# Patient Record
Sex: FEMALE | Race: WHITE | NOT HISPANIC OR LATINO | Employment: UNEMPLOYED | URBAN - METROPOLITAN AREA
[De-identification: names, ages, dates, MRNs, and addresses within clinical notes are randomized per-mention and may not be internally consistent; named-entity substitution may affect disease eponyms.]

---

## 2017-04-04 ENCOUNTER — GENERIC CONVERSION - ENCOUNTER (OUTPATIENT)
Dept: OTHER | Facility: OTHER | Age: 27
End: 2017-04-04

## 2017-04-05 ENCOUNTER — ALLSCRIPTS OFFICE VISIT (OUTPATIENT)
Dept: OTHER | Facility: OTHER | Age: 27
End: 2017-04-05

## 2017-04-05 ENCOUNTER — APPOINTMENT (INPATIENT)
Dept: CT IMAGING | Facility: HOSPITAL | Age: 27
DRG: 280 | End: 2017-04-05
Payer: COMMERCIAL

## 2017-04-05 ENCOUNTER — HOSPITAL ENCOUNTER (INPATIENT)
Facility: HOSPITAL | Age: 27
LOS: 6 days | Discharge: HOME | DRG: 280 | End: 2017-04-11
Attending: EMERGENCY MEDICINE | Admitting: INTERNAL MEDICINE
Payer: COMMERCIAL

## 2017-04-05 ENCOUNTER — APPOINTMENT (EMERGENCY)
Dept: ULTRASOUND IMAGING | Facility: HOSPITAL | Age: 27
DRG: 280 | End: 2017-04-05
Payer: COMMERCIAL

## 2017-04-05 DIAGNOSIS — R17 JAUNDICE: ICD-10-CM

## 2017-04-05 DIAGNOSIS — F41.0 PANIC DISORDER: Chronic | ICD-10-CM

## 2017-04-05 DIAGNOSIS — F10.10 ALCOHOL ABUSE: Primary | ICD-10-CM

## 2017-04-05 DIAGNOSIS — F31.63 BIPOLAR DISORDER, CURR EPISODE MIXED, SEVERE, W/O PSYCHOTIC FEATURES (HCC): ICD-10-CM

## 2017-04-05 DIAGNOSIS — R74.01 TRANSAMINITIS: ICD-10-CM

## 2017-04-05 DIAGNOSIS — E78.5 ELEVATED LIPIDS: ICD-10-CM

## 2017-04-05 DIAGNOSIS — R79.89 LFT ELEVATION: ICD-10-CM

## 2017-04-05 LAB
ALBUMIN SERPL BCP-MCNC: 2.5 G/DL (ref 3.5–5)
ALP SERPL-CCNC: 371 U/L (ref 46–116)
ALT SERPL W P-5'-P-CCNC: 120 U/L (ref 12–78)
AMPHETAMINES SERPL QL SCN: NEGATIVE
ANION GAP SERPL CALCULATED.3IONS-SCNC: 6 MMOL/L (ref 4–13)
APAP SERPL-MCNC: <2 UG/ML (ref 10–30)
APTT PPP: 30 SECONDS (ref 24–36)
AST SERPL W P-5'-P-CCNC: 346 U/L (ref 5–45)
BACTERIA UR QL AUTO: ABNORMAL /HPF
BARBITURATES UR QL: NEGATIVE
BASOPHILS # BLD AUTO: 0.05 THOUSANDS/ΜL (ref 0–0.1)
BASOPHILS NFR BLD AUTO: 1 % (ref 0–1)
BENZODIAZ UR QL: NEGATIVE
BILIRUB DIRECT SERPL-MCNC: 6.71 MG/DL (ref 0–0.2)
BILIRUB SERPL-MCNC: 10.78 MG/DL (ref 0.2–1)
BILIRUB UR QL STRIP: ABNORMAL
BUN SERPL-MCNC: 5 MG/DL (ref 5–25)
CALCIUM SERPL-MCNC: 8 MG/DL (ref 8.3–10.1)
CHLORIDE SERPL-SCNC: 98 MMOL/L (ref 100–108)
CLARITY UR: CLEAR
CO2 SERPL-SCNC: 31 MMOL/L (ref 21–32)
COCAINE UR QL: NEGATIVE
COLOR UR: ABNORMAL
COLOR, POC: NORMAL
CREAT SERPL-MCNC: 0.41 MG/DL (ref 0.6–1.3)
EOSINOPHIL # BLD AUTO: 0.08 THOUSAND/ΜL (ref 0–0.61)
EOSINOPHIL NFR BLD AUTO: 1 % (ref 0–6)
ERYTHROCYTE [DISTWIDTH] IN BLOOD BY AUTOMATED COUNT: 15.2 % (ref 11.6–15.1)
ETHANOL SERPL-MCNC: <3 MG/DL (ref 0–3)
GFR SERPL CREATININE-BSD FRML MDRD: >60 ML/MIN/1.73SQ M
GLUCOSE SERPL-MCNC: 94 MG/DL (ref 65–140)
GLUCOSE UR STRIP-MCNC: NEGATIVE MG/DL
HCG UR QL: NEGATIVE
HCT VFR BLD AUTO: 33.5 % (ref 34.8–46.1)
HGB BLD-MCNC: 10.6 G/DL
HGB BLD-MCNC: 11.2 G/DL (ref 11.5–15.4)
HGB UR QL STRIP.AUTO: ABNORMAL
INR PPP: 1.2 (ref 0.86–1.16)
KETONES UR STRIP-MCNC: ABNORMAL MG/DL
LEUKOCYTE ESTERASE UR QL STRIP: NEGATIVE
LIPASE SERPL-CCNC: 313 U/L (ref 73–393)
LYMPHOCYTES # BLD AUTO: 0.88 THOUSANDS/ΜL (ref 0.6–4.47)
LYMPHOCYTES NFR BLD AUTO: 8 % (ref 14–44)
MCH RBC QN AUTO: 33.4 PG (ref 26.8–34.3)
MCHC RBC AUTO-ENTMCNC: 33.4 G/DL (ref 31.4–37.4)
MCV RBC AUTO: 100 FL (ref 82–98)
METHADONE UR QL: NEGATIVE
MONOCYTES # BLD AUTO: 0.69 THOUSAND/ΜL (ref 0.17–1.22)
MONOCYTES NFR BLD AUTO: 7 % (ref 4–12)
NEUTROPHILS # BLD AUTO: 8.97 THOUSANDS/ΜL (ref 1.85–7.62)
NEUTS SEG NFR BLD AUTO: 83 % (ref 43–75)
NITRITE UR QL STRIP: NEGATIVE
NON-SQ EPI CELLS URNS QL MICRO: ABNORMAL /HPF
NRBC BLD AUTO-RTO: 0 /100 WBCS
OPIATES UR QL SCN: NEGATIVE
PCP UR QL: NEGATIVE
PH UR STRIP.AUTO: 7 [PH] (ref 4.5–8)
PLATELET # BLD AUTO: 220 THOUSANDS/UL (ref 149–390)
PMV BLD AUTO: 11.4 FL (ref 8.9–12.7)
POTASSIUM SERPL-SCNC: 4.9 MMOL/L (ref 3.5–5.3)
PROT SERPL-MCNC: 6.9 G/DL (ref 6.4–8.2)
PROT UR STRIP-MCNC: NEGATIVE MG/DL
PROTHROMBIN TIME: 15.2 SECONDS (ref 12–14.3)
RBC # BLD AUTO: 3.35 MILLION/UL (ref 3.81–5.12)
RBC #/AREA URNS AUTO: ABNORMAL /HPF
SALICYLATES SERPL-MCNC: <3 MG/DL (ref 3–20)
SODIUM SERPL-SCNC: 135 MMOL/L (ref 136–145)
SP GR UR STRIP.AUTO: 1.01 (ref 1–1.03)
THC UR QL: NEGATIVE
TSH SERPL DL<=0.05 MIU/L-ACNC: 4.68 UIU/ML (ref 0.36–3.74)
UROBILINOGEN UR QL STRIP.AUTO: 1 E.U./DL
WBC # BLD AUTO: 10.67 THOUSAND/UL (ref 4.31–10.16)
WBC #/AREA URNS AUTO: ABNORMAL /HPF

## 2017-04-05 PROCEDURE — 81002 URINALYSIS NONAUTO W/O SCOPE: CPT

## 2017-04-05 PROCEDURE — 81001 URINALYSIS AUTO W/SCOPE: CPT

## 2017-04-05 PROCEDURE — 80307 DRUG TEST PRSMV CHEM ANLYZR: CPT | Performed by: EMERGENCY MEDICINE

## 2017-04-05 PROCEDURE — 80048 BASIC METABOLIC PNL TOTAL CA: CPT | Performed by: EMERGENCY MEDICINE

## 2017-04-05 PROCEDURE — 76705 ECHO EXAM OF ABDOMEN: CPT

## 2017-04-05 PROCEDURE — 85610 PROTHROMBIN TIME: CPT | Performed by: EMERGENCY MEDICINE

## 2017-04-05 PROCEDURE — 99285 EMERGENCY DEPT VISIT HI MDM: CPT

## 2017-04-05 PROCEDURE — 81025 URINE PREGNANCY TEST: CPT

## 2017-04-05 PROCEDURE — 83690 ASSAY OF LIPASE: CPT | Performed by: EMERGENCY MEDICINE

## 2017-04-05 PROCEDURE — 80076 HEPATIC FUNCTION PANEL: CPT | Performed by: EMERGENCY MEDICINE

## 2017-04-05 PROCEDURE — 84443 ASSAY THYROID STIM HORMONE: CPT | Performed by: EMERGENCY MEDICINE

## 2017-04-05 PROCEDURE — 85025 COMPLETE CBC W/AUTO DIFF WBC: CPT | Performed by: EMERGENCY MEDICINE

## 2017-04-05 PROCEDURE — 87086 URINE CULTURE/COLONY COUNT: CPT

## 2017-04-05 PROCEDURE — 85730 THROMBOPLASTIN TIME PARTIAL: CPT | Performed by: EMERGENCY MEDICINE

## 2017-04-05 PROCEDURE — 36415 COLL VENOUS BLD VENIPUNCTURE: CPT | Performed by: EMERGENCY MEDICINE

## 2017-04-05 PROCEDURE — 74177 CT ABD & PELVIS W/CONTRAST: CPT

## 2017-04-05 PROCEDURE — 80320 DRUG SCREEN QUANTALCOHOLS: CPT | Performed by: EMERGENCY MEDICINE

## 2017-04-05 PROCEDURE — 80329 ANALGESICS NON-OPIOID 1 OR 2: CPT | Performed by: EMERGENCY MEDICINE

## 2017-04-05 RX ORDER — IBUPROFEN 400 MG/1
400 TABLET ORAL ONCE
Status: COMPLETED | OUTPATIENT
Start: 2017-04-05 | End: 2017-04-05

## 2017-04-05 RX ORDER — MAGNESIUM HYDROXIDE/ALUMINUM HYDROXICE/SIMETHICONE 120; 1200; 1200 MG/30ML; MG/30ML; MG/30ML
30 SUSPENSION ORAL EVERY 6 HOURS PRN
Status: DISCONTINUED | OUTPATIENT
Start: 2017-04-05 | End: 2017-04-11 | Stop reason: HOSPADM

## 2017-04-05 RX ORDER — ALBUMIN (HUMAN) 12.5 G/50ML
25 SOLUTION INTRAVENOUS ONCE
Status: COMPLETED | OUTPATIENT
Start: 2017-04-05 | End: 2017-04-07

## 2017-04-05 RX ORDER — ONDANSETRON 2 MG/ML
4 INJECTION INTRAMUSCULAR; INTRAVENOUS EVERY 6 HOURS PRN
Status: DISCONTINUED | OUTPATIENT
Start: 2017-04-05 | End: 2017-04-07

## 2017-04-05 RX ORDER — ACETAMINOPHEN 325 MG/1
650 TABLET ORAL EVERY 6 HOURS PRN
Status: DISCONTINUED | OUTPATIENT
Start: 2017-04-05 | End: 2017-04-07

## 2017-04-05 RX ORDER — SODIUM CHLORIDE 9 MG/ML
75 INJECTION, SOLUTION INTRAVENOUS CONTINUOUS
Status: DISCONTINUED | OUTPATIENT
Start: 2017-04-05 | End: 2017-04-07

## 2017-04-05 RX ORDER — LORAZEPAM 1 MG/1
0.5 TABLET ORAL ONCE
Status: COMPLETED | OUTPATIENT
Start: 2017-04-05 | End: 2017-04-05

## 2017-04-05 RX ORDER — QUETIAPINE FUMARATE 200 MG/1
400 TABLET, FILM COATED ORAL
Status: DISCONTINUED | OUTPATIENT
Start: 2017-04-05 | End: 2017-04-11 | Stop reason: HOSPADM

## 2017-04-05 RX ORDER — AZELASTINE 1 MG/ML
1 SPRAY, METERED NASAL 2 TIMES DAILY
COMMUNITY
End: 2017-05-18

## 2017-04-05 RX ORDER — FOLIC ACID 1 MG/1
1 TABLET ORAL DAILY
COMMUNITY

## 2017-04-05 RX ORDER — QUETIAPINE FUMARATE 25 MG/1
100 TABLET, FILM COATED ORAL 2 TIMES DAILY
Status: DISCONTINUED | OUTPATIENT
Start: 2017-04-05 | End: 2017-04-11 | Stop reason: HOSPADM

## 2017-04-05 RX ORDER — LEVETIRACETAM 500 MG/1
500 TABLET ORAL 2 TIMES DAILY
COMMUNITY
End: 2018-06-21

## 2017-04-05 RX ORDER — IBUPROFEN 400 MG/1
400 TABLET ORAL ONCE
Status: DISCONTINUED | OUTPATIENT
Start: 2017-04-06 | End: 2017-04-08

## 2017-04-05 RX ORDER — LORAZEPAM 0.5 MG/1
0.5 TABLET ORAL EVERY 6 HOURS PRN
Status: DISCONTINUED | OUTPATIENT
Start: 2017-04-05 | End: 2017-04-11 | Stop reason: HOSPADM

## 2017-04-05 RX ORDER — FOLIC ACID 1 MG/1
1 TABLET ORAL DAILY
Status: DISCONTINUED | OUTPATIENT
Start: 2017-04-05 | End: 2017-04-08

## 2017-04-05 RX ORDER — CHLORDIAZEPOXIDE HYDROCHLORIDE 10 MG/1
10 CAPSULE, GELATIN COATED ORAL EVERY 8 HOURS
Status: DISCONTINUED | OUTPATIENT
Start: 2017-04-05 | End: 2017-04-07

## 2017-04-05 RX ORDER — FOLIC ACID 1 MG/1
1 TABLET ORAL DAILY
Status: DISCONTINUED | OUTPATIENT
Start: 2017-04-06 | End: 2017-04-05

## 2017-04-05 RX ORDER — THIAMINE MONONITRATE (VIT B1) 100 MG
100 TABLET ORAL DAILY
Status: DISCONTINUED | OUTPATIENT
Start: 2017-04-05 | End: 2017-04-07

## 2017-04-05 RX ORDER — LEVETIRACETAM 500 MG/1
500 TABLET ORAL 2 TIMES DAILY
Status: DISCONTINUED | OUTPATIENT
Start: 2017-04-05 | End: 2017-04-11 | Stop reason: HOSPADM

## 2017-04-05 RX ADMIN — SODIUM CHLORIDE 75 ML/HR: 0.9 INJECTION, SOLUTION INTRAVENOUS at 23:32

## 2017-04-05 RX ADMIN — IOHEXOL 100 ML: 350 INJECTION, SOLUTION INTRAVENOUS at 23:02

## 2017-04-05 RX ADMIN — ALBUMIN HUMAN 25 G: 0.25 SOLUTION INTRAVENOUS at 22:00

## 2017-04-05 RX ADMIN — LEVETIRACETAM 500 MG: 500 TABLET, FILM COATED ORAL at 21:22

## 2017-04-05 RX ADMIN — IBUPROFEN 400 MG: 400 TABLET ORAL at 19:18

## 2017-04-05 RX ADMIN — FOLIC ACID 1 MG: 1 TABLET ORAL at 22:07

## 2017-04-05 RX ADMIN — ACETAMINOPHEN 650 MG: 325 TABLET, FILM COATED ORAL at 21:22

## 2017-04-05 RX ADMIN — LORAZEPAM 0.5 MG: 0.5 TABLET ORAL at 21:22

## 2017-04-05 RX ADMIN — LORAZEPAM 0.5 MG: 1 TABLET ORAL at 18:43

## 2017-04-05 RX ADMIN — Medication 100 MG: at 22:07

## 2017-04-05 RX ADMIN — CHLORDIAZEPOXIDE HYDROCHLORIDE 10 MG: 10 CAPSULE ORAL at 21:23

## 2017-04-05 RX ADMIN — QUETIAPINE FUMARATE 100 MG: 25 TABLET, FILM COATED ORAL at 21:22

## 2017-04-06 ENCOUNTER — APPOINTMENT (INPATIENT)
Dept: NUCLEAR MEDICINE | Facility: HOSPITAL | Age: 27
DRG: 280 | End: 2017-04-06
Payer: COMMERCIAL

## 2017-04-06 LAB
ALBUMIN SERPL BCP-MCNC: 2.5 G/DL (ref 3.5–5)
ALP SERPL-CCNC: 286 U/L (ref 46–116)
ALT SERPL W P-5'-P-CCNC: 83 U/L (ref 12–78)
ANION GAP SERPL CALCULATED.3IONS-SCNC: 7 MMOL/L (ref 4–13)
AST SERPL W P-5'-P-CCNC: 204 U/L (ref 5–45)
BACTERIA UR CULT: NORMAL
BILIRUB DIRECT SERPL-MCNC: 6.59 MG/DL (ref 0–0.2)
BILIRUB SERPL-MCNC: 8.28 MG/DL (ref 0.2–1)
BUN SERPL-MCNC: 6 MG/DL (ref 5–25)
CALCIUM SERPL-MCNC: 7.7 MG/DL (ref 8.3–10.1)
CHLORIDE SERPL-SCNC: 103 MMOL/L (ref 100–108)
CO2 SERPL-SCNC: 26 MMOL/L (ref 21–32)
CREAT SERPL-MCNC: 0.4 MG/DL (ref 0.6–1.3)
ERYTHROCYTE [DISTWIDTH] IN BLOOD BY AUTOMATED COUNT: 15.3 % (ref 11.6–15.1)
FOLATE SERPL-MCNC: 17.7 NG/ML (ref 3.1–17.5)
GFR SERPL CREATININE-BSD FRML MDRD: >60 ML/MIN/1.73SQ M
GLUCOSE SERPL-MCNC: 92 MG/DL (ref 65–140)
HCT VFR BLD AUTO: 30.5 % (ref 34.8–46.1)
HGB BLD-MCNC: 9.8 G/DL (ref 11.5–15.4)
INR PPP: 1.25 (ref 0.86–1.16)
MCH RBC QN AUTO: 32 PG (ref 26.8–34.3)
MCHC RBC AUTO-ENTMCNC: 32.1 G/DL (ref 31.4–37.4)
MCV RBC AUTO: 100 FL (ref 82–98)
PLATELET # BLD AUTO: 180 THOUSANDS/UL (ref 149–390)
PMV BLD AUTO: 11.1 FL (ref 8.9–12.7)
POTASSIUM SERPL-SCNC: 3 MMOL/L (ref 3.5–5.3)
PROT SERPL-MCNC: 6.1 G/DL (ref 6.4–8.2)
PROTHROMBIN TIME: 15.6 SECONDS (ref 12–14.3)
RBC # BLD AUTO: 3.06 MILLION/UL (ref 3.81–5.12)
SODIUM SERPL-SCNC: 136 MMOL/L (ref 136–145)
VIT B12 SERPL-MCNC: 1350 PG/ML (ref 100–900)
WBC # BLD AUTO: 8.24 THOUSAND/UL (ref 4.31–10.16)

## 2017-04-06 PROCEDURE — 80048 BASIC METABOLIC PNL TOTAL CA: CPT | Performed by: PHYSICIAN ASSISTANT

## 2017-04-06 PROCEDURE — 85027 COMPLETE CBC AUTOMATED: CPT | Performed by: PHYSICIAN ASSISTANT

## 2017-04-06 PROCEDURE — A9537 TC99M MEBROFENIN: HCPCS

## 2017-04-06 PROCEDURE — 80076 HEPATIC FUNCTION PANEL: CPT | Performed by: PHYSICIAN ASSISTANT

## 2017-04-06 PROCEDURE — 82746 ASSAY OF FOLIC ACID SERUM: CPT | Performed by: PHYSICIAN ASSISTANT

## 2017-04-06 PROCEDURE — 83625 ASSAY OF LDH ENZYMES: CPT | Performed by: PHYSICIAN ASSISTANT

## 2017-04-06 PROCEDURE — 82607 VITAMIN B-12: CPT | Performed by: PHYSICIAN ASSISTANT

## 2017-04-06 PROCEDURE — 85610 PROTHROMBIN TIME: CPT | Performed by: PHYSICIAN ASSISTANT

## 2017-04-06 PROCEDURE — 83615 LACTATE (LD) (LDH) ENZYME: CPT | Performed by: PHYSICIAN ASSISTANT

## 2017-04-06 PROCEDURE — 78227 HEPATOBIL SYST IMAGE W/DRUG: CPT

## 2017-04-06 RX ORDER — MORPHINE SULFATE 4 MG/ML
3 INJECTION, SOLUTION INTRAMUSCULAR; INTRAVENOUS
Status: COMPLETED | OUTPATIENT
Start: 2017-04-06 | End: 2017-04-06

## 2017-04-06 RX ORDER — LORAZEPAM 2 MG/ML
0.5 INJECTION INTRAMUSCULAR ONCE
Status: COMPLETED | OUTPATIENT
Start: 2017-04-06 | End: 2017-04-06

## 2017-04-06 RX ORDER — POTASSIUM CHLORIDE 20 MEQ/1
40 TABLET, EXTENDED RELEASE ORAL 2 TIMES DAILY
Status: DISPENSED | OUTPATIENT
Start: 2017-04-06 | End: 2017-04-07

## 2017-04-06 RX ORDER — PANTOPRAZOLE SODIUM 40 MG/1
40 TABLET, DELAYED RELEASE ORAL
Status: DISCONTINUED | OUTPATIENT
Start: 2017-04-06 | End: 2017-04-11 | Stop reason: HOSPADM

## 2017-04-06 RX ADMIN — ACETAMINOPHEN 650 MG: 325 TABLET, FILM COATED ORAL at 19:40

## 2017-04-06 RX ADMIN — LORAZEPAM 0.5 MG: 0.5 TABLET ORAL at 16:31

## 2017-04-06 RX ADMIN — LEVETIRACETAM 500 MG: 500 TABLET, FILM COATED ORAL at 10:53

## 2017-04-06 RX ADMIN — Medication 100 MG: at 10:53

## 2017-04-06 RX ADMIN — LORAZEPAM 0.5 MG: 0.5 TABLET ORAL at 22:32

## 2017-04-06 RX ADMIN — SODIUM CHLORIDE 75 ML/HR: 0.9 INJECTION, SOLUTION INTRAVENOUS at 15:32

## 2017-04-06 RX ADMIN — MORPHINE SULFATE 3 MG: 4 INJECTION, SOLUTION INTRAMUSCULAR; INTRAVENOUS at 15:15

## 2017-04-06 RX ADMIN — LORAZEPAM 0.5 MG: 2 INJECTION, SOLUTION INTRAMUSCULAR; INTRAVENOUS at 11:59

## 2017-04-06 RX ADMIN — PANTOPRAZOLE SODIUM 40 MG: 40 TABLET, DELAYED RELEASE ORAL at 16:29

## 2017-04-06 RX ADMIN — FOLIC ACID 1 MG: 1 TABLET ORAL at 10:53

## 2017-04-06 RX ADMIN — LEVETIRACETAM 500 MG: 500 TABLET, FILM COATED ORAL at 19:24

## 2017-04-06 RX ADMIN — ENOXAPARIN SODIUM 40 MG: 40 INJECTION SUBCUTANEOUS at 10:52

## 2017-04-06 RX ADMIN — POTASSIUM CHLORIDE 40 MEQ: 1500 TABLET, EXTENDED RELEASE ORAL at 19:24

## 2017-04-07 LAB
ALBUMIN SERPL BCP-MCNC: 2.4 G/DL (ref 3.5–5)
ALP SERPL-CCNC: 267 U/L (ref 46–116)
ALT SERPL W P-5'-P-CCNC: 77 U/L (ref 12–78)
ANION GAP SERPL CALCULATED.3IONS-SCNC: 7 MMOL/L (ref 4–13)
AST SERPL W P-5'-P-CCNC: 178 U/L (ref 5–45)
BILIRUB DIRECT SERPL-MCNC: 6.07 MG/DL (ref 0–0.2)
BILIRUB SERPL-MCNC: 7.23 MG/DL (ref 0.2–1)
BUN SERPL-MCNC: 6 MG/DL (ref 5–25)
CALCIUM SERPL-MCNC: 7.7 MG/DL (ref 8.3–10.1)
CHLORIDE SERPL-SCNC: 104 MMOL/L (ref 100–108)
CO2 SERPL-SCNC: 24 MMOL/L (ref 21–32)
CREAT SERPL-MCNC: 0.44 MG/DL (ref 0.6–1.3)
ERYTHROCYTE [DISTWIDTH] IN BLOOD BY AUTOMATED COUNT: 15.2 % (ref 11.6–15.1)
GFR SERPL CREATININE-BSD FRML MDRD: >60 ML/MIN/1.73SQ M
GLUCOSE SERPL-MCNC: 94 MG/DL (ref 65–140)
HCT VFR BLD AUTO: 31.6 % (ref 34.8–46.1)
HGB BLD-MCNC: 10.3 G/DL (ref 11.5–15.4)
INR PPP: 1.17 (ref 0.86–1.16)
MCH RBC QN AUTO: 33.2 PG (ref 26.8–34.3)
MCHC RBC AUTO-ENTMCNC: 32.6 G/DL (ref 31.4–37.4)
MCV RBC AUTO: 102 FL (ref 82–98)
PLATELET # BLD AUTO: 193 THOUSANDS/UL (ref 149–390)
PMV BLD AUTO: 10.8 FL (ref 8.9–12.7)
POTASSIUM SERPL-SCNC: 3.9 MMOL/L (ref 3.5–5.3)
PROT SERPL-MCNC: 6.1 G/DL (ref 6.4–8.2)
PROTHROMBIN TIME: 14.9 SECONDS (ref 12–14.3)
RBC # BLD AUTO: 3.1 MILLION/UL (ref 3.81–5.12)
SODIUM SERPL-SCNC: 135 MMOL/L (ref 136–145)
WBC # BLD AUTO: 10.9 THOUSAND/UL (ref 4.31–10.16)

## 2017-04-07 PROCEDURE — 85610 PROTHROMBIN TIME: CPT | Performed by: INTERNAL MEDICINE

## 2017-04-07 PROCEDURE — 85027 COMPLETE CBC AUTOMATED: CPT | Performed by: INTERNAL MEDICINE

## 2017-04-07 PROCEDURE — 80076 HEPATIC FUNCTION PANEL: CPT | Performed by: INTERNAL MEDICINE

## 2017-04-07 PROCEDURE — 80048 BASIC METABOLIC PNL TOTAL CA: CPT | Performed by: INTERNAL MEDICINE

## 2017-04-07 RX ORDER — DEXTROSE AND SODIUM CHLORIDE 5; .9 G/100ML; G/100ML
100 INJECTION, SOLUTION INTRAVENOUS CONTINUOUS
Status: DISCONTINUED | OUTPATIENT
Start: 2017-04-07 | End: 2017-04-10

## 2017-04-07 RX ADMIN — CHLORDIAZEPOXIDE HYDROCHLORIDE 10 MG: 10 CAPSULE ORAL at 15:10

## 2017-04-07 RX ADMIN — LEVETIRACETAM 500 MG: 500 TABLET, FILM COATED ORAL at 20:46

## 2017-04-07 RX ADMIN — LORAZEPAM 0.5 MG: 0.5 TABLET ORAL at 09:18

## 2017-04-07 RX ADMIN — ENOXAPARIN SODIUM 40 MG: 40 INJECTION SUBCUTANEOUS at 09:17

## 2017-04-07 RX ADMIN — SODIUM CHLORIDE 75 ML/HR: 0.9 INJECTION, SOLUTION INTRAVENOUS at 04:39

## 2017-04-07 RX ADMIN — DEXTROSE AND SODIUM CHLORIDE 100 ML/HR: 5; .9 INJECTION, SOLUTION INTRAVENOUS at 15:20

## 2017-04-07 RX ADMIN — LORAZEPAM 0.5 MG: 0.5 TABLET ORAL at 21:36

## 2017-04-07 RX ADMIN — POTASSIUM CHLORIDE 40 MEQ: 1500 TABLET, EXTENDED RELEASE ORAL at 09:17

## 2017-04-07 RX ADMIN — THIAMINE HYDROCHLORIDE 100 MG: 100 INJECTION, SOLUTION INTRAMUSCULAR; INTRAVENOUS at 15:54

## 2017-04-07 RX ADMIN — PANTOPRAZOLE SODIUM 40 MG: 40 TABLET, DELAYED RELEASE ORAL at 05:47

## 2017-04-07 RX ADMIN — Medication 100 MG: at 09:17

## 2017-04-07 RX ADMIN — LEVETIRACETAM 500 MG: 500 TABLET, FILM COATED ORAL at 09:30

## 2017-04-07 RX ADMIN — FOLIC ACID 1 MG: 1 TABLET ORAL at 09:17

## 2017-04-07 RX ADMIN — CHLORDIAZEPOXIDE HYDROCHLORIDE 15 MG: 10 CAPSULE ORAL at 20:46

## 2017-04-07 RX ADMIN — LORAZEPAM 0.5 MG: 0.5 TABLET ORAL at 15:21

## 2017-04-08 LAB
ALBUMIN SERPL BCP-MCNC: 2.3 G/DL (ref 3.5–5)
ALP SERPL-CCNC: 265 U/L (ref 46–116)
ALT SERPL W P-5'-P-CCNC: 70 U/L (ref 12–78)
ANION GAP SERPL CALCULATED.3IONS-SCNC: 8 MMOL/L (ref 4–13)
AST SERPL W P-5'-P-CCNC: 173 U/L (ref 5–45)
BILIRUB SERPL-MCNC: 5.7 MG/DL (ref 0.2–1)
BUN SERPL-MCNC: 4 MG/DL (ref 5–25)
CALCIUM SERPL-MCNC: 7.7 MG/DL (ref 8.3–10.1)
CHLORIDE SERPL-SCNC: 104 MMOL/L (ref 100–108)
CO2 SERPL-SCNC: 22 MMOL/L (ref 21–32)
CREAT SERPL-MCNC: 0.49 MG/DL (ref 0.6–1.3)
ERYTHROCYTE [DISTWIDTH] IN BLOOD BY AUTOMATED COUNT: 15.6 % (ref 11.6–15.1)
GFR SERPL CREATININE-BSD FRML MDRD: >60 ML/MIN/1.73SQ M
GLUCOSE SERPL-MCNC: 122 MG/DL (ref 65–140)
HCT VFR BLD AUTO: 30.6 % (ref 34.8–46.1)
HGB BLD-MCNC: 9.9 G/DL (ref 11.5–15.4)
INR PPP: 1.14 (ref 0.86–1.16)
LDH SERPL-CCNC: 202 IU/L (ref 119–226)
LDH1 CFR SERPL ELPH: 16 % (ref 17–32)
LDH2 CFR SERPL ELPH: 27 % (ref 25–40)
LDH3 CFR SERPL ELPH: 21 % (ref 17–27)
LDH4 CFR SERPL ELPH: 9 % (ref 5–13)
LDH5 CFR SERPL ELPH: 27 % (ref 4–20)
MCH RBC QN AUTO: 33.3 PG (ref 26.8–34.3)
MCHC RBC AUTO-ENTMCNC: 32.4 G/DL (ref 31.4–37.4)
MCV RBC AUTO: 103 FL (ref 82–98)
PLATELET # BLD AUTO: 188 THOUSANDS/UL (ref 149–390)
PMV BLD AUTO: 11.1 FL (ref 8.9–12.7)
POTASSIUM SERPL-SCNC: 3.4 MMOL/L (ref 3.5–5.3)
PROT SERPL-MCNC: 6.1 G/DL (ref 6.4–8.2)
PROTHROMBIN TIME: 14.6 SECONDS (ref 12–14.3)
RBC # BLD AUTO: 2.97 MILLION/UL (ref 3.81–5.12)
SODIUM SERPL-SCNC: 134 MMOL/L (ref 136–145)
WBC # BLD AUTO: 10.32 THOUSAND/UL (ref 4.31–10.16)

## 2017-04-08 PROCEDURE — 85027 COMPLETE CBC AUTOMATED: CPT | Performed by: INTERNAL MEDICINE

## 2017-04-08 PROCEDURE — 80053 COMPREHEN METABOLIC PANEL: CPT | Performed by: INTERNAL MEDICINE

## 2017-04-08 PROCEDURE — 85610 PROTHROMBIN TIME: CPT | Performed by: INTERNAL MEDICINE

## 2017-04-08 RX ORDER — FERROUS SULFATE 325(65) MG
325 TABLET ORAL
Status: DISCONTINUED | OUTPATIENT
Start: 2017-04-09 | End: 2017-04-11 | Stop reason: HOSPADM

## 2017-04-08 RX ORDER — CALCIUM CARBONATE 500(1250)
1 TABLET ORAL 2 TIMES DAILY WITH MEALS
Status: DISCONTINUED | OUTPATIENT
Start: 2017-04-08 | End: 2017-04-11 | Stop reason: HOSPADM

## 2017-04-08 RX ORDER — POTASSIUM CHLORIDE 20 MEQ/1
40 TABLET, EXTENDED RELEASE ORAL ONCE
Status: COMPLETED | OUTPATIENT
Start: 2017-04-08 | End: 2017-04-08

## 2017-04-08 RX ORDER — LACTULOSE 20 G/30ML
10 SOLUTION ORAL 2 TIMES DAILY
Status: DISCONTINUED | OUTPATIENT
Start: 2017-04-08 | End: 2017-04-11 | Stop reason: HOSPADM

## 2017-04-08 RX ADMIN — LORAZEPAM 0.5 MG: 0.5 TABLET ORAL at 17:45

## 2017-04-08 RX ADMIN — THIAMINE HYDROCHLORIDE 100 MG: 100 INJECTION, SOLUTION INTRAMUSCULAR; INTRAVENOUS at 17:38

## 2017-04-08 RX ADMIN — PANTOPRAZOLE SODIUM 40 MG: 40 TABLET, DELAYED RELEASE ORAL at 06:08

## 2017-04-08 RX ADMIN — LEVETIRACETAM 500 MG: 500 TABLET, FILM COATED ORAL at 09:50

## 2017-04-08 RX ADMIN — CHLORDIAZEPOXIDE HYDROCHLORIDE 15 MG: 10 CAPSULE ORAL at 20:15

## 2017-04-08 RX ADMIN — FOLIC ACID 1 MG: 1 TABLET ORAL at 09:48

## 2017-04-08 RX ADMIN — LEVETIRACETAM 500 MG: 500 TABLET, FILM COATED ORAL at 20:08

## 2017-04-08 RX ADMIN — POTASSIUM CHLORIDE 40 MEQ: 1500 TABLET, EXTENDED RELEASE ORAL at 11:36

## 2017-04-08 RX ADMIN — ENOXAPARIN SODIUM 40 MG: 40 INJECTION SUBCUTANEOUS at 09:48

## 2017-04-08 RX ADMIN — LORAZEPAM 0.5 MG: 0.5 TABLET ORAL at 23:45

## 2017-04-08 RX ADMIN — DEXTROSE AND SODIUM CHLORIDE 100 ML/HR: 5; .9 INJECTION, SOLUTION INTRAVENOUS at 11:39

## 2017-04-08 RX ADMIN — Medication 1 TABLET: at 17:32

## 2017-04-08 RX ADMIN — CHLORDIAZEPOXIDE HYDROCHLORIDE 15 MG: 10 CAPSULE ORAL at 13:08

## 2017-04-08 RX ADMIN — CHLORDIAZEPOXIDE HYDROCHLORIDE 15 MG: 10 CAPSULE ORAL at 04:34

## 2017-04-08 RX ADMIN — LORAZEPAM 0.5 MG: 0.5 TABLET ORAL at 11:36

## 2017-04-08 RX ADMIN — LORAZEPAM 0.5 MG: 0.5 TABLET ORAL at 04:34

## 2017-04-08 RX ADMIN — DEXTROSE AND SODIUM CHLORIDE 100 ML/HR: 5; .9 INJECTION, SOLUTION INTRAVENOUS at 19:39

## 2017-04-09 ENCOUNTER — ANESTHESIA EVENT (INPATIENT)
Dept: GASTROENTEROLOGY | Facility: HOSPITAL | Age: 27
DRG: 280 | End: 2017-04-09
Payer: COMMERCIAL

## 2017-04-09 LAB
ALBUMIN SERPL BCP-MCNC: 2.4 G/DL (ref 3.5–5)
ALP SERPL-CCNC: 262 U/L (ref 46–116)
ALT SERPL W P-5'-P-CCNC: 73 U/L (ref 12–78)
AMMONIA PLAS-SCNC: <10 UMOL/L (ref 11–35)
ANION GAP SERPL CALCULATED.3IONS-SCNC: 7 MMOL/L (ref 4–13)
AST SERPL W P-5'-P-CCNC: 172 U/L (ref 5–45)
BASOPHILS # BLD AUTO: 0.03 THOUSANDS/ΜL (ref 0–0.1)
BASOPHILS NFR BLD AUTO: 0 % (ref 0–1)
BILIRUB SERPL-MCNC: 5.15 MG/DL (ref 0.2–1)
BUN SERPL-MCNC: 4 MG/DL (ref 5–25)
CALCIUM SERPL-MCNC: 8.4 MG/DL (ref 8.3–10.1)
CHLORIDE SERPL-SCNC: 104 MMOL/L (ref 100–108)
CO2 SERPL-SCNC: 26 MMOL/L (ref 21–32)
CREAT SERPL-MCNC: 0.47 MG/DL (ref 0.6–1.3)
EOSINOPHIL # BLD AUTO: 0.07 THOUSAND/ΜL (ref 0–0.61)
EOSINOPHIL NFR BLD AUTO: 1 % (ref 0–6)
ERYTHROCYTE [DISTWIDTH] IN BLOOD BY AUTOMATED COUNT: 15.8 % (ref 11.6–15.1)
GFR SERPL CREATININE-BSD FRML MDRD: >60 ML/MIN/1.73SQ M
GLUCOSE SERPL-MCNC: 102 MG/DL (ref 65–140)
HCT VFR BLD AUTO: 33.2 % (ref 34.8–46.1)
HGB BLD-MCNC: 10.8 G/DL (ref 11.5–15.4)
LYMPHOCYTES # BLD AUTO: 1.13 THOUSANDS/ΜL (ref 0.6–4.47)
LYMPHOCYTES NFR BLD AUTO: 13 % (ref 14–44)
MCH RBC QN AUTO: 33.9 PG (ref 26.8–34.3)
MCHC RBC AUTO-ENTMCNC: 32.5 G/DL (ref 31.4–37.4)
MCV RBC AUTO: 104 FL (ref 82–98)
MONOCYTES # BLD AUTO: 0.92 THOUSAND/ΜL (ref 0.17–1.22)
MONOCYTES NFR BLD AUTO: 10 % (ref 4–12)
NEUTROPHILS # BLD AUTO: 6.85 THOUSANDS/ΜL (ref 1.85–7.62)
NEUTS SEG NFR BLD AUTO: 76 % (ref 43–75)
NRBC BLD AUTO-RTO: 0 /100 WBCS
PLATELET # BLD AUTO: 174 THOUSANDS/UL (ref 149–390)
PMV BLD AUTO: 11.2 FL (ref 8.9–12.7)
POTASSIUM SERPL-SCNC: 3.6 MMOL/L (ref 3.5–5.3)
PROT SERPL-MCNC: 6.6 G/DL (ref 6.4–8.2)
RBC # BLD AUTO: 3.19 MILLION/UL (ref 3.81–5.12)
SODIUM SERPL-SCNC: 137 MMOL/L (ref 136–145)
WBC # BLD AUTO: 9 THOUSAND/UL (ref 4.31–10.16)

## 2017-04-09 PROCEDURE — 85025 COMPLETE CBC W/AUTO DIFF WBC: CPT | Performed by: INTERNAL MEDICINE

## 2017-04-09 PROCEDURE — 80053 COMPREHEN METABOLIC PANEL: CPT | Performed by: INTERNAL MEDICINE

## 2017-04-09 PROCEDURE — 82140 ASSAY OF AMMONIA: CPT | Performed by: INTERNAL MEDICINE

## 2017-04-09 RX ADMIN — ENOXAPARIN SODIUM 40 MG: 40 INJECTION SUBCUTANEOUS at 08:18

## 2017-04-09 RX ADMIN — FERROUS SULFATE TAB 325 MG (65 MG ELEMENTAL FE) 325 MG: 325 (65 FE) TAB at 08:18

## 2017-04-09 RX ADMIN — LORAZEPAM 0.5 MG: 0.5 TABLET ORAL at 06:27

## 2017-04-09 RX ADMIN — LORAZEPAM 0.5 MG: 0.5 TABLET ORAL at 18:53

## 2017-04-09 RX ADMIN — CHLORDIAZEPOXIDE HYDROCHLORIDE 15 MG: 10 CAPSULE ORAL at 21:29

## 2017-04-09 RX ADMIN — Medication 1 TABLET: at 16:20

## 2017-04-09 RX ADMIN — DEXTROSE AND SODIUM CHLORIDE 100 ML/HR: 5; .9 INJECTION, SOLUTION INTRAVENOUS at 16:47

## 2017-04-09 RX ADMIN — PANTOPRAZOLE SODIUM 40 MG: 40 TABLET, DELAYED RELEASE ORAL at 05:02

## 2017-04-09 RX ADMIN — CHLORDIAZEPOXIDE HYDROCHLORIDE 15 MG: 10 CAPSULE ORAL at 12:52

## 2017-04-09 RX ADMIN — DEXTROSE AND SODIUM CHLORIDE 100 ML/HR: 5; .9 INJECTION, SOLUTION INTRAVENOUS at 06:24

## 2017-04-09 RX ADMIN — Medication 1 TABLET: at 08:18

## 2017-04-09 RX ADMIN — LEVETIRACETAM 500 MG: 500 TABLET, FILM COATED ORAL at 11:03

## 2017-04-09 RX ADMIN — LORAZEPAM 0.5 MG: 0.5 TABLET ORAL at 12:52

## 2017-04-09 RX ADMIN — CHLORDIAZEPOXIDE HYDROCHLORIDE 15 MG: 10 CAPSULE ORAL at 04:15

## 2017-04-09 RX ADMIN — LEVETIRACETAM 500 MG: 500 TABLET, FILM COATED ORAL at 21:29

## 2017-04-10 ENCOUNTER — ANESTHESIA (INPATIENT)
Dept: GASTROENTEROLOGY | Facility: HOSPITAL | Age: 27
DRG: 280 | End: 2017-04-10
Payer: COMMERCIAL

## 2017-04-10 PROCEDURE — 0DJ08ZZ INSPECTION OF UPPER INTESTINAL TRACT, VIA NATURAL OR ARTIFICIAL OPENING ENDOSCOPIC: ICD-10-PCS | Performed by: INTERNAL MEDICINE

## 2017-04-10 RX ORDER — CHLORDIAZEPOXIDE HYDROCHLORIDE 10 MG/1
10 CAPSULE, GELATIN COATED ORAL EVERY 8 HOURS
Status: DISCONTINUED | OUTPATIENT
Start: 2017-04-10 | End: 2017-04-11 | Stop reason: HOSPADM

## 2017-04-10 RX ORDER — SODIUM CHLORIDE 9 MG/ML
INJECTION, SOLUTION INTRAVENOUS CONTINUOUS PRN
Status: DISCONTINUED | OUTPATIENT
Start: 2017-04-10 | End: 2017-04-10 | Stop reason: SURG

## 2017-04-10 RX ORDER — PROPOFOL 10 MG/ML
INJECTION, EMULSION INTRAVENOUS AS NEEDED
Status: DISCONTINUED | OUTPATIENT
Start: 2017-04-10 | End: 2017-04-10 | Stop reason: SURG

## 2017-04-10 RX ORDER — DEXTROSE AND SODIUM CHLORIDE 5; .9 G/100ML; G/100ML
75 INJECTION, SOLUTION INTRAVENOUS CONTINUOUS
Status: DISCONTINUED | OUTPATIENT
Start: 2017-04-10 | End: 2017-04-11

## 2017-04-10 RX ADMIN — FERROUS SULFATE TAB 325 MG (65 MG ELEMENTAL FE) 325 MG: 325 (65 FE) TAB at 09:25

## 2017-04-10 RX ADMIN — CHLORDIAZEPOXIDE HYDROCHLORIDE 15 MG: 10 CAPSULE ORAL at 05:05

## 2017-04-10 RX ADMIN — LORAZEPAM 0.5 MG: 0.5 TABLET ORAL at 11:51

## 2017-04-10 RX ADMIN — Medication 1 TABLET: at 09:25

## 2017-04-10 RX ADMIN — PROPOFOL 30 MG: 10 INJECTION, EMULSION INTRAVENOUS at 13:04

## 2017-04-10 RX ADMIN — PROPOFOL 170 MG: 10 INJECTION, EMULSION INTRAVENOUS at 13:00

## 2017-04-10 RX ADMIN — LORAZEPAM 0.5 MG: 0.5 TABLET ORAL at 18:32

## 2017-04-10 RX ADMIN — SODIUM CHLORIDE: 0.9 INJECTION, SOLUTION INTRAVENOUS at 12:56

## 2017-04-10 RX ADMIN — LORAZEPAM 0.5 MG: 0.5 TABLET ORAL at 00:56

## 2017-04-10 RX ADMIN — PANTOPRAZOLE SODIUM 40 MG: 40 TABLET, DELAYED RELEASE ORAL at 05:05

## 2017-04-10 RX ADMIN — LEVETIRACETAM 500 MG: 500 TABLET, FILM COATED ORAL at 09:25

## 2017-04-10 RX ADMIN — Medication 1 TABLET: at 16:40

## 2017-04-10 RX ADMIN — DEXTROSE AND SODIUM CHLORIDE 100 ML/HR: 5; .9 INJECTION, SOLUTION INTRAVENOUS at 03:09

## 2017-04-10 RX ADMIN — DEXTROSE AND SODIUM CHLORIDE 100 ML/HR: 5; .9 INJECTION, SOLUTION INTRAVENOUS at 12:17

## 2017-04-10 RX ADMIN — CHLORDIAZEPOXIDE HYDROCHLORIDE 10 MG: 10 CAPSULE ORAL at 21:54

## 2017-04-10 RX ADMIN — LEVETIRACETAM 500 MG: 500 TABLET, FILM COATED ORAL at 21:54

## 2017-04-10 RX ADMIN — THIAMINE HYDROCHLORIDE 100 MG: 100 INJECTION, SOLUTION INTRAMUSCULAR; INTRAVENOUS at 16:40

## 2017-04-11 VITALS
OXYGEN SATURATION: 97 % | HEART RATE: 88 BPM | BODY MASS INDEX: 24.55 KG/M2 | RESPIRATION RATE: 18 BRPM | DIASTOLIC BLOOD PRESSURE: 73 MMHG | HEIGHT: 68 IN | WEIGHT: 162 LBS | SYSTOLIC BLOOD PRESSURE: 127 MMHG | TEMPERATURE: 98.9 F

## 2017-04-11 RX ORDER — PANTOPRAZOLE SODIUM 40 MG/1
40 TABLET, DELAYED RELEASE ORAL
Qty: 30 TABLET | Refills: 0 | Status: SHIPPED | OUTPATIENT
Start: 2017-04-11 | End: 2018-06-21

## 2017-04-11 RX ORDER — CHLORDIAZEPOXIDE HYDROCHLORIDE 5 MG/1
5 CAPSULE, GELATIN COATED ORAL 3 TIMES DAILY PRN
Qty: 15 CAPSULE | Refills: 0 | Status: SHIPPED | OUTPATIENT
Start: 2017-04-11 | End: 2017-05-03 | Stop reason: HOSPADM

## 2017-04-11 RX ORDER — LACTULOSE 20 G/30ML
10 SOLUTION ORAL 2 TIMES DAILY
Qty: 900 ML | Refills: 0 | Status: SHIPPED | OUTPATIENT
Start: 2017-04-11 | End: 2017-06-07 | Stop reason: HOSPADM

## 2017-04-11 RX ORDER — CALCIUM CARBONATE 500(1250)
1 TABLET ORAL 2 TIMES DAILY WITH MEALS
Qty: 60 TABLET | Refills: 0 | Status: SHIPPED | OUTPATIENT
Start: 2017-04-11 | End: 2017-05-03 | Stop reason: HOSPADM

## 2017-04-11 RX ORDER — LORAZEPAM 1 MG/1
0.5 TABLET ORAL EVERY 8 HOURS PRN
Start: 2017-04-11

## 2017-04-11 RX ORDER — FERROUS SULFATE 325(65) MG
325 TABLET ORAL
Qty: 30 TABLET | Refills: 0 | Status: SHIPPED | OUTPATIENT
Start: 2017-04-11 | End: 2017-05-03 | Stop reason: HOSPADM

## 2017-04-11 RX ADMIN — DEXTROSE AND SODIUM CHLORIDE 75 ML/HR: 5; .9 INJECTION, SOLUTION INTRAVENOUS at 00:33

## 2017-04-11 RX ADMIN — FERROUS SULFATE TAB 325 MG (65 MG ELEMENTAL FE) 325 MG: 325 (65 FE) TAB at 08:44

## 2017-04-11 RX ADMIN — Medication 1 TABLET: at 08:44

## 2017-04-11 RX ADMIN — PANTOPRAZOLE SODIUM 40 MG: 40 TABLET, DELAYED RELEASE ORAL at 06:00

## 2017-04-11 RX ADMIN — LORAZEPAM 0.5 MG: 0.5 TABLET ORAL at 08:44

## 2017-04-11 RX ADMIN — LORAZEPAM 0.5 MG: 0.5 TABLET ORAL at 00:32

## 2017-04-11 RX ADMIN — CHLORDIAZEPOXIDE HYDROCHLORIDE 10 MG: 10 CAPSULE ORAL at 06:00

## 2017-04-11 RX ADMIN — LEVETIRACETAM 500 MG: 500 TABLET, FILM COATED ORAL at 08:44

## 2017-04-18 ENCOUNTER — ALLSCRIPTS OFFICE VISIT (OUTPATIENT)
Dept: OTHER | Facility: OTHER | Age: 27
End: 2017-04-18

## 2017-04-18 DIAGNOSIS — D64.9 ANEMIA: ICD-10-CM

## 2017-04-18 DIAGNOSIS — F10.10 UNCOMPLICATED ALCOHOL ABUSE: ICD-10-CM

## 2017-04-18 DIAGNOSIS — K76.6 PORTAL HYPERTENSION (HCC): ICD-10-CM

## 2017-04-18 DIAGNOSIS — K70.10 ALCOHOLIC HEPATITIS WITHOUT ASCITES: ICD-10-CM

## 2017-04-18 DIAGNOSIS — Z87.898 PERSONAL HISTORY OF OTHER SPECIFIED CONDITIONS: ICD-10-CM

## 2017-04-18 DIAGNOSIS — K85.90 ACUTE PANCREATITIS WITHOUT INFECTION OR NECROSIS: ICD-10-CM

## 2017-04-24 ENCOUNTER — ALLSCRIPTS OFFICE VISIT (OUTPATIENT)
Dept: OTHER | Facility: OTHER | Age: 27
End: 2017-04-24

## 2017-04-24 ENCOUNTER — GENERIC CONVERSION - ENCOUNTER (OUTPATIENT)
Dept: OTHER | Facility: OTHER | Age: 27
End: 2017-04-24

## 2017-04-27 ENCOUNTER — APPOINTMENT (EMERGENCY)
Dept: CT IMAGING | Facility: HOSPITAL | Age: 27
DRG: 282 | End: 2017-04-27
Payer: COMMERCIAL

## 2017-04-27 ENCOUNTER — HOSPITAL ENCOUNTER (INPATIENT)
Facility: HOSPITAL | Age: 27
LOS: 5 days | Discharge: HOME | DRG: 282 | End: 2017-05-03
Attending: EMERGENCY MEDICINE | Admitting: INTERNAL MEDICINE
Payer: COMMERCIAL

## 2017-04-27 DIAGNOSIS — K85.90 PANCREATITIS: Primary | ICD-10-CM

## 2017-04-27 DIAGNOSIS — F10.10 ALCOHOL ABUSE: ICD-10-CM

## 2017-04-27 DIAGNOSIS — K85.20 ALCOHOL-INDUCED ACUTE PANCREATITIS: ICD-10-CM

## 2017-04-27 DIAGNOSIS — R74.01 TRANSAMINITIS: ICD-10-CM

## 2017-04-27 LAB
ALBUMIN SERPL BCP-MCNC: 2.8 G/DL (ref 3.5–5)
ALP SERPL-CCNC: 165 U/L (ref 46–116)
ALT SERPL W P-5'-P-CCNC: 93 U/L (ref 12–78)
ANION GAP SERPL CALCULATED.3IONS-SCNC: 8 MMOL/L (ref 4–13)
AST SERPL W P-5'-P-CCNC: 230 U/L (ref 5–45)
BACTERIA UR QL AUTO: ABNORMAL /HPF
BASOPHILS # BLD AUTO: 0.03 THOUSANDS/ΜL (ref 0–0.1)
BASOPHILS NFR BLD AUTO: 0 % (ref 0–1)
BILIRUB SERPL-MCNC: 1.78 MG/DL (ref 0.2–1)
BILIRUB UR QL STRIP: ABNORMAL
BUN SERPL-MCNC: 4 MG/DL (ref 5–25)
CALCIUM SERPL-MCNC: 7.9 MG/DL (ref 8.3–10.1)
CHLORIDE SERPL-SCNC: 103 MMOL/L (ref 100–108)
CLARITY UR: CLEAR
CO2 SERPL-SCNC: 24 MMOL/L (ref 21–32)
COLOR UR: YELLOW
CREAT SERPL-MCNC: 0.47 MG/DL (ref 0.6–1.3)
EOSINOPHIL # BLD AUTO: 0.1 THOUSAND/ΜL (ref 0–0.61)
EOSINOPHIL NFR BLD AUTO: 1 % (ref 0–6)
ERYTHROCYTE [DISTWIDTH] IN BLOOD BY AUTOMATED COUNT: 13.9 % (ref 11.6–15.1)
GFR SERPL CREATININE-BSD FRML MDRD: >60 ML/MIN/1.73SQ M
GLUCOSE SERPL-MCNC: 101 MG/DL (ref 65–140)
GLUCOSE UR STRIP-MCNC: NEGATIVE MG/DL
HCG UR QL: NEGATIVE
HCT VFR BLD AUTO: 33.6 % (ref 34.8–46.1)
HGB BLD-MCNC: 11.3 G/DL (ref 11.5–15.4)
HGB UR QL STRIP.AUTO: ABNORMAL
KETONES UR STRIP-MCNC: NEGATIVE MG/DL
LEUKOCYTE ESTERASE UR QL STRIP: NEGATIVE
LIPASE SERPL-CCNC: 465 U/L (ref 73–393)
LYMPHOCYTES # BLD AUTO: 1.28 THOUSANDS/ΜL (ref 0.6–4.47)
LYMPHOCYTES NFR BLD AUTO: 8 % (ref 14–44)
MCH RBC QN AUTO: 33.2 PG (ref 26.8–34.3)
MCHC RBC AUTO-ENTMCNC: 33.6 G/DL (ref 31.4–37.4)
MCV RBC AUTO: 99 FL (ref 82–98)
MONOCYTES # BLD AUTO: 1.11 THOUSAND/ΜL (ref 0.17–1.22)
MONOCYTES NFR BLD AUTO: 7 % (ref 4–12)
NEUTROPHILS # BLD AUTO: 13.01 THOUSANDS/ΜL (ref 1.85–7.62)
NEUTS SEG NFR BLD AUTO: 84 % (ref 43–75)
NITRITE UR QL STRIP: NEGATIVE
NON-SQ EPI CELLS URNS QL MICRO: ABNORMAL /HPF
NRBC BLD AUTO-RTO: 0 /100 WBCS
PH UR STRIP.AUTO: 6.5 [PH] (ref 4.5–8)
PLATELET # BLD AUTO: 140 THOUSANDS/UL (ref 149–390)
PMV BLD AUTO: 10.4 FL (ref 8.9–12.7)
POTASSIUM SERPL-SCNC: 4.3 MMOL/L (ref 3.5–5.3)
PROT SERPL-MCNC: 7.7 G/DL (ref 6.4–8.2)
PROT UR STRIP-MCNC: ABNORMAL MG/DL
RBC # BLD AUTO: 3.4 MILLION/UL (ref 3.81–5.12)
RBC #/AREA URNS AUTO: ABNORMAL /HPF
SODIUM SERPL-SCNC: 135 MMOL/L (ref 136–145)
SP GR UR STRIP.AUTO: 1.02 (ref 1–1.03)
UROBILINOGEN UR QL STRIP.AUTO: 0.2 E.U./DL
WBC # BLD AUTO: 15.53 THOUSAND/UL (ref 4.31–10.16)
WBC #/AREA URNS AUTO: ABNORMAL /HPF

## 2017-04-27 PROCEDURE — 85025 COMPLETE CBC W/AUTO DIFF WBC: CPT | Performed by: EMERGENCY MEDICINE

## 2017-04-27 PROCEDURE — 96374 THER/PROPH/DIAG INJ IV PUSH: CPT

## 2017-04-27 PROCEDURE — 81001 URINALYSIS AUTO W/SCOPE: CPT

## 2017-04-27 PROCEDURE — 36415 COLL VENOUS BLD VENIPUNCTURE: CPT

## 2017-04-27 PROCEDURE — 80053 COMPREHEN METABOLIC PANEL: CPT

## 2017-04-27 PROCEDURE — 81002 URINALYSIS NONAUTO W/O SCOPE: CPT

## 2017-04-27 PROCEDURE — 83690 ASSAY OF LIPASE: CPT | Performed by: EMERGENCY MEDICINE

## 2017-04-27 PROCEDURE — 96375 TX/PRO/DX INJ NEW DRUG ADDON: CPT

## 2017-04-27 PROCEDURE — 87086 URINE CULTURE/COLONY COUNT: CPT

## 2017-04-27 PROCEDURE — 81025 URINE PREGNANCY TEST: CPT

## 2017-04-27 PROCEDURE — 96361 HYDRATE IV INFUSION ADD-ON: CPT

## 2017-04-27 PROCEDURE — 74177 CT ABD & PELVIS W/CONTRAST: CPT

## 2017-04-27 RX ORDER — ONDANSETRON 2 MG/ML
4 INJECTION INTRAMUSCULAR; INTRAVENOUS ONCE
Status: COMPLETED | OUTPATIENT
Start: 2017-04-27 | End: 2017-04-27

## 2017-04-27 RX ORDER — LORAZEPAM 2 MG/ML
0.5 INJECTION INTRAMUSCULAR ONCE
Status: COMPLETED | OUTPATIENT
Start: 2017-04-27 | End: 2017-04-27

## 2017-04-27 RX ORDER — MORPHINE SULFATE 4 MG/ML
4 INJECTION, SOLUTION INTRAMUSCULAR; INTRAVENOUS ONCE
Status: COMPLETED | OUTPATIENT
Start: 2017-04-27 | End: 2017-04-27

## 2017-04-27 RX ADMIN — FAMOTIDINE 20 MG: 10 INJECTION, SOLUTION INTRAVENOUS at 21:51

## 2017-04-27 RX ADMIN — ONDANSETRON 4 MG: 2 INJECTION INTRAMUSCULAR; INTRAVENOUS at 21:50

## 2017-04-27 RX ADMIN — IOHEXOL 100 ML: 350 INJECTION, SOLUTION INTRAVENOUS at 22:26

## 2017-04-27 RX ADMIN — SODIUM CHLORIDE 1000 ML: 0.9 INJECTION, SOLUTION INTRAVENOUS at 21:48

## 2017-04-27 RX ADMIN — MORPHINE SULFATE 4 MG: 4 INJECTION, SOLUTION INTRAMUSCULAR; INTRAVENOUS at 22:08

## 2017-04-27 RX ADMIN — LORAZEPAM 0.5 MG: 2 INJECTION, SOLUTION INTRAMUSCULAR; INTRAVENOUS at 22:05

## 2017-04-27 RX ADMIN — SODIUM CHLORIDE 1000 ML: 0.9 INJECTION, SOLUTION INTRAVENOUS at 23:16

## 2017-04-28 PROBLEM — K86.3 PSEUDOCYST OF PANCREAS: Status: ACTIVE | Noted: 2017-04-28

## 2017-04-28 PROBLEM — K85.20 ALCOHOL-INDUCED ACUTE PANCREATITIS: Status: ACTIVE | Noted: 2017-04-28

## 2017-04-28 LAB
ALBUMIN SERPL BCP-MCNC: 2.3 G/DL (ref 3.5–5)
ALP SERPL-CCNC: 138 U/L (ref 46–116)
ALT SERPL W P-5'-P-CCNC: 72 U/L (ref 12–78)
ANION GAP SERPL CALCULATED.3IONS-SCNC: 10 MMOL/L (ref 4–13)
AST SERPL W P-5'-P-CCNC: 161 U/L (ref 5–45)
BILIRUB SERPL-MCNC: 1.66 MG/DL (ref 0.2–1)
BUN SERPL-MCNC: 3 MG/DL (ref 5–25)
CALCIUM SERPL-MCNC: 7.9 MG/DL (ref 8.3–10.1)
CHLORIDE SERPL-SCNC: 107 MMOL/L (ref 100–108)
CO2 SERPL-SCNC: 24 MMOL/L (ref 21–32)
CREAT SERPL-MCNC: 0.48 MG/DL (ref 0.6–1.3)
GFR SERPL CREATININE-BSD FRML MDRD: >60 ML/MIN/1.73SQ M
GLUCOSE SERPL-MCNC: 92 MG/DL (ref 65–140)
POTASSIUM SERPL-SCNC: 3.4 MMOL/L (ref 3.5–5.3)
PROT SERPL-MCNC: 6.6 G/DL (ref 6.4–8.2)
SODIUM SERPL-SCNC: 141 MMOL/L (ref 136–145)

## 2017-04-28 PROCEDURE — 87040 BLOOD CULTURE FOR BACTERIA: CPT | Performed by: INTERNAL MEDICINE

## 2017-04-28 PROCEDURE — 99285 EMERGENCY DEPT VISIT HI MDM: CPT

## 2017-04-28 PROCEDURE — 80053 COMPREHEN METABOLIC PANEL: CPT | Performed by: NURSE PRACTITIONER

## 2017-04-28 RX ORDER — SODIUM CHLORIDE 9 MG/ML
75 INJECTION, SOLUTION INTRAVENOUS CONTINUOUS
Status: DISCONTINUED | OUTPATIENT
Start: 2017-04-28 | End: 2017-05-03 | Stop reason: HOSPADM

## 2017-04-28 RX ORDER — LEVETIRACETAM 500 MG/1
500 TABLET ORAL 2 TIMES DAILY
Status: DISCONTINUED | OUTPATIENT
Start: 2017-04-28 | End: 2017-05-03 | Stop reason: HOSPADM

## 2017-04-28 RX ORDER — MORPHINE SULFATE 2 MG/ML
1 INJECTION, SOLUTION INTRAMUSCULAR; INTRAVENOUS EVERY 4 HOURS PRN
Status: DISCONTINUED | OUTPATIENT
Start: 2017-04-28 | End: 2017-04-28

## 2017-04-28 RX ORDER — QUETIAPINE FUMARATE 25 MG/1
100 TABLET, FILM COATED ORAL 2 TIMES DAILY
Status: DISCONTINUED | OUTPATIENT
Start: 2017-04-28 | End: 2017-05-03 | Stop reason: HOSPADM

## 2017-04-28 RX ORDER — THIAMINE MONONITRATE (VIT B1) 100 MG
100 TABLET ORAL DAILY
Status: DISCONTINUED | OUTPATIENT
Start: 2017-04-28 | End: 2017-05-03 | Stop reason: HOSPADM

## 2017-04-28 RX ORDER — AZELASTINE 1 MG/ML
1 SPRAY, METERED NASAL 2 TIMES DAILY
Status: DISCONTINUED | OUTPATIENT
Start: 2017-04-28 | End: 2017-05-03 | Stop reason: HOSPADM

## 2017-04-28 RX ORDER — FOLIC ACID 1 MG/1
1 TABLET ORAL DAILY
Status: DISCONTINUED | OUTPATIENT
Start: 2017-04-28 | End: 2017-05-03 | Stop reason: HOSPADM

## 2017-04-28 RX ORDER — LACTULOSE 20 G/30ML
10 SOLUTION ORAL 2 TIMES DAILY
Status: DISCONTINUED | OUTPATIENT
Start: 2017-04-28 | End: 2017-05-03 | Stop reason: HOSPADM

## 2017-04-28 RX ORDER — LORAZEPAM 0.5 MG/1
0.5 TABLET ORAL EVERY 8 HOURS PRN
Status: DISCONTINUED | OUTPATIENT
Start: 2017-04-28 | End: 2017-05-03 | Stop reason: HOSPADM

## 2017-04-28 RX ORDER — QUETIAPINE FUMARATE 200 MG/1
400 TABLET, FILM COATED ORAL
Status: DISCONTINUED | OUTPATIENT
Start: 2017-04-28 | End: 2017-05-03 | Stop reason: HOSPADM

## 2017-04-28 RX ORDER — MORPHINE SULFATE 2 MG/ML
2 INJECTION, SOLUTION INTRAMUSCULAR; INTRAVENOUS
Status: DISCONTINUED | OUTPATIENT
Start: 2017-04-28 | End: 2017-05-03 | Stop reason: HOSPADM

## 2017-04-28 RX ORDER — CHLORDIAZEPOXIDE HYDROCHLORIDE 5 MG/1
5 CAPSULE, GELATIN COATED ORAL 3 TIMES DAILY PRN
Status: CANCELLED | OUTPATIENT
Start: 2017-04-28

## 2017-04-28 RX ORDER — FERROUS SULFATE 325(65) MG
325 TABLET ORAL
Status: DISCONTINUED | OUTPATIENT
Start: 2017-04-28 | End: 2017-05-02

## 2017-04-28 RX ORDER — HEPARIN SODIUM 5000 [USP'U]/ML
5000 INJECTION, SOLUTION INTRAVENOUS; SUBCUTANEOUS EVERY 8 HOURS SCHEDULED
Status: DISCONTINUED | OUTPATIENT
Start: 2017-04-28 | End: 2017-05-03 | Stop reason: HOSPADM

## 2017-04-28 RX ORDER — MORPHINE SULFATE 2 MG/ML
1 INJECTION, SOLUTION INTRAMUSCULAR; INTRAVENOUS EVERY 6 HOURS PRN
Status: DISCONTINUED | OUTPATIENT
Start: 2017-04-28 | End: 2017-04-28

## 2017-04-28 RX ORDER — ONDANSETRON 2 MG/ML
4 INJECTION INTRAMUSCULAR; INTRAVENOUS EVERY 6 HOURS PRN
Status: DISCONTINUED | OUTPATIENT
Start: 2017-04-28 | End: 2017-05-03 | Stop reason: HOSPADM

## 2017-04-28 RX ORDER — CALCIUM CARBONATE 500(1250)
1 TABLET ORAL 2 TIMES DAILY WITH MEALS
Status: DISCONTINUED | OUTPATIENT
Start: 2017-04-28 | End: 2017-05-02

## 2017-04-28 RX ORDER — PANTOPRAZOLE SODIUM 40 MG/1
40 TABLET, DELAYED RELEASE ORAL
Status: DISCONTINUED | OUTPATIENT
Start: 2017-04-28 | End: 2017-05-03 | Stop reason: HOSPADM

## 2017-04-28 RX ADMIN — HEPARIN SODIUM 5000 UNITS: 5000 INJECTION, SOLUTION INTRAVENOUS; SUBCUTANEOUS at 01:52

## 2017-04-28 RX ADMIN — MORPHINE SULFATE 2 MG: 2 INJECTION, SOLUTION INTRAMUSCULAR; INTRAVENOUS at 23:03

## 2017-04-28 RX ADMIN — LORAZEPAM 0.5 MG: 0.5 TABLET ORAL at 09:04

## 2017-04-28 RX ADMIN — HEPARIN SODIUM 5000 UNITS: 5000 INJECTION, SOLUTION INTRAVENOUS; SUBCUTANEOUS at 16:52

## 2017-04-28 RX ADMIN — MORPHINE SULFATE 1 MG: 2 INJECTION, SOLUTION INTRAMUSCULAR; INTRAVENOUS at 05:56

## 2017-04-28 RX ADMIN — MORPHINE SULFATE 2 MG: 2 INJECTION, SOLUTION INTRAMUSCULAR; INTRAVENOUS at 13:47

## 2017-04-28 RX ADMIN — FERROUS SULFATE TAB 325 MG (65 MG ELEMENTAL FE) 325 MG: 325 (65 FE) TAB at 09:04

## 2017-04-28 RX ADMIN — FOLIC ACID 1 MG: 1 TABLET ORAL at 09:04

## 2017-04-28 RX ADMIN — Medication 1 TABLET: at 16:52

## 2017-04-28 RX ADMIN — Medication 1 TABLET: at 09:04

## 2017-04-28 RX ADMIN — MORPHINE SULFATE 1 MG: 2 INJECTION, SOLUTION INTRAMUSCULAR; INTRAVENOUS at 10:02

## 2017-04-28 RX ADMIN — SODIUM CHLORIDE 2000 ML: 0.9 INJECTION, SOLUTION INTRAVENOUS at 01:10

## 2017-04-28 RX ADMIN — SODIUM CHLORIDE 150 ML/HR: 0.9 INJECTION, SOLUTION INTRAVENOUS at 10:06

## 2017-04-28 RX ADMIN — SODIUM CHLORIDE 150 ML/HR: 0.9 INJECTION, SOLUTION INTRAVENOUS at 03:32

## 2017-04-28 RX ADMIN — HEPARIN SODIUM 5000 UNITS: 5000 INJECTION, SOLUTION INTRAVENOUS; SUBCUTANEOUS at 09:04

## 2017-04-28 RX ADMIN — SODIUM CHLORIDE 150 ML/HR: 0.9 INJECTION, SOLUTION INTRAVENOUS at 16:53

## 2017-04-28 RX ADMIN — LORAZEPAM 0.5 MG: 0.5 TABLET ORAL at 23:03

## 2017-04-28 RX ADMIN — LEVETIRACETAM 500 MG: 500 TABLET ORAL at 16:51

## 2017-04-28 RX ADMIN — MORPHINE SULFATE 1 MG: 2 INJECTION, SOLUTION INTRAMUSCULAR; INTRAVENOUS at 01:53

## 2017-04-28 RX ADMIN — MORPHINE SULFATE 2 MG: 2 INJECTION, SOLUTION INTRAMUSCULAR; INTRAVENOUS at 20:02

## 2017-04-28 RX ADMIN — LEVETIRACETAM 500 MG: 500 TABLET ORAL at 09:05

## 2017-04-28 RX ADMIN — Medication 100 MG: at 09:04

## 2017-04-28 RX ADMIN — MORPHINE SULFATE 2 MG: 2 INJECTION, SOLUTION INTRAMUSCULAR; INTRAVENOUS at 16:52

## 2017-04-29 LAB
ALBUMIN SERPL BCP-MCNC: 2.4 G/DL (ref 3.5–5)
ALP SERPL-CCNC: 139 U/L (ref 46–116)
ALT SERPL W P-5'-P-CCNC: 71 U/L (ref 12–78)
ANION GAP SERPL CALCULATED.3IONS-SCNC: 8 MMOL/L (ref 4–13)
AST SERPL W P-5'-P-CCNC: 162 U/L (ref 5–45)
BACTERIA UR CULT: NORMAL
BASOPHILS # BLD AUTO: 0.02 THOUSANDS/ΜL (ref 0–0.1)
BASOPHILS NFR BLD AUTO: 0 % (ref 0–1)
BILIRUB SERPL-MCNC: 1.66 MG/DL (ref 0.2–1)
BUN SERPL-MCNC: 3 MG/DL (ref 5–25)
CALCIUM SERPL-MCNC: 8.6 MG/DL (ref 8.3–10.1)
CHLORIDE SERPL-SCNC: 105 MMOL/L (ref 100–108)
CO2 SERPL-SCNC: 26 MMOL/L (ref 21–32)
CREAT SERPL-MCNC: 0.49 MG/DL (ref 0.6–1.3)
EOSINOPHIL # BLD AUTO: 0.13 THOUSAND/ΜL (ref 0–0.61)
EOSINOPHIL NFR BLD AUTO: 2 % (ref 0–6)
ERYTHROCYTE [DISTWIDTH] IN BLOOD BY AUTOMATED COUNT: 13.4 % (ref 11.6–15.1)
GFR SERPL CREATININE-BSD FRML MDRD: >60 ML/MIN/1.73SQ M
GLUCOSE SERPL-MCNC: 84 MG/DL (ref 65–140)
HCT VFR BLD AUTO: 33.6 % (ref 34.8–46.1)
HGB BLD-MCNC: 11.3 G/DL (ref 11.5–15.4)
LIPASE SERPL-CCNC: 95 U/L (ref 73–393)
LYMPHOCYTES # BLD AUTO: 1.24 THOUSANDS/ΜL (ref 0.6–4.47)
LYMPHOCYTES NFR BLD AUTO: 14 % (ref 14–44)
MCH RBC QN AUTO: 32.9 PG (ref 26.8–34.3)
MCHC RBC AUTO-ENTMCNC: 33.6 G/DL (ref 31.4–37.4)
MCV RBC AUTO: 98 FL (ref 82–98)
MONOCYTES # BLD AUTO: 0.72 THOUSAND/ΜL (ref 0.17–1.22)
MONOCYTES NFR BLD AUTO: 8 % (ref 4–12)
NEUTROPHILS # BLD AUTO: 6.68 THOUSANDS/ΜL (ref 1.85–7.62)
NEUTS SEG NFR BLD AUTO: 76 % (ref 43–75)
NRBC BLD AUTO-RTO: 0 /100 WBCS
PLATELET # BLD AUTO: 153 THOUSANDS/UL (ref 149–390)
PMV BLD AUTO: 10.1 FL (ref 8.9–12.7)
POTASSIUM SERPL-SCNC: 3.5 MMOL/L (ref 3.5–5.3)
PROT SERPL-MCNC: 6.9 G/DL (ref 6.4–8.2)
RBC # BLD AUTO: 3.43 MILLION/UL (ref 3.81–5.12)
SODIUM SERPL-SCNC: 139 MMOL/L (ref 136–145)
WBC # BLD AUTO: 8.79 THOUSAND/UL (ref 4.31–10.16)

## 2017-04-29 PROCEDURE — 80053 COMPREHEN METABOLIC PANEL: CPT | Performed by: INTERNAL MEDICINE

## 2017-04-29 PROCEDURE — 83690 ASSAY OF LIPASE: CPT | Performed by: INTERNAL MEDICINE

## 2017-04-29 PROCEDURE — 85025 COMPLETE CBC W/AUTO DIFF WBC: CPT | Performed by: INTERNAL MEDICINE

## 2017-04-29 RX ADMIN — HEPARIN SODIUM 5000 UNITS: 5000 INJECTION, SOLUTION INTRAVENOUS; SUBCUTANEOUS at 22:30

## 2017-04-29 RX ADMIN — Medication 100 MG: at 08:08

## 2017-04-29 RX ADMIN — QUETIAPINE FUMARATE 100 MG: 25 TABLET, FILM COATED ORAL at 08:08

## 2017-04-29 RX ADMIN — HEPARIN SODIUM 5000 UNITS: 5000 INJECTION, SOLUTION INTRAVENOUS; SUBCUTANEOUS at 13:34

## 2017-04-29 RX ADMIN — MORPHINE SULFATE 2 MG: 2 INJECTION, SOLUTION INTRAMUSCULAR; INTRAVENOUS at 10:36

## 2017-04-29 RX ADMIN — PANTOPRAZOLE SODIUM 40 MG: 40 TABLET, DELAYED RELEASE ORAL at 06:04

## 2017-04-29 RX ADMIN — MORPHINE SULFATE 2 MG: 2 INJECTION, SOLUTION INTRAMUSCULAR; INTRAVENOUS at 03:46

## 2017-04-29 RX ADMIN — MORPHINE SULFATE 2 MG: 2 INJECTION, SOLUTION INTRAMUSCULAR; INTRAVENOUS at 17:27

## 2017-04-29 RX ADMIN — SODIUM CHLORIDE 150 ML/HR: 0.9 INJECTION, SOLUTION INTRAVENOUS at 05:46

## 2017-04-29 RX ADMIN — Medication 1 TABLET: at 21:05

## 2017-04-29 RX ADMIN — MORPHINE SULFATE 2 MG: 2 INJECTION, SOLUTION INTRAMUSCULAR; INTRAVENOUS at 07:18

## 2017-04-29 RX ADMIN — Medication 1 TABLET: at 08:07

## 2017-04-29 RX ADMIN — FERROUS SULFATE TAB 325 MG (65 MG ELEMENTAL FE) 325 MG: 325 (65 FE) TAB at 08:08

## 2017-04-29 RX ADMIN — LEVETIRACETAM 500 MG: 500 TABLET ORAL at 21:05

## 2017-04-29 RX ADMIN — LEVETIRACETAM 500 MG: 500 TABLET ORAL at 08:08

## 2017-04-29 RX ADMIN — LORAZEPAM 0.5 MG: 0.5 TABLET ORAL at 08:09

## 2017-04-29 RX ADMIN — LORAZEPAM 0.5 MG: 0.5 TABLET ORAL at 17:46

## 2017-04-29 RX ADMIN — MORPHINE SULFATE 2 MG: 2 INJECTION, SOLUTION INTRAMUSCULAR; INTRAVENOUS at 21:05

## 2017-04-29 RX ADMIN — MORPHINE SULFATE 2 MG: 2 INJECTION, SOLUTION INTRAMUSCULAR; INTRAVENOUS at 13:34

## 2017-04-29 RX ADMIN — FOLIC ACID 1 MG: 1 TABLET ORAL at 08:08

## 2017-04-30 LAB
ANION GAP SERPL CALCULATED.3IONS-SCNC: 8 MMOL/L (ref 4–13)
BUN SERPL-MCNC: 2 MG/DL (ref 5–25)
CALCIUM SERPL-MCNC: 8.3 MG/DL (ref 8.3–10.1)
CHLORIDE SERPL-SCNC: 106 MMOL/L (ref 100–108)
CO2 SERPL-SCNC: 26 MMOL/L (ref 21–32)
CREAT SERPL-MCNC: 0.49 MG/DL (ref 0.6–1.3)
GFR SERPL CREATININE-BSD FRML MDRD: >60 ML/MIN/1.73SQ M
GLUCOSE SERPL-MCNC: 90 MG/DL (ref 65–140)
POTASSIUM SERPL-SCNC: 3.3 MMOL/L (ref 3.5–5.3)
SODIUM SERPL-SCNC: 140 MMOL/L (ref 136–145)

## 2017-04-30 PROCEDURE — 80048 BASIC METABOLIC PNL TOTAL CA: CPT | Performed by: INTERNAL MEDICINE

## 2017-04-30 RX ORDER — GABAPENTIN 100 MG/1
100 CAPSULE ORAL 3 TIMES DAILY
Status: DISCONTINUED | OUTPATIENT
Start: 2017-04-30 | End: 2017-05-03 | Stop reason: HOSPADM

## 2017-04-30 RX ORDER — IBUPROFEN 200 MG
200 TABLET ORAL EVERY 6 HOURS PRN
Qty: 30 TABLET | Refills: 0 | Status: SHIPPED | OUTPATIENT
Start: 2017-04-30 | End: 2017-05-03

## 2017-04-30 RX ADMIN — LORAZEPAM 0.5 MG: 0.5 TABLET ORAL at 23:06

## 2017-04-30 RX ADMIN — Medication 1 TABLET: at 17:32

## 2017-04-30 RX ADMIN — PANTOPRAZOLE SODIUM 40 MG: 40 TABLET, DELAYED RELEASE ORAL at 06:30

## 2017-04-30 RX ADMIN — SODIUM CHLORIDE 75 ML/HR: 0.9 INJECTION, SOLUTION INTRAVENOUS at 02:08

## 2017-04-30 RX ADMIN — FOLIC ACID 1 MG: 1 TABLET ORAL at 08:19

## 2017-04-30 RX ADMIN — LEVETIRACETAM 500 MG: 500 TABLET ORAL at 08:19

## 2017-04-30 RX ADMIN — LEVETIRACETAM 500 MG: 500 TABLET ORAL at 17:33

## 2017-04-30 RX ADMIN — FERROUS SULFATE TAB 325 MG (65 MG ELEMENTAL FE) 325 MG: 325 (65 FE) TAB at 08:19

## 2017-04-30 RX ADMIN — MORPHINE SULFATE 2 MG: 2 INJECTION, SOLUTION INTRAMUSCULAR; INTRAVENOUS at 15:27

## 2017-04-30 RX ADMIN — GABAPENTIN 100 MG: 100 CAPSULE ORAL at 21:50

## 2017-04-30 RX ADMIN — SODIUM CHLORIDE 75 ML/HR: 0.9 INJECTION, SOLUTION INTRAVENOUS at 15:28

## 2017-04-30 RX ADMIN — ONDANSETRON 4 MG: 2 INJECTION INTRAMUSCULAR; INTRAVENOUS at 20:41

## 2017-04-30 RX ADMIN — SODIUM CHLORIDE 75 ML/HR: 0.9 INJECTION, SOLUTION INTRAVENOUS at 15:29

## 2017-04-30 RX ADMIN — MORPHINE SULFATE 2 MG: 2 INJECTION, SOLUTION INTRAMUSCULAR; INTRAVENOUS at 08:19

## 2017-04-30 RX ADMIN — MORPHINE SULFATE 2 MG: 2 INJECTION, SOLUTION INTRAMUSCULAR; INTRAVENOUS at 12:23

## 2017-04-30 RX ADMIN — Medication 100 MG: at 08:19

## 2017-04-30 RX ADMIN — MORPHINE SULFATE 2 MG: 2 INJECTION, SOLUTION INTRAMUSCULAR; INTRAVENOUS at 04:53

## 2017-04-30 RX ADMIN — Medication 1 TABLET: at 08:19

## 2017-04-30 RX ADMIN — MORPHINE SULFATE 2 MG: 2 INJECTION, SOLUTION INTRAMUSCULAR; INTRAVENOUS at 00:06

## 2017-04-30 RX ADMIN — LORAZEPAM 0.5 MG: 0.5 TABLET ORAL at 02:08

## 2017-04-30 RX ADMIN — MORPHINE SULFATE 2 MG: 2 INJECTION, SOLUTION INTRAMUSCULAR; INTRAVENOUS at 19:25

## 2017-04-30 RX ADMIN — MORPHINE SULFATE 2 MG: 2 INJECTION, SOLUTION INTRAMUSCULAR; INTRAVENOUS at 22:32

## 2017-05-01 LAB
ALBUMIN SERPL BCP-MCNC: 2.7 G/DL (ref 3.5–5)
ALP SERPL-CCNC: 168 U/L (ref 46–116)
ALT SERPL W P-5'-P-CCNC: 72 U/L (ref 12–78)
ANION GAP SERPL CALCULATED.3IONS-SCNC: 7 MMOL/L (ref 4–13)
AST SERPL W P-5'-P-CCNC: 145 U/L (ref 5–45)
BILIRUB SERPL-MCNC: 1.58 MG/DL (ref 0.2–1)
BUN SERPL-MCNC: 2 MG/DL (ref 5–25)
CALCIUM SERPL-MCNC: 9.1 MG/DL (ref 8.3–10.1)
CHLORIDE SERPL-SCNC: 104 MMOL/L (ref 100–108)
CO2 SERPL-SCNC: 28 MMOL/L (ref 21–32)
CREAT SERPL-MCNC: 0.48 MG/DL (ref 0.6–1.3)
GFR SERPL CREATININE-BSD FRML MDRD: >60 ML/MIN/1.73SQ M
GLUCOSE SERPL-MCNC: 79 MG/DL (ref 65–140)
LIPASE SERPL-CCNC: 58 U/L (ref 73–393)
POTASSIUM SERPL-SCNC: 3.5 MMOL/L (ref 3.5–5.3)
PROT SERPL-MCNC: 7.5 G/DL (ref 6.4–8.2)
SODIUM SERPL-SCNC: 139 MMOL/L (ref 136–145)

## 2017-05-01 PROCEDURE — 80053 COMPREHEN METABOLIC PANEL: CPT | Performed by: INTERNAL MEDICINE

## 2017-05-01 PROCEDURE — 83690 ASSAY OF LIPASE: CPT | Performed by: INTERNAL MEDICINE

## 2017-05-01 RX ORDER — MORPHINE SULFATE 2 MG/ML
2 INJECTION, SOLUTION INTRAMUSCULAR; INTRAVENOUS ONCE
Status: COMPLETED | OUTPATIENT
Start: 2017-05-01 | End: 2017-05-01

## 2017-05-01 RX ADMIN — MORPHINE SULFATE 2 MG: 2 INJECTION, SOLUTION INTRAMUSCULAR; INTRAVENOUS at 02:11

## 2017-05-01 RX ADMIN — SODIUM CHLORIDE 75 ML/HR: 0.9 INJECTION, SOLUTION INTRAVENOUS at 17:38

## 2017-05-01 RX ADMIN — LEVETIRACETAM 500 MG: 500 TABLET ORAL at 08:23

## 2017-05-01 RX ADMIN — LORAZEPAM 0.5 MG: 0.5 TABLET ORAL at 08:24

## 2017-05-01 RX ADMIN — GABAPENTIN 100 MG: 100 CAPSULE ORAL at 08:23

## 2017-05-01 RX ADMIN — MORPHINE SULFATE 2 MG: 2 INJECTION, SOLUTION INTRAMUSCULAR; INTRAVENOUS at 21:13

## 2017-05-01 RX ADMIN — MORPHINE SULFATE 2 MG: 2 INJECTION, SOLUTION INTRAMUSCULAR; INTRAVENOUS at 01:06

## 2017-05-01 RX ADMIN — LEVETIRACETAM 500 MG: 500 TABLET ORAL at 21:12

## 2017-05-01 RX ADMIN — Medication 1 TABLET: at 08:23

## 2017-05-01 RX ADMIN — MORPHINE SULFATE 2 MG: 2 INJECTION, SOLUTION INTRAMUSCULAR; INTRAVENOUS at 15:01

## 2017-05-01 RX ADMIN — MORPHINE SULFATE 2 MG: 2 INJECTION, SOLUTION INTRAMUSCULAR; INTRAVENOUS at 12:19

## 2017-05-01 RX ADMIN — Medication 100 MG: at 08:24

## 2017-05-01 RX ADMIN — MORPHINE SULFATE 2 MG: 2 INJECTION, SOLUTION INTRAMUSCULAR; INTRAVENOUS at 18:06

## 2017-05-01 RX ADMIN — FOLIC ACID 1 MG: 1 TABLET ORAL at 08:23

## 2017-05-01 RX ADMIN — PANTOPRAZOLE SODIUM 40 MG: 40 TABLET, DELAYED RELEASE ORAL at 05:17

## 2017-05-01 RX ADMIN — MORPHINE SULFATE 2 MG: 2 INJECTION, SOLUTION INTRAMUSCULAR; INTRAVENOUS at 05:16

## 2017-05-01 RX ADMIN — FERROUS SULFATE TAB 325 MG (65 MG ELEMENTAL FE) 325 MG: 325 (65 FE) TAB at 08:23

## 2017-05-01 RX ADMIN — HEPARIN SODIUM 5000 UNITS: 5000 INJECTION, SOLUTION INTRAVENOUS; SUBCUTANEOUS at 21:12

## 2017-05-01 RX ADMIN — MORPHINE SULFATE 2 MG: 2 INJECTION, SOLUTION INTRAMUSCULAR; INTRAVENOUS at 08:24

## 2017-05-01 RX ADMIN — SODIUM CHLORIDE 75 ML/HR: 0.9 INJECTION, SOLUTION INTRAVENOUS at 04:28

## 2017-05-01 RX ADMIN — MORPHINE SULFATE 2 MG: 2 INJECTION, SOLUTION INTRAMUSCULAR; INTRAVENOUS at 22:10

## 2017-05-02 RX ADMIN — GABAPENTIN 100 MG: 100 CAPSULE ORAL at 22:03

## 2017-05-02 RX ADMIN — LEVETIRACETAM 500 MG: 500 TABLET ORAL at 08:59

## 2017-05-02 RX ADMIN — PANTOPRAZOLE SODIUM 40 MG: 40 TABLET, DELAYED RELEASE ORAL at 06:20

## 2017-05-02 RX ADMIN — LORAZEPAM 0.5 MG: 0.5 TABLET ORAL at 20:36

## 2017-05-02 RX ADMIN — FOLIC ACID 1 MG: 1 TABLET ORAL at 08:58

## 2017-05-02 RX ADMIN — Medication 100 MG: at 08:59

## 2017-05-02 RX ADMIN — MORPHINE SULFATE 2 MG: 2 INJECTION, SOLUTION INTRAMUSCULAR; INTRAVENOUS at 22:03

## 2017-05-02 RX ADMIN — MORPHINE SULFATE 2 MG: 2 INJECTION, SOLUTION INTRAMUSCULAR; INTRAVENOUS at 12:23

## 2017-05-02 RX ADMIN — SODIUM CHLORIDE 75 ML/HR: 0.9 INJECTION, SOLUTION INTRAVENOUS at 20:11

## 2017-05-02 RX ADMIN — GABAPENTIN 100 MG: 100 CAPSULE ORAL at 08:59

## 2017-05-02 RX ADMIN — MORPHINE SULFATE 2 MG: 2 INJECTION, SOLUTION INTRAMUSCULAR; INTRAVENOUS at 09:36

## 2017-05-02 RX ADMIN — GABAPENTIN 100 MG: 100 CAPSULE ORAL at 18:40

## 2017-05-02 RX ADMIN — FERROUS SULFATE TAB 325 MG (65 MG ELEMENTAL FE) 325 MG: 325 (65 FE) TAB at 08:58

## 2017-05-02 RX ADMIN — LEVETIRACETAM 500 MG: 500 TABLET ORAL at 18:40

## 2017-05-02 RX ADMIN — MORPHINE SULFATE 2 MG: 2 INJECTION, SOLUTION INTRAMUSCULAR; INTRAVENOUS at 00:19

## 2017-05-02 RX ADMIN — MORPHINE SULFATE 2 MG: 2 INJECTION, SOLUTION INTRAMUSCULAR; INTRAVENOUS at 06:20

## 2017-05-02 RX ADMIN — MORPHINE SULFATE 2 MG: 2 INJECTION, SOLUTION INTRAMUSCULAR; INTRAVENOUS at 15:47

## 2017-05-02 RX ADMIN — LORAZEPAM 0.5 MG: 0.5 TABLET ORAL at 00:19

## 2017-05-02 RX ADMIN — LACTULOSE 10 G: 20 SOLUTION ORAL at 20:33

## 2017-05-02 RX ADMIN — MORPHINE SULFATE 2 MG: 2 INJECTION, SOLUTION INTRAMUSCULAR; INTRAVENOUS at 18:55

## 2017-05-02 RX ADMIN — Medication 1 TABLET: at 08:58

## 2017-05-02 RX ADMIN — MORPHINE SULFATE 2 MG: 2 INJECTION, SOLUTION INTRAMUSCULAR; INTRAVENOUS at 03:05

## 2017-05-02 RX ADMIN — LORAZEPAM 0.5 MG: 0.5 TABLET ORAL at 10:38

## 2017-05-03 VITALS
RESPIRATION RATE: 18 BRPM | HEIGHT: 67 IN | BODY MASS INDEX: 24.33 KG/M2 | DIASTOLIC BLOOD PRESSURE: 68 MMHG | TEMPERATURE: 98.2 F | WEIGHT: 154.98 LBS | OXYGEN SATURATION: 97 % | HEART RATE: 68 BPM | SYSTOLIC BLOOD PRESSURE: 126 MMHG

## 2017-05-03 LAB
BACTERIA BLD CULT: NORMAL
BACTERIA BLD CULT: NORMAL

## 2017-05-03 RX ORDER — OXYCODONE HYDROCHLORIDE 5 MG/1
5 TABLET ORAL EVERY 4 HOURS PRN
Qty: 30 TABLET | Refills: 0 | Status: SHIPPED | OUTPATIENT
Start: 2017-05-03 | End: 2017-05-13

## 2017-05-03 RX ORDER — IBUPROFEN 600 MG/1
600 TABLET ORAL EVERY 6 HOURS PRN
Qty: 50 TABLET | Refills: 0 | Status: SHIPPED | OUTPATIENT
Start: 2017-05-03 | End: 2017-06-07 | Stop reason: HOSPADM

## 2017-05-03 RX ORDER — IBUPROFEN 200 MG
200 TABLET ORAL EVERY 6 HOURS PRN
Qty: 30 TABLET | Refills: 0 | Status: SHIPPED | OUTPATIENT
Start: 2017-05-03 | End: 2017-05-03 | Stop reason: HOSPADM

## 2017-05-03 RX ADMIN — Medication 100 MG: at 08:24

## 2017-05-03 RX ADMIN — MORPHINE SULFATE 2 MG: 2 INJECTION, SOLUTION INTRAMUSCULAR; INTRAVENOUS at 16:35

## 2017-05-03 RX ADMIN — LACTULOSE 10 G: 20 SOLUTION ORAL at 08:23

## 2017-05-03 RX ADMIN — FOLIC ACID 1 MG: 1 TABLET ORAL at 08:24

## 2017-05-03 RX ADMIN — PANTOPRAZOLE SODIUM 40 MG: 40 TABLET, DELAYED RELEASE ORAL at 06:20

## 2017-05-03 RX ADMIN — LEVETIRACETAM 500 MG: 500 TABLET ORAL at 17:32

## 2017-05-03 RX ADMIN — MORPHINE SULFATE 2 MG: 2 INJECTION, SOLUTION INTRAMUSCULAR; INTRAVENOUS at 04:44

## 2017-05-03 RX ADMIN — LORAZEPAM 0.5 MG: 0.5 TABLET ORAL at 17:31

## 2017-05-03 RX ADMIN — MORPHINE SULFATE 2 MG: 2 INJECTION, SOLUTION INTRAMUSCULAR; INTRAVENOUS at 08:27

## 2017-05-03 RX ADMIN — MORPHINE SULFATE 2 MG: 2 INJECTION, SOLUTION INTRAMUSCULAR; INTRAVENOUS at 13:35

## 2017-05-03 RX ADMIN — LACTULOSE 10 G: 20 SOLUTION ORAL at 17:36

## 2017-05-03 RX ADMIN — LORAZEPAM 0.5 MG: 0.5 TABLET ORAL at 08:24

## 2017-05-03 RX ADMIN — MORPHINE SULFATE 2 MG: 2 INJECTION, SOLUTION INTRAMUSCULAR; INTRAVENOUS at 01:31

## 2017-05-03 RX ADMIN — LEVETIRACETAM 500 MG: 500 TABLET ORAL at 08:24

## 2017-05-03 RX ADMIN — SODIUM CHLORIDE 75 ML/HR: 0.9 INJECTION, SOLUTION INTRAVENOUS at 08:21

## 2017-05-08 ENCOUNTER — ALLSCRIPTS OFFICE VISIT (OUTPATIENT)
Dept: OTHER | Facility: OTHER | Age: 27
End: 2017-05-08

## 2017-05-15 ENCOUNTER — GENERIC CONVERSION - ENCOUNTER (OUTPATIENT)
Dept: OTHER | Facility: OTHER | Age: 27
End: 2017-05-15

## 2017-05-18 ENCOUNTER — GENERIC CONVERSION - ENCOUNTER (OUTPATIENT)
Dept: OTHER | Facility: OTHER | Age: 27
End: 2017-05-18

## 2017-05-18 ENCOUNTER — HOSPITAL ENCOUNTER (EMERGENCY)
Facility: HOSPITAL | Age: 27
Discharge: HOME | End: 2017-05-18
Attending: EMERGENCY MEDICINE | Admitting: EMERGENCY MEDICINE
Payer: COMMERCIAL

## 2017-05-18 ENCOUNTER — ALLSCRIPTS OFFICE VISIT (OUTPATIENT)
Dept: OTHER | Facility: OTHER | Age: 27
End: 2017-05-18

## 2017-05-18 VITALS
DIASTOLIC BLOOD PRESSURE: 81 MMHG | TEMPERATURE: 98.3 F | OXYGEN SATURATION: 99 % | BODY MASS INDEX: 23.49 KG/M2 | HEART RATE: 92 BPM | RESPIRATION RATE: 14 BRPM | SYSTOLIC BLOOD PRESSURE: 126 MMHG | WEIGHT: 150 LBS

## 2017-05-18 DIAGNOSIS — K29.20 ALCOHOLIC GASTRITIS: ICD-10-CM

## 2017-05-18 DIAGNOSIS — R10.13 ABDOMINAL PAIN, ACUTE, EPIGASTRIC: Primary | ICD-10-CM

## 2017-05-18 LAB
ALBUMIN SERPL BCP-MCNC: 3.4 G/DL (ref 3.5–5)
ALP SERPL-CCNC: 125 U/L (ref 46–116)
ALT SERPL W P-5'-P-CCNC: 94 U/L (ref 12–78)
ANION GAP SERPL CALCULATED.3IONS-SCNC: 13 MMOL/L (ref 4–13)
AST SERPL W P-5'-P-CCNC: 197 U/L (ref 5–45)
BASOPHILS # BLD AUTO: 0.07 THOUSANDS/ΜL (ref 0–0.1)
BASOPHILS NFR BLD AUTO: 1 % (ref 0–1)
BILIRUB SERPL-MCNC: 0.76 MG/DL (ref 0.2–1)
BILIRUB UR QL STRIP: NEGATIVE
BUN SERPL-MCNC: 3 MG/DL (ref 5–25)
CALCIUM SERPL-MCNC: 8.8 MG/DL (ref 8.3–10.1)
CHLORIDE SERPL-SCNC: 104 MMOL/L (ref 100–108)
CLARITY UR: CLEAR
CO2 SERPL-SCNC: 22 MMOL/L (ref 21–32)
COLOR UR: YELLOW
COLOR, POC: YELLOW
CREAT SERPL-MCNC: 0.44 MG/DL (ref 0.6–1.3)
EOSINOPHIL # BLD AUTO: 0.19 THOUSAND/ΜL (ref 0–0.61)
EOSINOPHIL NFR BLD AUTO: 2 % (ref 0–6)
ERYTHROCYTE [DISTWIDTH] IN BLOOD BY AUTOMATED COUNT: 13.4 % (ref 11.6–15.1)
GFR SERPL CREATININE-BSD FRML MDRD: >60 ML/MIN/1.73SQ M
GLUCOSE SERPL-MCNC: 110 MG/DL (ref 65–140)
GLUCOSE UR STRIP-MCNC: NEGATIVE MG/DL
HCG UR QL: NEGATIVE
HCT VFR BLD AUTO: 41.4 % (ref 34.8–46.1)
HGB BLD-MCNC: 14.3 G/DL (ref 11.5–15.4)
HGB UR QL STRIP.AUTO: NEGATIVE
KETONES UR STRIP-MCNC: NEGATIVE MG/DL
LEUKOCYTE ESTERASE UR QL STRIP: NEGATIVE
LIPASE SERPL-CCNC: 122 U/L (ref 73–393)
LYMPHOCYTES # BLD AUTO: 3.5 THOUSANDS/ΜL (ref 0.6–4.47)
LYMPHOCYTES NFR BLD AUTO: 29 % (ref 14–44)
MCH RBC QN AUTO: 32.1 PG (ref 26.8–34.3)
MCHC RBC AUTO-ENTMCNC: 34.5 G/DL (ref 31.4–37.4)
MCV RBC AUTO: 93 FL (ref 82–98)
MONOCYTES # BLD AUTO: 0.66 THOUSAND/ΜL (ref 0.17–1.22)
MONOCYTES NFR BLD AUTO: 6 % (ref 4–12)
NEUTROPHILS # BLD AUTO: 7.56 THOUSANDS/ΜL (ref 1.85–7.62)
NEUTS SEG NFR BLD AUTO: 62 % (ref 43–75)
NITRITE UR QL STRIP: NEGATIVE
NRBC BLD AUTO-RTO: 0 /100 WBCS
PH UR STRIP.AUTO: 7 [PH] (ref 4.5–8)
PLATELET # BLD AUTO: 294 THOUSANDS/UL (ref 149–390)
PMV BLD AUTO: 9.6 FL (ref 8.9–12.7)
POTASSIUM SERPL-SCNC: 4.8 MMOL/L (ref 3.5–5.3)
PROT SERPL-MCNC: 8.9 G/DL (ref 6.4–8.2)
PROT UR STRIP-MCNC: NEGATIVE MG/DL
RBC # BLD AUTO: 4.46 MILLION/UL (ref 3.81–5.12)
SODIUM SERPL-SCNC: 139 MMOL/L (ref 136–145)
SP GR UR STRIP.AUTO: 1.01 (ref 1–1.03)
UROBILINOGEN UR QL STRIP.AUTO: 0.2 E.U./DL
WBC # BLD AUTO: 11.98 THOUSAND/UL (ref 4.31–10.16)

## 2017-05-18 PROCEDURE — 36415 COLL VENOUS BLD VENIPUNCTURE: CPT

## 2017-05-18 PROCEDURE — 96376 TX/PRO/DX INJ SAME DRUG ADON: CPT

## 2017-05-18 PROCEDURE — 81003 URINALYSIS AUTO W/O SCOPE: CPT

## 2017-05-18 PROCEDURE — 96374 THER/PROPH/DIAG INJ IV PUSH: CPT

## 2017-05-18 PROCEDURE — 85025 COMPLETE CBC W/AUTO DIFF WBC: CPT

## 2017-05-18 PROCEDURE — 80053 COMPREHEN METABOLIC PANEL: CPT

## 2017-05-18 PROCEDURE — 99284 EMERGENCY DEPT VISIT MOD MDM: CPT

## 2017-05-18 PROCEDURE — 96375 TX/PRO/DX INJ NEW DRUG ADDON: CPT

## 2017-05-18 PROCEDURE — 81025 URINE PREGNANCY TEST: CPT | Performed by: EMERGENCY MEDICINE

## 2017-05-18 PROCEDURE — 81002 URINALYSIS NONAUTO W/O SCOPE: CPT | Performed by: EMERGENCY MEDICINE

## 2017-05-18 PROCEDURE — 83690 ASSAY OF LIPASE: CPT | Performed by: EMERGENCY MEDICINE

## 2017-05-18 PROCEDURE — C9113 INJ PANTOPRAZOLE SODIUM, VIA: HCPCS

## 2017-05-18 PROCEDURE — 96361 HYDRATE IV INFUSION ADD-ON: CPT

## 2017-05-18 RX ORDER — PANTOPRAZOLE SODIUM 40 MG/1
INJECTION, POWDER, FOR SOLUTION INTRAVENOUS
Status: COMPLETED
Start: 2017-05-18 | End: 2017-05-18

## 2017-05-18 RX ORDER — LORAZEPAM 2 MG/ML
1 INJECTION INTRAMUSCULAR ONCE
Status: COMPLETED | OUTPATIENT
Start: 2017-05-18 | End: 2017-05-18

## 2017-05-18 RX ORDER — ONDANSETRON 2 MG/ML
INJECTION INTRAMUSCULAR; INTRAVENOUS
Status: COMPLETED
Start: 2017-05-18 | End: 2017-05-18

## 2017-05-18 RX ORDER — ONDANSETRON 4 MG/1
4 TABLET, FILM COATED ORAL EVERY 6 HOURS
Qty: 12 TABLET | Refills: 0 | Status: ON HOLD | OUTPATIENT
Start: 2017-05-18 | End: 2017-06-07

## 2017-05-18 RX ORDER — MORPHINE SULFATE 4 MG/ML
4 INJECTION, SOLUTION INTRAMUSCULAR; INTRAVENOUS ONCE
Status: COMPLETED | OUTPATIENT
Start: 2017-05-18 | End: 2017-05-18

## 2017-05-18 RX ORDER — PANTOPRAZOLE SODIUM 40 MG/1
40 INJECTION, POWDER, FOR SOLUTION INTRAVENOUS ONCE
Status: COMPLETED | OUTPATIENT
Start: 2017-05-18 | End: 2017-05-18

## 2017-05-18 RX ORDER — ONDANSETRON 2 MG/ML
4 INJECTION INTRAMUSCULAR; INTRAVENOUS ONCE
Status: COMPLETED | OUTPATIENT
Start: 2017-05-18 | End: 2017-05-18

## 2017-05-18 RX ORDER — ONDANSETRON 2 MG/ML
4 INJECTION INTRAMUSCULAR; INTRAVENOUS ONCE AS NEEDED
Status: COMPLETED | OUTPATIENT
Start: 2017-05-18 | End: 2017-05-18

## 2017-05-18 RX ORDER — SUCRALFATE 1 G/1
1 TABLET ORAL
Qty: 56 TABLET | Refills: 0 | Status: SHIPPED | OUTPATIENT
Start: 2017-05-18 | End: 2017-06-04

## 2017-05-18 RX ORDER — OMEPRAZOLE 20 MG/1
20 CAPSULE, DELAYED RELEASE ORAL DAILY
Qty: 30 CAPSULE | Refills: 0 | Status: SHIPPED | OUTPATIENT
Start: 2017-05-18 | End: 2017-06-04

## 2017-05-18 RX ADMIN — PANTOPRAZOLE SODIUM 40 MG: 40 INJECTION, POWDER, FOR SOLUTION INTRAVENOUS at 07:46

## 2017-05-18 RX ADMIN — ONDANSETRON 4 MG: 2 INJECTION INTRAMUSCULAR; INTRAVENOUS at 07:44

## 2017-05-18 RX ADMIN — ONDANSETRON 4 MG: 2 INJECTION INTRAMUSCULAR; INTRAVENOUS at 06:45

## 2017-05-18 RX ADMIN — SODIUM CHLORIDE 1000 ML: 0.9 INJECTION, SOLUTION INTRAVENOUS at 06:47

## 2017-05-18 RX ADMIN — MORPHINE SULFATE 4 MG: 4 INJECTION, SOLUTION INTRAMUSCULAR; INTRAVENOUS at 08:45

## 2017-05-18 RX ADMIN — LORAZEPAM 1 MG: 2 INJECTION INTRAMUSCULAR; INTRAVENOUS at 08:40

## 2017-06-02 ENCOUNTER — ALLSCRIPTS OFFICE VISIT (OUTPATIENT)
Dept: OTHER | Facility: OTHER | Age: 27
End: 2017-06-02

## 2017-06-04 ENCOUNTER — HOSPITAL ENCOUNTER (INPATIENT)
Facility: HOSPITAL | Age: 27
LOS: 3 days | Discharge: HOME | DRG: 282 | End: 2017-06-07
Attending: EMERGENCY MEDICINE | Admitting: INTERNAL MEDICINE
Payer: COMMERCIAL

## 2017-06-04 ENCOUNTER — APPOINTMENT (EMERGENCY)
Dept: CT IMAGING | Facility: HOSPITAL | Age: 27
DRG: 282 | End: 2017-06-04
Payer: COMMERCIAL

## 2017-06-04 DIAGNOSIS — R11.10 VOMITING: ICD-10-CM

## 2017-06-04 DIAGNOSIS — R10.9 ABDOMINAL PAIN: ICD-10-CM

## 2017-06-04 DIAGNOSIS — K85.90 ACUTE PANCREATITIS: Primary | ICD-10-CM

## 2017-06-04 PROBLEM — IMO0002 RECURRENT PANCREATITIS: Status: ACTIVE | Noted: 2017-06-04

## 2017-06-04 LAB
ALBUMIN SERPL BCP-MCNC: 3.9 G/DL (ref 3.5–5)
ALP SERPL-CCNC: 120 U/L (ref 46–116)
ALT SERPL W P-5'-P-CCNC: 70 U/L (ref 12–78)
ANION GAP SERPL CALCULATED.3IONS-SCNC: 10 MMOL/L (ref 4–13)
AST SERPL W P-5'-P-CCNC: 73 U/L (ref 5–45)
BACTERIA UR QL AUTO: ABNORMAL /HPF
BASOPHILS # BLD AUTO: 0.02 THOUSANDS/ΜL (ref 0–0.1)
BASOPHILS NFR BLD AUTO: 0 % (ref 0–1)
BILIRUB SERPL-MCNC: 0.75 MG/DL (ref 0.2–1)
BILIRUB UR QL STRIP: ABNORMAL
BUN SERPL-MCNC: 8 MG/DL (ref 5–25)
CALCIUM SERPL-MCNC: 9.5 MG/DL (ref 8.3–10.1)
CHLORIDE SERPL-SCNC: 101 MMOL/L (ref 100–108)
CLARITY UR: ABNORMAL
CO2 SERPL-SCNC: 27 MMOL/L (ref 21–32)
COLOR UR: YELLOW
CREAT SERPL-MCNC: 0.61 MG/DL (ref 0.6–1.3)
EOSINOPHIL # BLD AUTO: 0.1 THOUSAND/ΜL (ref 0–0.61)
EOSINOPHIL NFR BLD AUTO: 1 % (ref 0–6)
ERYTHROCYTE [DISTWIDTH] IN BLOOD BY AUTOMATED COUNT: 13.2 % (ref 11.6–15.1)
GFR SERPL CREATININE-BSD FRML MDRD: >60 ML/MIN/1.73SQ M
GLUCOSE SERPL-MCNC: 115 MG/DL (ref 65–140)
GLUCOSE UR STRIP-MCNC: NEGATIVE MG/DL
HCG UR QL: NEGATIVE
HCT VFR BLD AUTO: 39.3 % (ref 34.8–46.1)
HGB BLD-MCNC: 13.8 G/DL (ref 11.5–15.4)
HGB UR QL STRIP.AUTO: ABNORMAL
KETONES UR STRIP-MCNC: NEGATIVE MG/DL
LEUKOCYTE ESTERASE UR QL STRIP: NEGATIVE
LIPASE SERPL-CCNC: 171 U/L (ref 73–393)
LYMPHOCYTES # BLD AUTO: 1.14 THOUSANDS/ΜL (ref 0.6–4.47)
LYMPHOCYTES NFR BLD AUTO: 13 % (ref 14–44)
MCH RBC QN AUTO: 31.4 PG (ref 26.8–34.3)
MCHC RBC AUTO-ENTMCNC: 35.1 G/DL (ref 31.4–37.4)
MCV RBC AUTO: 89 FL (ref 82–98)
MONOCYTES # BLD AUTO: 0.62 THOUSAND/ΜL (ref 0.17–1.22)
MONOCYTES NFR BLD AUTO: 7 % (ref 4–12)
NEUTROPHILS # BLD AUTO: 6.66 THOUSANDS/ΜL (ref 1.85–7.62)
NEUTS SEG NFR BLD AUTO: 79 % (ref 43–75)
NITRITE UR QL STRIP: NEGATIVE
NON-SQ EPI CELLS URNS QL MICRO: ABNORMAL /HPF
NRBC BLD AUTO-RTO: 0 /100 WBCS
PH UR STRIP.AUTO: 5.5 [PH] (ref 4.5–8)
PLATELET # BLD AUTO: 135 THOUSANDS/UL (ref 149–390)
PMV BLD AUTO: 10.7 FL (ref 8.9–12.7)
POTASSIUM SERPL-SCNC: 4 MMOL/L (ref 3.5–5.3)
PROT SERPL-MCNC: 8.6 G/DL (ref 6.4–8.2)
PROT UR STRIP-MCNC: ABNORMAL MG/DL
RBC # BLD AUTO: 4.4 MILLION/UL (ref 3.81–5.12)
RBC #/AREA URNS AUTO: ABNORMAL /HPF
SODIUM SERPL-SCNC: 138 MMOL/L (ref 136–145)
SP GR UR STRIP.AUTO: >=1.03 (ref 1–1.03)
UROBILINOGEN UR QL STRIP.AUTO: 0.2 E.U./DL
WBC # BLD AUTO: 8.54 THOUSAND/UL (ref 4.31–10.16)
WBC #/AREA URNS AUTO: ABNORMAL /HPF

## 2017-06-04 PROCEDURE — 96374 THER/PROPH/DIAG INJ IV PUSH: CPT

## 2017-06-04 PROCEDURE — 36415 COLL VENOUS BLD VENIPUNCTURE: CPT

## 2017-06-04 PROCEDURE — C9113 INJ PANTOPRAZOLE SODIUM, VIA: HCPCS | Performed by: EMERGENCY MEDICINE

## 2017-06-04 PROCEDURE — 85025 COMPLETE CBC W/AUTO DIFF WBC: CPT | Performed by: EMERGENCY MEDICINE

## 2017-06-04 PROCEDURE — 81001 URINALYSIS AUTO W/SCOPE: CPT

## 2017-06-04 PROCEDURE — 80053 COMPREHEN METABOLIC PANEL: CPT

## 2017-06-04 PROCEDURE — 96375 TX/PRO/DX INJ NEW DRUG ADDON: CPT

## 2017-06-04 PROCEDURE — 96376 TX/PRO/DX INJ SAME DRUG ADON: CPT

## 2017-06-04 PROCEDURE — 96361 HYDRATE IV INFUSION ADD-ON: CPT

## 2017-06-04 PROCEDURE — 99285 EMERGENCY DEPT VISIT HI MDM: CPT

## 2017-06-04 PROCEDURE — 83690 ASSAY OF LIPASE: CPT | Performed by: EMERGENCY MEDICINE

## 2017-06-04 PROCEDURE — 74177 CT ABD & PELVIS W/CONTRAST: CPT

## 2017-06-04 PROCEDURE — 81025 URINE PREGNANCY TEST: CPT | Performed by: EMERGENCY MEDICINE

## 2017-06-04 PROCEDURE — 81002 URINALYSIS NONAUTO W/O SCOPE: CPT | Performed by: EMERGENCY MEDICINE

## 2017-06-04 RX ORDER — MIRTAZAPINE 15 MG/1
15 TABLET, FILM COATED ORAL
Status: DISCONTINUED | OUTPATIENT
Start: 2017-06-04 | End: 2017-06-07 | Stop reason: HOSPADM

## 2017-06-04 RX ORDER — ONDANSETRON 2 MG/ML
4 INJECTION INTRAMUSCULAR; INTRAVENOUS ONCE
Status: COMPLETED | OUTPATIENT
Start: 2017-06-04 | End: 2017-06-04

## 2017-06-04 RX ORDER — MAGNESIUM HYDROXIDE/ALUMINUM HYDROXICE/SIMETHICONE 120; 1200; 1200 MG/30ML; MG/30ML; MG/30ML
30 SUSPENSION ORAL EVERY 4 HOURS PRN
Status: DISCONTINUED | OUTPATIENT
Start: 2017-06-04 | End: 2017-06-07 | Stop reason: HOSPADM

## 2017-06-04 RX ORDER — FOLIC ACID 1 MG/1
1 TABLET ORAL DAILY
Status: DISCONTINUED | OUTPATIENT
Start: 2017-06-05 | End: 2017-06-07 | Stop reason: HOSPADM

## 2017-06-04 RX ORDER — MORPHINE SULFATE 4 MG/ML
4 INJECTION, SOLUTION INTRAMUSCULAR; INTRAVENOUS ONCE
Status: COMPLETED | OUTPATIENT
Start: 2017-06-04 | End: 2017-06-04

## 2017-06-04 RX ORDER — NICOTINE 21 MG/24HR
1 PATCH, TRANSDERMAL 24 HOURS TRANSDERMAL DAILY
Status: DISCONTINUED | OUTPATIENT
Start: 2017-06-05 | End: 2017-06-07 | Stop reason: HOSPADM

## 2017-06-04 RX ORDER — LACTULOSE 20 G/30ML
10 SOLUTION ORAL 2 TIMES DAILY
Status: DISCONTINUED | OUTPATIENT
Start: 2017-06-04 | End: 2017-06-07 | Stop reason: HOSPADM

## 2017-06-04 RX ORDER — IBUPROFEN 600 MG/1
600 TABLET ORAL EVERY 6 HOURS PRN
Status: DISCONTINUED | OUTPATIENT
Start: 2017-06-04 | End: 2017-06-07 | Stop reason: HOSPADM

## 2017-06-04 RX ORDER — LEVETIRACETAM 500 MG/1
500 TABLET ORAL 2 TIMES DAILY
Status: DISCONTINUED | OUTPATIENT
Start: 2017-06-04 | End: 2017-06-07 | Stop reason: HOSPADM

## 2017-06-04 RX ORDER — LORAZEPAM 0.5 MG/1
0.5 TABLET ORAL EVERY 8 HOURS PRN
Status: DISCONTINUED | OUTPATIENT
Start: 2017-06-04 | End: 2017-06-07 | Stop reason: HOSPADM

## 2017-06-04 RX ORDER — SODIUM CHLORIDE 9 MG/ML
125 INJECTION, SOLUTION INTRAVENOUS CONTINUOUS
Status: DISCONTINUED | OUTPATIENT
Start: 2017-06-04 | End: 2017-06-07 | Stop reason: HOSPADM

## 2017-06-04 RX ORDER — OXYCODONE HYDROCHLORIDE 5 MG/1
5 TABLET ORAL EVERY 4 HOURS PRN
Status: DISCONTINUED | OUTPATIENT
Start: 2017-06-04 | End: 2017-06-07

## 2017-06-04 RX ORDER — PANTOPRAZOLE SODIUM 40 MG/1
40 INJECTION, POWDER, FOR SOLUTION INTRAVENOUS ONCE
Status: COMPLETED | OUTPATIENT
Start: 2017-06-04 | End: 2017-06-04

## 2017-06-04 RX ORDER — KETOROLAC TROMETHAMINE 30 MG/ML
15 INJECTION, SOLUTION INTRAMUSCULAR; INTRAVENOUS EVERY 6 HOURS PRN
Status: DISCONTINUED | OUTPATIENT
Start: 2017-06-04 | End: 2017-06-07 | Stop reason: HOSPADM

## 2017-06-04 RX ORDER — PANTOPRAZOLE SODIUM 40 MG/1
40 TABLET, DELAYED RELEASE ORAL
Status: DISCONTINUED | OUTPATIENT
Start: 2017-06-05 | End: 2017-06-07 | Stop reason: HOSPADM

## 2017-06-04 RX ORDER — THIAMINE MONONITRATE (VIT B1) 100 MG
100 TABLET ORAL DAILY
Status: DISCONTINUED | OUTPATIENT
Start: 2017-06-05 | End: 2017-06-07 | Stop reason: HOSPADM

## 2017-06-04 RX ORDER — ONDANSETRON 2 MG/ML
4 INJECTION INTRAMUSCULAR; INTRAVENOUS EVERY 6 HOURS PRN
Status: DISCONTINUED | OUTPATIENT
Start: 2017-06-04 | End: 2017-06-07 | Stop reason: HOSPADM

## 2017-06-04 RX ADMIN — ONDANSETRON 4 MG: 2 INJECTION INTRAMUSCULAR; INTRAVENOUS at 22:40

## 2017-06-04 RX ADMIN — ONDANSETRON 4 MG: 2 INJECTION INTRAMUSCULAR; INTRAVENOUS at 14:22

## 2017-06-04 RX ADMIN — MORPHINE SULFATE 4 MG: 4 INJECTION, SOLUTION INTRAMUSCULAR; INTRAVENOUS at 15:42

## 2017-06-04 RX ADMIN — ONDANSETRON 4 MG: 2 INJECTION INTRAMUSCULAR; INTRAVENOUS at 15:40

## 2017-06-04 RX ADMIN — PANTOPRAZOLE SODIUM 40 MG: 40 INJECTION, POWDER, FOR SOLUTION INTRAVENOUS at 14:25

## 2017-06-04 RX ADMIN — SODIUM CHLORIDE 125 ML/HR: 0.9 INJECTION, SOLUTION INTRAVENOUS at 17:10

## 2017-06-04 RX ADMIN — MIRTAZAPINE 15 MG: 15 TABLET, FILM COATED ORAL at 22:39

## 2017-06-04 RX ADMIN — KETOROLAC TROMETHAMINE 15 MG: 30 INJECTION, SOLUTION INTRAMUSCULAR at 18:38

## 2017-06-04 RX ADMIN — MORPHINE SULFATE 4 MG: 4 INJECTION, SOLUTION INTRAMUSCULAR; INTRAVENOUS at 14:17

## 2017-06-04 RX ADMIN — SODIUM CHLORIDE 1000 ML: 0.9 INJECTION, SOLUTION INTRAVENOUS at 14:34

## 2017-06-04 RX ADMIN — HYDROMORPHONE HYDROCHLORIDE 1 MG: 1 INJECTION, SOLUTION INTRAMUSCULAR; INTRAVENOUS; SUBCUTANEOUS at 17:09

## 2017-06-04 RX ADMIN — ALUMINUM HYDROXIDE, MAGNESIUM HYDROXIDE, AND SIMETHICONE 30 ML: 200; 200; 20 SUSPENSION ORAL at 14:28

## 2017-06-04 RX ADMIN — IBUPROFEN 600 MG: 600 TABLET, FILM COATED ORAL at 22:39

## 2017-06-04 RX ADMIN — OXYCODONE HYDROCHLORIDE 5 MG: 5 TABLET ORAL at 20:51

## 2017-06-04 RX ADMIN — LIDOCAINE HYDROCHLORIDE 15 ML: 20 SOLUTION ORAL; TOPICAL at 14:29

## 2017-06-04 RX ADMIN — LORAZEPAM 0.5 MG: 0.5 TABLET ORAL at 20:07

## 2017-06-04 RX ADMIN — IOHEXOL 100 ML: 350 INJECTION, SOLUTION INTRAVENOUS at 16:10

## 2017-06-04 RX ADMIN — LEVETIRACETAM 500 MG: 500 TABLET ORAL at 18:38

## 2017-06-04 RX ADMIN — SODIUM CHLORIDE 1000 ML: 0.9 INJECTION, SOLUTION INTRAVENOUS at 16:15

## 2017-06-04 RX ADMIN — HYDROMORPHONE HYDROCHLORIDE 0.5 MG: 1 INJECTION, SOLUTION INTRAMUSCULAR; INTRAVENOUS; SUBCUTANEOUS at 22:40

## 2017-06-05 LAB
ALBUMIN SERPL BCP-MCNC: 3.1 G/DL (ref 3.5–5)
ALP SERPL-CCNC: 91 U/L (ref 46–116)
ALT SERPL W P-5'-P-CCNC: 49 U/L (ref 12–78)
ANION GAP SERPL CALCULATED.3IONS-SCNC: 10 MMOL/L (ref 4–13)
AST SERPL W P-5'-P-CCNC: 43 U/L (ref 5–45)
BILIRUB SERPL-MCNC: 1.23 MG/DL (ref 0.2–1)
BUN SERPL-MCNC: 5 MG/DL (ref 5–25)
CALCIUM SERPL-MCNC: 8.7 MG/DL (ref 8.3–10.1)
CHLORIDE SERPL-SCNC: 103 MMOL/L (ref 100–108)
CO2 SERPL-SCNC: 23 MMOL/L (ref 21–32)
CREAT SERPL-MCNC: 0.51 MG/DL (ref 0.6–1.3)
GFR SERPL CREATININE-BSD FRML MDRD: >60 ML/MIN/1.73SQ M
GLUCOSE SERPL-MCNC: 96 MG/DL (ref 65–140)
POTASSIUM SERPL-SCNC: 3.6 MMOL/L (ref 3.5–5.3)
PROT SERPL-MCNC: 6.9 G/DL (ref 6.4–8.2)
SODIUM SERPL-SCNC: 136 MMOL/L (ref 136–145)

## 2017-06-05 PROCEDURE — 80053 COMPREHEN METABOLIC PANEL: CPT | Performed by: HOSPITALIST

## 2017-06-05 RX ADMIN — FOLIC ACID 1 MG: 1 TABLET ORAL at 08:39

## 2017-06-05 RX ADMIN — ONDANSETRON 4 MG: 2 INJECTION INTRAMUSCULAR; INTRAVENOUS at 12:23

## 2017-06-05 RX ADMIN — SODIUM CHLORIDE 125 ML/HR: 0.9 INJECTION, SOLUTION INTRAVENOUS at 09:26

## 2017-06-05 RX ADMIN — ENOXAPARIN SODIUM 40 MG: 40 INJECTION SUBCUTANEOUS at 08:39

## 2017-06-05 RX ADMIN — Medication 100 MG: at 08:39

## 2017-06-05 RX ADMIN — SODIUM CHLORIDE 125 ML/HR: 0.9 INJECTION, SOLUTION INTRAVENOUS at 17:20

## 2017-06-05 RX ADMIN — LACTULOSE 10 G: 20 SOLUTION ORAL at 17:20

## 2017-06-05 RX ADMIN — MIRTAZAPINE 15 MG: 15 TABLET, FILM COATED ORAL at 21:56

## 2017-06-05 RX ADMIN — LACTULOSE 10 G: 20 SOLUTION ORAL at 08:38

## 2017-06-05 RX ADMIN — OXYCODONE HYDROCHLORIDE 5 MG: 5 TABLET ORAL at 02:35

## 2017-06-05 RX ADMIN — LEVETIRACETAM 500 MG: 500 TABLET ORAL at 08:39

## 2017-06-05 RX ADMIN — KETOROLAC TROMETHAMINE 15 MG: 30 INJECTION, SOLUTION INTRAMUSCULAR at 01:36

## 2017-06-05 RX ADMIN — HYDROMORPHONE HYDROCHLORIDE 0.5 MG: 1 INJECTION, SOLUTION INTRAMUSCULAR; INTRAVENOUS; SUBCUTANEOUS at 06:07

## 2017-06-05 RX ADMIN — ONDANSETRON 4 MG: 2 INJECTION INTRAMUSCULAR; INTRAVENOUS at 06:07

## 2017-06-05 RX ADMIN — KETOROLAC TROMETHAMINE 15 MG: 30 INJECTION, SOLUTION INTRAMUSCULAR at 21:56

## 2017-06-05 RX ADMIN — KETOROLAC TROMETHAMINE 15 MG: 30 INJECTION, SOLUTION INTRAMUSCULAR at 15:28

## 2017-06-05 RX ADMIN — KETOROLAC TROMETHAMINE 15 MG: 30 INJECTION, SOLUTION INTRAMUSCULAR at 09:27

## 2017-06-05 RX ADMIN — HYDROMORPHONE HYDROCHLORIDE 0.5 MG: 1 INJECTION, SOLUTION INTRAMUSCULAR; INTRAVENOUS; SUBCUTANEOUS at 18:20

## 2017-06-05 RX ADMIN — OXYCODONE HYDROCHLORIDE 5 MG: 5 TABLET ORAL at 19:30

## 2017-06-05 RX ADMIN — HYDROMORPHONE HYDROCHLORIDE 0.5 MG: 1 INJECTION, SOLUTION INTRAMUSCULAR; INTRAVENOUS; SUBCUTANEOUS at 12:19

## 2017-06-05 RX ADMIN — LEVETIRACETAM 500 MG: 500 TABLET ORAL at 17:20

## 2017-06-05 RX ADMIN — SODIUM CHLORIDE 125 ML/HR: 0.9 INJECTION, SOLUTION INTRAVENOUS at 01:23

## 2017-06-05 RX ADMIN — LORAZEPAM 0.5 MG: 0.5 TABLET ORAL at 06:07

## 2017-06-06 LAB
BASOPHILS # BLD AUTO: 0.01 THOUSANDS/ΜL (ref 0–0.1)
BASOPHILS NFR BLD AUTO: 0 % (ref 0–1)
EOSINOPHIL # BLD AUTO: 0.11 THOUSAND/ΜL (ref 0–0.61)
EOSINOPHIL NFR BLD AUTO: 2 % (ref 0–6)
ERYTHROCYTE [DISTWIDTH] IN BLOOD BY AUTOMATED COUNT: 13.1 % (ref 11.6–15.1)
HCT VFR BLD AUTO: 30.5 % (ref 34.8–46.1)
HGB BLD-MCNC: 10.4 G/DL (ref 11.5–15.4)
LIPASE SERPL-CCNC: 80 U/L (ref 73–393)
LYMPHOCYTES # BLD AUTO: 1.34 THOUSANDS/ΜL (ref 0.6–4.47)
LYMPHOCYTES NFR BLD AUTO: 27 % (ref 14–44)
MCH RBC QN AUTO: 30.4 PG (ref 26.8–34.3)
MCHC RBC AUTO-ENTMCNC: 34.1 G/DL (ref 31.4–37.4)
MCV RBC AUTO: 89 FL (ref 82–98)
MONOCYTES # BLD AUTO: 0.69 THOUSAND/ΜL (ref 0.17–1.22)
MONOCYTES NFR BLD AUTO: 14 % (ref 4–12)
NEUTROPHILS # BLD AUTO: 2.87 THOUSANDS/ΜL (ref 1.85–7.62)
NEUTS SEG NFR BLD AUTO: 57 % (ref 43–75)
NRBC BLD AUTO-RTO: 0 /100 WBCS
PLATELET # BLD AUTO: 108 THOUSANDS/UL (ref 149–390)
PMV BLD AUTO: 10.7 FL (ref 8.9–12.7)
RBC # BLD AUTO: 3.42 MILLION/UL (ref 3.81–5.12)
WBC # BLD AUTO: 5.02 THOUSAND/UL (ref 4.31–10.16)

## 2017-06-06 PROCEDURE — 83690 ASSAY OF LIPASE: CPT | Performed by: INTERNAL MEDICINE

## 2017-06-06 PROCEDURE — 85025 COMPLETE CBC W/AUTO DIFF WBC: CPT | Performed by: INTERNAL MEDICINE

## 2017-06-06 RX ADMIN — LACTULOSE 10 G: 20 SOLUTION ORAL at 17:07

## 2017-06-06 RX ADMIN — OXYCODONE HYDROCHLORIDE 5 MG: 5 TABLET ORAL at 09:14

## 2017-06-06 RX ADMIN — KETOROLAC TROMETHAMINE 15 MG: 30 INJECTION, SOLUTION INTRAMUSCULAR at 16:36

## 2017-06-06 RX ADMIN — ONDANSETRON 4 MG: 2 INJECTION INTRAMUSCULAR; INTRAVENOUS at 21:33

## 2017-06-06 RX ADMIN — KETOROLAC TROMETHAMINE 15 MG: 30 INJECTION, SOLUTION INTRAMUSCULAR at 10:31

## 2017-06-06 RX ADMIN — LEVETIRACETAM 500 MG: 500 TABLET ORAL at 09:14

## 2017-06-06 RX ADMIN — LEVETIRACETAM 500 MG: 500 TABLET ORAL at 17:07

## 2017-06-06 RX ADMIN — KETOROLAC TROMETHAMINE 15 MG: 30 INJECTION, SOLUTION INTRAMUSCULAR at 23:03

## 2017-06-06 RX ADMIN — HYDROMORPHONE HYDROCHLORIDE 0.5 MG: 1 INJECTION, SOLUTION INTRAMUSCULAR; INTRAVENOUS; SUBCUTANEOUS at 06:40

## 2017-06-06 RX ADMIN — ONDANSETRON 4 MG: 2 INJECTION INTRAMUSCULAR; INTRAVENOUS at 00:44

## 2017-06-06 RX ADMIN — ONDANSETRON 4 MG: 2 INJECTION INTRAMUSCULAR; INTRAVENOUS at 09:14

## 2017-06-06 RX ADMIN — KETOROLAC TROMETHAMINE 15 MG: 30 INJECTION, SOLUTION INTRAMUSCULAR at 04:14

## 2017-06-06 RX ADMIN — Medication 100 MG: at 09:14

## 2017-06-06 RX ADMIN — SODIUM CHLORIDE 125 ML/HR: 0.9 INJECTION, SOLUTION INTRAVENOUS at 09:19

## 2017-06-06 RX ADMIN — OXYCODONE HYDROCHLORIDE 5 MG: 5 TABLET ORAL at 20:26

## 2017-06-06 RX ADMIN — SODIUM CHLORIDE 125 ML/HR: 0.9 INJECTION, SOLUTION INTRAVENOUS at 01:12

## 2017-06-06 RX ADMIN — LACTULOSE 10 G: 20 SOLUTION ORAL at 09:14

## 2017-06-06 RX ADMIN — PANTOPRAZOLE SODIUM 40 MG: 40 TABLET, DELAYED RELEASE ORAL at 05:32

## 2017-06-06 RX ADMIN — LORAZEPAM 0.5 MG: 0.5 TABLET ORAL at 09:14

## 2017-06-06 RX ADMIN — FOLIC ACID 1 MG: 1 TABLET ORAL at 09:14

## 2017-06-06 RX ADMIN — OXYCODONE HYDROCHLORIDE 5 MG: 5 TABLET ORAL at 02:25

## 2017-06-06 RX ADMIN — HYDROMORPHONE HYDROCHLORIDE 0.5 MG: 1 INJECTION, SOLUTION INTRAMUSCULAR; INTRAVENOUS; SUBCUTANEOUS at 18:50

## 2017-06-06 RX ADMIN — HYDROMORPHONE HYDROCHLORIDE 0.5 MG: 1 INJECTION, SOLUTION INTRAMUSCULAR; INTRAVENOUS; SUBCUTANEOUS at 00:40

## 2017-06-06 RX ADMIN — HYDROMORPHONE HYDROCHLORIDE 0.5 MG: 1 INJECTION, SOLUTION INTRAMUSCULAR; INTRAVENOUS; SUBCUTANEOUS at 12:47

## 2017-06-07 VITALS
BODY MASS INDEX: 23.96 KG/M2 | HEART RATE: 70 BPM | OXYGEN SATURATION: 97 % | WEIGHT: 153 LBS | DIASTOLIC BLOOD PRESSURE: 82 MMHG | TEMPERATURE: 97.4 F | SYSTOLIC BLOOD PRESSURE: 126 MMHG | RESPIRATION RATE: 16 BRPM

## 2017-06-07 PROBLEM — K85.20 ALCOHOL-INDUCED ACUTE PANCREATITIS: Status: RESOLVED | Noted: 2017-04-28 | Resolved: 2017-06-07

## 2017-06-07 RX ORDER — LANOLIN ALCOHOL/MO/W.PET/CERES
100 CREAM (GRAM) TOPICAL DAILY
Qty: 30 TABLET | Refills: 0 | Status: SHIPPED | OUTPATIENT
Start: 2017-06-07

## 2017-06-07 RX ORDER — OXYCODONE HYDROCHLORIDE 5 MG/1
5 TABLET ORAL EVERY 4 HOURS PRN
Status: DISCONTINUED | OUTPATIENT
Start: 2017-06-07 | End: 2017-06-07 | Stop reason: HOSPADM

## 2017-06-07 RX ORDER — OXYCODONE HYDROCHLORIDE 5 MG/1
5 TABLET ORAL EVERY 6 HOURS PRN
Status: DISCONTINUED | OUTPATIENT
Start: 2017-06-07 | End: 2017-06-07 | Stop reason: HOSPADM

## 2017-06-07 RX ORDER — OXYCODONE HYDROCHLORIDE 5 MG/1
5 TABLET ORAL EVERY 6 HOURS PRN
Qty: 20 TABLET | Refills: 0 | Status: SHIPPED | OUTPATIENT
Start: 2017-06-07 | End: 2018-06-21

## 2017-06-07 RX ORDER — LIDOCAINE 50 MG/G
1 PATCH TOPICAL EVERY 24 HOURS
Qty: 30 PATCH | Refills: 0 | Status: SHIPPED | OUTPATIENT
Start: 2017-06-07 | End: 2017-06-26 | Stop reason: ALTCHOICE

## 2017-06-07 RX ORDER — ONDANSETRON 4 MG/1
4 TABLET, FILM COATED ORAL EVERY 6 HOURS
Qty: 28 TABLET | Refills: 0 | Status: SHIPPED | OUTPATIENT
Start: 2017-06-07 | End: 2018-06-21

## 2017-06-07 RX ORDER — ONDANSETRON 4 MG/1
4 TABLET, FILM COATED ORAL EVERY 6 HOURS
Qty: 12 TABLET | Refills: 0 | Status: SHIPPED | OUTPATIENT
Start: 2017-06-07 | End: 2017-06-07

## 2017-06-07 RX ADMIN — HYDROMORPHONE HYDROCHLORIDE 0.5 MG: 1 INJECTION, SOLUTION INTRAMUSCULAR; INTRAVENOUS; SUBCUTANEOUS at 07:53

## 2017-06-07 RX ADMIN — KETOROLAC TROMETHAMINE 15 MG: 30 INJECTION, SOLUTION INTRAMUSCULAR at 05:48

## 2017-06-07 RX ADMIN — OXYCODONE HYDROCHLORIDE 5 MG: 5 TABLET ORAL at 02:20

## 2017-06-07 RX ADMIN — LORAZEPAM 0.5 MG: 0.5 TABLET ORAL at 14:39

## 2017-06-07 RX ADMIN — ONDANSETRON 4 MG: 2 INJECTION INTRAMUSCULAR; INTRAVENOUS at 12:47

## 2017-06-07 RX ADMIN — LACTULOSE 10 G: 20 SOLUTION ORAL at 08:55

## 2017-06-07 RX ADMIN — LEVETIRACETAM 500 MG: 500 TABLET ORAL at 08:54

## 2017-06-07 RX ADMIN — HYDROMORPHONE HYDROCHLORIDE 0.5 MG: 1 INJECTION, SOLUTION INTRAMUSCULAR; INTRAVENOUS; SUBCUTANEOUS at 00:51

## 2017-06-07 RX ADMIN — SODIUM CHLORIDE 125 ML/HR: 0.9 INJECTION, SOLUTION INTRAVENOUS at 08:58

## 2017-06-07 RX ADMIN — FOLIC ACID 1 MG: 1 TABLET ORAL at 08:55

## 2017-06-07 RX ADMIN — PANTOPRAZOLE SODIUM 40 MG: 40 TABLET, DELAYED RELEASE ORAL at 05:48

## 2017-06-07 RX ADMIN — OXYCODONE HYDROCHLORIDE 5 MG: 5 TABLET ORAL at 12:47

## 2017-06-07 RX ADMIN — LORAZEPAM 0.5 MG: 0.5 TABLET ORAL at 02:20

## 2017-06-07 RX ADMIN — ENOXAPARIN SODIUM 40 MG: 40 INJECTION SUBCUTANEOUS at 08:54

## 2017-06-07 RX ADMIN — Medication 100 MG: at 08:55

## 2017-06-07 RX ADMIN — MIRTAZAPINE 15 MG: 15 TABLET, FILM COATED ORAL at 00:43

## 2017-06-07 RX ADMIN — SODIUM CHLORIDE 125 ML/HR: 0.9 INJECTION, SOLUTION INTRAVENOUS at 00:51

## 2017-06-12 ENCOUNTER — ALLSCRIPTS OFFICE VISIT (OUTPATIENT)
Dept: OTHER | Facility: OTHER | Age: 27
End: 2017-06-12

## 2017-06-14 ENCOUNTER — ALLSCRIPTS OFFICE VISIT (OUTPATIENT)
Dept: OTHER | Facility: OTHER | Age: 27
End: 2017-06-14

## 2017-06-16 ENCOUNTER — GENERIC CONVERSION - ENCOUNTER (OUTPATIENT)
Dept: OTHER | Facility: OTHER | Age: 27
End: 2017-06-16

## 2017-06-19 ENCOUNTER — ALLSCRIPTS OFFICE VISIT (OUTPATIENT)
Dept: OTHER | Facility: OTHER | Age: 27
End: 2017-06-19

## 2017-06-19 DIAGNOSIS — F31.63 BIPOLAR DISORDER, CURRENT EPISODE MIXED, SEVERE, WITHOUT PSYCHOTIC FEATURES (HCC): ICD-10-CM

## 2017-06-19 DIAGNOSIS — F10.10 UNCOMPLICATED ALCOHOL ABUSE: ICD-10-CM

## 2017-06-19 DIAGNOSIS — K86.3 PSEUDOCYST OF PANCREAS: ICD-10-CM

## 2017-06-19 DIAGNOSIS — K21.9 GASTRO-ESOPHAGEAL REFLUX DISEASE WITHOUT ESOPHAGITIS: ICD-10-CM

## 2017-06-19 DIAGNOSIS — M54.50 LOW BACK PAIN: ICD-10-CM

## 2017-06-19 DIAGNOSIS — K85.20 ALCOHOL-INDUCED ACUTE PANCREATITIS WITHOUT INFECTION OR NECROSIS: ICD-10-CM

## 2017-06-19 DIAGNOSIS — K70.10 ALCOHOLIC HEPATITIS WITHOUT ASCITES: ICD-10-CM

## 2017-06-19 DIAGNOSIS — R94.5 ABNORMAL RESULTS OF LIVER FUNCTION STUDIES: ICD-10-CM

## 2017-06-26 ENCOUNTER — HOSPITAL ENCOUNTER (EMERGENCY)
Facility: HOSPITAL | Age: 27
Discharge: HOME | End: 2017-06-26
Attending: EMERGENCY MEDICINE | Admitting: EMERGENCY MEDICINE
Payer: COMMERCIAL

## 2017-06-26 VITALS
DIASTOLIC BLOOD PRESSURE: 79 MMHG | RESPIRATION RATE: 18 BRPM | TEMPERATURE: 98.4 F | SYSTOLIC BLOOD PRESSURE: 128 MMHG | OXYGEN SATURATION: 99 % | HEART RATE: 94 BPM

## 2017-06-26 DIAGNOSIS — R11.10 VOMITING: ICD-10-CM

## 2017-06-26 DIAGNOSIS — R10.9 ACUTE ABDOMINAL PAIN: Primary | ICD-10-CM

## 2017-06-26 DIAGNOSIS — K29.20 ALCOHOLIC GASTRITIS: ICD-10-CM

## 2017-06-26 DIAGNOSIS — F10.10 ALCOHOL ABUSE: ICD-10-CM

## 2017-06-26 LAB
ALBUMIN SERPL BCP-MCNC: 3.9 G/DL (ref 3.5–5)
ALP SERPL-CCNC: 115 U/L (ref 46–116)
ALT SERPL W P-5'-P-CCNC: 68 U/L (ref 12–78)
ANION GAP SERPL CALCULATED.3IONS-SCNC: 15 MMOL/L (ref 4–13)
AST SERPL W P-5'-P-CCNC: 68 U/L (ref 5–45)
BACTERIA UR QL AUTO: ABNORMAL /HPF
BASOPHILS # BLD AUTO: 0.03 THOUSANDS/ΜL (ref 0–0.1)
BASOPHILS NFR BLD AUTO: 0 % (ref 0–1)
BILIRUB SERPL-MCNC: 0.65 MG/DL (ref 0.2–1)
BILIRUB UR QL STRIP: NEGATIVE
BUN SERPL-MCNC: 6 MG/DL (ref 5–25)
CALCIUM SERPL-MCNC: 9.1 MG/DL (ref 8.3–10.1)
CHLORIDE SERPL-SCNC: 98 MMOL/L (ref 100–108)
CLARITY UR: CLEAR
CO2 SERPL-SCNC: 24 MMOL/L (ref 21–32)
COLOR UR: YELLOW
CREAT SERPL-MCNC: 0.61 MG/DL (ref 0.6–1.3)
EOSINOPHIL # BLD AUTO: 0.15 THOUSAND/ΜL (ref 0–0.61)
EOSINOPHIL NFR BLD AUTO: 2 % (ref 0–6)
ERYTHROCYTE [DISTWIDTH] IN BLOOD BY AUTOMATED COUNT: 13.5 % (ref 11.6–15.1)
ETHANOL SERPL-MCNC: 147 MG/DL (ref 0–3)
GFR SERPL CREATININE-BSD FRML MDRD: >60 ML/MIN/1.73SQ M
GLUCOSE SERPL-MCNC: 85 MG/DL (ref 65–140)
GLUCOSE UR STRIP-MCNC: NEGATIVE MG/DL
HCT VFR BLD AUTO: 36.8 % (ref 34.8–46.1)
HGB BLD-MCNC: 13.2 G/DL (ref 11.5–15.4)
HGB UR QL STRIP.AUTO: ABNORMAL
KETONES UR STRIP-MCNC: NEGATIVE MG/DL
LEUKOCYTE ESTERASE UR QL STRIP: NEGATIVE
LIPASE SERPL-CCNC: 76 U/L (ref 73–393)
LYMPHOCYTES # BLD AUTO: 3.4 THOUSANDS/ΜL (ref 0.6–4.47)
LYMPHOCYTES NFR BLD AUTO: 34 % (ref 14–44)
MCH RBC QN AUTO: 30.1 PG (ref 26.8–34.3)
MCHC RBC AUTO-ENTMCNC: 35.9 G/DL (ref 31.4–37.4)
MCV RBC AUTO: 84 FL (ref 82–98)
MONOCYTES # BLD AUTO: 0.84 THOUSAND/ΜL (ref 0.17–1.22)
MONOCYTES NFR BLD AUTO: 8 % (ref 4–12)
NEUTROPHILS # BLD AUTO: 5.7 THOUSANDS/ΜL (ref 1.85–7.62)
NEUTS SEG NFR BLD AUTO: 56 % (ref 43–75)
NITRITE UR QL STRIP: NEGATIVE
NON-SQ EPI CELLS URNS QL MICRO: ABNORMAL /HPF
NRBC BLD AUTO-RTO: 0 /100 WBCS
PH UR STRIP.AUTO: 5 [PH] (ref 4.5–8)
PLATELET # BLD AUTO: 223 THOUSANDS/UL (ref 149–390)
PMV BLD AUTO: 9.5 FL (ref 8.9–12.7)
POTASSIUM SERPL-SCNC: 3.1 MMOL/L (ref 3.5–5.3)
PROT SERPL-MCNC: 8.3 G/DL (ref 6.4–8.2)
PROT UR STRIP-MCNC: NEGATIVE MG/DL
RBC # BLD AUTO: 4.38 MILLION/UL (ref 3.81–5.12)
RBC #/AREA URNS AUTO: ABNORMAL /HPF
SODIUM SERPL-SCNC: 137 MMOL/L (ref 136–145)
SP GR UR STRIP.AUTO: <=1.005 (ref 1–1.03)
UROBILINOGEN UR QL STRIP.AUTO: 0.2 E.U./DL
WBC # BLD AUTO: 10.12 THOUSAND/UL (ref 4.31–10.16)
WBC #/AREA URNS AUTO: ABNORMAL /HPF

## 2017-06-26 PROCEDURE — C9113 INJ PANTOPRAZOLE SODIUM, VIA: HCPCS | Performed by: EMERGENCY MEDICINE

## 2017-06-26 PROCEDURE — 85025 COMPLETE CBC W/AUTO DIFF WBC: CPT | Performed by: EMERGENCY MEDICINE

## 2017-06-26 PROCEDURE — 96375 TX/PRO/DX INJ NEW DRUG ADDON: CPT

## 2017-06-26 PROCEDURE — 80320 DRUG SCREEN QUANTALCOHOLS: CPT | Performed by: EMERGENCY MEDICINE

## 2017-06-26 PROCEDURE — 81001 URINALYSIS AUTO W/SCOPE: CPT

## 2017-06-26 PROCEDURE — 96361 HYDRATE IV INFUSION ADD-ON: CPT

## 2017-06-26 PROCEDURE — 36415 COLL VENOUS BLD VENIPUNCTURE: CPT | Performed by: EMERGENCY MEDICINE

## 2017-06-26 PROCEDURE — 80053 COMPREHEN METABOLIC PANEL: CPT | Performed by: EMERGENCY MEDICINE

## 2017-06-26 PROCEDURE — 99284 EMERGENCY DEPT VISIT MOD MDM: CPT

## 2017-06-26 PROCEDURE — 96365 THER/PROPH/DIAG IV INF INIT: CPT

## 2017-06-26 PROCEDURE — 81002 URINALYSIS NONAUTO W/O SCOPE: CPT | Performed by: EMERGENCY MEDICINE

## 2017-06-26 PROCEDURE — 83690 ASSAY OF LIPASE: CPT | Performed by: EMERGENCY MEDICINE

## 2017-06-26 RX ORDER — SUCRALFATE 1 G/1
1 TABLET ORAL
Status: DISCONTINUED | OUTPATIENT
Start: 2017-06-26 | End: 2017-06-26 | Stop reason: HOSPADM

## 2017-06-26 RX ORDER — ONDANSETRON 4 MG/1
4 TABLET, ORALLY DISINTEGRATING ORAL ONCE
Status: COMPLETED | OUTPATIENT
Start: 2017-06-26 | End: 2017-06-26

## 2017-06-26 RX ORDER — PANTOPRAZOLE SODIUM 40 MG/1
40 INJECTION, POWDER, FOR SOLUTION INTRAVENOUS ONCE
Status: COMPLETED | OUTPATIENT
Start: 2017-06-26 | End: 2017-06-26

## 2017-06-26 RX ORDER — MAGNESIUM HYDROXIDE/ALUMINUM HYDROXICE/SIMETHICONE 120; 1200; 1200 MG/30ML; MG/30ML; MG/30ML
30 SUSPENSION ORAL ONCE
Status: COMPLETED | OUTPATIENT
Start: 2017-06-26 | End: 2017-06-26

## 2017-06-26 RX ORDER — ONDANSETRON 2 MG/ML
4 INJECTION INTRAMUSCULAR; INTRAVENOUS ONCE
Status: COMPLETED | OUTPATIENT
Start: 2017-06-26 | End: 2017-06-26

## 2017-06-26 RX ORDER — SUCRALFATE 1 G/1
1 TABLET ORAL
Qty: 56 TABLET | Refills: 0 | Status: SHIPPED | OUTPATIENT
Start: 2017-06-26 | End: 2018-06-21

## 2017-06-26 RX ORDER — LORAZEPAM 2 MG/ML
1 INJECTION INTRAMUSCULAR ONCE
Status: COMPLETED | OUTPATIENT
Start: 2017-06-26 | End: 2017-06-26

## 2017-06-26 RX ORDER — MORPHINE SULFATE 4 MG/ML
4 INJECTION, SOLUTION INTRAMUSCULAR; INTRAVENOUS ONCE
Status: COMPLETED | OUTPATIENT
Start: 2017-06-26 | End: 2017-06-26

## 2017-06-26 RX ORDER — ONDANSETRON 4 MG/1
4 TABLET, FILM COATED ORAL EVERY 6 HOURS
Qty: 12 TABLET | Refills: 0 | Status: SHIPPED | OUTPATIENT
Start: 2017-06-26 | End: 2018-06-21

## 2017-06-26 RX ORDER — OXYCODONE HYDROCHLORIDE AND ACETAMINOPHEN 5; 325 MG/1; MG/1
1 TABLET ORAL ONCE
Status: COMPLETED | OUTPATIENT
Start: 2017-06-26 | End: 2017-06-26

## 2017-06-26 RX ADMIN — PANTOPRAZOLE SODIUM 40 MG: 40 INJECTION, POWDER, FOR SOLUTION INTRAVENOUS at 07:21

## 2017-06-26 RX ADMIN — LIDOCAINE HYDROCHLORIDE 10 ML: 20 SOLUTION ORAL; TOPICAL at 11:10

## 2017-06-26 RX ADMIN — ONDANSETRON 4 MG: 2 INJECTION INTRAMUSCULAR; INTRAVENOUS at 07:21

## 2017-06-26 RX ADMIN — MORPHINE SULFATE 4 MG: 4 INJECTION, SOLUTION INTRAMUSCULAR; INTRAVENOUS at 08:44

## 2017-06-26 RX ADMIN — ONDANSETRON 4 MG: 4 TABLET, ORALLY DISINTEGRATING ORAL at 09:53

## 2017-06-26 RX ADMIN — OXYCODONE HYDROCHLORIDE AND ACETAMINOPHEN 1 TABLET: 5; 325 TABLET ORAL at 09:53

## 2017-06-26 RX ADMIN — SODIUM CHLORIDE 1000 ML: 0.9 INJECTION, SOLUTION INTRAVENOUS at 09:54

## 2017-06-26 RX ADMIN — LORAZEPAM 1 MG: 2 INJECTION INTRAMUSCULAR; INTRAVENOUS at 07:21

## 2017-06-26 RX ADMIN — SUCRALFATE 1 G: 1 TABLET ORAL at 11:10

## 2017-06-26 RX ADMIN — ALUMINUM HYDROXIDE, MAGNESIUM HYDROXIDE, AND SIMETHICONE 30 ML: 200; 200; 20 SUSPENSION ORAL at 11:10

## 2017-06-26 RX ADMIN — DEXTROSE, SODIUM CHLORIDE, SODIUM LACTATE, POTASSIUM CHLORIDE, AND CALCIUM CHLORIDE 1000 ML: 5; .6; .31; .03; .02 INJECTION, SOLUTION INTRAVENOUS at 07:22

## 2017-06-28 ENCOUNTER — ALLSCRIPTS OFFICE VISIT (OUTPATIENT)
Dept: OTHER | Facility: OTHER | Age: 27
End: 2017-06-28

## 2017-07-01 ENCOUNTER — GENERIC CONVERSION - ENCOUNTER (OUTPATIENT)
Dept: OTHER | Facility: OTHER | Age: 27
End: 2017-07-01

## 2017-07-03 ENCOUNTER — GENERIC CONVERSION - ENCOUNTER (OUTPATIENT)
Dept: OTHER | Facility: OTHER | Age: 27
End: 2017-07-03

## 2017-07-03 ENCOUNTER — ALLSCRIPTS OFFICE VISIT (OUTPATIENT)
Dept: OTHER | Facility: OTHER | Age: 27
End: 2017-07-03

## 2017-07-06 ENCOUNTER — HOSPITAL ENCOUNTER (EMERGENCY)
Facility: HOSPITAL | Age: 27
Discharge: HOME | End: 2017-07-06
Attending: EMERGENCY MEDICINE
Payer: COMMERCIAL

## 2017-07-06 VITALS
RESPIRATION RATE: 16 BRPM | HEART RATE: 82 BPM | TEMPERATURE: 98.6 F | OXYGEN SATURATION: 96 % | WEIGHT: 136.91 LBS | SYSTOLIC BLOOD PRESSURE: 142 MMHG | BODY MASS INDEX: 21.44 KG/M2 | DIASTOLIC BLOOD PRESSURE: 82 MMHG

## 2017-07-06 DIAGNOSIS — Z76.5 DRUG-SEEKING BEHAVIOR: ICD-10-CM

## 2017-07-06 DIAGNOSIS — K29.20 ALCOHOLIC GASTRITIS: ICD-10-CM

## 2017-07-06 DIAGNOSIS — E86.0 DEHYDRATION: ICD-10-CM

## 2017-07-06 DIAGNOSIS — F10.20 ALCOHOLISM (HCC): ICD-10-CM

## 2017-07-06 DIAGNOSIS — F10.929 ALCOHOL INTOXICATION (HCC): Primary | ICD-10-CM

## 2017-07-06 LAB
ALBUMIN SERPL BCP-MCNC: 4.1 G/DL (ref 3.5–5)
ALP SERPL-CCNC: 124 U/L (ref 46–116)
ALT SERPL W P-5'-P-CCNC: 68 U/L (ref 12–78)
AMPHETAMINES SERPL QL SCN: NEGATIVE
ANION GAP SERPL CALCULATED.3IONS-SCNC: 15 MMOL/L (ref 4–13)
AST SERPL W P-5'-P-CCNC: 85 U/L (ref 5–45)
BACTERIA UR QL AUTO: ABNORMAL /HPF
BARBITURATES UR QL: NEGATIVE
BASOPHILS # BLD AUTO: 0.03 THOUSANDS/ΜL (ref 0–0.1)
BASOPHILS NFR BLD AUTO: 0 % (ref 0–1)
BENZODIAZ UR QL: NEGATIVE
BILIRUB SERPL-MCNC: 0.39 MG/DL (ref 0.2–1)
BILIRUB UR QL STRIP: NEGATIVE
BUN SERPL-MCNC: 5 MG/DL (ref 5–25)
CALCIUM SERPL-MCNC: 8.2 MG/DL (ref 8.3–10.1)
CHLORIDE SERPL-SCNC: 104 MMOL/L (ref 100–108)
CLARITY UR: CLEAR
CLARITY, POC: CLEAR
CO2 SERPL-SCNC: 25 MMOL/L (ref 21–32)
COCAINE UR QL: NEGATIVE
COLOR UR: YELLOW
COLOR, POC: YELLOW
CREAT SERPL-MCNC: 0.66 MG/DL (ref 0.6–1.3)
EOSINOPHIL # BLD AUTO: 0.08 THOUSAND/ΜL (ref 0–0.61)
EOSINOPHIL NFR BLD AUTO: 1 % (ref 0–6)
ERYTHROCYTE [DISTWIDTH] IN BLOOD BY AUTOMATED COUNT: 14.4 % (ref 11.6–15.1)
ETHANOL SERPL-MCNC: 376 MG/DL (ref 0–3)
GFR SERPL CREATININE-BSD FRML MDRD: >60 ML/MIN/1.73SQ M
GLUCOSE SERPL-MCNC: 97 MG/DL (ref 65–140)
GLUCOSE UR STRIP-MCNC: NEGATIVE MG/DL
HCG UR QL: NEGATIVE
HCT VFR BLD AUTO: 37.8 % (ref 34.8–46.1)
HGB BLD-MCNC: 13.5 G/DL (ref 11.5–15.4)
HGB UR QL STRIP.AUTO: NEGATIVE
KETONES UR STRIP-MCNC: NEGATIVE MG/DL
LACTATE SERPL-SCNC: 3.2 MMOL/L (ref 0.5–2)
LACTATE SERPL-SCNC: 3.3 MMOL/L (ref 0.5–2)
LEUKOCYTE ESTERASE UR QL STRIP: NEGATIVE
LIPASE SERPL-CCNC: 142 U/L (ref 73–393)
LYMPHOCYTES # BLD AUTO: 5.25 THOUSANDS/ΜL (ref 0.6–4.47)
LYMPHOCYTES NFR BLD AUTO: 51 % (ref 14–44)
MCH RBC QN AUTO: 29.9 PG (ref 26.8–34.3)
MCHC RBC AUTO-ENTMCNC: 35.7 G/DL (ref 31.4–37.4)
MCV RBC AUTO: 84 FL (ref 82–98)
METHADONE UR QL: NEGATIVE
MONOCYTES # BLD AUTO: 0.65 THOUSAND/ΜL (ref 0.17–1.22)
MONOCYTES NFR BLD AUTO: 6 % (ref 4–12)
MUCOUS THREADS UR QL AUTO: ABNORMAL
NEUTROPHILS # BLD AUTO: 4.27 THOUSANDS/ΜL (ref 1.85–7.62)
NEUTS SEG NFR BLD AUTO: 42 % (ref 43–75)
NITRITE UR QL STRIP: NEGATIVE
NON-SQ EPI CELLS URNS QL MICRO: ABNORMAL /HPF
NRBC BLD AUTO-RTO: 0 /100 WBCS
OPIATES UR QL SCN: NEGATIVE
PCP UR QL: NEGATIVE
PH UR STRIP.AUTO: 5.5 [PH] (ref 4.5–8)
PLATELET # BLD AUTO: 262 THOUSANDS/UL (ref 149–390)
PMV BLD AUTO: 9 FL (ref 8.9–12.7)
POTASSIUM SERPL-SCNC: 3.5 MMOL/L (ref 3.5–5.3)
PROT SERPL-MCNC: 8.4 G/DL (ref 6.4–8.2)
PROT UR STRIP-MCNC: ABNORMAL MG/DL
RBC # BLD AUTO: 4.51 MILLION/UL (ref 3.81–5.12)
RBC #/AREA URNS AUTO: ABNORMAL /HPF
SODIUM SERPL-SCNC: 144 MMOL/L (ref 136–145)
SP GR UR STRIP.AUTO: 1.02 (ref 1–1.03)
THC UR QL: NEGATIVE
UROBILINOGEN UR QL STRIP.AUTO: 0.2 E.U./DL
WBC # BLD AUTO: 10.28 THOUSAND/UL (ref 4.31–10.16)
WBC #/AREA URNS AUTO: ABNORMAL /HPF

## 2017-07-06 PROCEDURE — 36415 COLL VENOUS BLD VENIPUNCTURE: CPT | Performed by: EMERGENCY MEDICINE

## 2017-07-06 PROCEDURE — 81025 URINE PREGNANCY TEST: CPT | Performed by: EMERGENCY MEDICINE

## 2017-07-06 PROCEDURE — 80307 DRUG TEST PRSMV CHEM ANLYZR: CPT | Performed by: EMERGENCY MEDICINE

## 2017-07-06 PROCEDURE — 80053 COMPREHEN METABOLIC PANEL: CPT | Performed by: EMERGENCY MEDICINE

## 2017-07-06 PROCEDURE — 85025 COMPLETE CBC W/AUTO DIFF WBC: CPT | Performed by: EMERGENCY MEDICINE

## 2017-07-06 PROCEDURE — 96361 HYDRATE IV INFUSION ADD-ON: CPT

## 2017-07-06 PROCEDURE — 83690 ASSAY OF LIPASE: CPT | Performed by: EMERGENCY MEDICINE

## 2017-07-06 PROCEDURE — 80320 DRUG SCREEN QUANTALCOHOLS: CPT | Performed by: EMERGENCY MEDICINE

## 2017-07-06 PROCEDURE — 99284 EMERGENCY DEPT VISIT MOD MDM: CPT

## 2017-07-06 PROCEDURE — 96374 THER/PROPH/DIAG INJ IV PUSH: CPT

## 2017-07-06 PROCEDURE — 81002 URINALYSIS NONAUTO W/O SCOPE: CPT | Performed by: EMERGENCY MEDICINE

## 2017-07-06 PROCEDURE — 83605 ASSAY OF LACTIC ACID: CPT | Performed by: EMERGENCY MEDICINE

## 2017-07-06 PROCEDURE — 81001 URINALYSIS AUTO W/SCOPE: CPT

## 2017-07-06 RX ORDER — DICYCLOMINE HYDROCHLORIDE 10 MG/5ML
20 SOLUTION ORAL ONCE
Status: COMPLETED | OUTPATIENT
Start: 2017-07-06 | End: 2017-07-06

## 2017-07-06 RX ORDER — MAGNESIUM HYDROXIDE/ALUMINUM HYDROXICE/SIMETHICONE 120; 1200; 1200 MG/30ML; MG/30ML; MG/30ML
30 SUSPENSION ORAL ONCE
Status: COMPLETED | OUTPATIENT
Start: 2017-07-06 | End: 2017-07-06

## 2017-07-06 RX ORDER — KETOROLAC TROMETHAMINE 30 MG/ML
30 INJECTION, SOLUTION INTRAMUSCULAR; INTRAVENOUS ONCE
Status: COMPLETED | OUTPATIENT
Start: 2017-07-06 | End: 2017-07-06

## 2017-07-06 RX ORDER — SUCRALFATE ORAL 1 G/10ML
1000 SUSPENSION ORAL ONCE
Status: COMPLETED | OUTPATIENT
Start: 2017-07-06 | End: 2017-07-06

## 2017-07-06 RX ADMIN — SUCRALFATE 1000 MG: 1 SUSPENSION ORAL at 04:57

## 2017-07-06 RX ADMIN — ALUMINUM HYDROXIDE, MAGNESIUM HYDROXIDE, AND SIMETHICONE 30 ML: 200; 200; 20 SUSPENSION ORAL at 05:29

## 2017-07-06 RX ADMIN — SODIUM CHLORIDE 1000 ML: 0.9 INJECTION, SOLUTION INTRAVENOUS at 04:01

## 2017-07-06 RX ADMIN — SODIUM CHLORIDE 1000 ML: 0.9 INJECTION, SOLUTION INTRAVENOUS at 04:10

## 2017-07-06 RX ADMIN — LIDOCAINE HYDROCHLORIDE 15 ML: 20 SOLUTION ORAL; TOPICAL at 05:29

## 2017-07-06 RX ADMIN — DICYCLOMINE HYDROCHLORIDE 20 MG: 10 SOLUTION ORAL at 05:38

## 2017-07-06 RX ADMIN — KETOROLAC TROMETHAMINE 30 MG: 30 INJECTION, SOLUTION INTRAMUSCULAR at 05:25

## 2018-01-10 NOTE — PROCEDURES
Procedures by Bria Boswell MD at 2016   4:32 PM      Author:  Bria Boswell MD Service:  Neurology Author Type:  Physician     Filed:  2016  4:35 PM Date of Service:  2016  4:32 PM Status:  Signed     :  Bria Boswell MD (Physician)            ELECTROENCEPHALOGRAM (EEG)      Patient Name:  Hedy Arenas  MRN: [id number]   :  1990 File #: [facility]    Age: 26 y o  Encounter #: [id number]   Date performed: 2016            Report date: 2016          Study type: Routine EEG    ICD 10 diagnosis: Other Epilepsy G40 909    Start time: 09:30 End time: 10:13     -------------------------------------------------------------------------------------------------------------------   Patient History: This recording was observed in a 26 y o  female with prior seizures to better  characterize epilepsy  Medications include: clonazepam    -------------------------------------------------------------------------------------------------------------------   Description of Procedure:  ·  32 channel digital recording with electrodes placed according to the International 10-20 system with additional  T1/T2 electrodes, EOG, EKG, and simultaneous  video  The recording was technically satisfactory  -------------------------------------------------------------------------------------------------------------------   EEG Description:    The recording was performed with the patient awake, drowsy, and asleep  She was fully oriented  During wakefulness, there were long runs of well regulated, low amplitude, posteriorly  dominant, symmetric 9-10 cps alpha rhythm that attenuated with eye opening  There were symmetric low amplitude,  diffuse enhanced beta activities  With drowsiness, alpha activity attenuated and was replaced by diffusely distributed beta and theta activities  With sleep, symmetric vertex sharp waves  and sleep spindles were present  -------------------------------------------------------------------------------------------------------------------   Activation Procedures:  Hyperventilation was performed for 3-4  minutes and did not produce any changes  Stepped photic stimulation between 1-20 cps was performed and induced symmetric  photic driving  Other findings: The single lead ECG demonstrated a regular rhythm     -------------------------------------------------------------------------------------------------------------------   EEG Interpretation: This Routine EEG recorded during wakefulness, drowsiness, and sleep is essentially normal   Enhanced beta activities are commonly seen due to medication effect of benzodiazepines and/or barbiturates  Maritza Moreno MD   [facility]               Received for:Robb LEVIN    Jun 16 2016  4:34PM [facility] Standard Time
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
## 2018-01-10 NOTE — MISCELLANEOUS
Provider Comments  Provider Comments:   First no show appointment 8/15/16  Adriane JEWELL tried to contact patient to [facility]  Sent first missed appointment letter        Signatures   Electronically signed by : DANTE Pearce ; Aug 15 2016  4:12PM EST                       (Author)
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
## 2018-01-11 NOTE — PSYCH
History of Present Illness  Psychotherapy Provided  [facility]: Individual Psychotherapy 25 minutes minutes provided today  Goals addressed in session:   PATIENT SEEN VIA EVISIT  Patient seen by PCP today  Continues to struggle with physical health/pain issues and has not secured behavioral health provider for her mood and alcohol issues  Unfortunately, options are somewhat limited by her insurance and her alcohol and pain med use  Discussed need for rehab but she declines this level of care  Denies drinking daily and claims to have drank three times since her last discharge from hospital        HPI - Psych: Patient's biggest complaints revolve around her pancreatitis and subsequent pain issues  States she picked up drinking for pain relief  Have met with her before but she remains somewhat ambivalent about need for alcohol/drug treatment despite her pancreatitis  Until this issue is evaluated and in treatment no outpatient psych office will accept her  Denies any acute mood issues but has been anxious and overwhelmed with her struggles and pain issues   Note   Note:   Spoke with our psych group and she would need to be evaluated for alcohol treatment before they would consider accepting her even for counseling  Forwarded the contact information for [facility]  Still believe she would benefit from dual program  The programs listed may help with this  Will assist if needed in attempting to secure gastroenterologist       Assessment    1   Alcohol abuse (305 00) (F10 10)    Signatures   Electronically signed by : Candance Simper, LCSW; May 18 2017 12:11PM EST                       (Author)

## 2018-01-12 VITALS
HEART RATE: 84 BPM | RESPIRATION RATE: 16 BRPM | BODY MASS INDEX: 24.25 KG/M2 | HEIGHT: 67 IN | SYSTOLIC BLOOD PRESSURE: 118 MMHG | DIASTOLIC BLOOD PRESSURE: 82 MMHG | WEIGHT: 154.5 LBS

## 2018-01-13 VITALS
SYSTOLIC BLOOD PRESSURE: 100 MMHG | WEIGHT: 155.25 LBS | HEIGHT: 67 IN | HEART RATE: 104 BPM | BODY MASS INDEX: 24.37 KG/M2 | DIASTOLIC BLOOD PRESSURE: 80 MMHG

## 2018-01-13 VITALS
WEIGHT: 156 LBS | HEIGHT: 67 IN | SYSTOLIC BLOOD PRESSURE: 118 MMHG | DIASTOLIC BLOOD PRESSURE: 70 MMHG | HEART RATE: 80 BPM | BODY MASS INDEX: 24.48 KG/M2

## 2018-01-13 VITALS
WEIGHT: 156 LBS | DIASTOLIC BLOOD PRESSURE: 66 MMHG | BODY MASS INDEX: 24.43 KG/M2 | SYSTOLIC BLOOD PRESSURE: 118 MMHG | HEART RATE: 80 BPM

## 2018-01-14 VITALS
HEIGHT: 67 IN | BODY MASS INDEX: 24.56 KG/M2 | HEART RATE: 84 BPM | SYSTOLIC BLOOD PRESSURE: 128 MMHG | DIASTOLIC BLOOD PRESSURE: 70 MMHG | WEIGHT: 156.5 LBS

## 2018-01-14 VITALS
BODY MASS INDEX: 25.45 KG/M2 | HEIGHT: 67 IN | SYSTOLIC BLOOD PRESSURE: 116 MMHG | RESPIRATION RATE: 16 BRPM | WEIGHT: 162.13 LBS | DIASTOLIC BLOOD PRESSURE: 72 MMHG | HEART RATE: 72 BPM
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
## 2018-01-14 NOTE — PSYCH
Provider Comments  Provider Comments:   Patient was a no-show for psychiatric evaluation        Signatures   Electronically signed by : Ivy Ferguson MD; May 11 2016  9:59AM EST                       (Author)
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
## 2018-01-15 VITALS
BODY MASS INDEX: 24.01 KG/M2 | HEART RATE: 64 BPM | WEIGHT: 153 LBS | SYSTOLIC BLOOD PRESSURE: 128 MMHG | HEIGHT: 67 IN | DIASTOLIC BLOOD PRESSURE: 70 MMHG
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
## 2018-01-15 NOTE — PSYCH
Behavioral Health Outpatient Intake    Referred By: Sharyn Hall  Intake Questions: there are no developmental disabilities  the patient does not have a hearing impairment  the patient does not have an ICM or CTT  patient is not taking injectable psychiatric medications  Employment: The patient is not employed  at [employer]  Emergency Contact Information:   Emergency Contact: JAYNA QUEZADA   Relationship to Patient: FATHER   Phone Number: [phone]   Previous Psychiatric Treatment: She has previously been seen by a psychiatrist  Zackery Arreola, 5 YRS AGO  She has previously been seen by a therapist  Mariah Larios   History: no  service  She has not had combat service  She was not activated into federal active duty as a member of the [employer] or [employer]  Insurance Subscriber: Sudhir Peterson   : [birth year]   Employer: [employer]   Primary Insurance: [id number]   Phone number: [phone]   ID number: [id number]         Presenting Problem (in patient's words): PTSD, BIPOLAR, ANXIETY, MED MANAGEMENT  Substance Abuse: NONE  Previous Treatment: The patient has not been seen here in the past      Accepted as Patient    [facility] 16 @ 9:00     Primary Care Physician:       Signatures   Electronically signed by : Don Caban, ; Feb 15 2016 10:50AM EST                       (Author)
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
## 2018-01-15 NOTE — PSYCH
History of Present Illness  Psychotherapy Provided [facility]: Individual Psychotherapy 25 minutes minutes provided today  Goals addressed in session:   Patient not engaged in current [facility] treatment  Complaining of sleep issues and reduction in Benzodiazepine  EtOH issues more problematic than previously known  Would benefit from dual diagnosis treatment  Insurance may be barrier for same  Patient verbal and cooperative  HPI - Psych: Patient complaining of sleep and anxiety issues  Claims to have been having panic attacks  Needs to engage in outpatient behavioral health treatment  Options limited with insurance  Declined PHP referral  Denies any SI  Interestingly, requesting prior Benzodiazepine dose as well as something for restless leg syndrome  Reinforced need for dual diagnosis treatment   Note   Note:   Provided supportive therapy  Agreed to investigate dual treatment options for her  Will do so and notify off options and will also inform he PCP  Assessment    1   Anxiety disorder (300 00) (F41 9)    Signatures   Electronically signed by : Chelsea Zuniga LCSW; Apr 24 2017 12:05PM EST                       (Author)
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
## 2018-01-17 NOTE — MISCELLANEOUS
Provider Comments  Provider Comments:   pt called at 11:30 am, regarding her 11:00 am appointment that she no showed for   she was requesting another appt for today because she stated she overslept because she had to take her father to the er last night   she was told that she could not get another appt for today  Gio Dietz an appt was made for monday        Signatures   Electronically signed by : Aleida Samuels, ; Jun 16 2017 12:02PM EST                       (Author)
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
## 2018-01-17 NOTE — MISCELLANEOUS
Message  Message Free Text Note Form: The patient called this morning requesting pain medication because she is in an extreme amount of pain  She was advised by the nurse to go to the emergency room because she will not get any pain medication dispensed from this office without Dr Anuj Martínez approving it  She would not take no for an answer and called me back and I told her basically the same thing  She was slurring her words but denied she was drinking and was told again to go to the emergency room and have them evaluate her for her pain  She is not to call the rest of the weekend an answering service will be told that they should not protocol through because she has instructions and she hasn't been following them        Signatures   Electronically signed by : Shanita Gomez, [facility]; Jul 1 2017  9:39AM EST                       (Author)
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
## 2018-01-18 NOTE — PSYCH
Provider Comments  Provider Comments:   No show for follow-up  Will send letter to reach out to patient and encourage follow-up in accordance with clinic policy        Signatures   Electronically signed by : Yaneli Park MD; Dec  6 2016  3:28PM EST                       (Author)
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
## 2018-01-22 VITALS
DIASTOLIC BLOOD PRESSURE: 68 MMHG | SYSTOLIC BLOOD PRESSURE: 124 MMHG | WEIGHT: 163 LBS | HEART RATE: 68 BPM | HEIGHT: 67 IN | BODY MASS INDEX: 25.58 KG/M2

## 2018-03-07 NOTE — PSYCH
Message  Patient No Show Letter - Behavioral Health:     Date: 12/07/2016     Dear [place],     We missed seeing you for a scheduled appointment at [facility] on 12-6-16 at 11:20am with Dr Alyson Colin  Our goal is to offer the best possible care to our patients, so we are concerned when you are unable to keep a scheduled appointment  Please call us at [phone] so that we can reschedule the appointment for a date and time that will work for you  We understand that circumstances may arise which make it impossible for you to keep a scheduled appointment  Should this happen in the future, please call us as soon as you know the appointment will be missed  The earlier you let us know, the more likely we can offer your scheduled appointment time to another patient  We hope to hear from you soon       Sincerely,   Dr Guillen Credit   Electronically signed by : Karen Barron, ; Dec  7 2016  7:10AM EST                       (Author)

## 2018-06-21 ENCOUNTER — HOSPITAL ENCOUNTER (INPATIENT)
Facility: HOSPITAL | Age: 28
LOS: 1 days | Discharge: HOME | DRG: 053 | End: 2018-06-23
Attending: EMERGENCY MEDICINE | Admitting: INTERNAL MEDICINE
Payer: COMMERCIAL

## 2018-06-21 ENCOUNTER — APPOINTMENT (EMERGENCY)
Dept: RADIOLOGY | Facility: HOSPITAL | Age: 28
DRG: 053 | End: 2018-06-21
Payer: COMMERCIAL

## 2018-06-21 DIAGNOSIS — R56.9 SEIZURE (HCC): Primary | ICD-10-CM

## 2018-06-21 DIAGNOSIS — F10.929 ALCOHOL INTOXICATION (HCC): ICD-10-CM

## 2018-06-21 LAB
ALBUMIN SERPL BCP-MCNC: 3.8 G/DL (ref 3.5–5)
ALP SERPL-CCNC: 97 U/L (ref 46–116)
ALT SERPL W P-5'-P-CCNC: 25 U/L (ref 12–78)
AMPHETAMINES SERPL QL SCN: NEGATIVE
ANION GAP BLD CALC-SCNC: 21 MMOL/L (ref 4–13)
ANION GAP SERPL CALCULATED.3IONS-SCNC: 9 MMOL/L (ref 4–13)
APAP SERPL-MCNC: <2 UG/ML (ref 10–30)
AST SERPL W P-5'-P-CCNC: 26 U/L (ref 5–45)
ATRIAL RATE: 83 BPM
ATRIAL RATE: 89 BPM
BARBITURATES UR QL: NEGATIVE
BASE EX.OXY STD BLDV CALC-SCNC: 64.2 % (ref 60–80)
BASE EXCESS BLDV CALC-SCNC: -5 MMOL/L
BASOPHILS # BLD AUTO: 0.05 THOUSANDS/ΜL (ref 0–0.1)
BASOPHILS NFR BLD AUTO: 0 % (ref 0–1)
BENZODIAZ UR QL: NEGATIVE
BILIRUB SERPL-MCNC: 0.19 MG/DL (ref 0.2–1)
BILIRUB UR QL STRIP: NEGATIVE
BUN BLD-MCNC: 10 MG/DL (ref 5–25)
BUN SERPL-MCNC: 11 MG/DL (ref 5–25)
CA-I BLD-SCNC: 1.13 MMOL/L (ref 1.12–1.32)
CALCIUM SERPL-MCNC: 8.4 MG/DL (ref 8.3–10.1)
CHLORIDE BLD-SCNC: 108 MMOL/L (ref 100–108)
CHLORIDE SERPL-SCNC: 110 MMOL/L (ref 100–108)
CLARITY UR: CLEAR
CO2 SERPL-SCNC: 22 MMOL/L (ref 21–32)
COCAINE UR QL: NEGATIVE
COLOR UR: YELLOW
CREAT BLD-MCNC: 0.8 MG/DL (ref 0.6–1.3)
CREAT SERPL-MCNC: 0.52 MG/DL (ref 0.6–1.3)
EOSINOPHIL # BLD AUTO: 0.31 THOUSAND/ΜL (ref 0–0.61)
EOSINOPHIL NFR BLD AUTO: 2 % (ref 0–6)
ERYTHROCYTE [DISTWIDTH] IN BLOOD BY AUTOMATED COUNT: 15.4 % (ref 11.6–15.1)
ETHANOL SERPL-MCNC: 297 MG/DL (ref 0–3)
EXT PREG TEST URINE: NORMAL
GFR SERPL CREATININE-BSD FRML MDRD: 102 ML/MIN/1.73SQ M
GFR SERPL CREATININE-BSD FRML MDRD: 132 ML/MIN/1.73SQ M
GLUCOSE SERPL-MCNC: 101 MG/DL (ref 65–140)
GLUCOSE SERPL-MCNC: 94 MG/DL (ref 65–140)
GLUCOSE SERPL-MCNC: 95 MG/DL (ref 65–140)
GLUCOSE UR STRIP-MCNC: NEGATIVE MG/DL
HCO3 BLDV-SCNC: 21.3 MMOL/L (ref 24–30)
HCT VFR BLD AUTO: 37.6 % (ref 34.8–46.1)
HCT VFR BLD CALC: 45 % (ref 34.8–46.1)
HGB BLD-MCNC: 12.3 G/DL (ref 11.5–15.4)
HGB BLDA-MCNC: 15.3 G/DL (ref 11.5–15.4)
HGB UR QL STRIP.AUTO: NEGATIVE
IMM GRANULOCYTES # BLD AUTO: 0.05 THOUSAND/UL (ref 0–0.2)
IMM GRANULOCYTES NFR BLD AUTO: 0 % (ref 0–2)
KETONES UR STRIP-MCNC: NEGATIVE MG/DL
LEUKOCYTE ESTERASE UR QL STRIP: NEGATIVE
LIPASE SERPL-CCNC: 47 U/L (ref 73–393)
LYMPHOCYTES # BLD AUTO: 3.91 THOUSANDS/ΜL (ref 0.6–4.47)
LYMPHOCYTES NFR BLD AUTO: 27 % (ref 14–44)
MAGNESIUM SERPL-MCNC: 2 MG/DL (ref 1.6–2.6)
MCH RBC QN AUTO: 27.3 PG (ref 26.8–34.3)
MCHC RBC AUTO-ENTMCNC: 32.7 G/DL (ref 31.4–37.4)
MCV RBC AUTO: 83 FL (ref 82–98)
METHADONE UR QL: NEGATIVE
MONOCYTES # BLD AUTO: 0.76 THOUSAND/ΜL (ref 0.17–1.22)
MONOCYTES NFR BLD AUTO: 5 % (ref 4–12)
NEUTROPHILS # BLD AUTO: 9.18 THOUSANDS/ΜL (ref 1.85–7.62)
NEUTS SEG NFR BLD AUTO: 66 % (ref 43–75)
NITRITE UR QL STRIP: NEGATIVE
NRBC BLD AUTO-RTO: 0 /100 WBCS
NT-PROBNP SERPL-MCNC: 43 PG/ML
O2 CT BLDV-SCNC: 10.8 ML/DL
OPIATES UR QL SCN: NEGATIVE
P AXIS: 54 DEGREES
P AXIS: 57 DEGREES
PCO2 BLD: 20 MMOL/L (ref 21–32)
PCO2 BLDV: 44.2 MM HG (ref 42–50)
PCP UR QL: NEGATIVE
PH BLDV: 7.3 [PH] (ref 7.3–7.4)
PH UR STRIP.AUTO: 6.5 [PH] (ref 4.5–8)
PLATELET # BLD AUTO: 235 THOUSANDS/UL (ref 149–390)
PMV BLD AUTO: 10.6 FL (ref 8.9–12.7)
PO2 BLDV: 39.8 MM HG (ref 35–45)
POTASSIUM BLD-SCNC: 4 MMOL/L (ref 3.5–5.3)
POTASSIUM SERPL-SCNC: 4 MMOL/L (ref 3.5–5.3)
PR INTERVAL: 178 MS
PR INTERVAL: 192 MS
PROT SERPL-MCNC: 7.2 G/DL (ref 6.4–8.2)
PROT UR STRIP-MCNC: NEGATIVE MG/DL
QRS AXIS: 74 DEGREES
QRS AXIS: 75 DEGREES
QRSD INTERVAL: 80 MS
QRSD INTERVAL: 96 MS
QT INTERVAL: 346 MS
QT INTERVAL: 358 MS
QTC INTERVAL: 406 MS
QTC INTERVAL: 435 MS
RBC # BLD AUTO: 4.51 MILLION/UL (ref 3.81–5.12)
SALICYLATES SERPL-MCNC: 4 MG/DL (ref 3–20)
SODIUM BLD-SCNC: 144 MMOL/L (ref 136–145)
SODIUM SERPL-SCNC: 141 MMOL/L (ref 136–145)
SP GR UR STRIP.AUTO: 1.01 (ref 1–1.03)
SPECIMEN SOURCE: ABNORMAL
T WAVE AXIS: 62 DEGREES
T WAVE AXIS: 67 DEGREES
THC UR QL: NEGATIVE
TROPONIN I SERPL-MCNC: <0.02 NG/ML
TSH SERPL DL<=0.05 MIU/L-ACNC: 1.98 UIU/ML (ref 0.36–3.74)
UROBILINOGEN UR QL STRIP.AUTO: 0.2 E.U./DL
VENTRICULAR RATE: 83 BPM
VENTRICULAR RATE: 89 BPM
WBC # BLD AUTO: 14.26 THOUSAND/UL (ref 4.31–10.16)

## 2018-06-21 PROCEDURE — 71045 X-RAY EXAM CHEST 1 VIEW: CPT

## 2018-06-21 PROCEDURE — 94640 AIRWAY INHALATION TREATMENT: CPT

## 2018-06-21 PROCEDURE — 83735 ASSAY OF MAGNESIUM: CPT | Performed by: EMERGENCY MEDICINE

## 2018-06-21 PROCEDURE — 70450 CT HEAD/BRAIN W/O DYE: CPT

## 2018-06-21 PROCEDURE — 36415 COLL VENOUS BLD VENIPUNCTURE: CPT | Performed by: EMERGENCY MEDICINE

## 2018-06-21 PROCEDURE — 96361 HYDRATE IV INFUSION ADD-ON: CPT

## 2018-06-21 PROCEDURE — 96375 TX/PRO/DX INJ NEW DRUG ADDON: CPT

## 2018-06-21 PROCEDURE — 84484 ASSAY OF TROPONIN QUANT: CPT | Performed by: EMERGENCY MEDICINE

## 2018-06-21 PROCEDURE — 83880 ASSAY OF NATRIURETIC PEPTIDE: CPT | Performed by: EMERGENCY MEDICINE

## 2018-06-21 PROCEDURE — 99285 EMERGENCY DEPT VISIT HI MDM: CPT

## 2018-06-21 PROCEDURE — 85014 HEMATOCRIT: CPT

## 2018-06-21 PROCEDURE — 93010 ELECTROCARDIOGRAM REPORT: CPT | Performed by: INTERNAL MEDICINE

## 2018-06-21 PROCEDURE — 81003 URINALYSIS AUTO W/O SCOPE: CPT

## 2018-06-21 PROCEDURE — 93005 ELECTROCARDIOGRAM TRACING: CPT

## 2018-06-21 PROCEDURE — 80329 ANALGESICS NON-OPIOID 1 OR 2: CPT | Performed by: EMERGENCY MEDICINE

## 2018-06-21 PROCEDURE — 80047 BASIC METABLC PNL IONIZED CA: CPT

## 2018-06-21 PROCEDURE — 84443 ASSAY THYROID STIM HORMONE: CPT | Performed by: EMERGENCY MEDICINE

## 2018-06-21 PROCEDURE — 85025 COMPLETE CBC W/AUTO DIFF WBC: CPT | Performed by: EMERGENCY MEDICINE

## 2018-06-21 PROCEDURE — 80053 COMPREHEN METABOLIC PANEL: CPT | Performed by: EMERGENCY MEDICINE

## 2018-06-21 PROCEDURE — 96374 THER/PROPH/DIAG INJ IV PUSH: CPT

## 2018-06-21 PROCEDURE — 80320 DRUG SCREEN QUANTALCOHOLS: CPT | Performed by: EMERGENCY MEDICINE

## 2018-06-21 PROCEDURE — 80201 ASSAY OF TOPIRAMATE: CPT | Performed by: EMERGENCY MEDICINE

## 2018-06-21 PROCEDURE — 82805 BLOOD GASES W/O2 SATURATION: CPT | Performed by: EMERGENCY MEDICINE

## 2018-06-21 PROCEDURE — 83690 ASSAY OF LIPASE: CPT | Performed by: EMERGENCY MEDICINE

## 2018-06-21 PROCEDURE — 81025 URINE PREGNANCY TEST: CPT | Performed by: EMERGENCY MEDICINE

## 2018-06-21 PROCEDURE — 80307 DRUG TEST PRSMV CHEM ANLYZR: CPT | Performed by: EMERGENCY MEDICINE

## 2018-06-21 PROCEDURE — 82948 REAGENT STRIP/BLOOD GLUCOSE: CPT

## 2018-06-21 RX ORDER — ALBUTEROL SULFATE 2.5 MG/3ML
2.5 SOLUTION RESPIRATORY (INHALATION) EVERY 4 HOURS PRN
Status: DISCONTINUED | OUTPATIENT
Start: 2018-06-21 | End: 2018-06-23 | Stop reason: HOSPADM

## 2018-06-21 RX ORDER — CITALOPRAM 40 MG/1
40 TABLET ORAL DAILY
COMMUNITY

## 2018-06-21 RX ORDER — MIRTAZAPINE 15 MG/1
15 TABLET, FILM COATED ORAL
Status: ON HOLD | COMMUNITY
End: 2018-06-21 | Stop reason: ALTCHOICE

## 2018-06-21 RX ORDER — CITALOPRAM 20 MG/1
20 TABLET ORAL DAILY
Status: CANCELLED | OUTPATIENT
Start: 2018-06-21

## 2018-06-21 RX ORDER — PRAZOSIN HYDROCHLORIDE 1 MG/1
1 CAPSULE ORAL
COMMUNITY
End: 2019-02-27 | Stop reason: HOSPADM

## 2018-06-21 RX ORDER — ALBUTEROL SULFATE 2.5 MG/3ML
5 SOLUTION RESPIRATORY (INHALATION) ONCE
Status: DISCONTINUED | OUTPATIENT
Start: 2018-06-21 | End: 2018-06-21

## 2018-06-21 RX ORDER — SODIUM CHLORIDE, SODIUM GLUCONATE, SODIUM ACETATE, POTASSIUM CHLORIDE, MAGNESIUM CHLORIDE, SODIUM PHOSPHATE, DIBASIC, AND POTASSIUM PHOSPHATE .53; .5; .37; .037; .03; .012; .00082 G/100ML; G/100ML; G/100ML; G/100ML; G/100ML; G/100ML; G/100ML
125 INJECTION, SOLUTION INTRAVENOUS CONTINUOUS
Status: DISCONTINUED | OUTPATIENT
Start: 2018-06-21 | End: 2018-06-23

## 2018-06-21 RX ORDER — PRAZOSIN HYDROCHLORIDE 1 MG/1
1 CAPSULE ORAL
Status: DISCONTINUED | OUTPATIENT
Start: 2018-06-21 | End: 2018-06-21

## 2018-06-21 RX ORDER — IBUPROFEN 400 MG/1
400 TABLET ORAL EVERY 6 HOURS PRN
Status: DISCONTINUED | OUTPATIENT
Start: 2018-06-21 | End: 2018-06-22

## 2018-06-21 RX ORDER — SODIUM CHLORIDE 9 MG/ML
125 INJECTION, SOLUTION INTRAVENOUS CONTINUOUS
Status: DISCONTINUED | OUTPATIENT
Start: 2018-06-21 | End: 2018-06-21

## 2018-06-21 RX ORDER — GABAPENTIN 300 MG/1
600 CAPSULE ORAL 3 TIMES DAILY
Status: DISCONTINUED | OUTPATIENT
Start: 2018-06-22 | End: 2018-06-23 | Stop reason: HOSPADM

## 2018-06-21 RX ORDER — LORAZEPAM 2 MG/ML
1 INJECTION INTRAMUSCULAR ONCE
Status: COMPLETED | OUTPATIENT
Start: 2018-06-21 | End: 2018-06-21

## 2018-06-21 RX ORDER — THIAMINE MONONITRATE (VIT B1) 100 MG
100 TABLET ORAL DAILY
Status: DISCONTINUED | OUTPATIENT
Start: 2018-06-22 | End: 2018-06-23 | Stop reason: HOSPADM

## 2018-06-21 RX ORDER — ALBUTEROL SULFATE 2.5 MG/3ML
5 SOLUTION RESPIRATORY (INHALATION) ONCE
Status: COMPLETED | OUTPATIENT
Start: 2018-06-21 | End: 2018-06-21

## 2018-06-21 RX ORDER — MAGNESIUM HYDROXIDE/ALUMINUM HYDROXICE/SIMETHICONE 120; 1200; 1200 MG/30ML; MG/30ML; MG/30ML
15 SUSPENSION ORAL EVERY 4 HOURS PRN
Status: DISCONTINUED | OUTPATIENT
Start: 2018-06-21 | End: 2018-06-22

## 2018-06-21 RX ORDER — FOLIC ACID 1 MG/1
1 TABLET ORAL DAILY
Status: DISCONTINUED | OUTPATIENT
Start: 2018-06-22 | End: 2018-06-23 | Stop reason: HOSPADM

## 2018-06-21 RX ORDER — GABAPENTIN 600 MG/1
600 TABLET ORAL 3 TIMES DAILY
Status: DISCONTINUED | OUTPATIENT
Start: 2018-06-21 | End: 2018-06-21

## 2018-06-21 RX ORDER — BUPRENORPHINE AND NALOXONE 8; 2 MG/1; MG/1
1 FILM, SOLUBLE BUCCAL; SUBLINGUAL DAILY
COMMUNITY
End: 2019-02-27 | Stop reason: HOSPADM

## 2018-06-21 RX ORDER — GABAPENTIN 600 MG/1
600 TABLET ORAL 3 TIMES DAILY
COMMUNITY
End: 2019-02-27 | Stop reason: HOSPADM

## 2018-06-21 RX ORDER — MIRTAZAPINE 15 MG/1
15 TABLET, FILM COATED ORAL
Status: DISCONTINUED | OUTPATIENT
Start: 2018-06-21 | End: 2018-06-23 | Stop reason: HOSPADM

## 2018-06-21 RX ORDER — TOPIRAMATE 50 MG/1
50 TABLET, FILM COATED ORAL EVERY 12 HOURS SCHEDULED
Status: ON HOLD | COMMUNITY
End: 2018-06-23

## 2018-06-21 RX ORDER — ONDANSETRON 2 MG/ML
4 INJECTION INTRAMUSCULAR; INTRAVENOUS ONCE
Status: COMPLETED | OUTPATIENT
Start: 2018-06-21 | End: 2018-06-21

## 2018-06-21 RX ORDER — ONDANSETRON 2 MG/ML
4 INJECTION INTRAMUSCULAR; INTRAVENOUS EVERY 6 HOURS PRN
Status: DISCONTINUED | OUTPATIENT
Start: 2018-06-21 | End: 2018-06-23 | Stop reason: HOSPADM

## 2018-06-21 RX ORDER — METHOCARBAMOL 500 MG/1
500 TABLET, FILM COATED ORAL 3 TIMES DAILY
Status: ON HOLD | COMMUNITY
End: 2019-02-27 | Stop reason: SDUPTHER

## 2018-06-21 RX ORDER — ONDANSETRON 2 MG/ML
4 INJECTION INTRAMUSCULAR; INTRAVENOUS ONCE
Status: DISCONTINUED | OUTPATIENT
Start: 2018-06-21 | End: 2018-06-21

## 2018-06-21 RX ORDER — LANOLIN ALCOHOL/MO/W.PET/CERES
1 CREAM (GRAM) TOPICAL
COMMUNITY

## 2018-06-21 RX ORDER — MIRTAZAPINE 15 MG/1
15 TABLET, FILM COATED ORAL
Status: DISCONTINUED | OUTPATIENT
Start: 2018-06-21 | End: 2018-06-21

## 2018-06-21 RX ORDER — NAPROXEN 500 MG/1
500 TABLET ORAL 2 TIMES DAILY WITH MEALS
COMMUNITY
End: 2019-02-27 | Stop reason: HOSPADM

## 2018-06-21 RX ORDER — LORAZEPAM 2 MG/ML
1 INJECTION INTRAMUSCULAR EVERY 4 HOURS PRN
Status: DISCONTINUED | OUTPATIENT
Start: 2018-06-21 | End: 2018-06-22

## 2018-06-21 RX ORDER — PRAZOSIN HYDROCHLORIDE 1 MG/1
1 CAPSULE ORAL
Status: DISCONTINUED | OUTPATIENT
Start: 2018-06-21 | End: 2018-06-23 | Stop reason: HOSPADM

## 2018-06-21 RX ORDER — CITALOPRAM 20 MG/1
20 TABLET ORAL DAILY
Status: DISCONTINUED | OUTPATIENT
Start: 2018-06-22 | End: 2018-06-23 | Stop reason: HOSPADM

## 2018-06-21 RX ORDER — LORAZEPAM 2 MG/ML
INJECTION INTRAMUSCULAR
Status: COMPLETED
Start: 2018-06-21 | End: 2018-06-21

## 2018-06-21 RX ORDER — LORAZEPAM 2 MG/ML
1 INJECTION INTRAMUSCULAR ONCE
Status: DISCONTINUED | OUTPATIENT
Start: 2018-06-21 | End: 2018-06-21

## 2018-06-21 RX ADMIN — IPRATROPIUM BROMIDE 0.5 MG: 0.5 SOLUTION RESPIRATORY (INHALATION) at 12:40

## 2018-06-21 RX ADMIN — TOPIRAMATE 150 MG: 100 TABLET, FILM COATED ORAL at 17:55

## 2018-06-21 RX ADMIN — SODIUM CHLORIDE 1000 ML: 0.9 INJECTION, SOLUTION INTRAVENOUS at 12:24

## 2018-06-21 RX ADMIN — LORAZEPAM 1 MG: 2 INJECTION INTRAMUSCULAR; INTRAVENOUS at 22:09

## 2018-06-21 RX ADMIN — LORAZEPAM 1 MG: 2 INJECTION INTRAMUSCULAR; INTRAVENOUS at 12:50

## 2018-06-21 RX ADMIN — ALBUTEROL SULFATE 5 MG: 2.5 SOLUTION RESPIRATORY (INHALATION) at 12:40

## 2018-06-21 RX ADMIN — PANCRELIPASE 36000 UNITS: 24000; 76000; 120000 CAPSULE, DELAYED RELEASE PELLETS ORAL at 18:31

## 2018-06-21 RX ADMIN — FOLIC ACID: 5 INJECTION, SOLUTION INTRAMUSCULAR; INTRAVENOUS; SUBCUTANEOUS at 17:41

## 2018-06-21 RX ADMIN — LORAZEPAM 1 MG: 2 INJECTION INTRAMUSCULAR at 12:50

## 2018-06-21 RX ADMIN — ONDANSETRON 4 MG: 2 INJECTION, SOLUTION INTRAMUSCULAR; INTRAVENOUS at 12:40

## 2018-06-21 RX ADMIN — SODIUM CHLORIDE 125 ML/HR: 0.9 INJECTION, SOLUTION INTRAVENOUS at 16:49

## 2018-06-21 RX ADMIN — IBUPROFEN 400 MG: 400 TABLET, FILM COATED ORAL at 18:32

## 2018-06-21 RX ADMIN — ONDANSETRON 4 MG: 2 INJECTION, SOLUTION INTRAMUSCULAR; INTRAVENOUS at 18:32

## 2018-06-21 RX ADMIN — ALUMINUM HYDROXIDE, MAGNESIUM HYDROXIDE, AND SIMETHICONE 15 ML: 200; 200; 20 SUSPENSION ORAL at 22:09

## 2018-06-21 RX ADMIN — SODIUM CHLORIDE, SODIUM GLUCONATE, SODIUM ACETATE, POTASSIUM CHLORIDE AND MAGNESIUM CHLORIDE 125 ML/HR: 526; 502; 368; 37; 30 INJECTION, SOLUTION INTRAVENOUS at 22:17

## 2018-06-21 RX ADMIN — LORAZEPAM 1 MG: 2 INJECTION INTRAMUSCULAR; INTRAVENOUS at 17:54

## 2018-06-21 RX ADMIN — MIRTAZAPINE 15 MG: 15 TABLET, FILM COATED ORAL at 22:17

## 2018-06-21 NOTE — ED NOTES
Pt medications being walked down to pharmacy by primary RN at this time        Belia Hobson RN  06/21/18 4915

## 2018-06-21 NOTE — ED NOTES
Called [facility] for a tele-box number at this time  States will be calling back in a few minutes        Bennett Xiao RN  06/21/18 5738

## 2018-06-21 NOTE — ED NOTES
Dr Hercules asked to not send pt up at this time  Requests that SOD see pt in the ED prior        Forrest Boone RN  06/21/18 6321

## 2018-06-21 NOTE — H&P
INTERNAL MEDICINE HISTORY AND PHYSICAL  [facility] 714-01 SOD Team B     NAME: Marilia Arriaga  AGE: 33 y o  SEX: female  : 1990   MRN: [id number]  ENCOUNTER: [id number]    DATE: 2018  TIME: 5:14 PM    Primary Care Physician: Luis Santos DO  Admitting Provider: Larae Boas, MD    Chief complaint:  Seizure, placement into drug and alcohol rehab    History of Present Illness     Melanie Tafoya is a 33 y o  female with past medical history of seizure disorder, chronic pancreatitis, extensive psychiatric history, alcohol dependence, asthma, nicotine dependence  Per chart review, patient arrived at [facility] Emergency Department with her sponsor after having a seizure this afternoon  Patient arrived postictal with her sponsor for placement into drug and alcohol rehab  Per sponsor, patient was in route to HOST program when she became quiet and went into a tonic-clonic seizure that lasted for less than 1 min  According to patient, last seizure was yesterday  Patient states that she is compliant with her medications including Topamax  On arrival she also complained of epigastric and left upper quadrant discomfort similar to her previous episodes of pancreatitis with mild nausea  The she denied vomiting, chest pain, shortness of breath, fevers, chills about dyspnea on exertion  Patient would not comply with a few full history and physical     In the emergency department, patient arrived tachycardic with a heart rate between , otherwise hemodynamically stable  Labs were significant for ethanol level 297, troponin negative x1, lipase 47, CMP within normal limits with exception of CO2 22, WBC 14 26 with absolute neutrophilia, rapid drug screen negative, VBG with pH 7 3, pCO2 44, H CO3 21 3  CT of the head without contrast showed no acute intracranial abnormality  Chest x-ray showed no active pulmonary disease which is somewhat limited secondary to low lung volumes    Patient received IV Ativan x2, Zofran and 1 L bolus normal saline  Review of Systems   Review of Systems   Unable to perform ROS: Acuity of condition (Patient refused review of systems, intoxicated, possibly postictal)       Past Medical History     Past Medical History:   Diagnosis Date    Alcohol abuse     Anxiety     Asthma     Bipolar disorder ([facility] )     Depression     Panic disorder 10/11/2016    Psychiatric disorder     PTSD (post-traumatic stress disorder)     Seizures ([facility] )     Self-injurious behavior        Past Surgical History     Past Surgical History:   Procedure Laterality Date    ESOPHAGOGASTRODUODENOSCOPY N/A 4/10/2017    Procedure: ESOPHAGOGASTRODUODENOSCOPY (EGD); Surgeon: Jody Santana MD;  Location: [facility]; Service:     INDUCED          Social History     History   Alcohol Use    1 2 oz/week    2 Cans of beer per week     Comment: history of alcohol abuse, last drank Friday     History   Drug Use No     Comment: used about 2 weeks ago     History   Smoking Status    Current Some Day Smoker    Packs/day: 0 50    Years: 5 00   Smokeless Tobacco    Never Used     Comment: socially       Family History     Family History   Problem Relation Age of Onset    Lupus Father     Coronary artery disease Father     Stroke Father        Medications Prior to Admission     Prior to Admission medications    Medication Sig Start Date End Date Taking?  Authorizing Provider   buprenorphine-naloxone (SUBOXONE) 8-2 mg Place 1 Film under the tongue daily   Yes Historical Provider, MD   citalopram (CeleXA) 20 mg tablet Take 20 mg by mouth daily   Yes Historical Provider, MD   folic acid (FOLVITE) 1 mg tablet Take 1 mg by mouth daily   Yes Historical Provider, MD   gabapentin (NEURONTIN) 600 MG tablet Take 600 mg by mouth 3 (three) times a day   Yes Historical Provider, MD   Melatonin 5 MG TABS Take 1 tablet by mouth daily at bedtime   Yes Historical Provider, MD   methocarbamol (ROBAXIN) 500 mg tablet Take 500 mg by mouth 3 (three) times a day   Yes Historical Provider, MD   mirtazapine (REMERON) 15 mg tablet Take 15 mg by mouth daily at bedtime   Yes Historical Provider, MD   naproxen (NAPROSYN) 500 mg tablet Take 500 mg by mouth 2 (two) times a day with meals   Yes Historical Provider, MD   nicotine polacrilex (NICORETTE) 2 mg gum Chew 1 each as needed for smoking cessation (1 piece of gum max every 1 to 2 hours) Chew and then park for approximately 30 minutes PRN 6/7/17  Yes Akosua Chapman MD   Pancrelipase, Lip-Prot-Amyl, (CREON) 60353 units CPEP Take 1 capsule by mouth 3 (three) times a day   Yes Historical Provider, MD   prazosin (MINIPRESS) 1 mg capsule Take 1 mg by mouth daily at bedtime   Yes Historical Provider, MD   thiamine 100 MG tablet Take 1 tablet by mouth daily 6/7/17  Yes Akosua Chapman MD   topiramate (TOPAMAX) 50 MG tablet Take 50 mg by mouth every 12 (twelve) hours   Yes Historical Provider, MD   levETIRAcetam (KEPPRA) 500 mg tablet Take 500 mg by mouth 2 (two) times a day  6/21/18 Yes Historical Provider, MD   LORazepam (ATIVAN) 1 mg tablet Take 0 5 tablets by mouth every 8 (eight) hours as needed for anxiety (moderate anxiety) for up to 10 doses 4/11/17   Sanjana Montelongo MD   ondansetron [facility]) 4 mg tablet Take 1 tablet by mouth every 6 (six) hours for 7 days 6/7/17 6/21/18  Akosua Chapman MD   ondansetron [facility]) 4 mg tablet Take 1 tablet by mouth every 6 (six) hours for 7 days 6/26/17 6/21/18  Barbara Zayas MD   oxyCODONE (ROXICODONE) 5 mg immediate release tablet Take 1 tablet by mouth every 6 (six) hours as needed for moderate pain or severe pain for up to 20 doses Max Daily Amount: 20 mg 6/7/17 6/21/18  Akosua Chapman MD   pantoprazole (PROTONIX) 40 mg tablet Take 1 tablet by mouth daily in the early morning for 30 days 4/11/17 6/21/18  Sanjana Montelongo MD   sucralfate (CARAFATE) 1 g tablet Take 1 tablet by mouth 4 (four) times a day (before meals and at bedtime) for 14 days 6/26/17 6/21/18  Zachery Munoz MD       Allergies     Allergies   Allergen Reactions    Amoxicillin     Other      Unknown pain med "I needed and epi pen and to get pumped full of steroids"     Penicillin G     Penicillins Hives     Hives      Percocet [Oxycodone-Acetaminophen] GI Intolerance    Sulfa Antibiotics     Sulfur     Erythromycin Rash and Other (See Comments)       Objective     Vitals:    06/21/18 1428 06/21/18 1515 06/21/18 1545 06/21/18 1711   BP: 121/54 112/53 106/59 (!) 106/40   BP Location: Right arm   Left arm   Pulse: 84 100 104 98   Resp: 18 20 18 14   Temp:    98 1 °F (36 7 °C)   TempSrc:    Oral   SpO2: 92% 95% 99% 91%   Weight:       Height:         Body mass index is 21 77 kg/m²  Intake/Output Summary (Last 24 hours) at 06/21/18 1714  Last data filed at 06/21/18 1511   Gross per 24 hour   Intake             1000 ml   Output                0 ml   Net             1000 ml     Invasive Devices     Peripheral Intravenous Line            Peripheral IV 06/21/18 Left Antecubital less than 1 day    Peripheral IV 06/21/18 Right Hand less than 1 day                Physical Exam   Constitutional: She appears well-developed and well-nourished  31-year-old female with BMI of 22, encephalopathic possibly secondary to intoxication versus postictal state, refused physical exam, agitated   Neurological:   Lethargic   Skin: She is not diaphoretic  Nursing note and vitals reviewed  Lab Results: I have personally reviewed pertinent reports      CBC:   Results from last 7 days  Lab Units 06/21/18  1246 06/21/18  1240   WBC Thousand/uL  --  14 26*   RBC Million/uL  --  4 51   HEMOGLOBIN g/dL  --  12 3   I STAT HEMOGLOBIN g/dl 15 3  --    HEMATOCRIT %  --  37 6   MCV fL  --  83   MCH pg  --  27 3   MCHC g/dL  --  32 7   RDW %  --  15 4*   MPV fL  --  10 6   PLATELETS Thousands/uL  --  235   NRBC AUTO /100 WBCs  --  0   NEUTROS PCT %  --  66   LYMPHS PCT %  --  27   MONOS PCT %  --  5 EOS PCT %  --  2   BASOS PCT %  --  0   NEUTROS ABS Thousands/µL  --  9 18*   LYMPHS ABS Thousands/µL  --  3 91   MONOS ABS Thousand/µL  --  0 76   EOS ABS Thousand/µL  --  0 31   , Chemistry Profile:   Results from last 7 days  Lab Units 06/21/18  1246 06/21/18  1240   SODIUM mmol/L  --  141   POTASSIUM mmol/L  --  4 0   CHLORIDE mmol/L  --  110*   CO2 mmol/L  --  22   ANION GAP mmol/L  --  9   BUN mg/dL  --  11   CREATININE mg/dL  --  0 52*   GLUCOSE RANDOM mg/dL  --  94   GLUCOSE, ISTAT mg/dl 101  --    CALCIUM mg/dL  --  8 4   AST U/L  --  26   ALT U/L  --  25   ALK PHOS U/L  --  97   TOTAL PROTEIN g/dL  --  7 2   BILIRUBIN TOTAL mg/dL  --  0 19*   EGFR ml/min/1 73sq m 102 132   , Coagulation Studies:   , Cardiac Studies:   Results from last 7 days  Lab Units 06/21/18  1240   TROPONIN I ng/mL <0 02       Imaging: I have personally reviewed pertinent films in PACS  Xr Chest 1 View Portable    Result Date: 6/21/2018  Narrative: CHEST INDICATION:   seizure/asthma  COMPARISON:  Chest radiographs January 13, 2015 EXAM PERFORMED/VIEWS:  XR CHEST PORTABLE FINDINGS: Cardiomediastinal silhouette appears unremarkable  Lung markings are crowded secondary to low lung volumes  Within limitations of this examination there is no focal airspace opacity to suggest pneumonia  No pneumothorax or pleural effusion  Osseous structures appear within normal limits for patient age  Impression: No active pulmonary disease on examination which is somewhat limited secondary to low lung volumes  Workstation performed: [id number]     Ct Head Without Contrast    Result Date: 6/21/2018  Narrative: CT BRAIN - WITHOUT CONTRAST INDICATION:   Seizure  COMPARISON:  6/7/2016  TECHNIQUE:  CT examination of the brain was performed  In addition to axial images, coronal 2D reformatted images were created and submitted for interpretation  Radiation dose length product (DLP) for this visit:  1104 85 mGy-cm     This examination, like all CT scans performed in the [facility], was performed utilizing techniques to minimize radiation dose exposure, including the use of iterative reconstruction and automated exposure control  IMAGE QUALITY:  Diagnostic  FINDINGS: PARENCHYMA:  No intracranial mass, mass effect or midline shift  No CT signs of acute infarction  No acute parenchymal hemorrhage  VENTRICLES AND EXTRA-AXIAL SPACES:  Normal for the patient's age  VISUALIZED ORBITS AND PARANASAL SINUSES:  Unremarkable  CALVARIUM AND EXTRACRANIAL SOFT TISSUES:  Normal      Impression: No acute intracranial abnormality  Workstation performed: [id number]       Urinalysis:   Results from last 7 days  Lab Units 06/21/18  1324   COLOR UA  Yellow   CLARITY UA  Clear   SPEC GRAV UA  1 010   PH UA  6 5   LEUKOCYTES UA  Negative   NITRITE UA  Negative   PROTEIN UA mg/dl Negative   GLUCOSE UA mg/dl Negative   KETONES UA mg/dl Negative   BILIRUBIN UA  Negative   BLOOD UA  Negative        Urine Micro:        EKG, Pathology, and Other Studies: I have personally reviewed pertinent reports        Medications given in Emergency Department     Medication Administration - last 24 hours from 06/20/2018 1714 to 06/21/2018 1714       Date/Time Order Dose Route Action Action by     06/21/2018 1511 sodium chloride 0 9 % bolus 1,000 mL 0 mL Intravenous Stopped Mercedes Pelayo RN     06/21/2018 1224 sodium chloride 0 9 % bolus 1,000 mL 1,000 mL Intravenous New Bag Mercedes Pelayo RN     06/21/2018 1240 ondansetron (ZOFRAN) injection 4 mg 4 mg Intravenous Given Mercedes Pelayo RN     06/21/2018 1240 albuterol inhalation solution 5 mg 5 mg Nebulization Given Mercedes Pelayo RN     06/21/2018 1240 ipratropium (ATROVENT) 0 02 % inhalation solution 0 5 mg 0 5 mg Nebulization Given Mercedes Pelayo RN     06/21/2018 1250 LORazepam (ATIVAN) 2 mg/mL injection 1 mg 1 mg Intravenous Given Jacqueline Piper RN     06/21/2018 1649 sodium chloride 0 9 % infusion 125 mL/hr Intravenous New Bag Estela Rios RN          Assessment and Plan     Problem List     * (Principal)Seizures [facility])    Migraine    Alcohol abuse    Panic disorder (Chronic)    Transaminitis    Pseudocyst of pancreas    Recurrent pancreatitis ([facility] [address] )    Acute alcohol intoxication ([facility] [address] )        1  Seizure  · Patient with past medical history of epilepsy, chronically on Topamax which patient states she is taking as prescribed though she states that she had a seizure prior to admission as well as yesterday  · Patient arrived with a blood alcohol level of about 300, stating that she drinks 6 beers per day  · Will order neuro checks q 4  · Will order lorazepam IV for seizures  · Will order seizure precautions  · Will monitor clinically and hemodynamically  · Will order telemetry  · Will order IV fluids with normal saline at a rate of 125 cc/hour  · Will continue home Topamax    2  Alcohol dependence with acute alcohol intoxication  · Patient states that she drinks 6 beers per day  · Sponsor was at bedside - states patient is here for HOST placement into the drug and alcohol rehab  · Will consult case management for HOST referral  · Blood alcohol level of about 300 on admission  · Will continue seizure precautions, IV Ativan for seizures  · Will order thiamine and folate  · Will repeat BMP, CBC, ethanol level in the a m  · Will order telemetry    3  Nausea  · Will order IV Zofran p r n  · Does not appear to be acute exacerbation of chronic pancreatitis at this time      Code Status: Level 1 - Full Code  VTE Pharmacologic Prophylaxis: Sequential compression device (Venodyne)    VTE Mechanical Prophylaxis: sequential compression device  Admission Status: OBSERVATION    Admission Time  I spent 1 hour admitting the patient  This involved direct patient contact where I performed a full history and physical, reviewing previous records, and reviewing laboratory and other diagnostic studies      Madalyn Pina MD  Internal Medicine  PGY-1

## 2018-06-21 NOTE — ED PROVIDER NOTES
History  Chief Complaint   Patient presents with    Seizure - Prior Hx Of     Pt reports going through a detox program and her last drink was two hours ago  Pt arrives to ED room post-ictal and began to respond to questions appropriately at this time  Pt also reports having a seizure disorder  Emergency Department Note- Mamta Greene 33 y o  female MRN: [id number]    Unit/Bed#: ED 28 Encounter: [id number]        History of Present Illness  HPI:  Mamta Greene is a 33 y o  female who presents with her sponsor in route to the [facility] program for alcohol rehab due to recently started drinking alcohol again, when she had a seizure  Patient does have a history of epilepsy as well as recurrent pancreatitis, asthma as well as migraine  Patient states that her last full drink was approximately 2 hr ago and then had few small sips before she got in the car  She is currently awake alert and oriented and answering questions but tearful  Per her sponsor who is at bedside, they were in route to the hose program when she suddenly became very quiet and then had a generalized tonic-clonic seizure lasting less than a minute  She was initially postictal but awoke on arrival to her room  Last seizure was yesterday unclear when her most recent seizure before that was  She denies any current drug use  She notes compliance with medication as well as her Topamax which she took this morning  Of note records show she had a recent increase/refill in her dosage  Patient also complains of cough that is nonproductive  No fevers or chills  No chest pain  No shortness of breath or dyspnea on exertion  No headache/dizziness  No focal numbness/weakness/tingling  Denies trauma  No fevers/recent illnesses  She also has epigastric and left upper quadrant discomfort similar to her previous episodes of pancreatitis and she does have a history of chronic pancreatitis  Mild nausea  No vomiting  Mildly decreased p o  intake    While in the room she is tearful but awake alert oriented answering questions appropriately  Vital signs are currently stable  She is placed on the monitor as well as started on IV fluid hydration antiemetic and a breathing treatment  Will monitor very closely  Will give Ativan as needed for seizure/agitation  Rin Krishna REVIEW OF SYSTEMS  Constitutional:  Denies fever or chills   Eyes:  Denies change in visual acuity   HENT:  Denies nasal congestion or sore throat   Respiratory:  Positive cough, denies shortness of breath   Cardiovascular:  Denies chest pain, + LE edema at ankles that has been unchanged x weeks per sponser   GI:  Positive abdominal pain, nausea, No vomiting, bloody stools or diarrhea   :  Denies dysuria   Musculoskeletal:  Denies back pain or joint pain   Integument:  Denies rash   Neurologic:  Denies headache, focal weakness or sensory changes   Endocrine:  Denies polyuria or polydipsia   Lymphatic:  Denies swollen glands   Psychiatric:  Positive anxiety, Positive EtOH abuse     Historical Information  Past Medical History:  No date: Alcohol abuse  No date: Anxiety  No date: Asthma  No date: Bipolar disorder ([facility] )  No date: Depression  10/11/2016: Panic disorder  No date: Psychiatric disorder  No date: PTSD (post-traumatic stress disorder)  No date: Seizures ([facility] )  No date: Self-injurious behavior  Past Surgical History:  4/10/2017: ESOPHAGOGASTRODUODENOSCOPY; N/A      Comment:  Procedure: ESOPHAGOGASTRODUODENOSCOPY (EGD); Surgeon:                Lowell Morris MD;  Location: [facility]; Service:   No date: INDUCED   Social History  Alcohol use: Yes           1 2 oz/week     Cans of beer: 2 per week     Comment: history of alcohol abuse, last drank Friday    Drug use:  No               Comment: used about 2 weeks ago    Smoking status: Current Some Day Smoker                                                    Packs/day: 0 50      Years: 5 00      Smokeless tobacco: Never Used                      Comment: socially    Family History: Review of patient's family history indicates:  Problem: Lupus      Relation: Father       Age of Onset: (Not Specified)   Problem: Coronary artery disease      Relation: Father       Age of Onset: (Not Specified)   Problem: Stroke      Relation: Father       Age of Onset: (Not Specified)       Meds/Allergies  (Not in a hospital admission)     -- Amoxicillin    -- Other     --  Unknown pain med "I needed and epi pen and to get             pumped full of steroids"   -- Penicillin G    -- Penicillins -- Hives    --  Hives   -- Percocet (Oxycodone-Acetaminophen) -- GI Intolerance   -- Sulfa Antibiotics    -- Sulfur    -- Erythromycin -- Rash and Other (See Comments)    Objective  Vitals: Blood pressure 134/66, pulse 85, resp  rate 20, height 5' 6 5" (1 689 m), weight 62 1 kg (136 lb 14 5 oz), SpO2 94 %, not currently breastfeeding  PHYSICAL EXAM  Constitutional:  Well developed, well nourished, no acute distress, non-toxic appearance, tearful  Eyes:  PERRL, conjunctiva mildly injected bilaterally, PERRL, nondilated fundoscopic exam grossly WNL  HENT:  Atraumatic, external ears normal, nose normal, oropharynx moist, no pharyngeal exudates  Neck- normal range of motion, no tenderness, supple   Respiratory:  No respiratory distress, + slight expiratory wheeze bilaterally, no rales, no rhonchi   Cardiovascular:  Normal rate, normal rhythm, no murmurs, no gallops, no rubs   GI:  Soft, nondistended, normal bowel sounds, no organomegaly, no mass, + ttp over epigastric region and LUQ, + guarding, no rebound  :  No costovertebral angle tenderness   Musculoskeletal:  Positive edema to bilateral ankles, no tenderness, no deformities   Back- no tenderness  Integument:  Well hydrated, no rash   Lymphatic:  No lymphadenopathy noted   Neurologic:  Alert & oriented x 3, CN 2-12 normal, normal motor function with 5/5 strength, normal sensory function, no focal deficits noted, no nystagmus, GARCIA, clear speech  Psychiatric:  Tearful, depressed affect, + EtOH     Lab Results: Lab Results: I have personally reviewed pertinent lab results  Imaging: I have personally reviewed pertinent reports  EKG, Pathology, and Other Studies: I have personally reviewed pertinent films in PACS      Assessment/Plan    ED Medical Decision Making:  Seizure  Likely related to patient's underlying epilepsy  Increasing frequency of seizures per patient as well as her sponsor at bedside  Patient also has history of alcohol abuse with chronic pancreatitis  Previously sober, started drinking recently again  She does appear intoxicated at this time but is steve to answer questions, is oriented and responds appropriately, does admit to drinking  Will need to monitor for EtOH withdrawal symptoms and seizure  Will also check labs for acute pancreatitis  Will give IV fluid hydration and antiemetic  Will hold off on narcotic pain control at this time  Will give benzo as needed  Will require continuous monitoring  Will discuss with Neurology if repeat seizures  Will require admission for further treatment and evaluation  Portions of the record may have been created with voice recognition software  Occasional wrong word or "sound-a-like" substitutions may have occurred due to the inherent limitations of voice recognition software  Review chart carefully and recognize, using context, where substitutions have occurred  Prior to Admission Medications   Prescriptions Last Dose Informant Patient Reported? Taking?    LORazepam (ATIVAN) 1 mg tablet More than a month at Unknown time  No No   Sig: Take 0 5 tablets by mouth every 8 (eight) hours as needed for anxiety (moderate anxiety) for up to 10 doses   Melatonin 5 MG TABS 6/20/2018 at Unknown time  Yes Yes   Sig: Take 1 tablet by mouth daily at bedtime   Pancrelipase, Lip-Prot-Amyl, (CREON) 89084 units CPEP 6/20/2018 at Unknown time  Yes Yes   Sig: Take 1 capsule by mouth 3 (three) times a day   buprenorphine-naloxone (SUBOXONE) 8-2 mg 2018 at 0400  Yes Yes   Sig: Place 1 Film under the tongue daily   citalopram (CeleXA) 20 mg tablet 2018 at Unknown time  Yes Yes   Sig: Take 20 mg by mouth daily   folic acid (FOLVITE) 1 mg tablet Past Week at Unknown time  Yes Yes   Sig: Take 1 mg by mouth daily   gabapentin (NEURONTIN) 600 MG tablet 2018 at Unknown time  Yes Yes   Sig: Take 600 mg by mouth 3 (three) times a day   methocarbamol (ROBAXIN) 500 mg tablet Past Week at Unknown time  Yes Yes   Sig: Take 500 mg by mouth 3 (three) times a day   naproxen (NAPROSYN) 500 mg tablet Past Week at Unknown time  Yes Yes   Sig: Take 500 mg by mouth 2 (two) times a day with meals   nicotine polacrilex (NICORETTE) 2 mg gum Past Week at Unknown time  No Yes   Sig: Chew 1 each as needed for smoking cessation (1 piece of gum max every 1 to 2 hours) Chew and then park for approximately 30 minutes PRN   prazosin (MINIPRESS) 1 mg capsule 2018 at Unknown time  Yes Yes   Sig: Take 1 mg by mouth daily at bedtime   thiamine 100 MG tablet Past Week at Unknown time  No Yes   Sig: Take 1 tablet by mouth daily   topiramate (TOPAMAX) 50 MG tablet 2018 at evening  Yes Yes   Sig: Take 50 mg by mouth every 12 (twelve) hours      Facility-Administered Medications: None       Past Medical History:   Diagnosis Date    Alcohol abuse     Anxiety     Asthma     [facility]     Depression     Panic disorder 10/11/2016    Psychiatric disorder     PTSD (post-traumatic stress disorder)     Seizures (HCC)     Self-injurious behavior        Past Surgical History:   Procedure Laterality Date    ESOPHAGOGASTRODUODENOSCOPY N/A 4/10/2017    Procedure: ESOPHAGOGASTRODUODENOSCOPY (EGD); Surgeon: Ganesh Kee MD;  Location: [facility];   Service:     INDUCED          Family History   Problem Relation Age of Onset    Lupus Father     Coronary artery disease Father     Stroke Father I have reviewed and agree with the history as documented      Social History   Substance Use Topics    Smoking status: Current Some Day Smoker     Packs/day: 1 00     Years: 10 00    Smokeless tobacco: Never Used    Alcohol use 14 4 oz/week     24 Cans of beer per week      Comment: last drink today        Review of Systems    Physical Exam  Physical Exam    Vital Signs  ED Triage Vitals   Temperature Pulse Respirations Blood Pressure SpO2   06/21/18 1338 06/21/18 1211 06/21/18 1211 06/21/18 1211 06/21/18 1211   98 7 °F (37 1 °C) 85 20 134/66 94 %      Temp Source Heart Rate Source Patient Position - Orthostatic VS BP Location FiO2 (%)   06/21/18 1338 06/21/18 1327 06/21/18 1327 06/21/18 1327 --   Oral Monitor Lying Right arm       Pain Score       06/21/18 1211       8           Vitals:    06/22/18 2308 06/23/18 0426 06/23/18 0745 06/23/18 1526   BP: 130/72 114/57 119/75 130/74   Pulse: 66 59 68 60   Patient Position - Orthostatic VS: Lying Lying Lying Lying       Visual Acuity  Visual Acuity      Most Recent Value   L Pupil Size (mm)  3   R Pupil Size (mm)  3   L Pupil Shape  Round   R Pupil Shape  Round          ED Medications  Medications   sodium chloride 0 9 % bolus 1,000 mL (0 mL Intravenous Stopped 6/21/18 1511)   ondansetron (ZOFRAN) injection 4 mg (4 mg Intravenous Given 6/21/18 1240)   albuterol inhalation solution 5 mg (5 mg Nebulization Given 6/21/18 1240)   LORazepam (ATIVAN) 2 mg/mL injection 1 mg (1 mg Intravenous Given 6/21/18 1250)   thiamine (VITAMIN B1) 576 mg, folic acid 1 mg, magnesium sulfate 2 g in sodium chloride 0 9 % 1,000 mL infusion ( Intravenous Stopped 6/22/18 0200)   iohexol (OMNIPAQUE) 350 MG/ML injection (MULTI-DOSE) 100 mL (100 mL Intravenous Given 6/22/18 0843)   cloNIDine (CATAPRES) tablet 0 1 mg (0 1 mg Oral Given 6/22/18 2122)   cloNIDine (CATAPRES) tablet 0 1 mg (0 1 mg Oral Given 6/23/18 0941)       Diagnostic Studies  Results Reviewed     Procedure Component Value Units Date/Time    Ethanol [[id number]]  (Normal) Collected:  06/22/18 0547    Lab Status:  Final result Specimen:  Blood from Hand, Left Updated:  06/22/18 0702     Ethanol Lvl <3 mg/dL     Rapid drug screen, urine [[id number]]  (Normal) Collected:  06/21/18 1318    Lab Status:  Final result Specimen:  Urine from Urine, Clean Catch Updated:  06/21/18 1423     Amph/Meth UR Negative     Barbiturate Ur Negative     Benzodiazepine Urine Negative     Cocaine Urine Negative     Methadone Urine Negative     Opiate Urine Negative     PCP Ur Negative     THC Urine Negative    Narrative:         FOR MEDICAL PURPOSES ONLY  IF CONFIRMATION NEEDED PLEASE CONTACT THE LAB WITHIN 5 DAYS  Drug Screen Cutoff Levels:  AMPHETAMINE/METHAMPHETAMINES  1000 ng/mL  BARBITURATES     200 ng/mL  BENZODIAZEPINES     200 ng/mL  COCAINE      300 ng/mL  METHADONE      300 ng/mL  OPIATES      300 ng/mL  PHENCYCLIDINE     25 ng/mL  THC       50 ng/mL    Blood gas, venous [[id number]]  (Abnormal) Collected:  06/21/18 1339    Lab Status:  Final result Specimen:  Blood from Arm, Left Updated:  06/21/18 1345     pH, Kam 7 301     pCO2, Kam 44 2 mm Hg      pO2, Kam 39 8 mm Hg      HCO3, Kam 21 3 (L) mmol/L      Base Excess, Kam -5 0 mmol/L      O2 Content, Kam 10 8 ml/dL      O2 HGB, VENOUS 64 2 %     Topiramate level [[id number]] Collected:  06/21/18 1240    Lab Status: In process Specimen:  Blood from Arm, Left Updated:  06/21/18 1339    TSH [[id number]]  (Normal) Collected:  06/21/18 1240    Lab Status:  Final result Specimen:  Blood from Arm, Left Updated:  06/21/18 1325     TSH 3RD GENERATON 1 980 uIU/mL     Narrative:         Patients undergoing fluorescein dye angiography may retain small amounts of fluorescein in the body for 48-72 hours post procedure  Samples containing fluorescein can produce falsely depressed TSH values  If the patient had this procedure,a specimen should be resubmitted post fluorescein clearance            The recommended reference ranges for TSH during pregnancy are as follows:  First trimester 0 1 to 2 5 uIU/mL  Second trimester  0 2 to 3 0 uIU/mL  Third trimester 0 3 to 3 0 uIU/m      Lipase [[id number]]  (Abnormal) Collected:  06/21/18 1240    Lab Status:  Final result Specimen:  Blood from Arm, Left Updated:  06/21/18 1325     Lipase 47 (L) u/L     B-type natriuretic peptide [[id number]]  (Normal) Collected:  06/21/18 1240    Lab Status:  Final result Specimen:  Blood from Arm, Left Updated:  06/21/18 1325     NT-proBNP 43 pg/mL     Salicylate level [[id number]]  (Normal) Collected:  06/21/18 1240    Lab Status:  Final result Specimen:  Blood from Arm, Left Updated:  80/86/36 0818     Salicylate Lvl 4 mg/dL     Acetaminophen level [[id number]]  (Abnormal) Collected:  06/21/18 1240    Lab Status:  Final result Specimen:  Blood from Arm, Left Updated:  06/21/18 1325     Acetaminophen Level <2 (L) ug/mL     Comprehensive metabolic panel [[id number]]  (Abnormal) Collected:  06/21/18 1240    Lab Status:  Final result Specimen:  Blood from Arm, Left Updated:  06/21/18 1325     Sodium 141 mmol/L      Potassium 4 0 mmol/L      Chloride 110 (H) mmol/L      CO2 22 mmol/L      Anion Gap 9 mmol/L      BUN 11 mg/dL      Creatinine 0 52 (L) mg/dL      Glucose 94 mg/dL      Calcium 8 4 mg/dL      AST 26 U/L      ALT 25 U/L      Alkaline Phosphatase 97 U/L      Total Protein 7 2 g/dL      Albumin 3 8 g/dL      Total Bilirubin 0 19 (L) mg/dL      eGFR 132 ml/min/1 73sq m     Narrative:         National Kidney Disease Education Program recommendations are as follows:  GFR calculation is accurate only with a steady state creatinine  Chronic Kidney disease less than 60 ml/min/1 73 sq  meters  Kidney failure less than 15 ml/min/1 73 sq  meters      Magnesium [[id number]]  (Normal) Collected:  06/21/18 1240    Lab Status:  Final result Specimen:  Blood from Arm, Left Updated:  06/21/18 1325     Magnesium 2 0 mg/dL     Troponin I [[id number]]  (Normal) Collected: 06/21/18 1240    Lab Status:  Final result Specimen:  Blood from Arm, Left Updated:  06/21/18 1317     Troponin I <0 02 ng/mL     POCT pregnancy, urine [[id number]]  (Normal) Resulted:  06/21/18 1317    Lab Status:  Final result Updated:  06/21/18 1317     EXT PREG TEST UR (Ref: Negative) NEGATIVE (-)    ED Urine Macroscopic [[id number]] Collected:  06/21/18 1324    Lab Status:  Final result Specimen:  Urine Updated:  06/21/18 1316     Color, UA Yellow     Clarity, UA Clear     pH, UA 6 5     Leukocytes, UA Negative     Nitrite, UA Negative     Protein, UA Negative mg/dl      Glucose, UA Negative mg/dl      Ketones, UA Negative mg/dl      Urobilinogen, UA 0 2 E U /dl      Bilirubin, UA Negative     Blood, UA Negative     Specific Gravity, UA 1 010    Narrative:       CLINITEK RESULT    Ethanol [[id number]]  (Abnormal) Collected:  06/21/18 1240    Lab Status:  Final result Specimen:  Blood from Arm, Left Updated:  06/21/18 1311     Ethanol Lvl 297 (H) mg/dL     CBC and differential [[id number]]  (Abnormal) Collected:  06/21/18 1240    Lab Status:  Final result Specimen:  Blood from Arm, Left Updated:  06/21/18 1256     WBC 14 26 (H) Thousand/uL      RBC 4 51 Million/uL      Hemoglobin 12 3 g/dL      Hematocrit 37 6 %      MCV 83 fL      MCH 27 3 pg      MCHC 32 7 g/dL      RDW 15 4 (H) %      MPV 10 6 fL      Platelets 567 Thousands/uL      nRBC 0 /100 WBCs      Neutrophils Relative 66 %      Immat GRANS % 0 %      Lymphocytes Relative 27 %      Monocytes Relative 5 %      Eosinophils Relative 2 %      Basophils Relative 0 %      Neutrophils Absolute 9 18 (H) Thousands/µL      Immature Grans Absolute 0 05 Thousand/uL      Lymphocytes Absolute 3 91 Thousands/µL      Monocytes Absolute 0 76 Thousand/µL      Eosinophils Absolute 0 31 Thousand/µL      Basophils Absolute 0 05 Thousands/µL     POCT Chem 8+ [[id number]]  (Abnormal) Collected:  06/21/18 1246    Lab Status:  Final result Updated:  06/21/18 1251     SODIUM, I-STAT 144 mmol/l      Potassium, i-STAT 4 0 mmol/L      Chloride, istat 108 mmol/L      CO2, i-STAT 20 (L) mmol/L      Anion Gap, Istat 21 (H) mmol/L      Calcium, Ionized i-STAT 1 13 mmol/L      BUN, I-STAT 10 mg/dl      Creatinine, i-STAT 0 8 mg/dl      eGFR 102 ml/min/1 73sq m      Glucose, i-STAT 101 mg/dl      Hct, i-STAT 45 %      Hgb, i-STAT 15 3 g/dl      Specimen Type VENOUS    Fingerstick Glucose (POCT) [[id number]]  (Normal) Collected:  06/21/18 1241    Lab Status:  Final result Updated:  06/21/18 1242     POC Glucose 95 mg/dl                  CT abdomen pelvis with contrast   Final Result by Marco Swartz MD (06/22 1843)      1  Mild nonspecific fat stranding around the head of the pancreas could suggest pancreatitis  Pseudocyst seen on prior exam in 2017 has resolved  No lingering peripancreatic fluid collections or findings of pancreatic necrosis  2   Engorgement of the main portal and splenic veins as well as recannulization of the umbilical vein suggesting of portal venous hypertension  Congestive splenomegaly is also present  Findings are chronic  No portal venous thrombosis or gross liver    or morphologic abnormalities  Workstation performed: [id number]         CT head without contrast   Final Result by Earl Webb MD (06/21 1423)      No acute intracranial abnormality  Workstation performed: [id number]         XR chest 1 view portable   ED Interpretation by Faviola Titus MD (06/21 1411)   No acute disease      Final Result by Alcira Vazquez DO (06/21 1421)   No active pulmonary disease on examination which is somewhat limited secondary to low lung volumes  Workstation performed: [id number]                    Procedures  Procedures       Phone Contacts  ED Phone Contact    ED Course  ED Course as of Jun 25 1030   Thu Jun 21, 2018   1249 Pt feeling anxious  Normally takes ativan prn throughout the day   Given concern for pending EtOH w/d and anxiety disorder, will ativan and continue to monitor      1517 Pt sleeping, awakens to verbal      1518 HR 90s, pulse ox 96% RA    1602 Pt wakes up easily, talking to medical students  [facility]  CritCare Time    Disposition  Final diagnoses:   Seizure ([facility] )   Alcohol intoxication ([facility] )     Time reflects when diagnosis was documented in both MDM as applicable and the Disposition within this note     Time User Action Codes Description Comment    6/21/2018  2:45 PM Deleonguerrero, Beryle Caras Add [R56 9] Seizure ([facility] )     6/21/2018  2:45 PM Deleonguerrero, Beryle Caras Add [F10 929] Alcohol intoxication [facility])       ED Disposition     ED Disposition Condition Comment    Admit  Case was discussed with SOD and the patient's admission status was agreed to be Admission Status: observation status to the service of Dr Gustabo Villegas          Follow-up Information     Follow up With Specialties Details Why [address] [place] [address],  Internal Medicine Schedule an appointment as soon as possible for a visit  [address] [place] [id number] [phone]            Discharge Medication List as of 6/23/2018  6:50 PM      CONTINUE these medications which have CHANGED    Details   topiramate (TOPAMAX) 50 MG tablet Take 3 tablets (150 mg total) by mouth every 12 (twelve) hours, Starting Sat 6/23/2018, Print         CONTINUE these medications which have NOT CHANGED    Details   buprenorphine-naloxone (SUBOXONE) 8-2 mg Place 1 Film under the tongue daily, Historical Med      citalopram (CeleXA) 20 mg tablet Take 20 mg by mouth daily, Historical Med      folic acid (FOLVITE) 1 mg tablet Take 1 mg by mouth daily, Until Discontinued, Historical Med      gabapentin (NEURONTIN) 600 MG tablet Take 600 mg by mouth 3 (three) times a day, Historical Med      Melatonin 5 MG TABS Take 1 tablet by mouth daily at bedtime, Historical Med      methocarbamol (ROBAXIN) 500 mg tablet Take 500 mg by mouth 3 (three) times a day, Historical Med      naproxen (NAPROSYN) 500 mg tablet Take 500 mg by mouth 2 (two) times a day with meals, Historical Med      nicotine polacrilex (NICORETTE) 2 mg gum Chew 1 each as needed for smoking cessation (1 piece of gum max every 1 to 2 hours) Chew and then park for approximately 30 minutes PRN, Starting 6/7/2017, Until Discontinued, Print      Pancrelipase, Lip-Prot-Amyl, (CREON) 34648 units CPEP Take 1 capsule by mouth 3 (three) times a day, Historical Med      prazosin (MINIPRESS) 1 mg capsule Take 1 mg by mouth daily at bedtime, Historical Med      thiamine 100 MG tablet Take 1 tablet by mouth daily, Starting 6/7/2017, Until Discontinued, Print      LORazepam (ATIVAN) 1 mg tablet Take 0 5 tablets by mouth every 8 (eight) hours as needed for anxiety (moderate anxiety) for up to 10 doses, Starting 4/11/2017, Until Discontinued, No Print         STOP taking these medications       mirtazapine (REMERON) 15 mg tablet Comments:   Reason for Stopping:               Outpatient Discharge Orders  Discharge Diet     Activity as tolerated         ED Provider  Electronically Signed by           Michelle Lozada MD  06/25/18 5592

## 2018-06-21 NOTE — ED PROCEDURE NOTE
PROCEDURE  ECG 12 Lead Documentation  Date/Time: 6/21/2018 1:27 PM  Performed by: Susannah Rob  Authorized by: Susannah Rob     Patient location:  ED  Previous ECG:     Previous ECG:  Unavailable  Rate:     ECG rate assessment: normal    Rhythm:     Rhythm: sinus rhythm    Ectopy:     Ectopy: none    QRS:     QRS axis:  Normal  Conduction:     Conduction: normal    ST segments:     ST segments:  Normal         Rosina Martin MD  06/21/18 5715

## 2018-06-21 NOTE — ED NOTES
Verbal order from Dr Leonardo Nieto to hang a second L of NS at this time       Marcin Mendez, ABIOLA  06/21/18 4322

## 2018-06-21 NOTE — PROGRESS NOTES
[facility] Senior Admission Note   Unit/Bed # @DBLINK (TIM,59167)@ Encounter: [id number]  SOD Team B           [place] Megha 33 y o  female [id number]       Patient seen and examined  Reviewed H&P per Dr Francisco Reynolds    Agree with the assessment and plan     Assessment/Plan: Principal Problem:    Seizures ([facility] )  Active Problems:    Migraine    Alcohol abuse    Recurrent pancreatitis (HCC)    Acute alcohol intoxication ([facility] )       Disposition:  OBSERVATION    Expected LOS: <2 9 Amy Bell DO

## 2018-06-21 NOTE — RESPIRATORY THERAPY NOTE
RT Protocol Note  Shawanda Blue 33 y o  female MRN: [id number]  Unit/Bed#: ED 28 Encounter: [id number]    Assessment    Active Problems:    * No active hospital problems  *      Home Pulmonary Medications:  albuterol       Past Medical History:   Diagnosis Date    Alcohol abuse     Anxiety     Asthma     Bipolar disorder ([facility] )     Depression     Panic disorder 10/11/2016    Psychiatric disorder     PTSD (post-traumatic stress disorder)     Seizures ([place] [address] )     Self-injurious behavior      Social History     Social History    Marital status: Single     Spouse name: N/A    Number of children: N/A    Years of education: N/A     Social History Main Topics    Smoking status: Current Some Day Smoker     Packs/day: 0 50     Years: 5 00    Smokeless tobacco: Never Used      Comment: socially    Alcohol use 1 2 oz/week     2 Cans of beer per week      Comment: history of alcohol abuse, last drank Friday    Drug use: No      Comment: used about 2 weeks ago    Sexual activity: Yes     Partners: Male     Other Topics Concern    None     Social History Narrative    None       Subjective         Objective    Physical Exam:   Assessment Type: (P) Assess only  General Appearance: (P) Sleeping  Respiratory Pattern: (P) Normal  Chest Assessment: (P) Chest expansion symmetrical  Bilateral Breath Sounds: (P) Clear    Vitals:  Blood pressure 112/53, pulse 100, temperature 98 7 °F (37 1 °C), temperature source Oral, resp  rate 20, height 5' 6 5" (1 689 m), weight 62 1 kg (136 lb 14 5 oz), SpO2 95 %, not currently breastfeeding  Imaging and other studies: I have personally reviewed pertinent reports              Plan    Respiratory Plan: (P) Mild Distress pathway        Resp Comments: (P) pt has hx of asthma, family is not positive what she should be taking at home, pt is sleeping and will not respond  bs clear, pt is not admittted for respiratory issues  will d/c atrovent and change to albuterol udn q4 prn , once pt is able to use mdi change to mdi per protocol

## 2018-06-22 ENCOUNTER — APPOINTMENT (OUTPATIENT)
Dept: RADIOLOGY | Facility: HOSPITAL | Age: 28
DRG: 053 | End: 2018-06-22
Payer: COMMERCIAL

## 2018-06-22 PROBLEM — F10.929 ACUTE ALCOHOL INTOXICATION (HCC): Status: RESOLVED | Noted: 2018-06-21 | Resolved: 2018-06-22

## 2018-06-22 LAB
ANION GAP SERPL CALCULATED.3IONS-SCNC: 10 MMOL/L (ref 4–13)
BUN SERPL-MCNC: 12 MG/DL (ref 5–25)
CALCIUM SERPL-MCNC: 8.2 MG/DL (ref 8.3–10.1)
CHLORIDE SERPL-SCNC: 109 MMOL/L (ref 100–108)
CO2 SERPL-SCNC: 22 MMOL/L (ref 21–32)
CREAT SERPL-MCNC: 0.39 MG/DL (ref 0.6–1.3)
ERYTHROCYTE [DISTWIDTH] IN BLOOD BY AUTOMATED COUNT: 15.3 % (ref 11.6–15.1)
ETHANOL SERPL-MCNC: <3 MG/DL (ref 0–3)
GFR SERPL CREATININE-BSD FRML MDRD: 145 ML/MIN/1.73SQ M
GLUCOSE SERPL-MCNC: 89 MG/DL (ref 65–140)
HCT VFR BLD AUTO: 33.9 % (ref 34.8–46.1)
HGB BLD-MCNC: 10.7 G/DL (ref 11.5–15.4)
MCH RBC QN AUTO: 26.7 PG (ref 26.8–34.3)
MCHC RBC AUTO-ENTMCNC: 31.6 G/DL (ref 31.4–37.4)
MCV RBC AUTO: 85 FL (ref 82–98)
PLATELET # BLD AUTO: 137 THOUSANDS/UL (ref 149–390)
PMV BLD AUTO: 10.5 FL (ref 8.9–12.7)
POTASSIUM SERPL-SCNC: 3.7 MMOL/L (ref 3.5–5.3)
RBC # BLD AUTO: 4.01 MILLION/UL (ref 3.81–5.12)
SODIUM SERPL-SCNC: 141 MMOL/L (ref 136–145)
WBC # BLD AUTO: 6.4 THOUSAND/UL (ref 4.31–10.16)

## 2018-06-22 PROCEDURE — 80320 DRUG SCREEN QUANTALCOHOLS: CPT | Performed by: INTERNAL MEDICINE

## 2018-06-22 PROCEDURE — 74177 CT ABD & PELVIS W/CONTRAST: CPT

## 2018-06-22 PROCEDURE — 80048 BASIC METABOLIC PNL TOTAL CA: CPT | Performed by: INTERNAL MEDICINE

## 2018-06-22 PROCEDURE — 85027 COMPLETE CBC AUTOMATED: CPT | Performed by: INTERNAL MEDICINE

## 2018-06-22 RX ORDER — CLONIDINE HYDROCHLORIDE 0.1 MG/1
0.1 TABLET ORAL ONCE
Status: COMPLETED | OUTPATIENT
Start: 2018-06-22 | End: 2018-06-22

## 2018-06-22 RX ORDER — HYDROXYZINE HYDROCHLORIDE 25 MG/1
25 TABLET, FILM COATED ORAL EVERY 6 HOURS PRN
Status: DISCONTINUED | OUTPATIENT
Start: 2018-06-22 | End: 2018-06-22

## 2018-06-22 RX ORDER — BUPRENORPHINE AND NALOXONE 8; 2 MG/1; MG/1
1 FILM, SOLUBLE BUCCAL; SUBLINGUAL DAILY
Status: DISCONTINUED | OUTPATIENT
Start: 2018-06-22 | End: 2018-06-23 | Stop reason: HOSPADM

## 2018-06-22 RX ORDER — NICOTINE 21 MG/24HR
21 PATCH, TRANSDERMAL 24 HOURS TRANSDERMAL DAILY
Status: DISCONTINUED | OUTPATIENT
Start: 2018-06-22 | End: 2018-06-23 | Stop reason: HOSPADM

## 2018-06-22 RX ORDER — CLONIDINE HYDROCHLORIDE 0.1 MG/1
0.1 TABLET ORAL EVERY 12 HOURS PRN
Status: DISCONTINUED | OUTPATIENT
Start: 2018-06-22 | End: 2018-06-23 | Stop reason: HOSPADM

## 2018-06-22 RX ADMIN — PANCRELIPASE 36000 UNITS: 24000; 76000; 120000 CAPSULE, DELAYED RELEASE PELLETS ORAL at 15:46

## 2018-06-22 RX ADMIN — FOLIC ACID 1 MG: 1 TABLET ORAL at 10:22

## 2018-06-22 RX ADMIN — NICOTINE 21 MG: 21 PATCH, EXTENDED RELEASE TRANSDERMAL at 12:43

## 2018-06-22 RX ADMIN — CITALOPRAM HYDROBROMIDE 20 MG: 20 TABLET ORAL at 10:22

## 2018-06-22 RX ADMIN — PANCRELIPASE 36000 UNITS: 24000; 76000; 120000 CAPSULE, DELAYED RELEASE PELLETS ORAL at 12:42

## 2018-06-22 RX ADMIN — CLONIDINE HYDROCHLORIDE 0.1 MG: 0.1 TABLET ORAL at 15:46

## 2018-06-22 RX ADMIN — GABAPENTIN 600 MG: 300 CAPSULE ORAL at 21:40

## 2018-06-22 RX ADMIN — IOHEXOL 100 ML: 350 INJECTION, SOLUTION INTRAVENOUS at 08:43

## 2018-06-22 RX ADMIN — SODIUM CHLORIDE, SODIUM GLUCONATE, SODIUM ACETATE, POTASSIUM CHLORIDE AND MAGNESIUM CHLORIDE 125 ML/HR: 526; 502; 368; 37; 30 INJECTION, SOLUTION INTRAVENOUS at 05:52

## 2018-06-22 RX ADMIN — MIRTAZAPINE 15 MG: 15 TABLET, FILM COATED ORAL at 21:40

## 2018-06-22 RX ADMIN — GABAPENTIN 600 MG: 300 CAPSULE ORAL at 15:46

## 2018-06-22 RX ADMIN — BUPRENORPHINE HYDROCHLORIDE, NALOXONE HYDROCHLORIDE 1 FILM: 8; 2 FILM, SOLUBLE BUCCAL; SUBLINGUAL at 17:29

## 2018-06-22 RX ADMIN — GABAPENTIN 600 MG: 300 CAPSULE ORAL at 10:22

## 2018-06-22 RX ADMIN — TOPIRAMATE 150 MG: 100 TABLET, FILM COATED ORAL at 21:40

## 2018-06-22 RX ADMIN — Medication 100 MG: at 10:21

## 2018-06-22 RX ADMIN — PRAZOSIN HYDROCHLORIDE 1 MG: 1 CAPSULE ORAL at 21:41

## 2018-06-22 RX ADMIN — PANCRELIPASE 36000 UNITS: 24000; 76000; 120000 CAPSULE, DELAYED RELEASE PELLETS ORAL at 10:22

## 2018-06-22 RX ADMIN — CLONIDINE HYDROCHLORIDE 0.1 MG: 0.1 TABLET ORAL at 21:22

## 2018-06-22 RX ADMIN — TOPIRAMATE 150 MG: 100 TABLET, FILM COATED ORAL at 10:22

## 2018-06-22 RX ADMIN — IBUPROFEN 400 MG: 400 TABLET, FILM COATED ORAL at 04:55

## 2018-06-22 RX ADMIN — LORAZEPAM 1 MG: 2 INJECTION INTRAMUSCULAR; INTRAVENOUS at 04:55

## 2018-06-22 NOTE — SOCIAL WORK
CM met with patient at bedside to explain CM role and discuss d/c plan  Pt resides with boyfriend Arlene Lawrence in a 3rd floor apartment with 12+ DANIELLA  Patient is independent with ADLs  No DME reported  No history of HHC or STR reported  Preferred Rx:  [facility] [address]  Patient reports history of [address] treatment unsure of last inpatient admission  Patient reports history of D/A treatment  Most recent inpatient at [facility] discharged 12/5/17  Pt's primary contact is father Karri Malachi [phone]  Pt is agreeable to referral to HOST  [facility] [place] program ([phone]) to evaluate patient  CM will follow  CM reviewed d/c planning process including the following: identifying help at home, patient preference for d/c planning needs, Discharge Lounge, [facility] Med[facility] to [facility] program, availability of treatment team to discuss questions or concerns patient and/or family may have regarding understanding medications and recognizing signs and symptoms once discharged  CM also encouraged patient to follow up with all recommended appointments after discharge  Patient advised of importance for patient and family to participate in managing patients medical well being

## 2018-06-22 NOTE — PROGRESS NOTES
Called [facility] to verify patient dose of suboxone 8-2 under the tongue daily prescribed by Dr Arely Chapman

## 2018-06-22 NOTE — SOCIAL WORK
MCG Guide Used for Initial Round: Seizure RRG  Optimal GLOS: 1  Hospital Day: 0 days  DC Readiness:   Discharge Readiness  Return to top of Seizure RRG - ISC  · Discharge readiness is indicated by patient meeting Recovery Milestones, including ALL of the following:  ? Mental status at baseline  ? No evidence of CNS bleeding or infection  ? No new neurologic deficits  ? Seizures absent or managed  ? Hemodynamic stability  ? No evidence of seizure etiology requiring inpatient care  ? Electrolytes normal or appropriate for next level of care  ? Ambulatory  ? Oral hydration, medications, and diet  ? Discharge plans and education understood    Identified Barriers: CT abd pending    Discussion Date (Time): 06/22/18 with Dr Emma Shah

## 2018-06-22 NOTE — PLAN OF CARE
DISCHARGE PLANNING     Discharge to home or other facility with appropriate resources Progressing        INFECTION - ADULT     Absence or prevention of progression during hospitalization Progressing        Knowledge Deficit     Patient/family/caregiver demonstrates understanding of disease process, treatment plan, medications, and discharge instructions Progressing        NEUROSENSORY - ADULT     Achieves stable or improved neurological status Progressing     Absence of seizures Progressing     Remains free of injury related to seizures activity Progressing     Achieves maximal functionality and self care Progressing        PAIN - ADULT     Verbalizes/displays adequate comfort level or baseline comfort level Progressing        Potential for Falls     Patient will remain free of falls Progressing        SAFETY ADULT     Maintain or return to baseline ADL function Progressing     Maintain or return mobility status to optimal level Progressing     Patient will remain free of falls Progressing

## 2018-06-22 NOTE — PROGRESS NOTES
IM Residency Progress Note   Unit/Bed#: [facility] 714-01 Encounter: [id number]  SOD Team B       Kamlesh De La Torre 33 y o  female [id number]    Hospital Stay Days: 0      Assessment/Plan:    Principal Problem:    Seizures ([email or web address] )  Active Problems:    Migraine    Alcohol abuse    Recurrent pancreatitis ([email or web address] )    Acute alcohol intoxication ([place] [address] )    1  Seizure  · Patient with past medical history of epilepsy, chronically on Topamax which patient states she is taking as prescribed though she states that she had a seizure prior to admission as day before admission  · Patient arrived with a blood alcohol level of about 300, stating that she drinks 6 beers per day  · Will order neuro checks q 4  · Will continue seizure precautions  · Will monitor clinically and hemodynamically  · Will discontinue telemetry  · Will continue IV fluids with isolyte at a rate of 125 cc/hour  · Will continue home Topamax     2  Alcohol dependence with acute alcohol intoxication  · Patient states that she drinks 6 beers per day  · Sponsor was at bedside - states patient is here for HOST placement into the drug and alcohol rehab  · Will consult case management for HOST referral  · Blood alcohol level of about 300 on admission, decreased to less than 3 on 2018  · Will continue seizure precautions  · Will continue thiamine and folate  · Will continue telemetry     3  Nausea/abdominal pain  · Will continue IV Zofran p r n  for nausea, ibuprofen 400 mg p o  p r n  for pain  · Patient with nausea, epigastric pain and retching on 2018  · Pending CT abdomen with contrast    Disposition:  Pending CT abdomen  Pending HOST referral for placement  Subjective:   Patient examined at bedside  RN states no acute events overnight  Patient states she still has epigastric pain and nausea  Patient denies fever, retching, vomiting  Dysuria, constipation  Patient refused to complete full review of systems         Vitals: Temp (24hrs), Av 4 °F (36 9 °C), Min:98 °F (36 7 °C), Max:98 7 °F (37 1 °C)  Current: Temperature: 98 5 °F (36 9 °C)  Vitals:    06/21/18 2339 06/22/18 0328 06/22/18 0713 06/22/18 1126   BP: 119/57 138/73 127/64 138/77   BP Location: Right arm Left arm Left arm Left arm   Pulse: 67 72 78 68   Resp: 18 18 18 18   Temp: 98 1 °F (36 7 °C) 98 5 °F (36 9 °C) 98 7 °F (37 1 °C) 98 5 °F (36 9 °C)   TempSrc: Oral Oral Oral Oral   SpO2: 99% 96% 98% 100%   Weight:       Height:        Body mass index is 21 77 kg/m²  I/O last 24 hours: In: 5867 9 [P O :1680; I V :2187 9; IV Piggyback:2000]  Out: -       Physical Exam   Constitutional: She appears well-developed and well-nourished  No distress  60-year-old female BMI of 22 resting in bed, lethargic, CIWA-Ar 0   HENT:   Head: Normocephalic and atraumatic  Mouth/Throat: No oropharyngeal exudate  Eyes: Conjunctivae and EOM are normal  Pupils are equal, round, and reactive to light  No scleral icterus  Neck: Normal range of motion  Neck supple  No JVD present  No thyromegaly present  Cardiovascular: Normal rate, regular rhythm, normal heart sounds and intact distal pulses  Exam reveals no gallop and no friction rub  No murmur heard  Pulmonary/Chest: Effort normal and breath sounds normal  No respiratory distress  She has no wheezes  She has no rales  She exhibits no tenderness  Abdominal:   Patient refused abdominal exam   Musculoskeletal: Normal range of motion  She exhibits no edema  Lymphadenopathy:     She has no cervical adenopathy  Neurological:   Lethargic, unable to assess orientation as patient refused to answer questions  Skin: She is not diaphoretic  Nursing note and vitals reviewed          Invasive Devices     Peripheral Intravenous Line            Peripheral IV 06/21/18 Left Antecubital less than 1 day    Peripheral IV 06/21/18 Right Hand less than 1 day                          Labs:   Recent Results (from the past 24 hour(s))   ECG 12 lead    Collection Time: 06/21/18 12:07 PM   Result Value Ref Range    Ventricular Rate 83 BPM    Atrial Rate 83 BPM    CO Interval 192 ms    QRSD Interval 80 ms    QT Interval 346 ms    QTC Interval 406 ms    P Axis 54 degrees    QRS Axis 75 degrees    T Wave Axis 67 degrees   ECG 12 lead    Collection Time: 06/21/18 12:09 PM   Result Value Ref Range    Ventricular Rate 89 BPM    Atrial Rate 89 BPM    CO Interval 178 ms    QRSD Interval 96 ms    QT Interval 358 ms    QTC Interval 435 ms    P [place] 57 degrees    QRS Axis 74 degrees    T Wave Axis 62 degrees   CBC and differential    Collection Time: 06/21/18 12:40 PM   Result Value Ref Range    WBC 14 26 (H) 4 31 - 10 16 Thousand/uL    RBC 4 51 3 81 - 5 12 Million/uL    Hemoglobin 12 3 11 5 - 15 4 g/dL    Hematocrit 37 6 34 8 - 46 1 %    MCV 83 82 - 98 fL    MCH 27 3 26 8 - 34 3 pg    MCHC 32 7 31 4 - 37 4 g/dL    RDW 15 4 (H) 11 6 - 15 1 %    MPV 10 6 8 9 - 12 7 fL    Platelets 823 890 - 100 Thousands/uL    nRBC 0 /100 WBCs    Neutrophils Relative 66 43 - 75 %    Immat GRANS % 0 0 - 2 %    Lymphocytes Relative 27 14 - 44 %    Monocytes Relative 5 4 - 12 %    Eosinophils Relative 2 0 - 6 %    Basophils Relative 0 0 - 1 %    Neutrophils Absolute 9 18 (H) 1 85 - 7 62 Thousands/µL    Immature Grans Absolute 0 05 0 00 - 0 20 Thousand/uL    Lymphocytes Absolute 3 91 0 60 - 4 47 Thousands/µL    Monocytes Absolute 0 76 0 17 - 1 22 Thousand/µL    Eosinophils Absolute 0 31 0 00 - 0 61 Thousand/µL    Basophils Absolute 0 05 0 00 - 0 10 Thousands/µL   Comprehensive metabolic panel    Collection Time: 06/21/18 12:40 PM   Result Value Ref Range    Sodium 141 136 - 145 mmol/L    Potassium 4 0 3 5 - 5 3 mmol/L    Chloride 110 (H) 100 - 108 mmol/L    CO2 22 21 - 32 mmol/L    Anion Gap 9 4 - 13 mmol/L    BUN 11 5 - 25 mg/dL    Creatinine 0 52 (L) 0 60 - 1 30 mg/dL    Glucose 94 65 - 140 mg/dL    Calcium 8 4 8 3 - 10 1 mg/dL    AST 26 5 - 45 U/L    ALT 25 12 - 78 U/L    Alkaline Phosphatase 97 46 - 116 U/L Total Protein 7 2 6 4 - 8 2 g/dL    Albumin 3 8 3 5 - 5 0 g/dL    Total Bilirubin 0 19 (L) 0 20 - 1 00 mg/dL    eGFR 132 ml/min/1 73sq m   TSH    Collection Time: 06/21/18 12:40 PM   Result Value Ref Range    TSH 3RD GENERATON 1 980 0 358 - 3 740 uIU/mL   Magnesium    Collection Time: 06/21/18 12:40 PM   Result Value Ref Range    Magnesium 2 0 1 6 - 2 6 mg/dL   Lipase    Collection Time: 06/21/18 12:40 PM   Result Value Ref Range    Lipase 47 (L) 73 - 393 u/L   Troponin I    Collection Time: 06/21/18 12:40 PM   Result Value Ref Range    Troponin I <0 02 <=0 04 ng/mL   B-type natriuretic peptide    Collection Time: 06/21/18 12:40 PM   Result Value Ref Range    NT-proBNP 43 <125 pg/mL   Ethanol    Collection Time: 06/21/18 12:40 PM   Result Value Ref Range    Ethanol Lvl 297 (H) 0 - 3 mg/dL   Salicylate level    Collection Time: 06/21/18 12:40 PM   Result Value Ref Range    Salicylate Lvl 4 3 - 20 mg/dL   Acetaminophen level    Collection Time: 06/21/18 12:40 PM   Result Value Ref Range    Acetaminophen Level <2 (L) 10 - 30 ug/mL   Fingerstick Glucose (POCT)    Collection Time: 06/21/18 12:41 PM   Result Value Ref Range    POC Glucose 95 65 - 140 mg/dl   POCT Chem 8+    Collection Time: 06/21/18 12:46 PM   Result Value Ref Range    SODIUM, I-STAT 144 136 - 145 mmol/l    Potassium, i-STAT 4 0 3 5 - 5 3 mmol/L    Chloride, istat 108 100 - 108 mmol/L    CO2, i-STAT 20 (L) 21 - 32 mmol/L    Anion Gap, Istat 21 (H) 4 - 13 mmol/L    Calcium, Ionized i-STAT 1 13 1 12 - 1 32 mmol/L    BUN, I-STAT 10 5 - 25 mg/dl    Creatinine, i-STAT 0 8 0 6 - 1 3 mg/dl    eGFR 102 ml/min/1 73sq m    Glucose, i-STAT 101 65 - 140 mg/dl    Hct, i-STAT 45 34 8 - 46 1 %    Hgb, i-STAT 15 3 11 5 - 15 4 g/dl    Specimen Type VENOUS    POCT pregnancy, urine    Collection Time: 06/21/18  1:17 PM   Result Value Ref Range    EXT PREG TEST UR (Ref: Negative) NEGATIVE (-)    Rapid drug screen, urine    Collection Time: 06/21/18  1:18 PM   Result Value Ref Range    Amph/Meth UR Negative Negative    Barbiturate Ur Negative Negative    Benzodiazepine Urine Negative Negative    Cocaine Urine Negative Negative    Methadone Urine Negative Negative    Opiate Urine Negative Negative    PCP Ur Negative Negative    THC Urine Negative Negative   ED Urine Macroscopic    Collection Time: 06/21/18  1:24 PM   Result Value Ref Range    Color, UA Yellow     Clarity, UA Clear     pH, UA 6 5 4 5 - 8 0    Leukocytes, UA Negative Negative    Nitrite, UA Negative Negative    Protein, UA Negative Negative mg/dl    Glucose, UA Negative Negative mg/dl    Ketones, UA Negative Negative mg/dl    Urobilinogen, UA 0 2 0 2, 1 0 E U /dl E U /dl    Bilirubin, UA Negative Negative    Blood, UA Negative Negative    Specific Gravity, UA 1 010 1 003 - 1 030   Blood gas, venous    Collection Time: 06/21/18  1:39 PM   Result Value Ref Range    pH, Kam 7 301 7 300 - 7 400    pCO2, Kam 44 2 42 0 - 50 0 mm Hg    pO2, Kam 39 8 35 0 - 45 0 mm Hg    HCO3, Kam 21 3 (L) 24 - 30 mmol/L    Base Excess, Kam -5 0 mmol/L    O2 Content, Kam 10 8 ml/dL    O2 HGB, VENOUS 64 2 60 0 - 80 0 %   Ethanol    Collection Time: 06/22/18  5:47 AM   Result Value Ref Range    Ethanol Lvl <3 0 - 3 mg/dL   CBC    Collection Time: 06/22/18  5:47 AM   Result Value Ref Range    WBC 6 40 4 31 - 10 16 Thousand/uL    RBC 4 01 3 81 - 5 12 Million/uL    Hemoglobin 10 7 (L) 11 5 - 15 4 g/dL    Hematocrit 33 9 (L) 34 8 - 46 1 %    MCV 85 82 - 98 fL    MCH 26 7 (L) 26 8 - 34 3 pg    MCHC 31 6 31 4 - 37 4 g/dL    RDW 15 3 (H) 11 6 - 15 1 %    Platelets 014 (L) 567 - 390 Thousands/uL    MPV 10 5 8 9 - 12 7 fL   Basic metabolic panel    Collection Time: 06/22/18  5:47 AM   Result Value Ref Range    Sodium 141 136 - 145 mmol/L    Potassium 3 7 3 5 - 5 3 mmol/L    Chloride 109 (H) 100 - 108 mmol/L    CO2 22 21 - 32 mmol/L    Anion Gap 10 4 - 13 mmol/L    BUN 12 5 - 25 mg/dL    Creatinine 0 39 (L) 0 60 - 1 30 mg/dL    Glucose 89 65 - 140 mg/dL Calcium 8 2 (L) 8 3 - 10 1 mg/dL    eGFR 145 ml/min/1 73sq m       Radiology Results: I have personally reviewed pertinent reports  Other Diagnostic Testing:   I have personally reviewed pertinent reports          Active Meds:   Current Facility-Administered Medications   Medication Dose Route Frequency    albuterol inhalation solution 2 5 mg  2 5 mg Nebulization Q4H PRN    citalopram (CeleXA) tablet 20 mg  20 mg Oral Daily    folic acid (FOLVITE) tablet 1 mg  1 mg Oral Daily    gabapentin (NEURONTIN) capsule 600 mg  600 mg Oral TID    mirtazapine (REMERON) tablet 15 mg  15 mg Oral HS    multi-electrolyte (ISOLYTE-S PH 7 4 equivalent) IV solution  125 mL/hr Intravenous Continuous    ondansetron (ZOFRAN) injection 4 mg  4 mg Intravenous Q6H PRN    pancrelipase (Lip-Prot-Amyl) (CREON) delayed release capsule 36,000 Units  36,000 Units Oral TID With Meals    prazosin (MINIPRESS) capsule 1 mg  1 mg Oral HS    thiamine (VITAMIN B1) tablet 100 mg  100 mg Oral Daily    topiramate (TOPAMAX) tablet 150 mg  150 mg Oral Q12H [facility]         VTE Pharmacologic Prophylaxis: Sequential compression device (Venodyne)   VTE Mechanical Prophylaxis: sequential compression device    Becky Tsang MD

## 2018-06-22 NOTE — SOCIAL WORK
CM received call from Luana Eldridge at HOST  Per Luana Eldridge someone will not be able to meet with patient until this evening or possibly tomorrow

## 2018-06-23 VITALS
SYSTOLIC BLOOD PRESSURE: 130 MMHG | DIASTOLIC BLOOD PRESSURE: 74 MMHG | HEART RATE: 60 BPM | TEMPERATURE: 98.8 F | OXYGEN SATURATION: 100 % | HEIGHT: 67 IN | WEIGHT: 136.91 LBS | BODY MASS INDEX: 21.49 KG/M2 | RESPIRATION RATE: 18 BRPM

## 2018-06-23 RX ORDER — IBUPROFEN 400 MG/1
400 TABLET ORAL EVERY 6 HOURS PRN
Status: DISCONTINUED | OUTPATIENT
Start: 2018-06-23 | End: 2018-06-23 | Stop reason: HOSPADM

## 2018-06-23 RX ORDER — TOPIRAMATE 50 MG/1
150 TABLET, FILM COATED ORAL EVERY 12 HOURS SCHEDULED
Qty: 180 TABLET | Refills: 0 | Status: SHIPPED | OUTPATIENT
Start: 2018-06-23 | End: 2019-02-27 | Stop reason: HOSPADM

## 2018-06-23 RX ORDER — CLONIDINE HYDROCHLORIDE 0.1 MG/1
0.1 TABLET ORAL ONCE
Status: COMPLETED | OUTPATIENT
Start: 2018-06-23 | End: 2018-06-23

## 2018-06-23 RX ADMIN — CLONIDINE HYDROCHLORIDE 0.1 MG: 0.1 TABLET ORAL at 04:27

## 2018-06-23 RX ADMIN — CLONIDINE HYDROCHLORIDE 0.1 MG: 0.1 TABLET ORAL at 09:41

## 2018-06-23 RX ADMIN — GABAPENTIN 600 MG: 300 CAPSULE ORAL at 17:40

## 2018-06-23 RX ADMIN — CITALOPRAM HYDROBROMIDE 20 MG: 20 TABLET ORAL at 09:41

## 2018-06-23 RX ADMIN — GABAPENTIN 600 MG: 300 CAPSULE ORAL at 09:40

## 2018-06-23 RX ADMIN — PANCRELIPASE 36000 UNITS: 24000; 76000; 120000 CAPSULE, DELAYED RELEASE PELLETS ORAL at 17:36

## 2018-06-23 RX ADMIN — SODIUM CHLORIDE, SODIUM GLUCONATE, SODIUM ACETATE, POTASSIUM CHLORIDE AND MAGNESIUM CHLORIDE 125 ML/HR: 526; 502; 368; 37; 30 INJECTION, SOLUTION INTRAVENOUS at 02:08

## 2018-06-23 RX ADMIN — TOPIRAMATE 150 MG: 100 TABLET, FILM COATED ORAL at 09:41

## 2018-06-23 RX ADMIN — Medication 100 MG: at 09:41

## 2018-06-23 RX ADMIN — FOLIC ACID 1 MG: 1 TABLET ORAL at 09:41

## 2018-06-23 RX ADMIN — NICOTINE 21 MG: 21 PATCH, EXTENDED RELEASE TRANSDERMAL at 09:40

## 2018-06-23 RX ADMIN — IBUPROFEN 400 MG: 400 TABLET ORAL at 00:53

## 2018-06-23 RX ADMIN — PANCRELIPASE 36000 UNITS: 24000; 76000; 120000 CAPSULE, DELAYED RELEASE PELLETS ORAL at 12:45

## 2018-06-23 RX ADMIN — PANCRELIPASE 36000 UNITS: 24000; 76000; 120000 CAPSULE, DELAYED RELEASE PELLETS ORAL at 08:30

## 2018-06-23 RX ADMIN — BUPRENORPHINE HYDROCHLORIDE, NALOXONE HYDROCHLORIDE 1 FILM: 8; 2 FILM, SOLUBLE BUCCAL; SUBLINGUAL at 09:41

## 2018-06-23 NOTE — PROGRESS NOTES
Called the host sponsor multiple times for bed availability  Was told by  and RN that no bed is available  Per CM HOST will reach out to the patient and place them in the community in in patient rehab vs  Intensive out patient rehab  Patients family will be here to pick her up

## 2018-06-23 NOTE — SOCIAL WORK
CM rec'd call from Bianca Alejandre (Host) stating [facility] just informed her, they cannot take the pt due to her "psychiatric issues"  Bianca Alejandre stated she will follow up with them and give us a call back  Per Bianca Alejandre, there may be other facilities which can accept the patient  Bianca Alejandre will check into them and let us know  CM contacted SOD-B and notified of situation  Per SOD-B pt is medically cleared and will be d/c today to home  CM notified Host of d/c and to follow up with patient via her primary phone [phone]

## 2018-06-23 NOTE — PROGRESS NOTES
IM Residency Progress Note   Unit/Bed#: [facility] 714-01 Encounter: [id number]  SOD Team B       Kathy Cea 33 y o  female [id number]    Hospital Stay Days: 1      Assessment/Plan:    Principal Problem:    Seizures ([facility] )  Active Problems:    Migraine    Alcohol abuse    [facility] )    Seizure  -history of epilepsy on outpatient Topamax which patient was not compliant with  Patient additionally was intoxicated on admission with alcohol level of 300  Plan:  -continue neuro checks q 4 hours  -continue seizure precautions  -continue Topamax    Alcohol dependence with acute alcohol intoxication  Patient drinks 6 beers per day the  Plan:  -follow-up HOST referral  -continue seizure precautions  -continue clonidine p r n  withdrawal symptoms  -continue thiamine and folate    Nausea/abdominal pain  Likely secondary to acute intoxication in setting of chronic pancreatitis/pseudocyst  CT abdomen shows mild nonspecific fat stranding around head of pancreas which could suggest pancreatitis, no signs of pancreatic necrosis as well as congestive splenomegaly  Lipase is normal, no abdominal pain, tolerating diet  Plan:  -continue oral hydration  -continue monitor clinically    Opioid dependence  Patient takes home Suboxone dose confirmed with pharmacy  Plan:  -continue Suboxone 8-2 mg daily    Nicotine dependence  Plan:  -continue nicotine patch while in hospital    Chronic pancreatitis  With history of pseudocyst which has since resolved  CT showed abdomen shows mild nonspecific fat stranding around head of pancreas which could suggest pancreatitis  Lipase is normal, no abdominal pain tolerating diet  Plan:  -continue Creon    Insomnia/PTSD  Plan:  -continue Remeron  -continue prazosin     Anemia  Likely iron-deficiency anemia versus myelosuppression from alcohol use  Plan:  -obtain outpatient iron studies and follow up        Disposition:  Pending host referral on placement, patient has transport today      Subjective:   Patient seen and examined at bedside  Patient required 1 time dose clonidine overnight  Patient has transport today to inpatient facility  Vitals: Temp (24hrs), Av 7 °F (37 1 °C), Min:98 4 °F (36 9 °C), Max:99 °F (37 2 °C)  Current: Temperature: 99 °F (37 2 °C)  Vitals:    18 2121 18 2308 18 0426 18 0745   BP: 138/78 130/72 114/57 119/75   BP Location: Left arm Left arm Left arm Left arm   Pulse: 74 66 59 68   Resp:  18  18   Temp:  98 7 °F (37 1 °C)  99 °F (37 2 °C)   TempSrc:  Oral  Oral   SpO2:  96%  98%   Weight:       Height:        Body mass index is 21 77 kg/m²  I/O last 24 hours: In: 1000 [I V :1000]  Out: -       Physical Exam:   Physical Exam   Constitutional: She is oriented to person, place, and time  She appears well-developed and well-nourished  No distress  HENT:   Head: Normocephalic and atraumatic  Eyes: Conjunctivae and EOM are normal  Pupils are equal, round, and reactive to light  Cardiovascular: Normal rate, regular rhythm and normal heart sounds  No murmur heard  Pulmonary/Chest: Effort normal and breath sounds normal  No respiratory distress  She has no rales  Abdominal: Soft  Bowel sounds are normal  She exhibits no distension  There is no tenderness  There is no guarding  Musculoskeletal: She exhibits no edema, tenderness or deformity  Neurological: She is alert and oriented to person, place, and time  Skin: Skin is warm and dry  She is not diaphoretic     Psychiatric:   Depressed affect       Invasive Devices     Peripheral Intravenous Line            Peripheral IV 18 Left Antecubital 1 day    Peripheral IV 18 Right Hand 1 day                          Labs: Labs personally reviewed    Radiology Results: Imaging personally reviewed    Active Meds:   Current Facility-Administered Medications   Medication Dose Route Frequency    albuterol inhalation solution 2 5 mg  2 5 mg Nebulization Q4H PRN    buprenorphine-naloxone (SUBOXONE) 8-2 mg per SL film 1 Film  1 Film Sublingual Daily    citalopram (CeleXA) tablet 20 mg  20 mg Oral Daily    cloNIDine (CATAPRES) tablet 0 1 mg  0 1 mg Oral Q12H PRN    cloNIDine (CATAPRES) tablet 0 1 mg  0 1 mg Oral Once    folic acid (FOLVITE) tablet 1 mg  1 mg Oral Daily    gabapentin (NEURONTIN) capsule 600 mg  600 mg Oral TID    ibuprofen (MOTRIN) tablet 400 mg  400 mg Oral Q6H PRN    mirtazapine (REMERON) tablet 15 mg  15 mg Oral HS    nicotine (NICODERM CQ) 21 mg/24 hr TD 24 hr patch 21 mg  21 mg Transdermal Daily    ondansetron (ZOFRAN) injection 4 mg  4 mg Intravenous Q6H PRN    pancrelipase (Lip-Prot-Amyl) (CREON) delayed release capsule 36,000 Units  36,000 Units Oral TID With Meals    prazosin (MINIPRESS) capsule 1 mg  1 mg Oral HS    thiamine (VITAMIN B1) tablet 100 mg  100 mg Oral Daily    topiramate (TOPAMAX) tablet 150 mg  150 mg Oral Q12H [address]         VTE Pharmacologic Prophylaxis: Reason for no pharmacologic prophylaxis low risk  VTE Mechanical Prophylaxis: Bilateral SCDs    Radha Gist, DO

## 2018-06-23 NOTE — SOCIAL WORK
Care coordination with SOD-B, pt medically cleared to go to [facility] today  CM called Host and left two messages for a callback regarding d/c to [facility]

## 2018-06-23 NOTE — PROGRESS NOTES
Shelly Joseph from the Host Program called last night to inform us that she found a bed for patient in a treatment facility  However, this morning, a call was made by the nurse to that facility and spoke with Tara Zimmerman, who told the nurse that, after reviewing the case, the facility decided not to accept the patient due to some psych issues  Shelly Joseph was contacted at the number she provided but there was no answer  A message was left asking for an update concerning any plan for an alternate facility  Patient will be discharge today to home if not arrangements are done with facilities  Patient stated that if she is sent home, she will not be willing to check in to  any rehab program later on  Pt was upset and crying to find out that this facility did not accepted her

## 2018-06-23 NOTE — DISCHARGE INSTRUCTIONS
Abuse of Alcohol   WHAT YOU SHOULD KNOW:   Alcohol abuse is when you drink large amounts of alcohol often to change your mood or behavior  CARE AGREEMENT:   You have the right to help plan your care  Learn about your health condition and how it may be treated  Discuss treatment options with your caregivers to decide what care you want to receive  You always have the right to refuse treatment  RISKS:   Medicines to treat alcohol abuse may cause vomiting, stress, anxiety, headaches, or dizziness  Alcohol abuse puts you at risk for disease and injury  Alcohol can damage your brain, heart, kidneys, lungs, and liver  The risk of stroke is greater if you have 5 or more drinks each day  You may act out violently when you abuse alcohol  You may harm yourself and others  Risky sexual behavior could lead to sexually transmitted infections  If you are pregnant and drink alcohol, you and your baby are at risk for serious health problems  Alcohol abuse may put you in a coma and may be life-threatening  WHILE YOU ARE HERE:   Informed consent  is a legal document that explains the tests, treatments, or procedures that you may need  Informed consent means you understand what will be done and can make decisions about what you want  You give your permission when you sign the consent form  You can have someone sign this form for you if you are not able to sign it  You have the right to understand your medical care in words you know  Before you sign the consent form, understand the risks and benefits of what will be done  Make sure all your questions are answered  Psychiatric assessment:  Caregivers will ask if you have a history of psychological trauma, such as physical, sexual, or mental abuse  They will ask if you were given the care that you needed  Caregivers will ask you if you have been a victim of a crime or natural disaster, or if you have a serious injury or disease   They will ask you if you have seen other people being harmed, such as in combat  You will be asked if you drink alcohol or use drugs at present or in the past  Caregivers will ask you if you want to hurt or kill yourself or others  How you answer these questions can help caregivers decide on treatment  To help during treatment, caregivers will ask you about such things as how you feel about it and your hobbies and goals  Caregivers will also ask you about the people in your life who support you  Pulse oximeter: A pulse oximeter is a device that measures the amount of oxygen in your blood  A cord with a clip or sticky strip is placed on your finger, ear, or toe  The other end of the cord is hooked to a machine  Never turn the pulse oximeter or alarm off  An alarm will sound if your oxygen level is low or cannot be read  Vital signs:  Caregivers will check your blood pressure, heart rate, breathing rate, and temperature  They will also ask about your pain  These vital signs give caregivers information about your current health  Intake and Output:  Healthcare providers will keep track of the amount of liquid you are getting  They may also need to know how much you are urinating  Ask your healthcare provider how much liquid you should have each day  You may need to increase or decrease the amount of liquid you have each day  Ask healthcare providers if they need to measure or collect your urine before you dispose of it  An IV  is a small tube placed in your vein that is used to give you medicine or liquids  Medicines:   · Sedative: This medicine is given to help you stay calm and relaxed  · Anticonvulsant medicine: This medicine is given to control seizures  Take this medicine exactly as directed  · Antinausea medicine: This medicine may be given to calm your stomach and prevent vomiting  · Glucose: This medicine may be given to increase the amount of sugar in your blood       · Vitamin supplement:  Alcohol can make it hard for your body to absorb enough vitamin B1  You may be given vitamin B1 if you have low levels  It is also given to prevent alcohol related brain damage  You may also need other vitamin supplements  Tests:   · Blood and urine tests:  Samples of your blood or urine are tested for alcohol  Tests can also show signs of liver, kidney, or heart damage caused by alcohol  You may need to have these tests more than once  · Neurologic exam:  This is also called neuro signs, neuro checks, or neuro status  A neurologic exam can show caregivers how well your brain works after an injury or illness  Caregivers will check how your pupils (black dots in the center of each eye) react to light  They may check your memory and how easily you wake up  Your hand grasp and balance may also be tested  · EKG: This test records the electrical activity of your heart  It will be used to check for damage or problems caused by alcohol  · Chest x-ray: This is a picture of your lungs and heart  Healthcare providers may use the x-ray to look for heart damage, injuries, or signs of infection, such as pneumonia  · CT scan: This test is also called a CAT scan  An x-ray machine uses a computer to take pictures of your brain  The pictures may show damage caused by alcohol abuse  You may be given a dye before the pictures are taken to help healthcare providers see the pictures better  Tell the healthcare provider if you have ever had an allergic reaction to contrast dye  Treatment:   · Brief intervention therapy:  A healthcare provider meets with you to discuss ways to control your risky behaviors, such as drinking and driving  This therapy also helps you set goals to decrease the amount of alcohol you drink  · Breathing support:      ¨ You may need extra oxygen  if your blood oxygen level is lower than it should be  You may get oxygen through a mask placed over your nose and mouth or through small tubes placed in your nostrils   Ask your healthcare provider before you take off the mask or oxygen tubing  ¨ A ventilator  is a machine that gives you oxygen and breathes for you when you cannot breathe well on your own  An endotracheal (ET) tube is put into your mouth or nose and attached to the ventilator  You may need a trach if an ET tube cannot be placed  A trach is a tube put through an incision and into your windpipe  © 2014 [address] [employer] [employer] is for End User's use only and may not be sold, redistributed or otherwise used for commercial purposes  All illustrations and images included in CareNotes® are the copyrighted property of [employer]  or [employer]  The above information is an  only  It is not intended as medical advice for individual conditions or treatments  Talk to your doctor, nurse or pharmacist before following any medical regimen to see if it is safe and effective for you

## 2018-06-25 LAB — TOPIRAMATE SERPL-MCNC: 3 UG/ML (ref 2–25)

## 2018-06-25 NOTE — DISCHARGE SUMMARY
[facility] Discharge Summary - [place] 33 y o  female MRN: [id number]    [address] [facility]  Room / Bed: [facility] 714/[facility] 792-93 Encounter: [id number]    BRIEF OVERVIEW    Admitting Provider: Tucker Bae MD  Discharge Provider: No att  providers found  Primary Care Physician at Discharge: Betina Austin DO    Discharge To:      Admission Date: 6/21/2018     Discharge Date: 6/23/2018  7:12 PM    Primary Discharge Diagnosis  Principal Problem:    Seizures ([facility] [address] )  Active Problems:    Migraine    Alcohol abuse    Recurrent pancreatitis ([facility] [address] )  Resolved Problems:    Acute alcohol intoxication ([facility] )      Other Problems Addressed: none    Consulting Providers        none      Therapeutic Operative Procedures Performed  none    Diagnostic Procedures Performed  Xr Chest 1 View Portable    Result Date: 6/21/2018  Impression: No active pulmonary disease on examination which is somewhat limited secondary to low lung volumes  Workstation performed: [id number]     Ct Head Without Contrast    Result Date: 6/21/2018  Impression: No acute intracranial abnormality  Workstation performed: [id number]     Ct Abdomen Pelvis With Contrast    Result Date: 6/22/2018  Impression: 1  Mild nonspecific fat stranding around the head of the pancreas could suggest pancreatitis  Pseudocyst seen on prior exam in 2017 has resolved  No lingering peripancreatic fluid collections or findings of pancreatic necrosis  2   Engorgement of the main portal and splenic veins as well as recannulization of the umbilical vein suggesting of portal venous hypertension  Congestive splenomegaly is also present  Findings are chronic  No portal venous thrombosis or gross liver or morphologic abnormalities   Workstation performed: [id number]         Discharge Disposition: Home/Self Care  Discharged With Lines: no  Test Results Pending at Discharge: none    Outpatient Follow-Up  Betina Austin, [facility] PCP - General Internal Medicine [address] [id number]     Next Steps: Schedule an appointment as soon as possible for a visit                      Code Status: Prior  Advance Directive and Living Will: <no information>  Power of :    POLST:      Medications     Discharge Medication List as of 6/23/2018  6:50 PM      STOP taking these medications       mirtazapine (REMERON) 15 mg tablet Comments:   Reason for Stopping:               Discharge Medication List as of 6/23/2018  6:50 PM      CONTINUE these medications which have NOT CHANGED    Details   buprenorphine-naloxone (SUBOXONE) 8-2 mg Place 1 Film under the tongue daily, Historical Med      citalopram (CeleXA) 20 mg tablet Take 20 mg by mouth daily, Historical Med      folic acid (FOLVITE) 1 mg tablet Take 1 mg by mouth daily, Until Discontinued, Historical Med      gabapentin (NEURONTIN) 600 MG tablet Take 600 mg by mouth 3 (three) times a day, Historical Med      Melatonin 5 MG TABS Take 1 tablet by mouth daily at bedtime, Historical Med      methocarbamol (ROBAXIN) 500 mg tablet Take 500 mg by mouth 3 (three) times a day, Historical Med      naproxen (NAPROSYN) 500 mg tablet Take 500 mg by mouth 2 (two) times a day with meals, Historical Med      nicotine polacrilex (NICORETTE) 2 mg gum Chew 1 each as needed for smoking cessation (1 piece of gum max every 1 to 2 hours) Chew and then park for approximately 30 minutes PRN, Starting 6/7/2017, Until Discontinued, Print      Pancrelipase, Lip-Prot-Amyl, (CREON) 80637 units CPEP Take 1 capsule by mouth 3 (three) times a day, Historical Med      prazosin (MINIPRESS) 1 mg capsule Take 1 mg by mouth daily at bedtime, Historical Med      thiamine 100 MG tablet Take 1 tablet by mouth daily, Starting 6/7/2017, Until Discontinued, Print      LORazepam (ATIVAN) 1 mg tablet Take 0 5 tablets by mouth every 8 (eight) hours as needed for anxiety (moderate anxiety) for up to 10 doses, Starting 4/11/2017, Until Discontinued, No Print             Discharge Medication List as of 6/23/2018  6:50 PM            Allergies  Allergies   Allergen Reactions    Amoxicillin     Other      Unknown pain med "I needed and epi pen and to get pumped full of steroids"     Penicillin G     Penicillins Hives     Hives      Percocet [Oxycodone-Acetaminophen] GI Intolerance    Sulfa Antibiotics     Sulfur     Erythromycin Rash and Other (See Comments)     Discharge Diet: regular diet  Activity restrictions: none    [address] [facility] [address]  Patient is a 36-year-old female with past medical history seizure disorder, chronic pancreatitis, extensive psychiatric history, alcohol dependence, asthma, nicotine dependence who presented to emergency department following witnessed tonic-clonic seizure  Patient was with sponsor on her way to placement due to drug and alcohol rehab for recent relapse of alcohol use  Sponsor reports that patient was quiet and had tonic-clonic seizure that lasted less than 1 min  Patient reported recent alcohol abuse as well as noncompliance with outpatient Topamax  On presentation to ED, place shunt complained of epigastric and left upper quadrant pain similar to her previous episodes of pancreatitis  In ED, patient was tachycardic otherwise hemodynamically stable, ethanol level was 297, troponin negative, lipase 47, white blood cell count 14  CT head no acute intracranial abnormality, his chest x-ray showed no active pulmonary disease  Patient was admitted and monitored on telemetry with q 4 hours neuro checks as well as seizure precautions  Host referral was consulted for evaluation  CT abdomen performed which showed mild nonspecific fat stranding around head of pancreas which could suggest pancreatitis, no signs of pancreatic necrosis    At that time, Patient had already received significant IV fluids, abdominal pain resolved, lipase was normal, patient was tolerated diet  Patient was not able to obtain inpatient bed due to availability  Patient was discharged home and case management/host will recheck the patient in the community discuss rehab versus intensive outpatient therapy  Patient is to resume home meds as prescribed prior to hospitalization  Presenting Problem/History of Present Illness  Principal Problem:    Seizures ([facility] [address] )  Active Problems:    Migraine    Alcohol abuse    Recurrent pancreatitis ([facility] [address] )  Resolved Problems:    Acute alcohol intoxication ([facility] )        Other Pertinent Test Results  none    Discharge Condition: stable    Discharge  Statement   I spent 30 minutes discharging the patient  This time was spent on the day of discharge  I had direct contact with the patient on the day of discharge  Additional documentation is required if more than 30 minutes were spent on discharge

## 2018-10-04 ENCOUNTER — APPOINTMENT (EMERGENCY)
Dept: CT IMAGING | Facility: HOSPITAL | Age: 28
End: 2018-10-04
Payer: COMMERCIAL

## 2018-10-04 ENCOUNTER — HOSPITAL ENCOUNTER (EMERGENCY)
Facility: HOSPITAL | Age: 28
Discharge: HOME | End: 2018-10-04
Attending: EMERGENCY MEDICINE | Admitting: EMERGENCY MEDICINE
Payer: COMMERCIAL

## 2018-10-04 VITALS
SYSTOLIC BLOOD PRESSURE: 120 MMHG | RESPIRATION RATE: 18 BRPM | WEIGHT: 147 LBS | DIASTOLIC BLOOD PRESSURE: 61 MMHG | BODY MASS INDEX: 23.37 KG/M2 | HEART RATE: 81 BPM | TEMPERATURE: 98.8 F | OXYGEN SATURATION: 98 %
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
## 2018-10-04 DIAGNOSIS — S09.93XA FACIAL INJURY, INITIAL ENCOUNTER: ICD-10-CM

## 2018-10-04 DIAGNOSIS — F10.929 ALCOHOL INTOXICATION (HCC): ICD-10-CM

## 2018-10-04 LAB
ANION GAP BLD CALC-SCNC: 18 MMOL/L (ref 4–13)
BUN BLD-MCNC: 7 MG/DL (ref 5–25)
CA-I BLD-SCNC: 1.03 MMOL/L (ref 1.12–1.32)
CHLORIDE BLD-SCNC: 106 MMOL/L (ref 100–108)
CREAT BLD-MCNC: 0.7 MG/DL (ref 0.6–1.3)
GFR SERPL CREATININE-BSD FRML MDRD: 119 ML/MIN/1.73SQ M
GLUCOSE SERPL-MCNC: 94 MG/DL (ref 65–140)
HCT VFR BLD CALC: 39 % (ref 34.8–46.1)
HGB BLDA-MCNC: 13.3 G/DL (ref 11.5–15.4)
PCO2 BLD: 25 MMOL/L (ref 21–32)
POTASSIUM BLD-SCNC: 3.6 MMOL/L (ref 3.5–5.3)
SODIUM BLD-SCNC: 143 MMOL/L (ref 136–145)
SPECIMEN SOURCE: ABNORMAL

## 2018-10-04 PROCEDURE — 94640 AIRWAY INHALATION TREATMENT: CPT

## 2018-10-04 PROCEDURE — 96360 HYDRATION IV INFUSION INIT: CPT

## 2018-10-04 PROCEDURE — 85014 HEMATOCRIT: CPT

## 2018-10-04 PROCEDURE — 74177 CT ABD & PELVIS W/CONTRAST: CPT

## 2018-10-04 PROCEDURE — 99284 EMERGENCY DEPT VISIT MOD MDM: CPT

## 2018-10-04 PROCEDURE — 70486 CT MAXILLOFACIAL W/O DYE: CPT

## 2018-10-04 PROCEDURE — 80047 BASIC METABLC PNL IONIZED CA: CPT

## 2018-10-04 PROCEDURE — 96361 HYDRATE IV INFUSION ADD-ON: CPT

## 2018-10-04 PROCEDURE — 71260 CT THORAX DX C+: CPT

## 2018-10-04 PROCEDURE — 70450 CT HEAD/BRAIN W/O DYE: CPT

## 2018-10-04 PROCEDURE — 72125 CT NECK SPINE W/O DYE: CPT

## 2018-10-04 RX ORDER — IPRATROPIUM BROMIDE AND ALBUTEROL SULFATE 2.5; .5 MG/3ML; MG/3ML
3 SOLUTION RESPIRATORY (INHALATION) ONCE
Status: COMPLETED | OUTPATIENT
Start: 2018-10-04 | End: 2018-10-04

## 2018-10-04 RX ADMIN — IOHEXOL 100 ML: 350 INJECTION, SOLUTION INTRAVENOUS at 04:50

## 2018-10-04 RX ADMIN — SODIUM CHLORIDE 1000 ML: 0.9 INJECTION, SOLUTION INTRAVENOUS at 03:48

## 2018-10-04 RX ADMIN — IPRATROPIUM BROMIDE AND ALBUTEROL SULFATE 3 ML: .5; 3 SOLUTION RESPIRATORY (INHALATION) at 05:37
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
## 2018-10-04 NOTE — DISCHARGE INSTRUCTIONS
The CT scan of your stomach showed signs that you may have continuing liver damage  It is recommended that you call a GI doctor, you should be seen in the office for further evaluation  The number for a GI doctor has been provided  This CT also showed what appeared to be a cyst in your Left pelvis  The radiologist recommends a pelvic ultrasound as an outpatient for further evaluation of this  You can speak with your family doctor or an OB to schedule this test for you  Return to the ER if you have thoughts of harming yourself or someone else  Alcohol Intoxication   WHAT YOU NEED TO KNOW:   Alcohol intoxication is a harmful physical condition caused when you drink more alcohol than your body can handle  It is also called ethanol poisoning, or being drunk  DISCHARGE INSTRUCTIONS:   Medicine: You may be given medicine to manage the signs and symptoms of alcohol intoxication  Take your medicine as directed  Contact your healthcare provider if you think your medicine is not helping or if you have side effects  Tell him if you are allergic to any medicine  Keep a list of the medicines, vitamins, and herbs you take  Include the amounts, and when and why you take them  Bring the list or the pill bottles to follow-up visits  Keep the list with you in case of emergency  Follow up with your healthcare provider as directed:  Write down your questions so you remember to ask them during your visits  Limit or avoid alcohol:  Men should not have more than 2 drinks per day  Women should not have more than 1 drink per day  A drink is 12 ounces of beer, 5 ounces of wine, or 1½ ounces of liquor  Do not drive or operate machines when you drink alcohol:  Make sure you always have someone to drive you when you drink alcohol  For more information:   · Alcoholics Anonymous  Web Address: http://www gonzalez info/  Contact your healthcare provider if:   · You need help to stop drinking alcohol      · You have trouble with work or school because you drink too much alcohol  · You have physical or verbal fights because of alcohol  · You have questions or concerns about your condition or care  Return to the emergency department if:   · You have sudden trouble breathing or chest pain  · You have a seizure  · You feel sad enough to harm yourself or others  · You have hallucinations (you see or hear things that are not real)  · You cannot stop vomiting  · You were in an accident because of alcohol  © 2017 [facility] Information is for End User's use only and may not be sold, redistributed or otherwise used for commercial purposes  All illustrations and images included in CareNotes® are the copyrighted property of A D A M [employer] Inc  or [employer]  The above information is an  only  It is not intended as medical advice for individual conditions or treatments  Talk to your doctor, nurse or pharmacist before following any medical regimen to see if it is safe and effective for you

## 2018-10-04 NOTE — ED NOTES
Patient very apologetic for her actions and allegations toward staff upon discharge  Patients boyfriend "Ankit Mejia" came to patients room for ride home  MD aware       Kaya Bowers RN  10/04/18 5562

## 2018-10-04 NOTE — ED NOTES
[employer] called and notified that patient would like to make a police report  Officer dispatched to ED at this time        [address], RN  10/04/18 7856

## 2018-10-04 NOTE — ED NOTES
Pt yelling and crying  Again trying to leave  Demanding her phone  Demanding to use the phone at the nurses station  Phone calls placed to pts parents on behalf of pt to attempt to find responsible party to take her home        Boo Patrick RN  10/04/18 1217

## 2018-10-04 NOTE — ED NOTES
Patient making attempts to leave, patient has taken off collar and screaming "I want to leave " "Im 22 and I can make my own decisions " Patient made aware collar has to stay on until cleared by provider  Provider aware and indicated patient not allowed to leave or get out of bed until CT results and if patient gets out of bed verbal from Dr Linda Diaz to place patient in restraints for patient safety       Seble Barron RN  10/04/18 3413

## 2018-12-01 NOTE — ED NOTES
Police at bedside for statement       Hung Sales RN  10/04/18 8367 Call to schedule an appointment with Dr. Serena Adams for 2 weeks.

## 2019-02-12 ENCOUNTER — HOSPITAL ENCOUNTER (EMERGENCY)
Facility: HOSPITAL | Age: 29
Discharge: HOME | End: 2019-02-12
Attending: EMERGENCY MEDICINE | Admitting: EMERGENCY MEDICINE
Payer: COMMERCIAL

## 2019-02-12 VITALS
BODY MASS INDEX: 28.81 KG/M2 | WEIGHT: 181.22 LBS | HEART RATE: 76 BPM | SYSTOLIC BLOOD PRESSURE: 130 MMHG | RESPIRATION RATE: 18 BRPM | DIASTOLIC BLOOD PRESSURE: 63 MMHG | OXYGEN SATURATION: 97 % | TEMPERATURE: 98.5 F

## 2019-02-12 DIAGNOSIS — F11.20 NARCOTIC DEPENDENCE (HCC): ICD-10-CM

## 2019-02-12 DIAGNOSIS — R11.2 NAUSEA & VOMITING: ICD-10-CM

## 2019-02-12 DIAGNOSIS — F10.10 ALCOHOL ABUSE: Primary | ICD-10-CM

## 2019-02-12 LAB
ALBUMIN SERPL BCP-MCNC: 3.6 G/DL (ref 3.5–5)
ALP SERPL-CCNC: 131 U/L (ref 46–116)
ALT SERPL W P-5'-P-CCNC: 44 U/L (ref 12–78)
AMPHETAMINES SERPL QL SCN: NEGATIVE
ANION GAP SERPL CALCULATED.3IONS-SCNC: 12 MMOL/L (ref 4–13)
AST SERPL W P-5'-P-CCNC: 44 U/L (ref 5–45)
BARBITURATES UR QL: POSITIVE
BASOPHILS # BLD AUTO: 0.02 THOUSANDS/ΜL (ref 0–0.1)
BASOPHILS NFR BLD AUTO: 0 % (ref 0–1)
BENZODIAZ UR QL: NEGATIVE
BILIRUB SERPL-MCNC: 0.19 MG/DL (ref 0.2–1)
BILIRUB UR QL STRIP: NEGATIVE
BUN SERPL-MCNC: 5 MG/DL (ref 5–25)
CALCIUM SERPL-MCNC: 8.5 MG/DL (ref 8.3–10.1)
CHLORIDE SERPL-SCNC: 103 MMOL/L (ref 100–108)
CLARITY UR: CLEAR
CO2 SERPL-SCNC: 22 MMOL/L (ref 21–32)
COCAINE UR QL: NEGATIVE
COLOR UR: YELLOW
COLOR, POC: YELLOW
CREAT SERPL-MCNC: 0.54 MG/DL (ref 0.6–1.3)
EOSINOPHIL # BLD AUTO: 0.11 THOUSAND/ΜL (ref 0–0.61)
EOSINOPHIL NFR BLD AUTO: 2 % (ref 0–6)
ERYTHROCYTE [DISTWIDTH] IN BLOOD BY AUTOMATED COUNT: 17.5 % (ref 11.6–15.1)
ETHANOL SERPL-MCNC: 90 MG/DL (ref 0–3)
EXT PREG TEST URINE: NEGATIVE
GFR SERPL CREATININE-BSD FRML MDRD: 130 ML/MIN/1.73SQ M
GLUCOSE SERPL-MCNC: 109 MG/DL (ref 65–140)
GLUCOSE UR STRIP-MCNC: NEGATIVE MG/DL
HCT VFR BLD AUTO: 36.5 % (ref 34.8–46.1)
HGB BLD-MCNC: 11.4 G/DL (ref 11.5–15.4)
HGB UR QL STRIP.AUTO: NEGATIVE
IMM GRANULOCYTES # BLD AUTO: 0.01 THOUSAND/UL (ref 0–0.2)
IMM GRANULOCYTES NFR BLD AUTO: 0 % (ref 0–2)
KETONES UR STRIP-MCNC: NEGATIVE MG/DL
LEUKOCYTE ESTERASE UR QL STRIP: NEGATIVE
LIPASE SERPL-CCNC: 88 U/L (ref 73–393)
LYMPHOCYTES # BLD AUTO: 1.73 THOUSANDS/ΜL (ref 0.6–4.47)
LYMPHOCYTES NFR BLD AUTO: 37 % (ref 14–44)
MCH RBC QN AUTO: 27.3 PG (ref 26.8–34.3)
MCHC RBC AUTO-ENTMCNC: 31.2 G/DL (ref 31.4–37.4)
MCV RBC AUTO: 87 FL (ref 82–98)
METHADONE UR QL: NEGATIVE
MONOCYTES # BLD AUTO: 0.55 THOUSAND/ΜL (ref 0.17–1.22)
MONOCYTES NFR BLD AUTO: 12 % (ref 4–12)
NEUTROPHILS # BLD AUTO: 2.3 THOUSANDS/ΜL (ref 1.85–7.62)
NEUTS SEG NFR BLD AUTO: 49 % (ref 43–75)
NITRITE UR QL STRIP: NEGATIVE
NRBC BLD AUTO-RTO: 0 /100 WBCS
OPIATES UR QL SCN: NEGATIVE
PCP UR QL: NEGATIVE
PH UR STRIP.AUTO: 5.5 [PH] (ref 4.5–8)
PHENOBARB SERPL-MCNC: 20.8 UG/ML (ref 15–40)
PLATELET # BLD AUTO: 127 THOUSANDS/UL (ref 149–390)
PMV BLD AUTO: 10.4 FL (ref 8.9–12.7)
POTASSIUM SERPL-SCNC: 3.5 MMOL/L (ref 3.5–5.3)
PROT SERPL-MCNC: 7.6 G/DL (ref 6.4–8.2)
PROT UR STRIP-MCNC: NEGATIVE MG/DL
RBC # BLD AUTO: 4.18 MILLION/UL (ref 3.81–5.12)
SODIUM SERPL-SCNC: 137 MMOL/L (ref 136–145)
SP GR UR STRIP.AUTO: <=1.005 (ref 1–1.03)
THC UR QL: NEGATIVE
TSH SERPL DL<=0.05 MIU/L-ACNC: 3.14 UIU/ML (ref 0.36–3.74)
UROBILINOGEN UR QL STRIP.AUTO: 0.2 E.U./DL
WBC # BLD AUTO: 4.72 THOUSAND/UL (ref 4.31–10.16)

## 2019-02-12 PROCEDURE — 84443 ASSAY THYROID STIM HORMONE: CPT | Performed by: EMERGENCY MEDICINE

## 2019-02-12 PROCEDURE — 81025 URINE PREGNANCY TEST: CPT | Performed by: EMERGENCY MEDICINE

## 2019-02-12 PROCEDURE — 99282 EMERGENCY DEPT VISIT SF MDM: CPT

## 2019-02-12 PROCEDURE — 96374 THER/PROPH/DIAG INJ IV PUSH: CPT

## 2019-02-12 PROCEDURE — 80184 ASSAY OF PHENOBARBITAL: CPT | Performed by: EMERGENCY MEDICINE

## 2019-02-12 PROCEDURE — 85025 COMPLETE CBC W/AUTO DIFF WBC: CPT | Performed by: EMERGENCY MEDICINE

## 2019-02-12 PROCEDURE — 80320 DRUG SCREEN QUANTALCOHOLS: CPT | Performed by: EMERGENCY MEDICINE

## 2019-02-12 PROCEDURE — 36415 COLL VENOUS BLD VENIPUNCTURE: CPT | Performed by: EMERGENCY MEDICINE

## 2019-02-12 PROCEDURE — 80307 DRUG TEST PRSMV CHEM ANLYZR: CPT | Performed by: EMERGENCY MEDICINE

## 2019-02-12 PROCEDURE — 80053 COMPREHEN METABOLIC PANEL: CPT | Performed by: EMERGENCY MEDICINE

## 2019-02-12 PROCEDURE — 96361 HYDRATE IV INFUSION ADD-ON: CPT

## 2019-02-12 PROCEDURE — 83690 ASSAY OF LIPASE: CPT | Performed by: EMERGENCY MEDICINE

## 2019-02-12 PROCEDURE — 81003 URINALYSIS AUTO W/O SCOPE: CPT

## 2019-02-12 RX ORDER — ONDANSETRON 4 MG/1
4 TABLET, ORALLY DISINTEGRATING ORAL EVERY 8 HOURS PRN
Qty: 10 TABLET | Refills: 0 | Status: SHIPPED | OUTPATIENT
Start: 2019-02-12 | End: 2019-11-16

## 2019-02-12 RX ORDER — ONDANSETRON 2 MG/ML
4 INJECTION INTRAMUSCULAR; INTRAVENOUS ONCE
Status: COMPLETED | OUTPATIENT
Start: 2019-02-12 | End: 2019-02-12

## 2019-02-12 RX ADMIN — ONDANSETRON 4 MG: 2 INJECTION INTRAMUSCULAR; INTRAVENOUS at 10:45

## 2019-02-12 RX ADMIN — SODIUM CHLORIDE 1000 ML: 0.9 INJECTION, SOLUTION INTRAVENOUS at 10:38

## 2019-02-12 NOTE — ED NOTES
Pt states her dad will pick her up  Will be here within the hour  Pt will wait in waiting room, Mehran  made aware        Serge Almeida, ABIOLA  02/12/19 9439

## 2019-02-12 NOTE — ED NOTES
Pt asking to go outside for "fresh air and  A smoke" pt was instructed she cannot leave the department and come back  Pt is being discharged and  attempting to obtain ride for patient  Pt was instructed to wait until  returns and d/c paperwork  Pt back in room will follow up        Arnett Ahumada, RN  02/12/19 4712

## 2019-02-12 NOTE — ED NOTES
Mehran- at pt bedside helping pt find a ride home       Sadia Diaz, ABIOLA  02/12/19 [address] [facility] [address], RN  02/12/19 9336

## 2019-02-12 NOTE — ED PROVIDER NOTES
History  Chief Complaint   Patient presents with    Medication Refill     Pt states that she lost her suboxone 1 week ago  Pt states she had 1 seizure yesterday  Pt states that she relapsed on alcohol and had a sip of alcohol 1 hour ago  History provided by:  Parent   used: No    Medical Problem - Major   Location:  Lost her Suboxone prescription, alcohol use and a history of having a seizure disorder, last seizure was yesterday  Drank alcohol this morning  Severity:  Moderate  Onset quality:  Sudden  Duration:  1 week (She has been drinking since January since her sister  )  Timing:  Constant  Progression:  Worsening  Relieved by:  Nothing  Worsened by:  Not having her Suboxone and drinking alcohol  Ineffective treatments:  She went to [facility] days ago and has not followed up with her step by Step program   Associated symptoms: diarrhea, nausea and vomiting    Associated symptoms: no abdominal pain, no chest pain, no congestion, no cough, no fever, no headaches, no shortness of breath and no sore throat     She has been compliant with her seizure medicines  Prior to Admission Medications   Prescriptions Last Dose Informant Patient Reported? Taking?    LORazepam (ATIVAN) 1 mg tablet   No No   Sig: Take 0 5 tablets by mouth every 8 (eight) hours as needed for anxiety (moderate anxiety) for up to 10 doses   Melatonin 5 MG TABS   Yes No   Sig: Take 1 tablet by mouth daily at bedtime   Pancrelipase, Lip-Prot-Amyl, (CREON) 62585 units CPEP   Yes No   Sig: Take 1 capsule by mouth 3 (three) times a day   buprenorphine-naloxone (SUBOXONE) 8-2 mg   Yes Yes   Sig: Place 1 Film under the tongue daily   citalopram (CeleXA) 20 mg tablet   Yes No   Sig: Take 20 mg by mouth daily   folic acid (FOLVITE) 1 mg tablet   Yes No   Sig: Take 1 mg by mouth daily   gabapentin (NEURONTIN) 600 MG tablet   Yes No   Sig: Take 600 mg by mouth 3 (three) times a day   methocarbamol (ROBAXIN) 500 mg tablet   Yes No   Sig: Take 500 mg by mouth 3 (three) times a day   naproxen (NAPROSYN) 500 mg tablet   Yes No   Sig: Take 500 mg by mouth 2 (two) times a day with meals   nicotine polacrilex (NICORETTE) 2 mg gum   No No   Sig: Chew 1 each as needed for smoking cessation (1 piece of gum max every 1 to 2 hours) Chew and then park for approximately 30 minutes PRN   prazosin (MINIPRESS) 1 mg capsule   Yes No   Sig: Take 1 mg by mouth daily at bedtime   thiamine 100 MG tablet   No No   Sig: Take 1 tablet by mouth daily   topiramate (TOPAMAX) 50 MG tablet   No No   Sig: Take 3 tablets (150 mg total) by mouth every 12 (twelve) hours      Facility-Administered Medications: None       Past Medical History:   Diagnosis Date    Alcohol abuse     Alcohol abuse     Anxiety     Asthma     [facility]     Depression     Pancreatitis     Panic disorder 10/11/2016    Psychiatric disorder     PTSD (post-traumatic stress disorder)     Seizures ([facility] )     Self-injurious behavior        Past Surgical History:   Procedure Laterality Date    ESOPHAGOGASTRODUODENOSCOPY N/A 4/10/2017    Procedure: ESOPHAGOGASTRODUODENOSCOPY (EGD); Surgeon: Christy Dunbar MD;  Location: [facility]; Service:     INDUCED       WISDOM TOOTH EXTRACTION         Family History   Problem Relation Age of Onset    Lupus Father     Coronary artery disease Father     Stroke Father      I have reviewed and agree with the history as documented  Social History     Tobacco Use    Smoking status: Current Some Day Smoker     Packs/day: 1 00     Years: 10 00     Pack years: 10 00    Smokeless tobacco: Never Used   Substance Use Topics    Alcohol use: Yes     Comment: last drink today    Drug use: No     Frequency: 1 0 times per week     Types: Marijuana     Comment: used about 2 weeks ago        Review of Systems   Constitutional: Negative for chills and fever  HENT: Negative for congestion and sore throat      Respiratory: Negative for cough, chest tightness and shortness of breath  Cardiovascular: Negative for chest pain  Gastrointestinal: Positive for diarrhea, nausea and vomiting  Negative for abdominal pain  Genitourinary: Negative for dysuria and flank pain  Neurological: Positive for seizures and light-headedness  Negative for dizziness, speech difficulty, weakness, numbness and headaches  All other systems reviewed and are negative  Physical Exam  Physical Exam   Constitutional: She appears well-developed and well-nourished  She is cooperative  Non-toxic appearance  She does not have a sickly appearance  She does not appear ill  No distress  HENT:   Head: Normocephalic and atraumatic  Right Ear: Hearing normal  No drainage or swelling  Left Ear: Hearing normal  No drainage or swelling  Mouth/Throat: Uvula is midline, oropharynx is clear and moist and mucous membranes are normal  No oropharyngeal exudate  Eyes: Pupils are equal, round, and reactive to light  Conjunctivae, EOM and lids are normal  Right eye exhibits no discharge  Left eye exhibits no discharge  Neck: Trachea normal and normal range of motion  Neck supple  No JVD present  Cardiovascular: Normal rate, regular rhythm, normal heart sounds, intact distal pulses and normal pulses  Exam reveals no gallop and no friction rub  No murmur heard  Pulmonary/Chest: Effort normal and breath sounds normal  No stridor  No respiratory distress  She has no wheezes  She has no rales  She exhibits no tenderness  Abdominal: Soft  Normal appearance  She exhibits no distension, no ascites and no mass  There is no hepatosplenomegaly  There is no tenderness  There is no rebound, no guarding and no CVA tenderness  Musculoskeletal: Normal range of motion  She exhibits no edema or tenderness  Lymphadenopathy:     She has no cervical adenopathy  Right: No inguinal adenopathy present  Left: No inguinal adenopathy present     Neurological: She is alert  She has normal strength  No cranial nerve deficit or sensory deficit  She exhibits normal muscle tone  Coordination and gait normal  GCS eye subscore is 4  GCS verbal subscore is 5  GCS motor subscore is 6  Skin: Skin is warm, dry and intact  No rash noted  She is not diaphoretic  No pallor  Psychiatric: She has a normal mood and affect  Her speech is normal  Cognition and memory are normal    Nursing note and vitals reviewed  Vital Signs  ED Triage Vitals [02/12/19 1028]   Temperature Pulse Respirations Blood Pressure SpO2   98 5 °F (36 9 °C) 91 16 148/79 97 %      Temp Source Heart Rate Source Patient Position - Orthostatic VS BP Location FiO2 (%)   Oral Monitor Sitting Right arm --      Pain Score       9           Vitals:    02/12/19 1028 02/12/19 1233   BP: 148/79 130/63   Pulse: 91 76   Patient Position - Orthostatic VS: Sitting Lying       Visual Acuity      ED Medications  Medications   sodium chloride 0 9 % bolus 1,000 mL (0 mL Intravenous Stopped 2/12/19 1233)   ondansetron (ZOFRAN) injection 4 mg (4 mg Intravenous Given 2/12/19 1045)       Diagnostic Studies  Results Reviewed     Procedure Component Value Units Date/Time    Phenobarbital level [[id number]]  (Normal) Collected:  02/12/19 1037    Lab Status:  Final result Specimen:  Blood from Arm, Left Updated:  02/12/19 1428     Phenobarbital Lvl 20 8 ug/mL     Rapid drug screen, urine [[id number]]  (Abnormal) Collected:  02/12/19 1056    Lab Status:  Final result Specimen:  Urine, Clean Catch Updated:  02/12/19 1124     Amph/Meth UR Negative     Barbiturate Ur Positive     Benzodiazepine Urine Negative     Cocaine Urine Negative     Methadone Urine Negative     Opiate Urine Negative     PCP Ur Negative     THC Urine Negative    Narrative:       Presumptive report  If requested, specimen will be sent to reference lab for confirmation  FOR MEDICAL PURPOSES ONLY  IF CONFIRMATION NEEDED PLEASE CONTACT THE LAB WITHIN 5 DAYS    Drug Screen Cutoff Levels:  AMPHETAMINE/METHAMPHETAMINES  1000 ng/mL  BARBITURATES     200 ng/mL  BENZODIAZEPINES     200 ng/mL  COCAINE      300 ng/mL  METHADONE      300 ng/mL  OPIATES      300 ng/mL  PHENCYCLIDINE     25 ng/mL  THC       50 ng/mL    TSH [[id number]]  (Normal) Collected:  02/12/19 1037    Lab Status:  Final result Specimen:  Blood from Arm, Left Updated:  02/12/19 1123     TSH 3RD GENERATON 3 143 uIU/mL     Narrative:       Patients undergoing fluorescein dye angiography may retain small amounts of fluorescein in the body for 48-72 hours post procedure  Samples containing fluorescein can produce falsely depressed TSH values  If the patient had this procedure,a specimen should be resubmitted post fluorescein clearance  Lipase [[id number]]  (Normal) Collected:  02/12/19 1037    Lab Status:  Final result Specimen:  Blood from Arm, Left Updated:  02/12/19 1123     Lipase 88 u/L     POCT urinalysis dipstick [[id number]]  (Normal) Resulted:  02/12/19 1112    Lab Status:  Final result Specimen:  Urine Updated:  02/12/19 1112     Color, UA yellow    Comprehensive metabolic panel [[id number]]  (Abnormal) Collected:  02/12/19 1037    Lab Status:  Final result Specimen:  Blood from Arm, Left Updated:  02/12/19 1109     Sodium 137 mmol/L      Potassium 3 5 mmol/L      Chloride 103 mmol/L      CO2 22 mmol/L      ANION GAP 12 mmol/L      BUN 5 mg/dL      Creatinine 0 54 mg/dL      Glucose 109 mg/dL      Calcium 8 5 mg/dL      AST 44 U/L      ALT 44 U/L      Alkaline Phosphatase 131 U/L      Total Protein 7 6 g/dL      Albumin 3 6 g/dL      Total Bilirubin 0 19 mg/dL      eGFR 130 ml/min/1 73sq m     Narrative:       National Kidney Disease Education Program recommendations are as follows:  GFR calculation is accurate only with a steady state creatinine  Chronic Kidney disease less than 60 ml/min/1 73 sq  meters  Kidney failure less than 15 ml/min/1 73 sq  meters      Ethanol [[id number]]  (Abnormal) Collected: 02/12/19 1037    Lab Status:  Final result Specimen:  Blood from Arm, Left Updated:  02/12/19 1105     Ethanol Lvl 90 mg/dL     POCT pregnancy, urine [[id number]]  (Normal) Resulted:  02/12/19 1101    Lab Status:  Final result Updated:  02/12/19 1101     EXT PREG TEST UR (Ref: Negative) Negative    ED Urine Macroscopic [[id number]] Collected:  02/12/19 1100    Lab Status:  Final result Specimen:  Urine Updated:  02/12/19 1100     Color, UA Yellow     Clarity, UA Clear     pH, UA 5 5     Leukocytes, UA Negative     Nitrite, UA Negative     Protein, UA Negative mg/dl      Glucose, UA Negative mg/dl      Ketones, UA Negative mg/dl      Urobilinogen, UA 0 2 E U /dl      Bilirubin, UA Negative     Blood, UA Negative     Specific Gravity, UA <=1 005    Narrative:       CLINITEK RESULT    CBC and differential [[id number]]  (Abnormal) Collected:  02/12/19 1036    Lab Status:  Final result Specimen:  Blood from Arm, Left Updated:  02/12/19 1059     WBC 4 72 Thousand/uL      RBC 4 18 Million/uL      Hemoglobin 11 4 g/dL      Hematocrit 36 5 %      MCV 87 fL      MCH 27 3 pg      MCHC 31 2 g/dL      RDW 17 5 %      MPV 10 4 fL      Platelets 454 Thousands/uL      nRBC 0 /100 WBCs      Neutrophils Relative 49 %      Immat GRANS % 0 %      Lymphocytes Relative 37 %      Monocytes Relative 12 %      Eosinophils Relative 2 %      Basophils Relative 0 %      Neutrophils Absolute 2 30 Thousands/µL      Immature Grans Absolute 0 01 Thousand/uL      Lymphocytes Absolute 1 73 Thousands/µL      Monocytes Absolute 0 55 Thousand/µL      Eosinophils Absolute 0 11 Thousand/µL      Basophils Absolute 0 02 Thousands/µL                  No orders to display              Procedures  Procedures       Phone Contacts  ED Phone Contact    ED Course                               MDM  Number of Diagnoses or Management Options  Alcohol abuse:   Narcotic dependence ([facility] ):   Nausea & vomiting:   Diagnosis management comments: Case was discussed with crisis who was in contact with [facility] and they will contact the patient regarding her Suboxone  She did have some alcohol in her system but she states she was drinking this morning  She is not suicidal or homicidal        Amount and/or Complexity of Data Reviewed  Clinical lab tests: reviewed        Disposition  Final diagnoses:   Alcohol abuse   Narcotic dependence ([facility] )   Nausea & vomiting     Time reflects when diagnosis was documented in both MDM as applicable and the Disposition within this note     Time User Action Codes Description Comment    2/12/2019 12:38 PM Lucinda Doles Add [F10 10] Alcohol abuse     2/12/2019 12:38 PM Lucinda Doles Add [F11 20] Narcotic dependence ([facility] )     2/12/2019 12:41 PM Lucinda Doles Add [R11 2] Nausea & vomiting       ED Disposition     ED Disposition Condition Date/Time Comment    Discharge Stable Tue Feb 12, 2019 12:40 PM Nuria Alonso discharge to home/self care              Follow-up Information     Follow up With Specialties Details Why Contact Info    Andrew Frias DO Internal Medicine Schedule an appointment as soon as possible for a visit in 1 week  [address] [place] [id number]  [phone]      [facility] PRO[facility]  Schedule an appointment as soon as possible for a visit             Discharge Medication List as of 2/12/2019 12:43 PM      START taking these medications    Details   ondansetron (ZOFRAN-ODT) 4 mg disintegrating tablet Take 1 tablet (4 mg total) by mouth every 8 (eight) hours as needed for nausea or vomiting for up to 10 doses, Starting Tue 2/12/2019, Print         CONTINUE these medications which have NOT CHANGED    Details   buprenorphine-naloxone (SUBOXONE) 8-2 mg Place 1 Film under the tongue daily, Historical Med      citalopram (CeleXA) 20 mg tablet Take 20 mg by mouth daily, Historical Med      folic acid (FOLVITE) 1 mg tablet Take 1 mg by mouth daily, Until Discontinued, Historical Med      gabapentin (NEURONTIN) 600 MG tablet Take 600 mg by mouth 3 (three) times a day, Historical Med      LORazepam (ATIVAN) 1 mg tablet Take 0 5 tablets by mouth every 8 (eight) hours as needed for anxiety (moderate anxiety) for up to 10 doses, Starting 4/11/2017, Until Discontinued, No Print      Melatonin 5 MG TABS Take 1 tablet by mouth daily at bedtime, Historical Med      methocarbamol (ROBAXIN) 500 mg tablet Take 500 mg by mouth 3 (three) times a day, Historical Med      naproxen (NAPROSYN) 500 mg tablet Take 500 mg by mouth 2 (two) times a day with meals, Historical Med      nicotine polacrilex (NICORETTE) 2 mg gum Chew 1 each as needed for smoking cessation (1 piece of gum max every 1 to 2 hours) Chew and then park for approximately 30 minutes PRN, Starting 6/7/2017, Until Discontinued, Print      Pancrelipase, Lip-Prot-Amyl, (CREON) 52280 units CPEP Take 1 capsule by mouth 3 (three) times a day, Historical Med      prazosin (MINIPRESS) 1 mg capsule Take 1 mg by mouth daily at bedtime, Historical Med      thiamine 100 MG tablet Take 1 tablet by mouth daily, Starting 6/7/2017, Until Discontinued, Print      topiramate (TOPAMAX) 50 MG tablet Take 3 tablets (150 mg total) by mouth every 12 (twelve) hours, Starting Sat 6/23/2018, Print           No discharge procedures on file      ED Provider  Electronically Signed by           Coty Wall MD  02/12/19 0706

## 2019-02-12 NOTE — ED NOTES
Called 3x in ED WR, no answer, pt not in restroom, belongings noted to be in [facility]  02/12/19 1009

## 2019-02-22 ENCOUNTER — APPOINTMENT (EMERGENCY)
Dept: RADIOLOGY | Facility: HOSPITAL | Age: 29
End: 2019-02-22
Payer: COMMERCIAL

## 2019-02-22 ENCOUNTER — HOSPITAL ENCOUNTER (EMERGENCY)
Facility: HOSPITAL | Age: 29
End: 2019-02-22
Attending: EMERGENCY MEDICINE | Admitting: EMERGENCY MEDICINE
Payer: COMMERCIAL

## 2019-02-22 ENCOUNTER — HOSPITAL ENCOUNTER (INPATIENT)
Facility: HOSPITAL | Age: 29
LOS: 5 days | Discharge: ANOTHER HOSPITAL | DRG: 053 | End: 2019-02-27
Attending: EMERGENCY MEDICINE | Admitting: INTERNAL MEDICINE
Payer: COMMERCIAL

## 2019-02-22 ENCOUNTER — APPOINTMENT (EMERGENCY)
Dept: CT IMAGING | Facility: HOSPITAL | Age: 29
End: 2019-02-22
Payer: COMMERCIAL

## 2019-02-22 ENCOUNTER — APPOINTMENT (INPATIENT)
Dept: NEUROLOGY | Facility: AMBULATORY SURGERY CENTER | Age: 29
DRG: 053 | End: 2019-02-22
Payer: COMMERCIAL

## 2019-02-22 VITALS
SYSTOLIC BLOOD PRESSURE: 120 MMHG | OXYGEN SATURATION: 94 % | TEMPERATURE: 98.3 F | RESPIRATION RATE: 14 BRPM | WEIGHT: 184.08 LBS | DIASTOLIC BLOOD PRESSURE: 70 MMHG | BODY MASS INDEX: 29.27 KG/M2 | HEART RATE: 106 BPM

## 2019-02-22 DIAGNOSIS — R56.9 SEIZURES (HCC): Primary | ICD-10-CM

## 2019-02-22 DIAGNOSIS — F10.929 ALCOHOL INTOXICATION (HCC): ICD-10-CM

## 2019-02-22 DIAGNOSIS — F19.10 POLYSUBSTANCE ABUSE (HCC): ICD-10-CM

## 2019-02-22 DIAGNOSIS — G43.909 MIGRAINE: ICD-10-CM

## 2019-02-22 DIAGNOSIS — G40.901 STATUS EPILEPTICUS (HCC): Primary | ICD-10-CM

## 2019-02-22 LAB
ALBUMIN SERPL BCP-MCNC: 3.4 G/DL (ref 3.5–5)
ALBUMIN SERPL BCP-MCNC: 3.9 G/DL (ref 3.5–5)
ALP SERPL-CCNC: 114 U/L (ref 46–116)
ALP SERPL-CCNC: 129 U/L (ref 46–116)
ALT SERPL W P-5'-P-CCNC: 30 U/L (ref 12–78)
ALT SERPL W P-5'-P-CCNC: 33 U/L (ref 12–78)
AMPHETAMINES SERPL QL SCN: NEGATIVE
ANION GAP SERPL CALCULATED.3IONS-SCNC: 10 MMOL/L (ref 4–13)
ANION GAP SERPL CALCULATED.3IONS-SCNC: 17 MMOL/L (ref 4–13)
ANION GAP SERPL CALCULATED.3IONS-SCNC: 7 MMOL/L (ref 4–13)
ANION GAP SERPL CALCULATED.3IONS-SCNC: 8 MMOL/L (ref 4–13)
APAP SERPL-MCNC: <2 UG/ML (ref 10–30)
ARTERIAL PATENCY WRIST A: YES
AST SERPL W P-5'-P-CCNC: 36 U/L (ref 5–45)
AST SERPL W P-5'-P-CCNC: 39 U/L (ref 5–45)
B-HCG SERPL-ACNC: <2 MIU/ML
BARBITURATES UR QL: POSITIVE
BASE EXCESS BLDA CALC-SCNC: -9.1 MMOL/L
BASOPHILS # BLD AUTO: 0.03 THOUSANDS/ΜL (ref 0–0.1)
BASOPHILS # BLD AUTO: 0.05 THOUSANDS/ΜL (ref 0–0.1)
BASOPHILS NFR BLD AUTO: 1 % (ref 0–1)
BASOPHILS NFR BLD AUTO: 1 % (ref 0–1)
BENZODIAZ UR QL: NEGATIVE
BILIRUB SERPL-MCNC: 0.1 MG/DL (ref 0.2–1)
BILIRUB SERPL-MCNC: 0.18 MG/DL (ref 0.2–1)
BILIRUB UR QL STRIP: NEGATIVE
BUN SERPL-MCNC: 6 MG/DL (ref 5–25)
BUN SERPL-MCNC: 7 MG/DL (ref 5–25)
CA-I BLD-SCNC: 1.05 MMOL/L (ref 1.12–1.32)
CALCIUM SERPL-MCNC: 7.1 MG/DL (ref 8.3–10.1)
CALCIUM SERPL-MCNC: 7.3 MG/DL (ref 8.3–10.1)
CALCIUM SERPL-MCNC: 7.3 MG/DL (ref 8.3–10.1)
CALCIUM SERPL-MCNC: 7.8 MG/DL (ref 8.3–10.1)
CHLORIDE SERPL-SCNC: 106 MMOL/L (ref 100–108)
CHLORIDE SERPL-SCNC: 108 MMOL/L (ref 100–108)
CHLORIDE SERPL-SCNC: 110 MMOL/L (ref 100–108)
CHLORIDE SERPL-SCNC: 114 MMOL/L (ref 100–108)
CK SERPL-CCNC: 71 U/L (ref 26–192)
CLARITY UR: CLEAR
CO2 SERPL-SCNC: 17 MMOL/L (ref 21–32)
CO2 SERPL-SCNC: 18 MMOL/L (ref 21–32)
CO2 SERPL-SCNC: 21 MMOL/L (ref 21–32)
CO2 SERPL-SCNC: 22 MMOL/L (ref 21–32)
COCAINE UR QL: NEGATIVE
COLOR UR: YELLOW
COLOR, POC: YELLOW
CREAT SERPL-MCNC: 0.39 MG/DL (ref 0.6–1.3)
CREAT SERPL-MCNC: 0.41 MG/DL (ref 0.6–1.3)
CREAT SERPL-MCNC: 0.45 MG/DL (ref 0.6–1.3)
CREAT SERPL-MCNC: 0.51 MG/DL (ref 0.6–1.3)
EOSINOPHIL # BLD AUTO: 0.1 THOUSAND/ΜL (ref 0–0.61)
EOSINOPHIL # BLD AUTO: 0.16 THOUSAND/ΜL (ref 0–0.61)
EOSINOPHIL NFR BLD AUTO: 2 % (ref 0–6)
EOSINOPHIL NFR BLD AUTO: 2 % (ref 0–6)
ERYTHROCYTE [DISTWIDTH] IN BLOOD BY AUTOMATED COUNT: 17.1 % (ref 11.6–15.1)
ERYTHROCYTE [DISTWIDTH] IN BLOOD BY AUTOMATED COUNT: 17.3 % (ref 11.6–15.1)
ETHANOL SERPL-MCNC: 324 MG/DL (ref 0–3)
ETHANOL SERPL-MCNC: 51 MG/DL (ref 0–3)
GFR SERPL CREATININE-BSD FRML MDRD: 132 ML/MIN/1.73SQ M
GFR SERPL CREATININE-BSD FRML MDRD: 138 ML/MIN/1.73SQ M
GFR SERPL CREATININE-BSD FRML MDRD: 142 ML/MIN/1.73SQ M
GFR SERPL CREATININE-BSD FRML MDRD: 144 ML/MIN/1.73SQ M
GLUCOSE SERPL-MCNC: 100 MG/DL (ref 65–140)
GLUCOSE SERPL-MCNC: 105 MG/DL (ref 65–140)
GLUCOSE SERPL-MCNC: 119 MG/DL (ref 65–140)
GLUCOSE SERPL-MCNC: 86 MG/DL (ref 65–140)
GLUCOSE SERPL-MCNC: 89 MG/DL (ref 65–140)
GLUCOSE UR STRIP-MCNC: NEGATIVE MG/DL
HCO3 BLDA-SCNC: 17 MMOL/L (ref 22–28)
HCT VFR BLD AUTO: 37.9 % (ref 34.8–46.1)
HCT VFR BLD AUTO: 38.5 % (ref 34.8–46.1)
HGB BLD-MCNC: 12 G/DL (ref 11.5–15.4)
HGB BLD-MCNC: 12 G/DL (ref 11.5–15.4)
HGB UR QL STRIP.AUTO: NEGATIVE
IMM GRANULOCYTES # BLD AUTO: 0.01 THOUSAND/UL (ref 0–0.2)
IMM GRANULOCYTES # BLD AUTO: 0.03 THOUSAND/UL (ref 0–0.2)
IMM GRANULOCYTES NFR BLD AUTO: 0 % (ref 0–2)
IMM GRANULOCYTES NFR BLD AUTO: 0 % (ref 0–2)
KETONES UR STRIP-MCNC: NEGATIVE MG/DL
LACTATE SERPL-SCNC: 1.3 MMOL/L (ref 0.5–2)
LEUKOCYTE ESTERASE UR QL STRIP: NEGATIVE
LIPASE SERPL-CCNC: 49 U/L (ref 73–393)
LYMPHOCYTES # BLD AUTO: 2.02 THOUSANDS/ΜL (ref 0.6–4.47)
LYMPHOCYTES # BLD AUTO: 2.95 THOUSANDS/ΜL (ref 0.6–4.47)
LYMPHOCYTES NFR BLD AUTO: 39 % (ref 14–44)
LYMPHOCYTES NFR BLD AUTO: 40 % (ref 14–44)
MAGNESIUM SERPL-MCNC: 1.9 MG/DL (ref 1.6–2.6)
MAGNESIUM SERPL-MCNC: 1.9 MG/DL (ref 1.6–2.6)
MCH RBC QN AUTO: 27 PG (ref 26.8–34.3)
MCH RBC QN AUTO: 27.5 PG (ref 26.8–34.3)
MCHC RBC AUTO-ENTMCNC: 31.2 G/DL (ref 31.4–37.4)
MCHC RBC AUTO-ENTMCNC: 31.7 G/DL (ref 31.4–37.4)
MCV RBC AUTO: 87 FL (ref 82–98)
MCV RBC AUTO: 87 FL (ref 82–98)
METHADONE UR QL: NEGATIVE
MONOCYTES # BLD AUTO: 0.35 THOUSAND/ΜL (ref 0.17–1.22)
MONOCYTES # BLD AUTO: 0.66 THOUSAND/ΜL (ref 0.17–1.22)
MONOCYTES NFR BLD AUTO: 7 % (ref 4–12)
MONOCYTES NFR BLD AUTO: 9 % (ref 4–12)
NEUTROPHILS # BLD AUTO: 2.69 THOUSANDS/ΜL (ref 1.85–7.62)
NEUTROPHILS # BLD AUTO: 3.61 THOUSANDS/ΜL (ref 1.85–7.62)
NEUTS SEG NFR BLD AUTO: 48 % (ref 43–75)
NEUTS SEG NFR BLD AUTO: 51 % (ref 43–75)
NITRITE UR QL STRIP: NEGATIVE
NON VENT ROOM AIR: 21 %
NRBC BLD AUTO-RTO: 0 /100 WBCS
NRBC BLD AUTO-RTO: 0 /100 WBCS
O2 CT BLDA-SCNC: 15.2 ML/DL (ref 16–23)
OPIATES UR QL SCN: NEGATIVE
OSMOLALITY UR/SERPL-RTO: 307 MMOL/KG (ref 282–298)
OXYHGB MFR BLDA: 89.7 % (ref 94–97)
PCO2 BLDA: 37.5 MM HG (ref 36–44)
PCP UR QL: NEGATIVE
PH BLDA: 7.27 [PH] (ref 7.35–7.45)
PH UR STRIP.AUTO: 5.5 [PH] (ref 4.5–8)
PHENOBARB SERPL-MCNC: 23.5 UG/ML (ref 15–40)
PHENYTOIN SERPL-MCNC: 0.4 UG/ML (ref 10–20)
PHOSPHATE SERPL-MCNC: 2.8 MG/DL (ref 2.7–4.5)
PLATELET # BLD AUTO: 164 THOUSANDS/UL (ref 149–390)
PLATELET # BLD AUTO: 175 THOUSANDS/UL (ref 149–390)
PMV BLD AUTO: 10.5 FL (ref 8.9–12.7)
PMV BLD AUTO: 10.9 FL (ref 8.9–12.7)
PO2 BLDA: 73.3 MM HG (ref 75–129)
POTASSIUM SERPL-SCNC: 3.8 MMOL/L (ref 3.5–5.3)
POTASSIUM SERPL-SCNC: 4 MMOL/L (ref 3.5–5.3)
PROT SERPL-MCNC: 7.3 G/DL (ref 6.4–8.2)
PROT SERPL-MCNC: 7.9 G/DL (ref 6.4–8.2)
PROT UR STRIP-MCNC: NEGATIVE MG/DL
RBC # BLD AUTO: 4.37 MILLION/UL (ref 3.81–5.12)
RBC # BLD AUTO: 4.44 MILLION/UL (ref 3.81–5.12)
SALICYLATES SERPL-MCNC: 3.8 MG/DL (ref 3–20)
SODIUM SERPL-SCNC: 137 MMOL/L (ref 136–145)
SODIUM SERPL-SCNC: 139 MMOL/L (ref 136–145)
SODIUM SERPL-SCNC: 141 MMOL/L (ref 136–145)
SODIUM SERPL-SCNC: 141 MMOL/L (ref 136–145)
SP GR UR STRIP.AUTO: <=1.005 (ref 1–1.03)
SPECIMEN SOURCE: ABNORMAL
THC UR QL: NEGATIVE
UROBILINOGEN UR QL STRIP.AUTO: 0.2 E.U./DL
WBC # BLD AUTO: 5.2 THOUSAND/UL (ref 4.31–10.16)
WBC # BLD AUTO: 7.46 THOUSAND/UL (ref 4.31–10.16)

## 2019-02-22 PROCEDURE — 95951 HB EEG MONITORING/VIDEORECORD: CPT

## 2019-02-22 PROCEDURE — 96365 THER/PROPH/DIAG IV INF INIT: CPT

## 2019-02-22 PROCEDURE — 85025 COMPLETE CBC W/AUTO DIFF WBC: CPT | Performed by: EMERGENCY MEDICINE

## 2019-02-22 PROCEDURE — 80053 COMPREHEN METABOLIC PANEL: CPT | Performed by: EMERGENCY MEDICINE

## 2019-02-22 PROCEDURE — 99285 EMERGENCY DEPT VISIT HI MDM: CPT

## 2019-02-22 PROCEDURE — 99291 CRITICAL CARE FIRST HOUR: CPT | Performed by: EMERGENCY MEDICINE

## 2019-02-22 PROCEDURE — 83690 ASSAY OF LIPASE: CPT | Performed by: EMERGENCY MEDICINE

## 2019-02-22 PROCEDURE — 70450 CT HEAD/BRAIN W/O DYE: CPT

## 2019-02-22 PROCEDURE — 82330 ASSAY OF CALCIUM: CPT | Performed by: EMERGENCY MEDICINE

## 2019-02-22 PROCEDURE — 80048 BASIC METABOLIC PNL TOTAL CA: CPT | Performed by: PHYSICIAN ASSISTANT

## 2019-02-22 PROCEDURE — 96375 TX/PRO/DX INJ NEW DRUG ADDON: CPT

## 2019-02-22 PROCEDURE — 80329 ANALGESICS NON-OPIOID 1 OR 2: CPT | Performed by: EMERGENCY MEDICINE

## 2019-02-22 PROCEDURE — 83735 ASSAY OF MAGNESIUM: CPT | Performed by: EMERGENCY MEDICINE

## 2019-02-22 PROCEDURE — C9113 INJ PANTOPRAZOLE SODIUM, VIA: HCPCS | Performed by: EMERGENCY MEDICINE

## 2019-02-22 PROCEDURE — 36415 COLL VENOUS BLD VENIPUNCTURE: CPT | Performed by: EMERGENCY MEDICINE

## 2019-02-22 PROCEDURE — 83930 ASSAY OF BLOOD OSMOLALITY: CPT | Performed by: EMERGENCY MEDICINE

## 2019-02-22 PROCEDURE — 84100 ASSAY OF PHOSPHORUS: CPT | Performed by: EMERGENCY MEDICINE

## 2019-02-22 PROCEDURE — 84702 CHORIONIC GONADOTROPIN TEST: CPT | Performed by: EMERGENCY MEDICINE

## 2019-02-22 PROCEDURE — 83605 ASSAY OF LACTIC ACID: CPT | Performed by: EMERGENCY MEDICINE

## 2019-02-22 PROCEDURE — 80185 ASSAY OF PHENYTOIN TOTAL: CPT | Performed by: EMERGENCY MEDICINE

## 2019-02-22 PROCEDURE — 80320 DRUG SCREEN QUANTALCOHOLS: CPT | Performed by: EMERGENCY MEDICINE

## 2019-02-22 PROCEDURE — 80307 DRUG TEST PRSMV CHEM ANLYZR: CPT | Performed by: EMERGENCY MEDICINE

## 2019-02-22 PROCEDURE — 80048 BASIC METABOLIC PNL TOTAL CA: CPT | Performed by: EMERGENCY MEDICINE

## 2019-02-22 PROCEDURE — 71045 X-RAY EXAM CHEST 1 VIEW: CPT

## 2019-02-22 PROCEDURE — 99255 IP/OBS CONSLTJ NEW/EST HI 80: CPT | Performed by: PSYCHIATRY & NEUROLOGY

## 2019-02-22 PROCEDURE — 96367 TX/PROPH/DG ADDL SEQ IV INF: CPT

## 2019-02-22 PROCEDURE — 82805 BLOOD GASES W/O2 SATURATION: CPT | Performed by: EMERGENCY MEDICINE

## 2019-02-22 PROCEDURE — 81003 URINALYSIS AUTO W/O SCOPE: CPT

## 2019-02-22 PROCEDURE — 72125 CT NECK SPINE W/O DYE: CPT

## 2019-02-22 PROCEDURE — 80201 ASSAY OF TOPIRAMATE: CPT | Performed by: EMERGENCY MEDICINE

## 2019-02-22 PROCEDURE — 80184 ASSAY OF PHENOBARBITAL: CPT | Performed by: EMERGENCY MEDICINE

## 2019-02-22 PROCEDURE — 82948 REAGENT STRIP/BLOOD GLUCOSE: CPT

## 2019-02-22 PROCEDURE — 82550 ASSAY OF CK (CPK): CPT | Performed by: EMERGENCY MEDICINE

## 2019-02-22 RX ORDER — LORAZEPAM 2 MG/ML
2 INJECTION INTRAMUSCULAR ONCE
Status: COMPLETED | OUTPATIENT
Start: 2019-02-22 | End: 2019-02-22

## 2019-02-22 RX ORDER — SODIUM CHLORIDE, SODIUM GLUCONATE, SODIUM ACETATE, POTASSIUM CHLORIDE, MAGNESIUM CHLORIDE, SODIUM PHOSPHATE, DIBASIC, AND POTASSIUM PHOSPHATE .53; .5; .37; .037; .03; .012; .00082 G/100ML; G/100ML; G/100ML; G/100ML; G/100ML; G/100ML; G/100ML
125 INJECTION, SOLUTION INTRAVENOUS CONTINUOUS
Status: DISCONTINUED | OUTPATIENT
Start: 2019-02-22 | End: 2019-02-23

## 2019-02-22 RX ORDER — MAGNESIUM SULFATE 1 G/100ML
1 INJECTION INTRAVENOUS ONCE
Status: COMPLETED | OUTPATIENT
Start: 2019-02-22 | End: 2019-02-22

## 2019-02-22 RX ORDER — BUPRENORPHINE AND NALOXONE 8; 2 MG/1; MG/1
1 FILM, SOLUBLE BUCCAL; SUBLINGUAL DAILY
Status: DISCONTINUED | OUTPATIENT
Start: 2019-02-22 | End: 2019-02-25

## 2019-02-22 RX ORDER — ONDANSETRON 2 MG/ML
4 INJECTION INTRAMUSCULAR; INTRAVENOUS ONCE
Status: DISCONTINUED | OUTPATIENT
Start: 2019-02-22 | End: 2019-02-22

## 2019-02-22 RX ORDER — PHENOBARBITAL 64.8 MG/1
60 TABLET ORAL 2 TIMES DAILY
Status: DISCONTINUED | OUTPATIENT
Start: 2019-02-22 | End: 2019-02-24

## 2019-02-22 RX ORDER — LORAZEPAM 1 MG/1
2 TABLET ORAL ONCE
Status: COMPLETED | OUTPATIENT
Start: 2019-02-22 | End: 2019-02-22

## 2019-02-22 RX ORDER — CHLORHEXIDINE GLUCONATE 0.12 MG/ML
15 RINSE ORAL EVERY 12 HOURS SCHEDULED
Status: DISCONTINUED | OUTPATIENT
Start: 2019-02-22 | End: 2019-02-23

## 2019-02-22 RX ORDER — POTASSIUM CHLORIDE 20 MEQ/1
20 TABLET, EXTENDED RELEASE ORAL ONCE
Status: COMPLETED | OUTPATIENT
Start: 2019-02-22 | End: 2019-02-22

## 2019-02-22 RX ORDER — PANTOPRAZOLE SODIUM 40 MG/1
40 INJECTION, POWDER, FOR SOLUTION INTRAVENOUS DAILY
Status: DISCONTINUED | OUTPATIENT
Start: 2019-02-22 | End: 2019-02-23

## 2019-02-22 RX ORDER — TOPIRAMATE 100 MG/1
200 TABLET, FILM COATED ORAL 2 TIMES DAILY
Status: DISCONTINUED | OUTPATIENT
Start: 2019-02-22 | End: 2019-02-24

## 2019-02-22 RX ORDER — THIAMINE MONONITRATE (VIT B1) 100 MG
100 TABLET ORAL DAILY
Status: DISCONTINUED | OUTPATIENT
Start: 2019-02-23 | End: 2019-02-27 | Stop reason: HOSPADM

## 2019-02-22 RX ORDER — LORAZEPAM 2 MG/ML
2 INJECTION INTRAMUSCULAR EVERY 4 HOURS PRN
Status: DISCONTINUED | OUTPATIENT
Start: 2019-02-22 | End: 2019-02-27 | Stop reason: HOSPADM

## 2019-02-22 RX ORDER — HEPARIN SODIUM 5000 [USP'U]/ML
5000 INJECTION, SOLUTION INTRAVENOUS; SUBCUTANEOUS EVERY 8 HOURS SCHEDULED
Status: DISCONTINUED | OUTPATIENT
Start: 2019-02-22 | End: 2019-02-27 | Stop reason: HOSPADM

## 2019-02-22 RX ORDER — GABAPENTIN 400 MG/1
800 CAPSULE ORAL 2 TIMES DAILY
Status: DISCONTINUED | OUTPATIENT
Start: 2019-02-22 | End: 2019-02-23

## 2019-02-22 RX ORDER — METHOCARBAMOL 500 MG/1
500 TABLET, FILM COATED ORAL EVERY 6 HOURS PRN
Status: DISCONTINUED | OUTPATIENT
Start: 2019-02-22 | End: 2019-02-27 | Stop reason: HOSPADM

## 2019-02-22 RX ORDER — ONDANSETRON 2 MG/ML
4 INJECTION INTRAMUSCULAR; INTRAVENOUS EVERY 6 HOURS PRN
Status: DISCONTINUED | OUTPATIENT
Start: 2019-02-22 | End: 2019-02-25

## 2019-02-22 RX ORDER — ONDANSETRON 2 MG/ML
4 INJECTION INTRAMUSCULAR; INTRAVENOUS ONCE
Status: COMPLETED | OUTPATIENT
Start: 2019-02-22 | End: 2019-02-22

## 2019-02-22 RX ORDER — LORAZEPAM 2 MG/ML
1 INJECTION INTRAMUSCULAR ONCE
Status: COMPLETED | OUTPATIENT
Start: 2019-02-22 | End: 2019-02-22

## 2019-02-22 RX ORDER — FOLIC ACID 1 MG/1
1 TABLET ORAL DAILY
Status: DISCONTINUED | OUTPATIENT
Start: 2019-02-23 | End: 2019-02-27 | Stop reason: HOSPADM

## 2019-02-22 RX ORDER — LIDOCAINE 50 MG/G
1 PATCH TOPICAL ONCE
Status: COMPLETED | OUTPATIENT
Start: 2019-02-22 | End: 2019-02-23

## 2019-02-22 RX ADMIN — LORAZEPAM 2 MG: 2 INJECTION INTRAMUSCULAR; INTRAVENOUS at 00:35

## 2019-02-22 RX ADMIN — LORAZEPAM 2 MG: 1 TABLET ORAL at 12:16

## 2019-02-22 RX ADMIN — GABAPENTIN 800 MG: 400 CAPSULE ORAL at 17:58

## 2019-02-22 RX ADMIN — PANTOPRAZOLE SODIUM 40 MG: 40 INJECTION, POWDER, FOR SOLUTION INTRAVENOUS at 06:35

## 2019-02-22 RX ADMIN — SODIUM CHLORIDE, SODIUM GLUCONATE, SODIUM ACETATE, POTASSIUM CHLORIDE, MAGNESIUM CHLORIDE, SODIUM PHOSPHATE, DIBASIC, AND POTASSIUM PHOSPHATE 125 ML/HR: .53; .5; .37; .037; .03; .012; .00082 INJECTION, SOLUTION INTRAVENOUS at 06:36

## 2019-02-22 RX ADMIN — ONDANSETRON 4 MG: 2 INJECTION INTRAMUSCULAR; INTRAVENOUS at 12:16

## 2019-02-22 RX ADMIN — SODIUM BICARBONATE 75 ML/HR: 84 INJECTION, SOLUTION INTRAVENOUS at 10:45

## 2019-02-22 RX ADMIN — FOLIC ACID 1 MG: 5 INJECTION, SOLUTION INTRAMUSCULAR; INTRAVENOUS; SUBCUTANEOUS at 03:32

## 2019-02-22 RX ADMIN — POTASSIUM PHOSPHATE, MONOBASIC AND POTASSIUM PHOSPHATE, DIBASIC 9 MMOL: 224; 236 INJECTION, SOLUTION INTRAVENOUS at 10:14

## 2019-02-22 RX ADMIN — LORAZEPAM 1 MG: 2 INJECTION INTRAMUSCULAR; INTRAVENOUS at 00:46

## 2019-02-22 RX ADMIN — ONDANSETRON 4 MG: 2 INJECTION INTRAMUSCULAR; INTRAVENOUS at 20:03

## 2019-02-22 RX ADMIN — HEPARIN SODIUM 5000 UNITS: 5000 INJECTION INTRAVENOUS; SUBCUTANEOUS at 06:34

## 2019-02-22 RX ADMIN — PHENOBARBITAL SODIUM 835 MG: 130 INJECTION INTRAMUSCULAR; INTRAVENOUS at 01:16

## 2019-02-22 RX ADMIN — LORAZEPAM 2 MG: 1 TABLET ORAL at 23:32

## 2019-02-22 RX ADMIN — ONDANSETRON 4 MG: 2 INJECTION INTRAMUSCULAR; INTRAVENOUS at 00:49

## 2019-02-22 RX ADMIN — POTASSIUM CHLORIDE 20 MEQ: 1500 TABLET, EXTENDED RELEASE ORAL at 20:03

## 2019-02-22 RX ADMIN — SODIUM CHLORIDE 1000 ML: 0.9 INJECTION, SOLUTION INTRAVENOUS at 01:02

## 2019-02-22 RX ADMIN — LORAZEPAM 2 MG: 1 TABLET ORAL at 18:55

## 2019-02-22 RX ADMIN — SODIUM CHLORIDE, SODIUM GLUCONATE, SODIUM ACETATE, POTASSIUM CHLORIDE, MAGNESIUM CHLORIDE, SODIUM PHOSPHATE, DIBASIC, AND POTASSIUM PHOSPHATE 75 ML/HR: .53; .5; .37; .037; .03; .012; .00082 INJECTION, SOLUTION INTRAVENOUS at 20:07

## 2019-02-22 RX ADMIN — FOLIC ACID 1 MG: 5 INJECTION, SOLUTION INTRAMUSCULAR; INTRAVENOUS; SUBCUTANEOUS at 06:35

## 2019-02-22 RX ADMIN — SODIUM CHLORIDE 1000 ML: 0.9 INJECTION, SOLUTION INTRAVENOUS at 01:00

## 2019-02-22 RX ADMIN — METHOCARBAMOL 500 MG: 500 TABLET, FILM COATED ORAL at 20:02

## 2019-02-22 RX ADMIN — HEPARIN SODIUM 5000 UNITS: 5000 INJECTION INTRAVENOUS; SUBCUTANEOUS at 23:00

## 2019-02-22 RX ADMIN — MAGNESIUM SULFATE HEPTAHYDRATE 1 G: 1 INJECTION, SOLUTION INTRAVENOUS at 08:59

## 2019-02-22 RX ADMIN — TOPIRAMATE 200 MG: 100 TABLET, FILM COATED ORAL at 18:50

## 2019-02-22 RX ADMIN — PHENOBARBITAL 64.8 MG: 64.8 TABLET ORAL at 17:58

## 2019-02-22 RX ADMIN — LIDOCAINE 1 PATCH: 50 PATCH CUTANEOUS at 20:02

## 2019-02-22 RX ADMIN — THIAMINE HYDROCHLORIDE 100 MG: 100 INJECTION, SOLUTION INTRAMUSCULAR; INTRAVENOUS at 03:14

## 2019-02-22 RX ADMIN — CHLORHEXIDINE GLUCONATE 0.12% ORAL RINSE 15 ML: 1.2 LIQUID ORAL at 20:03

## 2019-02-22 RX ADMIN — BUPRENORPHINE HYDROCHLORIDE, NALOXONE HYDROCHLORIDE 1 FILM: 8; 2 FILM, SOLUBLE BUCCAL; SUBLINGUAL at 17:40

## 2019-02-22 NOTE — ED NOTES
Pt is currently resting comfortably  Pt is sleeping   No distress noted      Jovanny Gottlieb RN  02/22/19 8288

## 2019-02-22 NOTE — PLAN OF CARE
Problem: Potential for Falls  Goal: Patient will remain free of falls  Description  INTERVENTIONS:  - Assess patient frequently for physical needs  -  Identify cognitive and physical deficits and behaviors that affect risk of falls    -  [place] fall precautions as indicated by assessment   - Educate patient/family on patient safety including physical limitations  - Instruct patient to call for assistance with activity based on assessment  - Modify environment to reduce risk of injury  - Consider OT/PT consult to assist with strengthening/mobility  2/22/2019 0706 by Mireille Mejía RN  Outcome: Progressing  2/22/2019 0706 by Mireille Mejía RN  Outcome: Progressing

## 2019-02-22 NOTE — PROGRESS NOTES
Patient failed first dysphagia assessment as she was being selective about answering questions and would "drift off to sleep" during conversation  When primary RN explained to the patient that she could not receive an oral diet if she was in this state of somnolence, patient quickly perked up and explained that she was just feeling tired  Patient went on to explain to primary nurse that she was keeping her eyes shut because she was experiencing photosensitivity  Patient answered all of the admission questions without hesitation when she realized that being alert would increase her chances of being placed on a diet  Ice was trialed first  Orange juice was trialed next  Patient passed second dysphagia test effortlessly  She is proving to be aox4 and more animated  Patient can handle thin liquids with assistance at the bedside      Nestor Nelson RN  02/22/19   7:37 AM

## 2019-02-22 NOTE — ED NOTES
Pt started with seizure like activity again  Episode lasted for approx 30 seconds   Pt given second dose of 2000 Emiliano Dela Cruz, RN  02/22/19 2038

## 2019-02-22 NOTE — EMTALA/ACUTE CARE TRANSFER
[address] [place] [address] EMERGENCY DEPARTMENT  [address] [place] [id number]  Dept: [phone]      EMTALA TRANSFER CONSENT    NAME Sinai Morse                                         1990                              MRN [id number]    I have been informed of my rights regarding examination, treatment, and transfer   by Dr Zack Burns DO    Benefits: Specialized equipment and/or services available at the receiving facility (Include comment)________________________    Risks: Potential for delay in receiving treatment      Consent for Transfer:  I acknowledge that my medical condition has been evaluated and explained to me by the emergency department physician or other qualified medical person and/or my attending physician, who has recommended that I be transferred to the service of  Accepting Physician: Dr Colonel Bain at [address] [place] [address] Name, [address] : [address]  The above potential benefits of such transfer, the potential risks associated with such transfer, and the probable risks of not being transferred have been explained to me, and I fully understand them  The doctor has explained that, in my case, the benefits of transfer outweigh the risks  I agree to be transferred  I authorize the performance of emergency medical procedures and treatments upon me in both transit and upon arrival at the receiving facility  Additionally, I authorize the release of any and all medical records to the receiving facility and request they be transported with me, if possible  I understand that the safest mode of transportation during a medical emergency is an ambulance and that the Hospital advocates the use of this mode of transport  Risks of traveling to the receiving facility by car, including absence of medical control, life sustaining equipment, such as oxygen, and medical personnel has been explained to me and I fully understand them      ([facility] [place])  [ ]  I consent to the stated transfer and to be transported by ambulance/helicopter  [  ]  I consent to the stated transfer, but refuse transportation by ambulance and accept full responsibility for my transportation by car  I understand the risks of non-ambulance transfers and I exonerate the Hospital and its staff from any deterioration in my condition that results from this refusal     X___________________________________________    DATE  19  TIME________  Signature of patient or legally responsible individual signing on patient behalf           RELATIONSHIP TO PATIENT_________________________          Provider Certification    NAME Mouna Rodriguez                                         1990                              MRN [id number]    A medical screening exam was performed on the above named patient  Based on the examination:    Condition Necessitating Transfer The primary encounter diagnosis was Status epilepticus ([facility] )  A diagnosis of Alcohol intoxication ([facility] ) was also pertinent to this visit  Patient Condition: The patient has been stabilized such that within reasonable medical probability, no material deterioration of the patient condition or the condition of the unborn child(lupe) is likely to result from the transfer    Reason for Transfer: Level of Care needed not available at this facility    Transfer Requirements: [address]   · Space available and qualified personnel available for treatment as acknowledged by    · Agreed to accept transfer and to provide appropriate medical treatment as acknowledged by       Dr Elaine Mcdaniel  · Appropriate medical records of the examination and treatment of the patient are provided at the time of transfer   [address], [address] _______  · Transfer will be performed by qualified personnel from    and appropriate transfer equipment as required, including the use of necessary and appropriate life support measures      Provider Certification: I have examined the patient and explained the following risks and benefits of being transferred/refusing transfer to the patient/family:  General risk, such as traffic hazards, adverse weather conditions, rough terrain or turbulence, possible failure of equipment (including vehicle or aircraft), or consequences of actions of persons outside the control of the transport personnel      Based on these reasonable risks and benefits to the patient and/or the unborn child(lupe), and based upon the information available at the time of the patients examination, I certify that the medical benefits reasonably to be expected from the provision of appropriate medical treatments at another medical facility outweigh the increasing risks, if any, to the individuals medical condition, and in the case of labor to the unborn child, from effecting the transfer      X____________________________________________ DATE 02/22/19        TIME_______      ORIGINAL - SEND TO MEDICAL RECORDS   COPY - SEND WITH PATIENT DURING TRANSFER

## 2019-02-22 NOTE — EMTALA/ACUTE CARE TRANSFER
[facility] [address]  [address] [id number]  Dept: [phone]      EMTALA TRANSFER CONSENT    NAME Padmini Harmon                                         1990                              MRN [id number]    I have been informed of my rights regarding examination, treatment, and transfer   by Dr Pati Rutherford DO    Benefits: Specialized equipment and/or services available at the receiving facility (Include comment)________________________    Risks: Potential for delay in receiving treatment      { ED EMTALA TRANSFER CHOICES:[id number]}    I authorize the performance of emergency medical procedures and treatments upon me in both transit and upon arrival at the receiving facility  Additionally, I authorize the release of any and all medical records to the receiving facility and request they be transported with me, if possible  I understand that the safest mode of transportation during a medical emergency is an ambulance and that the Hospital advocates the use of this mode of transport  Risks of traveling to the receiving facility by car, including absence of medical control, life sustaining equipment, such as oxygen, and medical personnel has been explained to me and I fully understand them  (ARMIDA CORRECT BOX BELOW)  [  ]  I consent to the stated transfer and to be transported by ambulance/helicopter  [  ]  I consent to the stated transfer, but refuse transportation by ambulance and accept full responsibility for my transportation by car    I understand the risks of non-ambulance transfers and I exonerate the Hospital and its staff from any deterioration in my condition that results from this refusal     X___________________________________________    DATE  19  TIME________  Signature of patient or legally responsible individual signing on patient behalf           RELATIONSHIP TO PATIENT_________________________          Provider Certification    NAME Nuria Alonso                                         1990                              MRN [id number]    A medical screening exam was performed on the above named patient  Based on the examination:    Condition Necessitating Transfer The primary encounter diagnosis was Status epilepticus ([facility] )  A diagnosis of Alcohol intoxication ([facility] ) was also pertinent to this visit  Patient Condition: The patient has been stabilized such that within reasonable medical probability, no material deterioration of the patient condition or the condition of the unborn child(lupe) is likely to result from the transfer    Reason for Transfer: Level of Care needed not available at this facility    Transfer Requirements: [facility]   · Space available and qualified personnel available for treatment as acknowledged by    · Agreed to accept transfer and to provide appropriate medical treatment as acknowledged by       Dr Broderick Duran  · Appropriate medical records of the examination and treatment of the patient are provided at the time of transfer   [address] [place] [address], [address] _______  · Transfer will be performed by qualified personnel from    and appropriate transfer equipment as required, including the use of necessary and appropriate life support measures      Provider Certification: I have examined the patient and explained the following risks and benefits of being transferred/refusing transfer to the patient/family:  General risk, such as traffic hazards, adverse weather conditions, rough terrain or turbulence, possible failure of equipment (including vehicle or aircraft), or consequences of actions of persons outside the control of the transport personnel      Based on these reasonable risks and benefits to the patient and/or the unborn child(lupe), and based upon the information available at the time of the patients examination, I certify that the medical benefits reasonably to be expected from the provision of appropriate medical treatments at another medical facility outweigh the increasing risks, if any, to the individuals medical condition, and in the case of labor to the unborn child, from effecting the transfer      X____________________________________________ DATE 02/22/19        TIME_______      ORIGINAL - SEND TO MEDICAL RECORDS   COPY - SEND WITH PATIENT DURING TRANSFER

## 2019-02-22 NOTE — H&P
History and Physical - Critical Care   Padmini Harmon 33 y o  female MRN: [id number]  Unit/Bed#: ED 20 Encounter: [id number]    Reason for Admission / Chief Complaint: Seizure and alcohol intoxication     History of Present Illness:  Padmini Harmon is a 33 y o  female with a history of seizures, opioid abuse, alcohol abuse with withdraw seizures, anxiety, depression, bipolar who presents from [facility] for concern of status epilepticus  Patient was recently admitted to [facility] from - for seizure-like activity  Ddx at that time per Neurology was pseudoseizures vs  Epileptic seizures vs  Alcohol withdraw seizures  Patient had atypical seizure presentation at that time and negative EEG  Patient states that her sister recently  on  due to drug overdose and that she went to the bar this evening to drink  She has a known seizure disorder and when she got up to go to the bathroom she was noted by bystanders to have a generalized, tonic clonic seizure  She was given Ativan 1 mg and had a neg CT head/ C-spine  She was noted to have 2 more generalized tonic clonic seizures at [facility] and was given Ativan 1mg x 2 as well as phenobarb  She was also noted to have an alcohol level of 328  According to home meds, patient takes topamax, phenobarb, gabapentin for AEDs  Unable to get history from patient 2/2 to intoxication  History obtained from chart review  Past Medical History:  Past Medical History:   Diagnosis Date    Alcohol abuse     Alcohol abuse     Anxiety     Asthma     Bipolar disorder ([facility] )     Depression     Pancreatitis     Panic disorder 10/11/2016    Psychiatric disorder     PTSD (post-traumatic stress disorder)     [facility]     Self-injurious behavior        Past Surgical History:  Past Surgical History:   Procedure Laterality Date    ESOPHAGOGASTRODUODENOSCOPY N/A 4/10/2017    Procedure: ESOPHAGOGASTRODUODENOSCOPY (EGD);   Surgeon: Yazan Moreno MD;  Location: [facility]; Service:     INDUCED       WISDOM TOOTH EXTRACTION         Past Family History:  Family History   Problem Relation Age of Onset    Lupus Father     Coronary artery disease Father     Stroke Father        Social History:  Social History     Tobacco Use   Smoking Status Current Some Day Smoker    Packs/day: 1 00    Years: 10 00    Pack years: 10 00   Smokeless Tobacco Never Used     Social History     Substance and Sexual Activity   Alcohol Use Yes    Comment: last drink today     Social History     Substance and Sexual Activity   Drug Use No     Marital Status: Single    Medications:  Current Facility-Administered Medications   Medication Dose Route Frequency    chlorhexidine (PERIDEX) 0 12 % oral rinse 15 mL  15 mL Swish & Spit T57I [address]    folic acid 1 mg, thiamine (VITAMIN B1) 100 mg in sodium chloride 0 9 % 50 mL IVPB  1 mg Intravenous Daily    heparin (porcine) subcutaneous injection 5,000 Units  5,000 Units Subcutaneous Q8H [address]    multi-electrolyte (ISOLYTE-S PH 7 4 equivalent) IV solution  125 mL/hr Intravenous Continuous     Home medications:  Prior to Admission medications    Medication Sig Start Date End Date Taking?  Authorizing Provider   buprenorphine-naloxone (SUBOXONE) 8-2 mg Place 1 Film under the tongue daily    Historical Provider, MD   citalopram (CeleXA) 20 mg tablet Take 20 mg by mouth daily    Historical Provider, MD   folic acid (FOLVITE) 1 mg tablet Take 1 mg by mouth daily    Historical Provider, MD   gabapentin (NEURONTIN) 600 MG tablet Take 600 mg by mouth 3 (three) times a day    Historical Provider, MD   LORazepam (ATIVAN) 1 mg tablet Take 0 5 tablets by mouth every 8 (eight) hours as needed for anxiety (moderate anxiety) for up to 10 doses 17   Ava Santos MD   Melatonin 5 MG TABS Take 1 tablet by mouth daily at bedtime    Historical Provider, MD   methocarbamol (ROBAXIN) 500 mg tablet Take 500 mg by mouth 3 (three) times a day    Historical Provider, MD   naproxen (NAPROSYN) 500 mg tablet Take 500 mg by mouth 2 (two) times a day with meals    Historical Provider, MD   nicotine polacrilex (NICORETTE) 2 mg gum Chew 1 each as needed for smoking cessation (1 piece of gum max every 1 to 2 hours) Chew and then park for approximately 30 minutes PRN 17   Sirisha Peterson MD   ondansetron (ZOFRAN-ODT) 4 mg disintegrating tablet Take 1 tablet (4 mg total) by mouth every 8 (eight) hours as needed for nausea or vomiting for up to 10 doses 19   Sarah Ernst MD   Pancrelipase, Lip-Prot-Amyl, (CREON) 83002 units CPEP Take 1 capsule by mouth 3 (three) times a day    Historical Provider, MD   prazosin (MINIPRESS) 1 mg capsule Take 1 mg by mouth daily at bedtime    Historical Provider, MD   thiamine 100 MG tablet Take 1 tablet by mouth daily 17   Sirisha Peterson MD   topiramate (TOPAMAX) 50 MG tablet Take 3 tablets (150 mg total) by mouth every 12 (twelve) hours 18   Kaye Patel DO     Allergies: Allergies   Allergen Reactions    Amoxicillin     Other      Unknown pain med "I needed and epi pen and to get pumped full of steroids"     Penicillin G     Penicillins Hives     Hives      Percocet [Oxycodone-Acetaminophen] GI Intolerance    Sulfa Antibiotics     Sulfur     Erythromycin Rash and Other (See Comments)       ROS:   Review of Systems   Unable to perform ROS: Mental status change       Vitals:  Vitals:    19 0423 19 0430 19 0500   BP: 126/58 99/52 110/54   BP Location: Right arm Right arm Right arm   Pulse: 102 104 90   Resp: 15 17 16   Temp: (!) 97 1 °F (36 2 °C)     SpO2: 95% 94% 97%   Weight: 83 5 kg (184 lb)       Temperature:   Temp (24hrs), Av 7 °F (36 5 °C), Min:97 1 °F (36 2 °C), Max:98 3 °F (36 8 °C)    Current Temperature: (!) 97 1 °F (36 2 °C)    Weights:   IBW: -92 5 kg  Body mass index is 29 25 kg/m²         Non-Invasive/Invasive Ventilation Settings:  Respiratory    Lab Data (Last 4 hours)    None         O2/Vent Data (Last 4 hours)    None                 Physical Exam:  Physical Exam   Constitutional: She appears well-developed and well-nourished  HENT:   Head: Normocephalic and atraumatic  Mouth/Throat: Oropharynx is clear and moist    Eyes: Pupils are equal, round, and reactive to light  Conjunctivae and EOM are normal    Neck: Normal range of motion  Neck supple  Cardiovascular: Normal rate and regular rhythm  Pulmonary/Chest: Effort normal and breath sounds normal  No respiratory distress  She has no wheezes  She has no rales  Abdominal: Soft  Bowel sounds are normal  She exhibits no distension  There is no tenderness  Musculoskeletal: Normal range of motion  Lymphadenopathy:     She has no cervical adenopathy  Neurological: GCS eye subscore is 1  GCS verbal subscore is 3  GCS motor subscore is 6  Sleepy, arouses to sternal rub  Will follow some simple commands  Moves all extremities  Skin: Skin is warm and dry  Nursing note and vitals reviewed  Labs:  Results from last 7 days   Lab Units 02/22/19  0043   WBC Thousand/uL 7 46   HEMOGLOBIN g/dL 12 0   HEMATOCRIT % 37 9   PLATELETS Thousands/uL 175   NEUTROS PCT % 48   MONOS PCT % 9      Results from last 7 days   Lab Units 02/22/19  0043   POTASSIUM mmol/L 3 8   CHLORIDE mmol/L 106   CO2 mmol/L 18*   BUN mg/dL 6   CREATININE mg/dL 0 51*   CALCIUM mg/dL 7 8*   ALK PHOS U/L 129*   ALT U/L 33   AST U/L 39     Results from last 7 days   Lab Units 02/22/19  0043   MAGNESIUM mg/dL 1 9                  0   Lab Value Date/Time    TROPONINI <0 02 06/21/2018 1240       Imaging: CT Head: No intracranial hemorrhage or calvarial fracture  CT C-spine: No cervical spine fracture or traumatic malalignment  I have personally reviewed pertinent reports  and I have personally reviewed pertinent films in PACS   Micro:  Lab Results   Component Value Date    BLOODCX No Growth After 5 Days   04/28/2017    BLOODCX No Growth After 5 Days  04/28/2017    URINECX 40,000-49,000 cfu/ml Mixed Contaminants X4 04/27/2017    URINECX No Growth <1000 cfu/mL 04/05/2017       ______________________________________________________________________    Assessment: 34 y/o female with acute encephalopathy - status epilepticus vs alcohol intoxication      Plan:      Neuro:     1  Acute Encephalopathy- Status epilepticus vs alcohol intoxication  · Recently admitted from 2/17-2/20 to [facility] with alcohol withdraw and questionable seizures  · Per Neurology note at that time, suspicous of pseudoseizures due to normal EEG and atypical seizure activity  · Patient with supposed generalized tonic clonic activity x 3 last night  · Q1 hr neurochecks   · Therapeutic phenobarb level-given loading dose in ED   · Monitor mental status- if patient more awake later today then can consider restarting PO antiepileptics   · Continuous vEEG   · Neurology consulted     2  Alcohol Intoxication  · CIWA protocol  · Folic acid/ thiamine   · High risk for withdraw based on chart review  · Chart mentions reported withdraw seizures in  the past      CV:   · Maintain MAP >65      Lung:   · No acute issues   · Monitor airway closely      GI:   · Protonix for GI ppx      FEN:   · Isolyte at 125 cc/hr   · Diet NPO  · Repeat labs now and replete electrolytes as needed      :   · Monitor I/Os     ID:   · Monitor fever curve and WBC count      Heme:   · Monitor CBC      Endo:   · No acute issues      Msk/Skin:   · No acute issues      Disposition: ICU care     ______________________________________________________________________    VTE Pharmacologic Prophylaxis: Heparin  VTE Mechanical Prophylaxis: sequential compression device    Invasive lines and devices:   Invasive Devices     Peripheral Intravenous Line            Peripheral IV 02/22/19 Left Hand less than 1 day    Peripheral IV 02/22/19 Right Hand less than 1 day                Code Status: Level 1 - Full Code  POA:    POLST: Given critical illness, patient length of stay will require greater than two midnights  Counseling / Coordination of Care  Total Critical Care time spent 30 minutes excluding procedures, teaching and family updates  Portions of the record may have been created with voice recognition software  Occasional wrong word or "sound a like" substitutions may have occurred due to the inherent limitations of voice recognition software  Read the chart carefully and recognize, using context, where substitutions have occurred        Kori Desir,

## 2019-02-22 NOTE — H&P
I have personally seen and examined patient and reviewed all data with resident/ MLP  Agree with note, assessment and plan  Critical care time 32 minutes   Critical care time does not include procedures, family meeting or teaching  Please refer to resident H&P for full details on laboratory data, physical exam and medications  Seizure disorder possible status epilepticus  Acute encephalopathy multifactorial possibly secondary to seizures versus alcohol intoxication  Metabolic acidosis-likely secondary to seizure event  Alcohol intoxication/history of alcohol abuse  Bipolar disorder/panic disorder/anxiety/PTSD  Tobacco abuse/nicotine dependence  Hypomagnesemia-replete and recheck  Hypokalemia-replete and recheck    Patient transferred to [facility] secondary to multiple seizure event  Patient has a known seizure history for which she is on Topamax, phenobarb and Dilantin as per Care everywhere chart from [facility]  Patient was at a bar and noted to have tonic-clonic seizure for which she was transported to the emergency department at [facility]  Upon arrival to the emergency department patient had 2 more seizure events for which she received Ativan as well as a loading dose of phenobarb  Patient has been somnolent/postictal since seizure events and receiving medications  She underwent CT scan of her head as well as her C-spine which did not reveal any acute abnormalities  Neurology was consulted by the emergency department and recommends continuous monitoring with EEG to rule out ongoing seizure events  Patient was evaluated in transfer in the emergency department at [facility] for which her GCS was noted to be 8  Smelling salts were administered and patient did have an appropriate response moving all extremities  Patient was asked why she was drinking at 1 point she was able to say her sister recently  and that is why she was drinking    Alcohol level of note was 324 at [facility]  UDS was positive for barbiturates  Patient's home medications were reviewed noted to have a history of being on Ativan, Robaxin and Suboxone  Patient unable to provide more detailed history secondary to acute intoxication/encephalopathy/seizures    Patient will be admitted to the ICU  Continue with neurologic checks q 1 hour  Continuous EEG monitoring for ongoing seizure activity  Neurology consultation  Continue with patient's home anti epileptic medications  Medication level sent off and pending result  Maintain on IV fluids as patient is not appropriate to take p o  Diet  Monitor patient's electrolytes and BMP for resolution of metabolic acidosis  Patient likely had a lactic acidosis with acute seizure event x3  One patient is encephalopathy has improved plan to discuss alcohol ingestion history  Patient will be monitored with CIWA, multivitamin and folate and thiamine to be administered

## 2019-02-22 NOTE — ED PROVIDER NOTES
History  Chief Complaint   Patient presents with    Seizure - Prior Hx Of     Pt was at a bar drinking and had a seizure  Unknown the length of time  P:t was postical for EMS  Pt remains postical but is able to answer questions appropriately      Patient arrives via EMS for a seizure tonight  According to EMS the patient was at a bar when she got up to walk to the bathroom and then had a general, tonic-clonic seizure  This was witnessed by patrons at the bar  No known head injury  History provided by:  EMS personnel   used: No    Seizure - Actively Seizing on Arrival   Seizure activity on arrival: no    Seizure type:  Grand mal  Initial focality:  Unable to specify  Episode characteristics: generalized shaking    Postictal symptoms: confusion    Return to baseline: no    Severity:  Moderate  Timing:  Once  Number of seizures this episode:  1  Progression:  Partially resolved  PTA treatment:  None  History of seizures: yes    Severity:  Unable to specify  Current therapy:  Phenobarbital and topiramate  Compliance with current therapy:  Unable to specify      Prior to Admission Medications   Prescriptions Last Dose Informant Patient Reported? Taking?    LORazepam (ATIVAN) 1 mg tablet   No No   Sig: Take 0 5 tablets by mouth every 8 (eight) hours as needed for anxiety (moderate anxiety) for up to 10 doses   Melatonin 5 MG TABS   Yes No   Sig: Take 1 tablet by mouth daily at bedtime   Pancrelipase, Lip-Prot-Amyl, (CREON) 79911 units CPEP   Yes No   Sig: Take 1 capsule by mouth 3 (three) times a day   buprenorphine-naloxone (SUBOXONE) 8-2 mg   Yes No   Sig: Place 1 Film under the tongue daily   citalopram (CeleXA) 20 mg tablet   Yes No   Sig: Take 20 mg by mouth daily   folic acid (FOLVITE) 1 mg tablet   Yes No   Sig: Take 1 mg by mouth daily   gabapentin (NEURONTIN) 600 MG tablet   Yes No   Sig: Take 600 mg by mouth 3 (three) times a day   methocarbamol (ROBAXIN) 500 mg tablet   Yes No   Sig: Take 500 mg by mouth 3 (three) times a day   naproxen (NAPROSYN) 500 mg tablet   Yes No   Sig: Take 500 mg by mouth 2 (two) times a day with meals   nicotine polacrilex (NICORETTE) 2 mg gum   No No   Sig: Chew 1 each as needed for smoking cessation (1 piece of gum max every 1 to 2 hours) Chew and then park for approximately 30 minutes PRN   ondansetron (ZOFRAN-ODT) 4 mg disintegrating tablet   No No   Sig: Take 1 tablet (4 mg total) by mouth every 8 (eight) hours as needed for nausea or vomiting for up to 10 doses   prazosin (MINIPRESS) 1 mg capsule   Yes No   Sig: Take 1 mg by mouth daily at bedtime   thiamine 100 MG tablet   No No   Sig: Take 1 tablet by mouth daily   topiramate (TOPAMAX) 50 MG tablet   No No   Sig: Take 3 tablets (150 mg total) by mouth every 12 (twelve) hours      Facility-Administered Medications: None       Past Medical History:   Diagnosis Date    Alcohol abuse     Alcohol abuse     Anxiety     Asthma     Bipolar disorder ([place] [address] )     Depression     Pancreatitis     Panic disorder 10/11/2016    Psychiatric disorder     PTSD (post-traumatic stress disorder)     Seizures ([facility] )     Self-injurious behavior        Past Surgical History:   Procedure Laterality Date    ESOPHAGOGASTRODUODENOSCOPY N/A 4/10/2017    Procedure: ESOPHAGOGASTRODUODENOSCOPY (EGD); Surgeon: Dominguez Carranza MD;  Location: [facility]; Service:     INDUCED       WISDOM TOOTH EXTRACTION         Family History   Problem Relation Age of Onset    Lupus Father     Coronary artery disease Father     Stroke Father      I have reviewed and agree with the history as documented      Social History     Tobacco Use    Smoking status: Current Some Day Smoker     Packs/day: 1 00     Years: 10 00     Pack years: 10 00    Smokeless tobacco: Never Used   Substance Use Topics    Alcohol use: Yes     Comment: last drink today    Drug use: No        Review of Systems   Unable to perform ROS: Mental status change Physical Exam  Physical Exam   Constitutional: She is oriented to person, place, and time  She appears well-developed and well-nourished  HENT:   Head: Normocephalic and atraumatic  Mouth/Throat: Oropharynx is clear and moist  Mucous membranes are dry  No lacerations  Eyes: Pupils are equal, round, and reactive to light  Conjunctivae are normal    Neck: Normal range of motion  Neck supple  Cardiovascular: Regular rhythm, normal heart sounds and intact distal pulses  Tachycardia present  Exam reveals no friction rub  No murmur heard  Pulmonary/Chest: Effort normal and breath sounds normal  No respiratory distress  She has no wheezes  She has no rales  Abdominal: Soft  She exhibits no distension  There is no tenderness  There is no rebound and no guarding  Musculoskeletal: Normal range of motion  She exhibits no edema, tenderness or deformity  Neurological: She is alert and oriented to person, place, and time  Skin: Skin is warm and dry  Psychiatric: She has a normal mood and affect  Nursing note and vitals reviewed        Vital Signs  ED Triage Vitals [02/22/19 0021]   Temperature Pulse Respirations Blood Pressure SpO2   98 3 °F (36 8 °C) (!) 112 20 133/58 97 %      Temp Source Heart Rate Source Patient Position - Orthostatic VS BP Location FiO2 (%)   Oral Monitor Lying Right arm --      Pain Score       9           Vitals:    02/22/19 0021 02/22/19 0123 02/22/19 0225 02/22/19 0300   BP: 133/58 102/50 109/58 117/70   Pulse: (!) 112 93 102 (!) 108   Patient Position - Orthostatic VS: Lying Lying Lying Lying       Visual Acuity  Visual Acuity      Most Recent Value   L Pupil Size (mm)  6   R Pupil Size (mm)  6          ED Medications  Medications   thiamine (VITAMIN B1) 100 mg in sodium chloride 0 9 % 50 mL IVPB (100 mg Intravenous New Bag 1/11/79 9982)   folic acid 1 mg in sodium chloride 0 9 % 50 mL IVPB (1 mg Intravenous New Bag 2/22/19 0332)   sodium chloride 0 9 % bolus 1,000 mL (0 mL Intravenous Stopped 2/22/19 0225)   LORazepam (ATIVAN) 2 mg/mL injection 1 mg (1 mg Intravenous Given 2/22/19 0046)   ondansetron (ZOFRAN) injection 4 mg (4 mg Intravenous Given 2/22/19 0049)   sodium chloride 0 9 % bolus 1,000 mL (0 mL Intravenous Stopped 2/22/19 0225)   LORazepam (ATIVAN) 2 mg/mL injection 2 mg (2 mg Intravenous Given 2/22/19 0035)   PHENobarbital 130 mg/mL 835 mg in sodium chloride 0 9 % 100 mL IVPB (0 mg/kg × 83 5 kg Intravenous Stopped 2/22/19 0225)       Diagnostic Studies  Results Reviewed     Procedure Component Value Units Date/Time    Phenytoin level, total [[id number]]  (Abnormal) Collected:  02/22/19 0232    Lab Status:  Final result Specimen:  Blood from Arm, Left Updated:  02/22/19 0330     Phenytoin Lvl 0 4 ug/mL     Phenobarbital level [[id number]]  (Normal) Collected:  02/22/19 0043    Lab Status:  Final result Specimen:  Blood from Hand, Left Updated:  02/22/19 0320     Phenobarbital Lvl 23 5 ug/mL     Rapid drug screen, urine [[id number]]  (Abnormal) Collected:  02/22/19 0053    Lab Status:  Final result Specimen:  Urine, Other Updated:  02/22/19 0131     Amph/Meth UR Negative     Barbiturate Ur Positive     Benzodiazepine Urine Negative     Cocaine Urine Negative     Methadone Urine Negative     Opiate Urine Negative     PCP Ur Negative     THC Urine Negative    Narrative:       Presumptive report  If requested, specimen will be sent to reference lab for confirmation  FOR MEDICAL PURPOSES ONLY  IF CONFIRMATION NEEDED PLEASE CONTACT THE LAB WITHIN 5 DAYS    Drug Screen Cutoff Levels:  AMPHETAMINE/METHAMPHETAMINES  1000 ng/mL  BARBITURATES     200 ng/mL  BENZODIAZEPINES     200 ng/mL  COCAINE      300 ng/mL  METHADONE      300 ng/mL  OPIATES      300 ng/mL  PHENCYCLIDINE     25 ng/mL  THC       50 ng/mL    Acetaminophen level [[id number]]  (Abnormal) Collected:  02/22/19 0043    Lab Status:  Final result Specimen:  Blood from Hand, Left Updated:  02/22/19 0124     Acetaminophen Level <2 ug/mL     Magnesium [[id number]]  (Normal) Collected:  02/22/19 0043    Lab Status:  Final result Specimen:  Blood from Hand, Left Updated:  02/22/19 0123     Magnesium 1 9 mg/dL     hCG, quantitative [[id number]]  (Normal) Collected:  02/22/19 0043    Lab Status:  Final result Specimen:  Blood from Hand, Left Updated:  02/22/19 0123     HCG, Quant <2 mIU/mL     Narrative:        Expected Ranges:   Approximate               Approximate HCG  Gestation age          Concentration ( mIU/mL)  _____________          ______________________   Abeba Zheng                      HCG values  0 2-1                       5-50  1-2                           2-3                         100-5000  3-4                         500-76839  4-5                         1000-73991  5-6                         79003-358002  6-8                         60075-456207  8-12                        56325-081926    Salicylate level [[id number]]  (Normal) Collected:  02/22/19 0043    Lab Status:  Final result Specimen:  Blood from Hand, Left Updated:  98/09/93 2969     Salicylate Lvl 3 8 mg/dL     CK Total with Reflex CKMB [[id number]]  (Normal) Collected:  02/22/19 0043    Lab Status:  Final result Specimen:  Blood from Hand, Left Updated:  02/22/19 0122     Total CK 71 U/L     Comprehensive metabolic panel [[id number]]  (Abnormal) Collected:  02/22/19 0043    Lab Status:  Final result Specimen:  Blood from Hand, Left Updated:  02/22/19 0118     Sodium 141 mmol/L      Potassium 3 8 mmol/L      Chloride 106 mmol/L      CO2 18 mmol/L      ANION GAP 17 mmol/L      BUN 6 mg/dL      Creatinine 0 51 mg/dL      Glucose 100 mg/dL      Calcium 7 8 mg/dL      AST 39 U/L      ALT 33 U/L      Alkaline Phosphatase 129 U/L      Total Protein 7 9 g/dL      Albumin 3 9 g/dL      Total Bilirubin 0 10 mg/dL      eGFR 132 ml/min/1 73sq m     Narrative:       National Kidney Disease Education Program recommendations are as follows:  GFR calculation is accurate only with a steady state creatinine  Chronic Kidney disease less than 60 ml/min/1 73 sq  meters  Kidney failure less than 15 ml/min/1 73 sq  meters  Ethanol [[id number]]  (Abnormal) Collected:  02/22/19 0043    Lab Status:  Final result Specimen:  Blood from Hand, Left Updated:  02/22/19 0112     Ethanol Lvl 324 mg/dL     CBC and differential [[id number]]  (Abnormal) Collected:  02/22/19 0043    Lab Status:  Final result Specimen:  Blood from Hand, Left Updated:  02/22/19 0057     WBC 7 46 Thousand/uL      RBC 4 37 Million/uL      Hemoglobin 12 0 g/dL      Hematocrit 37 9 %      MCV 87 fL      MCH 27 5 pg      MCHC 31 7 g/dL      RDW 17 1 %      MPV 10 5 fL      Platelets 578 Thousands/uL      nRBC 0 /100 WBCs      Neutrophils Relative 48 %      Immat GRANS % 0 %      Lymphocytes Relative 40 %      Monocytes Relative 9 %      Eosinophils Relative 2 %      Basophils Relative 1 %      Neutrophils Absolute 3 61 Thousands/µL      Immature Grans Absolute 0 03 Thousand/uL      Lymphocytes Absolute 2 95 Thousands/µL      Monocytes Absolute 0 66 Thousand/µL      Eosinophils Absolute 0 16 Thousand/µL      Basophils Absolute 0 05 Thousands/µL     Topiramate level [[id number]] Collected:  02/22/19 0043    Lab Status:   In process Specimen:  Blood from Hand, Left Updated:  02/22/19 0054    POCT urinalysis dipstick [[id number]]  (Normal) Resulted:  02/22/19 0053    Lab Status:  Final result Specimen:  Urine Updated:  02/22/19 0053     Color, UA Yellow    ED Urine Macroscopic [[id number]] Collected:  02/22/19 0053    Lab Status:  Final result Specimen:  Urine Updated:  02/22/19 0052     Color, UA Yellow     Clarity, UA Clear     pH, UA 5 5     Leukocytes, UA Negative     Nitrite, UA Negative     Protein, UA Negative mg/dl      Glucose, UA Negative mg/dl      Ketones, UA Negative mg/dl      Urobilinogen, UA 0 2 E U /dl      Bilirubin, UA Negative     Blood, UA Negative     Specific Gravity, UA <=1 005    Narrative:       CLINITEK RESULT CT cervical spine without contrast   Final Result by Laly Tinoco MD (02/22 0210)      No cervical spine fracture or traumatic malalignment  Workstation performed: [id number]         CT head without contrast   Final Result by Laly Tinoco MD (02/22 0210)      No intracranial hemorrhage or calvarial fracture  Workstation performed: [id number]         XR chest 1 view portable    (Results Pending)              Procedures  CriticalCare Time  Performed by: Sarai Sullivan DO  Authorized by: Sarai Sullivan DO     Critical care provider statement:     Critical care time (minutes):  60    Critical care time was exclusive of:  Separately billable procedures and treating other patients and teaching time    Critical care was necessary to treat or prevent imminent or life-threatening deterioration of the following conditions:  CNS failure or compromise    Critical care was time spent personally by me on the following activities:  Blood draw for specimens, obtaining history from patient or surrogate, development of treatment plan with patient or surrogate, discussions with consultants, evaluation of patient's response to treatment, examination of patient, interpretation of cardiac output measurements, ordering and performing treatments and interventions, ordering and review of laboratory studies, ordering and review of radiographic studies, review of old charts and re-evaluation of patient's condition    I assumed direction of critical care for this patient from another provider in my specialty: no             Phone Contacts  ED Phone Contact    ED Course                               MDM  Number of Diagnoses or Management Options  Alcohol intoxication [facility]): new and requires workup  Status epilepticus [facility]): new and requires workup  Diagnosis management comments: Patient presents for a seizure tonight    She had a witnessed generalized clonic tonic seizure at a bar tonight  It does appear that the patient has a history of seizures  Med list shows phenobarbital and Topamax  Will give Ativan now, check labs, CT scan of her head and cervical spine, chest x-ray and reassess  12:41 AM  Patient just had 2 more seizures  Short postictal phase in between  For seizure was about 40 seconds and the 2nd was about 30 seconds  Will give another dose of Ativan and now add phenobarbital IV  Looking at the patient's records it does appear that she has a history of alcohol abuse and withdrawal   Both Ativan and phenobarbital should be appropriate for possible alcohol withdrawal in her  Labs are pending  Will continue with IV fluids, support and discussion with Neurology  Given the fact that she had 2 more seizures here she is now considered in status epilepticus  Will discuss with [facility] for transfer once labs and imaging has returned  Alcohol level is 324  Does not appear to be alcohol withdrawal at this time  No more seizures since a loading dose of phenobarbital and Ativan were given  CTs are pending  Also looking at the patient's records it appears that she was supposed to be on phenytoin according to a recent neurology note  Will add this level  Spoke with Neurology about my treatment and further testing going forward  She agrees with transfer to [place] for continuous EEG monitoring  No further meds at this time  Will add folic acid and thiamine given her alcohol history  2:35 AM  Dr Miguel Navarro and accepted  Patient will go over at around 4:00 a m          Amount and/or Complexity of Data Reviewed  Clinical lab tests: ordered and reviewed  Tests in the radiology section of CPT®: ordered and reviewed  Tests in the medicine section of CPT®: ordered and reviewed  Review and summarize past medical records: yes  Discuss the patient with other providers: yes        Disposition  Final diagnoses:   Status epilepticus ([address] )   Alcohol intoxication [facility])     Time reflects when diagnosis was documented in both MDM as applicable and the Disposition within this note     Time User Action Codes Description Comment    2/22/2019  2:33 AM Vilinda Leventhal Add [K34 429] Status epilepticus ([address] )     2/22/2019  2:33 AM Coral Bernard Add [F10 499] Alcohol intoxication [facility])       ED Disposition     ED Disposition Condition Date/Time Comment    Transfer to Another Facility-In Network  Fri Feb 22, 2019  2:34 AM Joann Zeng should be transferred out to [facility]  MD Documentation      Most Recent Value   Patient Condition  The patient has been stabilized such that within reasonable medical probability, no material deterioration of the patient condition or the condition of the unborn child(lupe) is likely to result from the transfer   Reason for Transfer  Level of Care needed not available at this facility   Benefits of Transfer  Specialized equipment and/or services available at the receiving facility (Include comment)________________________   Risks of Transfer  Potential for delay in receiving treatment   Accepting Physician  Dr Henriquez Corporal Name, Camila Muniz   Sending MD Dr Kelley [facility]   Provider Certification  General risk, such as traffic hazards, adverse weather conditions, rough terrain or turbulence, possible failure of equipment (including vehicle or aircraft), or consequences of actions of persons outside the control of the transport personnel      RN Documentation      Most [facility] Name, Camila Muniz      Follow-up Information    None         Patient's Medications   Discharge Prescriptions    No medications on file     No discharge procedures on file      ED Provider  Electronically Signed by           Goldie Perdue DO  02/22/19 0978
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
## 2019-02-22 NOTE — ED NOTES
Patient transported to [facility] [address], RN  02/22/19 7074

## 2019-02-23 ENCOUNTER — APPOINTMENT (INPATIENT)
Dept: NEUROLOGY | Facility: AMBULATORY SURGERY CENTER | Age: 29
DRG: 053 | End: 2019-02-23
Payer: COMMERCIAL

## 2019-02-23 PROBLEM — E87.29 HIGH ANION GAP METABOLIC ACIDOSIS: Status: ACTIVE | Noted: 2019-02-23

## 2019-02-23 PROBLEM — E87.2 HIGH ANION GAP METABOLIC ACIDOSIS: Status: RESOLVED | Noted: 2019-02-23 | Resolved: 2019-02-23

## 2019-02-23 PROBLEM — E87.2 HIGH ANION GAP METABOLIC ACIDOSIS: Status: ACTIVE | Noted: 2019-02-23

## 2019-02-23 PROBLEM — E87.29 HIGH ANION GAP METABOLIC ACIDOSIS: Status: RESOLVED | Noted: 2019-02-23 | Resolved: 2019-02-23

## 2019-02-23 PROBLEM — E87.6 HYPOKALEMIA: Status: ACTIVE | Noted: 2019-02-23

## 2019-02-23 LAB
ANION GAP SERPL CALCULATED.3IONS-SCNC: 6 MMOL/L (ref 4–13)
BASOPHILS # BLD AUTO: 0.02 THOUSANDS/ΜL (ref 0–0.1)
BASOPHILS NFR BLD AUTO: 1 % (ref 0–1)
BUN SERPL-MCNC: 6 MG/DL (ref 5–25)
CALCIUM SERPL-MCNC: 7.5 MG/DL (ref 8.3–10.1)
CHLORIDE SERPL-SCNC: 110 MMOL/L (ref 100–108)
CO2 SERPL-SCNC: 22 MMOL/L (ref 21–32)
CREAT SERPL-MCNC: 0.44 MG/DL (ref 0.6–1.3)
EOSINOPHIL # BLD AUTO: 0.08 THOUSAND/ΜL (ref 0–0.61)
EOSINOPHIL NFR BLD AUTO: 2 % (ref 0–6)
ERYTHROCYTE [DISTWIDTH] IN BLOOD BY AUTOMATED COUNT: 16.8 % (ref 11.6–15.1)
GFR SERPL CREATININE-BSD FRML MDRD: 139 ML/MIN/1.73SQ M
GLUCOSE SERPL-MCNC: 112 MG/DL (ref 65–140)
HCT VFR BLD AUTO: 34.7 % (ref 34.8–46.1)
HGB BLD-MCNC: 10.9 G/DL (ref 11.5–15.4)
IMM GRANULOCYTES # BLD AUTO: 0 THOUSAND/UL (ref 0–0.2)
IMM GRANULOCYTES NFR BLD AUTO: 0 % (ref 0–2)
LYMPHOCYTES # BLD AUTO: 1.61 THOUSANDS/ΜL (ref 0.6–4.47)
LYMPHOCYTES NFR BLD AUTO: 37 % (ref 14–44)
MAGNESIUM SERPL-MCNC: 2.3 MG/DL (ref 1.6–2.6)
MCH RBC QN AUTO: 27.3 PG (ref 26.8–34.3)
MCHC RBC AUTO-ENTMCNC: 31.4 G/DL (ref 31.4–37.4)
MCV RBC AUTO: 87 FL (ref 82–98)
MONOCYTES # BLD AUTO: 0.46 THOUSAND/ΜL (ref 0.17–1.22)
MONOCYTES NFR BLD AUTO: 11 % (ref 4–12)
NEUTROPHILS # BLD AUTO: 2.16 THOUSANDS/ΜL (ref 1.85–7.62)
NEUTS SEG NFR BLD AUTO: 49 % (ref 43–75)
NRBC BLD AUTO-RTO: 0 /100 WBCS
PHOSPHATE SERPL-MCNC: 2.2 MG/DL (ref 2.7–4.5)
PLATELET # BLD AUTO: 127 THOUSANDS/UL (ref 149–390)
PMV BLD AUTO: 10.1 FL (ref 8.9–12.7)
POTASSIUM SERPL-SCNC: 3.5 MMOL/L (ref 3.5–5.3)
RBC # BLD AUTO: 4 MILLION/UL (ref 3.81–5.12)
SODIUM SERPL-SCNC: 138 MMOL/L (ref 136–145)
WBC # BLD AUTO: 4.33 THOUSAND/UL (ref 4.31–10.16)

## 2019-02-23 PROCEDURE — C9113 INJ PANTOPRAZOLE SODIUM, VIA: HCPCS | Performed by: EMERGENCY MEDICINE

## 2019-02-23 PROCEDURE — 95951 PR EEG MONITORING/VIDEORECORD: CPT | Performed by: PSYCHIATRY & NEUROLOGY

## 2019-02-23 PROCEDURE — 80048 BASIC METABOLIC PNL TOTAL CA: CPT | Performed by: PHYSICIAN ASSISTANT

## 2019-02-23 PROCEDURE — 99232 SBSQ HOSP IP/OBS MODERATE 35: CPT | Performed by: PSYCHIATRY & NEUROLOGY

## 2019-02-23 PROCEDURE — 99232 SBSQ HOSP IP/OBS MODERATE 35: CPT | Performed by: INTERNAL MEDICINE

## 2019-02-23 PROCEDURE — 85025 COMPLETE CBC W/AUTO DIFF WBC: CPT | Performed by: PHYSICIAN ASSISTANT

## 2019-02-23 PROCEDURE — 83735 ASSAY OF MAGNESIUM: CPT | Performed by: PHYSICIAN ASSISTANT

## 2019-02-23 PROCEDURE — 84100 ASSAY OF PHOSPHORUS: CPT | Performed by: PHYSICIAN ASSISTANT

## 2019-02-23 PROCEDURE — 95951 HB EEG MONITORING/VIDEORECORD: CPT

## 2019-02-23 RX ORDER — NICOTINE 21 MG/24HR
14 PATCH, TRANSDERMAL 24 HOURS TRANSDERMAL DAILY
Status: DISCONTINUED | OUTPATIENT
Start: 2019-02-23 | End: 2019-02-26

## 2019-02-23 RX ORDER — LORAZEPAM 2 MG/ML
0.5 INJECTION INTRAMUSCULAR EVERY 6 HOURS PRN
Status: DISCONTINUED | OUTPATIENT
Start: 2019-02-23 | End: 2019-02-23

## 2019-02-23 RX ORDER — LORAZEPAM 1 MG/1
2 TABLET ORAL ONCE
Status: COMPLETED | OUTPATIENT
Start: 2019-02-23 | End: 2019-02-23

## 2019-02-23 RX ORDER — LORAZEPAM 0.5 MG/1
0.5 TABLET ORAL EVERY 6 HOURS PRN
Status: DISCONTINUED | OUTPATIENT
Start: 2019-02-23 | End: 2019-02-27

## 2019-02-23 RX ORDER — CITALOPRAM 20 MG/1
20 TABLET ORAL DAILY
Status: DISCONTINUED | OUTPATIENT
Start: 2019-02-23 | End: 2019-02-27 | Stop reason: HOSPADM

## 2019-02-23 RX ORDER — POTASSIUM CHLORIDE 20 MEQ/1
40 TABLET, EXTENDED RELEASE ORAL ONCE
Status: COMPLETED | OUTPATIENT
Start: 2019-02-23 | End: 2019-02-23

## 2019-02-23 RX ORDER — ACETAMINOPHEN 325 MG/1
650 TABLET ORAL EVERY 6 HOURS PRN
Status: DISCONTINUED | OUTPATIENT
Start: 2019-02-23 | End: 2019-02-27 | Stop reason: HOSPADM

## 2019-02-23 RX ADMIN — LORAZEPAM 2 MG: 1 TABLET ORAL at 12:20

## 2019-02-23 RX ADMIN — PANCRELIPASE 24000 UNITS: 24000; 76000; 120000 CAPSULE, DELAYED RELEASE PELLETS ORAL at 23:00

## 2019-02-23 RX ADMIN — HEPARIN SODIUM 5000 UNITS: 5000 INJECTION INTRAVENOUS; SUBCUTANEOUS at 14:01

## 2019-02-23 RX ADMIN — ACETAMINOPHEN 650 MG: 325 TABLET ORAL at 11:18

## 2019-02-23 RX ADMIN — BUPRENORPHINE HYDROCHLORIDE, NALOXONE HYDROCHLORIDE 1 FILM: 8; 2 FILM, SOLUBLE BUCCAL; SUBLINGUAL at 08:37

## 2019-02-23 RX ADMIN — FOLIC ACID 1 MG: 1 TABLET ORAL at 08:37

## 2019-02-23 RX ADMIN — ACETAMINOPHEN 650 MG: 325 TABLET ORAL at 19:33

## 2019-02-23 RX ADMIN — PHENOBARBITAL 64.8 MG: 64.8 TABLET ORAL at 08:37

## 2019-02-23 RX ADMIN — ACETAMINOPHEN 650 MG: 325 TABLET ORAL at 00:33

## 2019-02-23 RX ADMIN — HEPARIN SODIUM 5000 UNITS: 5000 INJECTION INTRAVENOUS; SUBCUTANEOUS at 05:23

## 2019-02-23 RX ADMIN — LORAZEPAM 2 MG: 1 TABLET ORAL at 21:00

## 2019-02-23 RX ADMIN — POTASSIUM CHLORIDE 40 MEQ: 1500 TABLET, EXTENDED RELEASE ORAL at 07:15

## 2019-02-23 RX ADMIN — PHENOBARBITAL 64.8 MG: 64.8 TABLET ORAL at 17:05

## 2019-02-23 RX ADMIN — METHOCARBAMOL 500 MG: 500 TABLET, FILM COATED ORAL at 19:33

## 2019-02-23 RX ADMIN — THIAMINE HCL TAB 100 MG 100 MG: 100 TAB at 08:36

## 2019-02-23 RX ADMIN — LORAZEPAM 2 MG: 1 TABLET ORAL at 03:35

## 2019-02-23 RX ADMIN — CITALOPRAM HYDROBROMIDE 20 MG: 20 TABLET ORAL at 14:00

## 2019-02-23 RX ADMIN — ONDANSETRON 4 MG: 2 INJECTION INTRAMUSCULAR; INTRAVENOUS at 19:24

## 2019-02-23 RX ADMIN — HEPARIN SODIUM 5000 UNITS: 5000 INJECTION INTRAVENOUS; SUBCUTANEOUS at 21:34

## 2019-02-23 RX ADMIN — LORAZEPAM 2 MG: 1 TABLET ORAL at 23:00

## 2019-02-23 RX ADMIN — CHLORHEXIDINE GLUCONATE 0.12% ORAL RINSE 15 ML: 1.2 LIQUID ORAL at 08:36

## 2019-02-23 RX ADMIN — NICOTINE 14 MG: 14 PATCH TRANSDERMAL at 12:21

## 2019-02-23 RX ADMIN — LORAZEPAM 0.5 MG: 0.5 TABLET ORAL at 18:30

## 2019-02-23 RX ADMIN — LORAZEPAM 0.5 MG: 2 INJECTION INTRAMUSCULAR; INTRAVENOUS at 10:51

## 2019-02-23 RX ADMIN — PANTOPRAZOLE SODIUM 40 MG: 40 INJECTION, POWDER, FOR SOLUTION INTRAVENOUS at 08:36

## 2019-02-23 RX ADMIN — GABAPENTIN 800 MG: 400 CAPSULE ORAL at 08:36

## 2019-02-23 RX ADMIN — TOPIRAMATE 200 MG: 100 TABLET, FILM COATED ORAL at 19:24

## 2019-02-23 RX ADMIN — METHOCARBAMOL 500 MG: 500 TABLET, FILM COATED ORAL at 11:19

## 2019-02-23 RX ADMIN — TOPIRAMATE 200 MG: 100 TABLET, FILM COATED ORAL at 15:56

## 2019-02-23 NOTE — UTILIZATION REVIEW
Initial Clinical Review    Admission: Date/Time/Statement: 19 @ 0450   Orders Placed This Encounter   Procedures    Inpatient Admission     Standing Status:   Standing     Number of Occurrences:   1     Order Specific Question:   Admitting Physician     Answer:   Parris Gleason [[id number]]     Order Specific Question:   Level of Care     Answer:   Critical Care [15]     Order Specific Question:   Estimated length of stay     Answer:   More than 2 Midnights     Order Specific Question:   Certification     Answer:   I certify that inpatient services are medically necessary for this patient for a duration of greater than two midnights  See H&P and MD Progress Notes for additional information about the patient's course of treatment  ED: Date/Time/Mode of Arrival:   ED Arrival Information     Expected Arrival Acuity Means of Arrival Escorted By Service Admission Type    - 2019 04:16 Emergent Ambulance SLETS DEPARTMENT [facility]) Critical Care/ICU Emergency    Arrival Complaint    seizure        Chief Complaint:   Chief Complaint   Patient presents with    Seizure - Prior Hx Of     Pt was reported drinking tonight at a bar when she had a witnessed seizure, pt then went into the bathroom post seizure and was found unresponsive   Alcohol Intoxication     History of Illness: 33 y o  female with a history of seizures, opioid abuse, alcohol abuse with withdrawal seizures, anxiety, depression, bipolar who presents from [facility] for concern of status epilepticus  Patient was recently admitted to [facility] from - for seizure-like activity  Ddx at that time per Neurology was pseudoseizures vs  Epileptic seizures vs  Alcohol withdrawal seizures  Patient had atypical seizure presentation at that time and negative EEG      Patient states that her sister recently  on TERESITA due to drug overdose and that she went to the bar this evening to drink   She has a known seizure disorder and when she got up to go to the bathroom, she was noted by bystanders to have a generalized, tonic clonic seizure  She was given Ativan 1 mg and had a neg CT head/ C-spine  She was noted to have 2 more generalized tonic clonic seizures at [facility] and was given Ativan 1mg x 2 as well as phenobarb  She was also noted to have an alcohol level of 328  According to home meds, patient takes topamax, phenobarb, gabapentin for AEDs        ED Vital Signs:   ED Triage Vitals   Temperature Pulse Respirations Blood Pressure SpO2   02/22/19 0423 02/22/19 0423 02/22/19 0423 02/22/19 0423 02/22/19 0423   (!) 97 1 °F (36 2 °C) 102 15 126/58 95 %      Temp Source Heart Rate Source Patient Position - Orthostatic VS BP Location FiO2 (%)   02/22/19 0648 02/22/19 0423 02/22/19 0423 02/22/19 0423 --   Oral Monitor Lying Right arm       Pain Score       02/22/19 0648       No Pain        Wt Readings from Last 1 Encounters:   02/23/19 83 2 kg (183 lb 6 8 oz)     Vital Signs 02/22 0701  02/23 0700 02/23 0701  02/23 1714 Most Recent    Temperature (°F) 98 2-99 98 3-98 8 98 4 (36 9)    Pulse  52-68 58    Respirations 9-24 9-28 12    Blood Pressure 102//102 117//86 134/77    Shock Index 0 36-0 8 0 46-0 58 0 46    SpO2 (%)  96-99 97        Pertinent Labs/Diagnostic Test Results: CO2 18, Cr 0 51, Ca 7 8, Alk phos 129   Ionized Ca 51  Alcohol  324  UDS -- + barbiturates     Ref Range & Units 02/22/19 0653   pH, Arterial 7 350 - 7 450 7 274Low     pCO2, Arterial 36 0 - 44 0 mm Hg 37 5    pO2, Arterial 75 0 - 129 0 mm Hg 73 3Low     HCO3, Arterial 22 0 - 28 0 mmol/L 17 0Low     Base Excess, Arterial mmol/L -9 1    O2 Content, Arterial 16 0 - 23 0 mL/dL 15 2Low     O2 HGB,Arterial  94 0 - 97 0 % 89 7Low     SOURCE  Radial, Right    JINGESH TEST  Yes    ROOM AIR FIO2 % 21      CXR -- no acute abnl  CT head / C-spine -- no acute abnl    Past Medical/Surgical History:   Past Medical History:   Diagnosis Date    Alcohol abuse     Alcohol abuse     Anxiety     Asthma     Bipolar disorder ([place] [address] )     Depression     Pancreatitis     Panic disorder 10/11/2016    Psychiatric disorder     PTSD (post-traumatic stress disorder)     Seizures ([place] [address] )     Self-injurious behavior      Admitting Diagnosis: Seizure ([place] [address] ) [R56 9]  Seizures ([place] [address] ) [R56 9]  Age/Sex: 33 y o  female     Assessment/Plan: 32 y/o female with acute encephalopathy - status epilepticus vs alcohol intoxication    Acute Encephalopathy- Status epilepticus vs alcohol intoxication  · Recently admitted from 2/17-2/20 to [facility] with alcohol withdraw and questionable seizures  ?  Per Neurology note at that time, suspicous of pseudoseizures due to normal EEG and atypical seizure activity  · Patient with supposed generalized tonic clonic activity x 3 last night  · Q1 hr neurochecks   · Therapeutic phenobarb level-given loading dose in ED   · Monitor mental status- if patient more awake later today then can consider restarting PO antiepileptics   · Continuous vEEG   · Neurology consulted      Alcohol Intoxication  · CIWA protocol  · Folic acid/ thiamine   · High risk for withdraw based on chart review      Admission Orders:  Scheduled Meds:   Current Facility-Administered Medications:  acetaminophen 650 mg Oral Q6H PRN   buprenorphine-naloxone 1 Film Sublingual Daily   citalopram 20 mg Oral Daily   folic acid 1 mg Oral Daily   heparin (porcine) 5,000 Units Subcutaneous Q8H [address]   LORazepam 2 mg Intravenous Q4H PRN   LORazepam 0 5 mg Oral Q6H PRN   methocarbamol 500 mg Oral Q6H PRN   nicotine 14 mg Transdermal Daily   ondansetron 4 mg Intravenous Q6H PRN   pancrelipase (Lip-Prot-Amyl) 24,000 Units Oral TID With Meals   PHENobarbital 64 8 mg Oral BID   thiamine 100 mg Oral Daily   topiramate 200 mg Oral BID     Admit to ICU  Telem  Neuro checks q1h  Cont vEEG  Consult neuro  Npo  Nursing dysphagia assessment prior to po  Reg diet  CIWA-Ar protocol  I/O q2h  Cont pox  Up with assist  SCD's

## 2019-02-23 NOTE — PROGRESS NOTES
Progress Note - ICU Transfer to SD/MS tele   Batool Deal 33 y o  female MRN: [id number]  [address]   Unit/Bed#: MICU 01 Encounter: [id number]    Code Status: Level 1 - Full Code    Reason for ICU admission: Seizures    Active problems:   Principal Problem:    Seizures ([address] )  Active Problems:    Alcohol abuse    Hypokalemia  Resolved Problems:    High anion gap metabolic acidosis    History of Present Illness:  Per Dr Arpan Bravo "33 y o  female with a history of seizures, opioid abuse, alcohol abuse with withdraw seizures, anxiety, depression, bipolar who presents from [facility] for concern of status epilepticus  Patient was recently admitted to [facility] from - for seizure-like activity  Ddx at that time per Neurology was pseudoseizures vs  Epileptic seizures vs  Alcohol withdraw seizures  Patient had atypical seizure presentation at that time and negative EEG      Patient states that her sister recently  on  due to drug overdose and that she went to the bar this evening to drink  She has a known seizure disorder and when she got up to go to the bathroom she was noted by bystanders to have a generalized, tonic clonic seizure  She was given Ativan 1 mg and had a neg CT head/ C-spine  She was noted to have 2 more generalized tonic clonic seizures at [place] and was given Ativan 1mg x 2 as well as phenobarb  She was also noted to have an alcohol level of 328  According to home meds, patient takes topamax, phenobarb, gabapentin for AEDs "    Summary of clinical course:   Patient was admitted to the ICU on  for cEEG monitoring  She was placed on [facility] protocol for history of alcohol abuse  Neurology was consulted for help with AEDs  There has been no evidence of seizures since admission, and neurology plans to continue cEEG through tomorrow  Patient is stable for downgrade to Med surg status   She is tolerating a diet    Nutrition Plan:   Regular house diet    VTE Pharmacologic Prophylaxis: Heparin  VTE Mechanical Prophylaxis: sequential compression device    Home medications that are not reordered and reason why:   Gabapentin - per neurology  Suboxone - takes 3 films daily, downgraded to 1 secondary to lowered seizure threshold     Specific Diagnosis Plan:  · Seizures: cEEG monitoring per neurology  Phenobarbitol, topamax  · Alcohol abuse: folate/thiamine/CIWA  · Regular house diet  · Home Celexa    Spoke with Dr Bard Madison  regarding transfer  Please call  with any questions or concerns       Dai Leon PA-C

## 2019-02-23 NOTE — PLAN OF CARE
Problem: Potential for Falls  Goal: Patient will remain free of falls  Description  INTERVENTIONS:  - Assess patient frequently for physical needs  -  Identify cognitive and physical deficits and behaviors that affect risk of falls    -  [place] fall precautions as indicated by assessment   - Educate patient/family on patient safety including physical limitations  - Instruct patient to call for assistance with activity based on assessment  - Modify environment to reduce risk of injury  - Consider OT/PT consult to assist with strengthening/mobility  Outcome: Progressing     Problem: Prexisting or High Potential for Compromised Skin Integrity  Goal: Skin integrity is maintained or improved  Description  INTERVENTIONS:  - Identify patients at risk for skin breakdown  - Assess and monitor skin integrity  - Assess and monitor nutrition and hydration status  - Monitor labs (i e  albumin)  - Assess for incontinence   - Turn and reposition patient  - Assist with mobility/ambulation  - Relieve pressure over bony prominences  - Avoid friction and shearing  - Provide appropriate hygiene as needed including keeping skin clean and dry  - Evaluate need for skin moisturizer/barrier cream  - Collaborate with interdisciplinary team (i e  Nutrition, Rehabilitation, etc )   - Patient/family teaching  Outcome: Progressing

## 2019-02-23 NOTE — PROGRESS NOTES
Progress Note - Neurology   Hunter Felipe 33 y o  female MRN: [id number]  Unit/Bed#: MICU 01 Encounter: [id number]    Assessment:  Seizure disorder: unclear if these are epileptic in nature  Suspect possible stress reaction versus ETOH related convulsions     -continue vEEG  -topomax continued at 100mg BID  Home dose 200BID  DC completely on 2/24 if no electrographic activity  -home gabapentin 600mg TID DC'd  -continue on home dose of phenobarbital   Will require a slow wean    -she has a therapist who she follows for life stressors  Will likely benefit from learning adaptive coping mechanisms for stressors    ETOH abuse:   -CIWA protocol    -vitals stable  Subjective:   No acute events overnight  This AM, patient endorses that she has a high level of stress due to her home situation  Her boyfriend has not returned her calls, and is not bringing her personal items from home that she requested  Overnight, she did not experience headaches, chest pain, shortness of breath, abdominal pain, focal weakness, speech disturbance or visual disturbance  Vitals: Blood pressure 133/62, pulse 62, temperature 98 3 °F (36 8 °C), temperature source Oral, resp  rate 17, height 5' 7" (1 702 m), weight 83 2 kg (183 lb 6 8 oz), SpO2 97 %, not currently breastfeeding  ,Body mass index is 28 73 kg/m²  Physical Exam:   GEN: NAD  SKIN: no rashes  EXTREM: no CCE  No stigmata of emboli  GI: soft, non-tender  NEURO:   Awake, oriented, alert, following commands, giving a comprehensive history  Normal fluency and naming  PERRLA, EOMI, full visual fields, no papilledema on fundoscopy, no dysarthria or facial weakness, tongue midline  MOTOR: normal bulk and tone with no drift    No fasciculations  REFLEXES: 2+ symmetric BB, BR, patellar, 1+ achilles, toes downgoing  COORDINATION: normal FNF      Lab, Imaging and other studies:   CBC:   Results from last 7 days   Lab Units 02/23/19  0453 02/22/19  8492 02/22/19  0043 WBC Thousand/uL 4 33 5 20 7 46   RBC Million/uL 4 00 4 44 4 37   HEMOGLOBIN g/dL 10 9* 12 0 12 0   HEMATOCRIT % 34 7* 38 5 37 9   MCV fL 87 87 87   PLATELETS Thousands/uL 127* 164 175   , BMP/CMP:   Results from last 7 days   Lab Units 02/23/19  0453 02/22/19  1836 02/22/19  1443 02/22/19  0619 02/22/19  0043   SODIUM mmol/L 138 137 139 141 141   POTASSIUM mmol/L 3 5 3 8 3 8 4 0 3 8   CHLORIDE mmol/L 110* 108 110* 114* 106   CO2 mmol/L 22 22 21 17* 18*   BUN mg/dL 6 6 6 7 6   CREATININE mg/dL 0 44* 0 45* 0 39* 0 41* 0 51*   CALCIUM mg/dL 7 5* 7 3* 7 3* 7 1* 7 8*   AST U/L  --   --   --  36 39   ALT U/L  --   --   --  30 33   ALK PHOS U/L  --   --   --  114 129*   EGFR ml/min/1 73sq m 139 138 144 142 132   , TSH:   , Coagulation:   , Lipid Profile:     VTE Prophylaxis: Heparin    Counseling / Coordination of Care  Total time spent today 45 minutes  Greater than 50% of total time was spent with the patient and / or family counseling and / or coordination of care   A description of the counseling / coordination of care: Obtaining collateral history from patient, examining her, reviewing EEG

## 2019-02-23 NOTE — PROGRESS NOTES
Progress Note - Critical Care   Jose Alfredo Ruiz 33 y o  female MRN: [id number]  Unit/Bed#: [facility] 01 Encounter: [id number]    Assessment:  43-year-old female with acute encephalopathy:  Status epilepticus versus alcohol intoxication  Plan:          Neuro:   Acute encephalopathy:  Status epilepticus versus alcohol intoxication   -patient has been recently admitted from 2-17 to [facility] with  alcohol withdrawal and potential seizures  Neurology saw her at that  time and is suspicious of pseudoseizures due to a normal EEG and  atypical seizure Activity  -with 3 reported episodes of generalized tonic clonic activity prior to a dmission   -Q 1 neuro checks   -continuous video EEG   -current receiving phenobarb b i d , Ativan p r n , gabapentin b i d ,  Topamax  B i d     Alcohol intoxication:   -CIWA protocol, current CIWA score of 0  Patient has received multiple  doses  of Ativan overnight    -folic acid/thiamine   -patient is at high risk for withdrawal due to reported alcohol intake in  history of withdrawal seizures  CV:    -maintain MA P greater than 65                 Lung:    -no acute issues, continue to monitor                 GI:    -Protonix for GI prophylaxis  -patient is reported to have passed bedside swallowing study, potentially  can be started on a diet today  FEN:    -isolate at 125 cc Per hour   -currently NPO   -replete potassium at this time  :    -monitor ins and outs  ID:    -patient has been afebrile, white count within normal limits  Heme:    -trending CBC                 Endo:    -no acute issues                            Msk/Skin:    -no acute issues                 Disposition:  Currently in ICU pending results of video EEG  Chief Complaint: Headache    HPI/24hr events:  Patient received 2 doses of Ativan overnight per CIWA protocol  Currently with CIWA score of 0       Physical Exam:   Physical Exam   Constitutional: She is oriented to person, place, and time  She appears well-developed and well-nourished  No distress  HENT:   Head: Normocephalic and atraumatic  Eyes: Pupils are equal, round, and reactive to light  EOM are normal    Cardiovascular: Normal rate, regular rhythm, normal heart sounds and intact distal pulses  Exam reveals no gallop and no friction rub  No murmur heard  Pulmonary/Chest: Effort normal and breath sounds normal  No stridor  No respiratory distress  She has no wheezes  She has no rales  She exhibits no tenderness  Abdominal: Soft  Bowel sounds are normal  She exhibits no distension and no mass  There is no tenderness  There is no rebound and no guarding  No hernia  Musculoskeletal: Normal range of motion  She exhibits no edema  Neurological: She is alert and oriented to person, place, and time  She displays normal reflexes  No cranial nerve deficit or sensory deficit  She exhibits normal muscle tone  Coordination normal    GCS 13 (eyes 2/4)  Patient moves all extremities spontaneously  Skin: She is not diaphoretic  Vitals reviewed  Vitals:    19 0344 19 0400 19 0435 19 0544   BP: 122/81   114/76   BP Location: Right arm      Pulse: 56 (!) 54 64 56   Resp: 14 (!) 24  (!) 11   Temp:  98 3 °F (36 8 °C)     TempSrc:  Oral     SpO2: 99% 98%  97%   Weight:       Height:                 Temperature:   Temp (24hrs), Av 4 °F (36 9 °C), Min:97 5 °F (36 4 °C), Max:99 °F (37 2 °C)    Current: Temperature: 98 3 °F (36 8 °C)    Weights:   IBW: 61 6 kg    Body mass index is 27 76 kg/m²  Weight (last 2 days)     Date/Time   Weight    19 1800   --    Comment rows:    OBSERV: Patient requested to have her sanitary tampoon removed  removed intact with soaked menses   Sanitraty pad provided  at 19 1800    19 0648   80 4 (177 25)    19 0600   80 4 (177 25)    19 0423   83 5 (184)              Hemodynamic Monitoring:  N/A     Non-Invasive/Invasive Ventilation Settings:  Respiratory    Lab Data (Last 4 hours)    None         O2/Vent Data (Last 4 hours)    None              Lab Results   Component Value Date    PHART 7 274 (L) 02/22/2019    NVV7SYB 37 5 02/22/2019    PO2ART 73 3 (L) 02/22/2019    UXX7QTX 17 0 (L) 02/22/2019    BEART -9 1 02/22/2019    SOURCE Radial, Right 02/22/2019     SpO2: SpO2: 97 %    Intake and Outputs:  I/O       02/21 0701 - 02/22 0700 02/22 0701 - 02/23 0700    P  O   1230    I V  (mL/kg)  2972 9 (37)    IV Piggyback  250    Total Intake(mL/kg)  4452 9 (55 4)    Urine (mL/kg/hr)  3650 (1 9)    Stool  0    Total Output  3650    Net  +802 9          Unmeasured Stool Occurrence  1 x        UOP: 102/hour   Nutrition:        Diet Orders   (From admission, onward)            Start     Ordered    02/22/19 1205  Diet NPO; Sips with meds  Diet effective now     Question Answer Comment   Diet Type NPO    NPO Except: Sips with meds    RD to adjust diet per protocol?  Yes        02/22/19 1206          Labs:   Results from last 7 days   Lab Units 02/23/19  0453 02/22/19  0619 02/22/19  0043   WBC Thousand/uL 4 33 5 20 7 46   HEMOGLOBIN g/dL 10 9* 12 0 12 0   HEMATOCRIT % 34 7* 38 5 37 9   PLATELETS Thousands/uL 127* 164 175   NEUTROS PCT % 49 51 48   MONOS PCT % 11 7 9    Results from last 7 days   Lab Units 02/23/19  0453 02/22/19  1836 02/22/19  1443 02/22/19  0619 02/22/19  0043   SODIUM mmol/L 138 137 139 141 141   POTASSIUM mmol/L 3 5 3 8 3 8 4 0 3 8   CHLORIDE mmol/L 110* 108 110* 114* 106   CO2 mmol/L 22 22 21 17* 18*   BUN mg/dL 6 6 6 7 6   CREATININE mg/dL 0 44* 0 45* 0 39* 0 41* 0 51*   CALCIUM mg/dL 7 5* 7 3* 7 3* 7 1* 7 8*   ALK PHOS U/L  --   --   --  114 129*   ALT U/L  --   --   --  30 33   AST U/L  --   --   --  36 39     Results from last 7 days   Lab Units 02/23/19  0453 02/22/19  0619 02/22/19  0043   MAGNESIUM mg/dL 2 3 1 9 1 9     Results from last 7 days   Lab Units 02/23/19  0453 02/22/19  0619   PHOSPHORUS mg/dL 2 2* 2 8          Results from last 7 days   Lab Units 02/22/19  0619   LACTIC ACID mmol/L 1 3     0   Lab Value Date/Time    TROPONINI <0 02 06/21/2018 1240       Imaging: NA    EKG: NA    Micro:  Lab Results   Component Value Date    BLOODCX No Growth After 5 Days  04/28/2017    BLOODCX No Growth After 5 Days  04/28/2017    URINECX 40,000-49,000 cfu/ml Mixed Contaminants X4 04/27/2017    URINECX No Growth <1000 cfu/mL 04/05/2017       Allergies:    Allergies   Allergen Reactions    Amoxicillin     Other      Unknown pain med "I needed and epi pen and to get pumped full of steroids"     Penicillin G     Penicillins Hives     Hives      Percocet [Oxycodone-Acetaminophen] GI Intolerance    Sulfa Antibiotics     Sulfur     Erythromycin Rash and Other (See Comments)       Medications:   Scheduled Meds:  Current Facility-Administered Medications:  acetaminophen 650 mg Oral Q6H PRN Primus Albino, DO    buprenorphine-naloxone 1 Film Sublingual Daily Ekaterina Soliz MD    chlorhexidine 15 mL Swish & Spit Q12H [facility] Caro Shook DO    folic acid 1 mg Oral Daily Ekaterina Soliz MD    gabapentin 800 mg Oral BID Ekaterina Soliz MD    heparin (porcine) 5,000 Units Subcutaneous [facility] [address], DO    lidocaine 1 patch Topical Once Primus Albino, DO    LORazepam 2 mg Intravenous Q4H PRN Ekaterina Soliz MD    methocarbamol 500 mg Oral Q6H PRN Primus Albino, DO    multi-electrolyte 125 mL/hr Intravenous Continuous Caro Shook DO Last Rate: 125 mL/hr (02/23/19 0122)   ondansetron 4 mg Intravenous Q6H PRN Ana Vázquez PA-C    pantoprazole 40 mg Intravenous Daily Caro Shook DO    PHENobarbital 64 8 mg Oral BID Ekaterina Soliz MD    potassium chloride 40 mEq Oral Once Leslye Goodell, MD    thiamine 100 mg Oral Daily Ekaterina Soliz MD    topiramate 200 mg Oral BID Ekaterina Soliz MD      Continuous Infusions:  multi-electrolyte 125 mL/hr Last Rate: 125 mL/hr (02/23/19 0122)     PRN Meds:    acetaminophen 650 mg Q6H PRN   LORazepam 2 mg Q4H PRN   methocarbamol 500 mg Q6H PRN   ondansetron 4 mg Q6H PRN       VTE Pharmacologic Prophylaxis: Heparin  VTE Mechanical Prophylaxis: sequential compression device    Invasive lines and devices: Invasive Devices     Peripheral Intravenous Line            Peripheral IV 02/22/19 Right Hand 1 day                   Counseling / Coordination of Care  Total time spent today 30 minutes  Greater than 50% of total time was spent with the patient and / or family counseling and / or coordination of care  A description of the counseling / coordination of care: Gerhardt Sep Code Status: Level 1 - Full Code     Portions of the record may have been created with voice recognition software  Occasional wrong word or "sound a like" substitutions may have occurred due to the inherent limitations of voice recognition software  Read the chart carefully and recognize, using context, where substitutions have occurred       Vickie Steele MD

## 2019-02-23 NOTE — SOCIAL WORK
CM attempted to see patient this date however patient declining at this time  CM to follow for all discharge planning needs

## 2019-02-24 ENCOUNTER — APPOINTMENT (INPATIENT)
Dept: NEUROLOGY | Facility: AMBULATORY SURGERY CENTER | Age: 29
DRG: 053 | End: 2019-02-24
Payer: COMMERCIAL

## 2019-02-24 PROBLEM — G92.8 TOXIC METABOLIC ENCEPHALOPATHY: Status: ACTIVE | Noted: 2019-02-24

## 2019-02-24 PROBLEM — F19.10 POLYSUBSTANCE ABUSE (HCC): Status: ACTIVE | Noted: 2019-02-24

## 2019-02-24 LAB
ANION GAP SERPL CALCULATED.3IONS-SCNC: 5 MMOL/L (ref 4–13)
BASOPHILS # BLD AUTO: 0.01 THOUSANDS/ΜL (ref 0–0.1)
BASOPHILS NFR BLD AUTO: 0 % (ref 0–1)
BUN SERPL-MCNC: 9 MG/DL (ref 5–25)
CALCIUM SERPL-MCNC: 8.6 MG/DL (ref 8.3–10.1)
CHLORIDE SERPL-SCNC: 108 MMOL/L (ref 100–108)
CO2 SERPL-SCNC: 22 MMOL/L (ref 21–32)
CREAT SERPL-MCNC: 0.59 MG/DL (ref 0.6–1.3)
EOSINOPHIL # BLD AUTO: 0.07 THOUSAND/ΜL (ref 0–0.61)
EOSINOPHIL NFR BLD AUTO: 2 % (ref 0–6)
ERYTHROCYTE [DISTWIDTH] IN BLOOD BY AUTOMATED COUNT: 16.9 % (ref 11.6–15.1)
GFR SERPL CREATININE-BSD FRML MDRD: 126 ML/MIN/1.73SQ M
GLUCOSE SERPL-MCNC: 98 MG/DL (ref 65–140)
HCT VFR BLD AUTO: 37.3 % (ref 34.8–46.1)
HGB BLD-MCNC: 11.9 G/DL (ref 11.5–15.4)
IMM GRANULOCYTES # BLD AUTO: 0.01 THOUSAND/UL (ref 0–0.2)
IMM GRANULOCYTES NFR BLD AUTO: 0 % (ref 0–2)
LYMPHOCYTES # BLD AUTO: 1.78 THOUSANDS/ΜL (ref 0.6–4.47)
LYMPHOCYTES NFR BLD AUTO: 42 % (ref 14–44)
MAGNESIUM SERPL-MCNC: 2.3 MG/DL (ref 1.6–2.6)
MCH RBC QN AUTO: 27.5 PG (ref 26.8–34.3)
MCHC RBC AUTO-ENTMCNC: 31.9 G/DL (ref 31.4–37.4)
MCV RBC AUTO: 86 FL (ref 82–98)
MONOCYTES # BLD AUTO: 0.45 THOUSAND/ΜL (ref 0.17–1.22)
MONOCYTES NFR BLD AUTO: 11 % (ref 4–12)
NEUTROPHILS # BLD AUTO: 1.97 THOUSANDS/ΜL (ref 1.85–7.62)
NEUTS SEG NFR BLD AUTO: 45 % (ref 43–75)
NRBC BLD AUTO-RTO: 0 /100 WBCS
PLATELET # BLD AUTO: 157 THOUSANDS/UL (ref 149–390)
PMV BLD AUTO: 11.8 FL (ref 8.9–12.7)
POTASSIUM SERPL-SCNC: 3.9 MMOL/L (ref 3.5–5.3)
RBC # BLD AUTO: 4.32 MILLION/UL (ref 3.81–5.12)
SODIUM SERPL-SCNC: 135 MMOL/L (ref 136–145)
WBC # BLD AUTO: 4.29 THOUSAND/UL (ref 4.31–10.16)

## 2019-02-24 PROCEDURE — 99232 SBSQ HOSP IP/OBS MODERATE 35: CPT | Performed by: INTERNAL MEDICINE

## 2019-02-24 PROCEDURE — 85025 COMPLETE CBC W/AUTO DIFF WBC: CPT | Performed by: INTERNAL MEDICINE

## 2019-02-24 PROCEDURE — 95951 HB EEG MONITORING/VIDEORECORD: CPT

## 2019-02-24 PROCEDURE — 80048 BASIC METABOLIC PNL TOTAL CA: CPT | Performed by: INTERNAL MEDICINE

## 2019-02-24 PROCEDURE — 99232 SBSQ HOSP IP/OBS MODERATE 35: CPT | Performed by: PSYCHIATRY & NEUROLOGY

## 2019-02-24 PROCEDURE — 83735 ASSAY OF MAGNESIUM: CPT | Performed by: INTERNAL MEDICINE

## 2019-02-24 PROCEDURE — 95951 PR EEG MONITORING/VIDEORECORD: CPT | Performed by: PSYCHIATRY & NEUROLOGY

## 2019-02-24 RX ORDER — LORAZEPAM 1 MG/1
2 TABLET ORAL ONCE
Status: COMPLETED | OUTPATIENT
Start: 2019-02-24 | End: 2019-02-24

## 2019-02-24 RX ORDER — LORAZEPAM 1 MG/1
2 TABLET ORAL ONCE
Status: DISCONTINUED | OUTPATIENT
Start: 2019-02-24 | End: 2019-02-24

## 2019-02-24 RX ORDER — PHENOBARBITAL 64.8 MG/1
64.8 TABLET ORAL 2 TIMES DAILY
Status: DISCONTINUED | OUTPATIENT
Start: 2019-02-24 | End: 2019-02-25

## 2019-02-24 RX ORDER — LORAZEPAM 2 MG/ML
1 INJECTION INTRAMUSCULAR ONCE
Status: COMPLETED | OUTPATIENT
Start: 2019-02-24 | End: 2019-02-24

## 2019-02-24 RX ADMIN — ACETAMINOPHEN 650 MG: 325 TABLET ORAL at 02:25

## 2019-02-24 RX ADMIN — BUPRENORPHINE HYDROCHLORIDE, NALOXONE HYDROCHLORIDE 1 FILM: 8; 2 FILM, SOLUBLE BUCCAL; SUBLINGUAL at 09:31

## 2019-02-24 RX ADMIN — PHENOBARBITAL 64.8 MG: 64.8 TABLET ORAL at 17:08

## 2019-02-24 RX ADMIN — FOLIC ACID 1 MG: 1 TABLET ORAL at 09:29

## 2019-02-24 RX ADMIN — ONDANSETRON 4 MG: 2 INJECTION INTRAMUSCULAR; INTRAVENOUS at 10:57

## 2019-02-24 RX ADMIN — CITALOPRAM HYDROBROMIDE 20 MG: 20 TABLET ORAL at 09:29

## 2019-02-24 RX ADMIN — PANCRELIPASE 24000 UNITS: 24000; 76000; 120000 CAPSULE, DELAYED RELEASE PELLETS ORAL at 09:34

## 2019-02-24 RX ADMIN — TOPIRAMATE 200 MG: 100 TABLET, FILM COATED ORAL at 09:29

## 2019-02-24 RX ADMIN — LORAZEPAM 0.5 MG: 0.5 TABLET ORAL at 17:08

## 2019-02-24 RX ADMIN — NICOTINE 14 MG: 14 PATCH TRANSDERMAL at 09:29

## 2019-02-24 RX ADMIN — LORAZEPAM 1 MG: 2 INJECTION, SOLUTION INTRAMUSCULAR; INTRAVENOUS at 19:54

## 2019-02-24 RX ADMIN — PHENOBARBITAL 64.8 MG: 64.8 TABLET ORAL at 09:34

## 2019-02-24 RX ADMIN — PANCRELIPASE 24000 UNITS: 24000; 76000; 120000 CAPSULE, DELAYED RELEASE PELLETS ORAL at 23:28

## 2019-02-24 RX ADMIN — METHOCARBAMOL 500 MG: 500 TABLET, FILM COATED ORAL at 16:05

## 2019-02-24 RX ADMIN — PANCRELIPASE 24000 UNITS: 24000; 76000; 120000 CAPSULE, DELAYED RELEASE PELLETS ORAL at 12:04

## 2019-02-24 RX ADMIN — ACETAMINOPHEN 650 MG: 325 TABLET ORAL at 16:05

## 2019-02-24 RX ADMIN — ACETAMINOPHEN 650 MG: 325 TABLET ORAL at 09:31

## 2019-02-24 RX ADMIN — ONDANSETRON 4 MG: 2 INJECTION INTRAMUSCULAR; INTRAVENOUS at 17:08

## 2019-02-24 RX ADMIN — LORAZEPAM 2 MG: 1 TABLET ORAL at 02:25

## 2019-02-24 RX ADMIN — HEPARIN SODIUM 5000 UNITS: 5000 INJECTION INTRAVENOUS; SUBCUTANEOUS at 13:33

## 2019-02-24 RX ADMIN — ONDANSETRON 4 MG: 2 INJECTION INTRAMUSCULAR; INTRAVENOUS at 02:25

## 2019-02-24 RX ADMIN — LORAZEPAM 0.5 MG: 0.5 TABLET ORAL at 10:57

## 2019-02-24 RX ADMIN — HEPARIN SODIUM 5000 UNITS: 5000 INJECTION INTRAVENOUS; SUBCUTANEOUS at 22:15

## 2019-02-24 RX ADMIN — LORAZEPAM 2 MG: 1 TABLET ORAL at 13:33

## 2019-02-24 RX ADMIN — LORAZEPAM 2 MG: 1 TABLET ORAL at 22:15

## 2019-02-24 RX ADMIN — PANCRELIPASE 24000 UNITS: 24000; 76000; 120000 CAPSULE, DELAYED RELEASE PELLETS ORAL at 16:05

## 2019-02-24 RX ADMIN — THIAMINE HCL TAB 100 MG 100 MG: 100 TAB at 09:29

## 2019-02-24 RX ADMIN — HEPARIN SODIUM 5000 UNITS: 5000 INJECTION INTRAVENOUS; SUBCUTANEOUS at 06:21

## 2019-02-24 NOTE — PROGRESS NOTES
Progress Note - Ricardo Gray 1990, 33 y o  female MRN: [id number]    Unit/Bed#: [facility] 720-01 Encounter: [id number]    Primary Care Provider: Ava Diggs DO   Date and time admitted to hospital: 2019  4:16 AM        * Seizures [facility])  Assessment & Plan  Patient is being monitored on video EEG  Discussed with Neurology      Polysubstance abuse [facility])  Assessment & Plan  Monitor  HOST referral    Toxic metabolic encephalopathy  Assessment & Plan  Resolved  Monitor    Hypokalemia  Assessment & Plan  Replete monitor    Alcohol abuse  Assessment & Plan  Monitor for withdrawal          VTE Pharmacologic Prophylaxis:   Pharmacologic: Heparin  Mechanical VTE Prophylaxis in Place: Yes    Patient Centered Rounds: I have performed bedside rounds with nursing staff today  Discussions with Specialists or Other Care Team Provider:  Neurology    Education and Discussions with Family / Patient:  Discussed with the patient, called and left a message for father over phone    Time Spent for Care: 30 minutes  More than 50% of total time spent on counseling and coordination of care as described above  Current Length of Stay: 2 day(s)    Current Patient Status: Inpatient   Certification Statement: The patient will continue to require additional inpatient hospital stay due to as mentioned    Discharge Plan: patient is being monitored on vEEG today    Code Status: Level 1 - Full Code      Subjective:     Comfortably sitting up in bed  Patient is being monitored on video EEG  Reports feeling okay  History chart labs medications reviewed    Objective:     Vitals:   Temp (24hrs), Av 4 °F (36 9 °C), Min:98 3 °F (36 8 °C), Max:98 6 °F (37 °C)    Temp:  [98 3 °F (36 8 °C)-98 6 °F (37 °C)] 98 3 °F (36 8 °C)  HR:  [54-70] 60  Resp:  [12-24] 18  BP: ()/(49-99) 102/61  SpO2:  [96 %-99 %] 98 %  Body mass index is 29 kg/m²  Input and Output Summary (last 24 hours):        Intake/Output Summary (Last 24 hours) at 2/24/2019 1407  Last data filed at 2/24/2019 1300  Gross per 24 hour   Intake 340 ml   Output 1500 ml   Net -1160 ml       Physical Exam:     Physical Exam    Comfortably sitting up in bed  Neck supple  Lungs clear to auscultation  Heart sounds S1-S2 noted  Abdomen soft  Awake alert obeys simple commands  No rash  Pulses noted  No pedal edema      Additional Data:     Labs:    Results from last 7 days   Lab Units 02/24/19  0544   WBC Thousand/uL 4 29*   HEMOGLOBIN g/dL 11 9   HEMATOCRIT % 37 3   PLATELETS Thousands/uL 157   NEUTROS PCT % 45   LYMPHS PCT % 42   MONOS PCT % 11   EOS PCT % 2     Results from last 7 days   Lab Units 02/24/19  0544  02/22/19  0619   SODIUM mmol/L 135*   < > 141   POTASSIUM mmol/L 3 9   < > 4 0   CHLORIDE mmol/L 108   < > 114*   CO2 mmol/L 22   < > 17*   BUN mg/dL 9   < > 7   CREATININE mg/dL 0 59*   < > 0 41*   ANION GAP mmol/L 5   < > 10   CALCIUM mg/dL 8 6   < > 7 1*   ALBUMIN g/dL  --   --  3 4*   TOTAL BILIRUBIN mg/dL  --   --  0 18*   ALK PHOS U/L  --   --  114   ALT U/L  --   --  30   AST U/L  --   --  36   GLUCOSE RANDOM mg/dL 98   < > 89    < > = values in this interval not displayed  Results from last 7 days   Lab Units 02/22/19  0704   POC GLUCOSE mg/dl 86         Results from last 7 days   Lab Units 02/22/19  0619   LACTIC ACID mmol/L 1 3           * I Have Reviewed All Lab Data Listed Above  * Additional Pertinent Lab Tests Reviewed:  All Labs Within Last 24 Hours Reviewed    Imaging:    Imaging Reports Reviewed Today Include:  Imaging studies reviewed  Imaging Personally Reviewed by Myself Includes:      Recent Cultures (last 7 days):           Last 24 Hours Medication List:     Current Facility-Administered Medications:  acetaminophen 650 mg Oral Q6H PRN Shoshana Mckeon MD   buprenorphine-naloxone 1 Film Sublingual Daily Shoshana Mckeon MD   citalopram 20 mg Oral Daily Shoshana Mckeon MD   folic acid 1 mg Oral Daily Shoshana Mckeon MD   heparin (porcine) 5,000 Units Subcutaneous [facility] Seun Shaffer, MD   LORazepam 2 mg Intravenous Q4H PRN Seun Shaffer, MD   LORazepam 0 5 mg Oral Q6H PRN Seun Shaffer, MD   methocarbamol 500 mg Oral Q6H PRN Seun Shaffer, MD   nicotine 14 mg Transdermal Daily Seun Shaffer, MD   ondansetron 4 mg Intravenous Q6H PRN Seun Shaffer MD   pancrelipase (Lip-Prot-Amyl) 24,000 Units Oral TID With Meals Seun Shaffer MD   PHENobarbital 64 8 mg Oral BID Seun Shaffer, MD   thiamine 100 mg Oral Daily Seun Shaffer, MD        Today, Patient Was Seen By: Uri Pina MD    ** Please Note: Dictation voice to text software may have been used in the creation of this document   **

## 2019-02-24 NOTE — PLAN OF CARE
Problem: Potential for Falls  Goal: Patient will remain free of falls  Description  INTERVENTIONS:  - Assess patient frequently for physical needs  -  Identify cognitive and physical deficits and behaviors that affect risk of falls    -  [place] fall precautions as indicated by assessment   - Educate patient/family on patient safety including physical limitations  - Instruct patient to call for assistance with activity based on assessment  - Modify environment to reduce risk of injury  - Consider OT/PT consult to assist with strengthening/mobility  2/24/2019 0924 by Yash Pascal RN  Outcome: Progressing  2/24/2019 0923 by Yash Pascal RN  Outcome: Progressing     Problem: Prexisting or High Potential for Compromised Skin Integrity  Goal: Skin integrity is maintained or improved  Description  INTERVENTIONS:  - Identify patients at risk for skin breakdown  - Assess and monitor skin integrity  - Assess and monitor nutrition and hydration status  - Monitor labs (i e  albumin)  - Assess for incontinence   - Turn and reposition patient  - Assist with mobility/ambulation  - Relieve pressure over bony prominences  - Avoid friction and shearing  - Provide appropriate hygiene as needed including keeping skin clean and dry  - Evaluate need for skin moisturizer/barrier cream  - Collaborate with interdisciplinary team (i e  Nutrition, Rehabilitation, etc )   - Patient/family teaching  2/24/2019 9196 by Yash Pascal RN  Outcome: Progressing  2/24/2019 0923 by Yash Pascal RN  Outcome: Progressing     Problem: PAIN - ADULT  Goal: Verbalizes/displays adequate comfort level or baseline comfort level  Description  Interventions:  - Encourage patient to monitor pain and request assistance  - Assess pain using appropriate pain scale  - Administer analgesics based on type and severity of pain and evaluate response  - Implement non-pharmacological measures as appropriate and evaluate response  - Consider cultural and social influences on pain and pain management  - Notify physician/advanced practitioner if interventions unsuccessful or patient reports new pain  Outcome: Progressing     Problem: INFECTION - ADULT  Goal: Absence or prevention of progression during hospitalization  Description  INTERVENTIONS:  - Assess and monitor for signs and symptoms of infection  - Monitor lab/diagnostic results  - Monitor all insertion sites, i e  indwelling lines, tubes, and drains  - Monitor endotracheal (as able) and nasal secretions for changes in amount and color  - [place] appropriate cooling/warming therapies per order  - Administer medications as ordered  - Instruct and encourage patient and family to use good hand hygiene technique  - Identify and instruct in appropriate isolation precautions for identified infection/condition  Outcome: Progressing  Goal: Absence of fever/infection during neutropenic period  Description  INTERVENTIONS:  - Monitor WBC  - Implement neutropenic guidelines  Outcome: Progressing     Problem: SAFETY ADULT  Goal: Maintain or return to baseline ADL function  Description  INTERVENTIONS:  -  Assess patient's ability to carry out ADLs; assess patient's baseline for ADL function and identify physical deficits which impact ability to perform ADLs (bathing, care of mouth/teeth, toileting, grooming, dressing, etc )  - Assess/evaluate cause of self-care deficits   - Assess range of motion  - Assess patient's mobility; develop plan if impaired  - Assess patient's need for assistive devices and provide as appropriate  - Encourage maximum independence but intervene and supervise when necessary  ¯ Involve family in performance of ADLs  ¯ Assess for home care needs following discharge   ¯ Request OT consult to assist with ADL evaluation and planning for discharge  ¯ Provide patient education as appropriate  Outcome: Progressing  Goal: Maintain or return mobility status to optimal level  Description  INTERVENTIONS:  - Assess patient's baseline mobility status (ambulation, transfers, stairs, etc )    - Identify cognitive and physical deficits and behaviors that affect mobility  - Identify mobility aids required to assist with transfers and/or ambulation (gait belt, sit-to-stand, lift, walker, cane, etc )  - [place] fall precautions as indicated by assessment  - Record patient progress and toleration of activity level on Mobility SBAR; progress patient to next Phase/Stage  - Instruct patient to call for assistance with activity based on assessment  - Request Rehabilitation consult to assist with strengthening/weightbearing, etc   Outcome: Progressing     Problem: DISCHARGE PLANNING  Goal: Discharge to home or other facility with appropriate resources  Description  INTERVENTIONS:  - Identify barriers to discharge w/patient and caregiver  - Arrange for needed discharge resources and transportation as appropriate  - Identify discharge learning needs (meds, wound care, etc )  - Arrange for interpretive services to assist at discharge as needed  - Refer to Case Management Department for coordinating discharge planning if the patient needs post-hospital services based on physician/advanced practitioner order or complex needs related to functional status, cognitive ability, or social support system  Outcome: Progressing     Problem: Knowledge Deficit  Goal: Patient/family/caregiver demonstrates understanding of disease process, treatment plan, medications, and discharge instructions  Description  Complete learning assessment and assess knowledge base    Interventions:  - Provide teaching at level of understanding  - Provide teaching via preferred learning methods  Outcome: Progressing

## 2019-02-25 ENCOUNTER — APPOINTMENT (INPATIENT)
Dept: NEUROLOGY | Facility: AMBULATORY SURGERY CENTER | Age: 29
DRG: 053 | End: 2019-02-25
Payer: COMMERCIAL

## 2019-02-25 PROBLEM — F41.9 ANXIETY DISORDER: Status: ACTIVE | Noted: 2019-02-25

## 2019-02-25 LAB — TOPIRAMATE SERPL-MCNC: 2.7 UG/ML (ref 2–25)

## 2019-02-25 PROCEDURE — 99232 SBSQ HOSP IP/OBS MODERATE 35: CPT | Performed by: INTERNAL MEDICINE

## 2019-02-25 PROCEDURE — 95951 PR EEG MONITORING/VIDEORECORD: CPT | Performed by: PSYCHIATRY & NEUROLOGY

## 2019-02-25 PROCEDURE — 95951 HB EEG MONITORING/VIDEORECORD: CPT

## 2019-02-25 PROCEDURE — 99233 SBSQ HOSP IP/OBS HIGH 50: CPT | Performed by: PSYCHIATRY & NEUROLOGY

## 2019-02-25 RX ORDER — PHENOBARBITAL 64.8 MG/1
32.4 TABLET ORAL 2 TIMES DAILY
Status: DISCONTINUED | OUTPATIENT
Start: 2019-02-26 | End: 2019-02-27 | Stop reason: HOSPADM

## 2019-02-25 RX ORDER — CLONIDINE HYDROCHLORIDE 0.1 MG/1
0.1 TABLET ORAL EVERY 12 HOURS PRN
Status: DISCONTINUED | OUTPATIENT
Start: 2019-02-25 | End: 2019-02-25

## 2019-02-25 RX ORDER — ONDANSETRON 2 MG/ML
4 INJECTION INTRAMUSCULAR; INTRAVENOUS EVERY 4 HOURS PRN
Status: DISCONTINUED | OUTPATIENT
Start: 2019-02-25 | End: 2019-02-27

## 2019-02-25 RX ORDER — BUPRENORPHINE AND NALOXONE 8; 2 MG/1; MG/1
1 FILM, SOLUBLE BUCCAL; SUBLINGUAL 3 TIMES DAILY
Status: DISCONTINUED | OUTPATIENT
Start: 2019-02-25 | End: 2019-02-27 | Stop reason: HOSPADM

## 2019-02-25 RX ORDER — GABAPENTIN 400 MG/1
400 CAPSULE ORAL 2 TIMES DAILY
Status: DISCONTINUED | OUTPATIENT
Start: 2019-02-25 | End: 2019-02-27 | Stop reason: HOSPADM

## 2019-02-25 RX ORDER — ONDANSETRON 2 MG/ML
INJECTION INTRAMUSCULAR; INTRAVENOUS
Status: COMPLETED
Start: 2019-02-25 | End: 2019-02-25

## 2019-02-25 RX ORDER — SODIUM CHLORIDE 9 MG/ML
125 INJECTION, SOLUTION INTRAVENOUS CONTINUOUS
Status: DISCONTINUED | OUTPATIENT
Start: 2019-02-25 | End: 2019-02-27 | Stop reason: HOSPADM

## 2019-02-25 RX ORDER — CLONIDINE HYDROCHLORIDE 0.1 MG/1
0.1 TABLET ORAL EVERY 8 HOURS PRN
Status: DISCONTINUED | OUTPATIENT
Start: 2019-02-25 | End: 2019-02-27 | Stop reason: HOSPADM

## 2019-02-25 RX ADMIN — GABAPENTIN 400 MG: 400 CAPSULE ORAL at 17:00

## 2019-02-25 RX ADMIN — PHENOBARBITAL 64.8 MG: 64.8 TABLET ORAL at 09:22

## 2019-02-25 RX ADMIN — METHOCARBAMOL 500 MG: 500 TABLET, FILM COATED ORAL at 14:35

## 2019-02-25 RX ADMIN — SODIUM CHLORIDE 125 ML/HR: 0.9 INJECTION, SOLUTION INTRAVENOUS at 21:02

## 2019-02-25 RX ADMIN — NICOTINE 14 MG: 14 PATCH TRANSDERMAL at 09:32

## 2019-02-25 RX ADMIN — SODIUM CHLORIDE 125 ML/HR: 0.9 INJECTION, SOLUTION INTRAVENOUS at 11:10

## 2019-02-25 RX ADMIN — METHOCARBAMOL 500 MG: 500 TABLET, FILM COATED ORAL at 07:26

## 2019-02-25 RX ADMIN — HEPARIN SODIUM 5000 UNITS: 5000 INJECTION INTRAVENOUS; SUBCUTANEOUS at 06:07

## 2019-02-25 RX ADMIN — HEPARIN SODIUM 5000 UNITS: 5000 INJECTION INTRAVENOUS; SUBCUTANEOUS at 14:03

## 2019-02-25 RX ADMIN — METHOCARBAMOL 500 MG: 500 TABLET, FILM COATED ORAL at 21:06

## 2019-02-25 RX ADMIN — CLONIDINE HYDROCHLORIDE 0.1 MG: 0.1 TABLET ORAL at 11:09

## 2019-02-25 RX ADMIN — LORAZEPAM 0.5 MG: 0.5 TABLET ORAL at 21:06

## 2019-02-25 RX ADMIN — CITALOPRAM HYDROBROMIDE 20 MG: 20 TABLET ORAL at 11:27

## 2019-02-25 RX ADMIN — LORAZEPAM 0.5 MG: 0.5 TABLET ORAL at 14:31

## 2019-02-25 RX ADMIN — FOLIC ACID 1 MG: 1 TABLET ORAL at 11:27

## 2019-02-25 RX ADMIN — THIAMINE HCL TAB 100 MG 100 MG: 100 TAB at 11:27

## 2019-02-25 RX ADMIN — BUPRENORPHINE HYDROCHLORIDE, NALOXONE HYDROCHLORIDE 1 FILM: 8; 2 FILM, SOLUBLE BUCCAL; SUBLINGUAL at 21:57

## 2019-02-25 RX ADMIN — CLONIDINE HYDROCHLORIDE 0.1 MG: 0.1 TABLET ORAL at 19:30

## 2019-02-25 RX ADMIN — LORAZEPAM 0.5 MG: 0.5 TABLET ORAL at 14:35

## 2019-02-25 RX ADMIN — TOPIRAMATE 150 MG: 100 TABLET, FILM COATED ORAL at 17:00

## 2019-02-25 RX ADMIN — PANCRELIPASE 24000 UNITS: 24000; 76000; 120000 CAPSULE, DELAYED RELEASE PELLETS ORAL at 11:31

## 2019-02-25 RX ADMIN — LORAZEPAM 0.5 MG: 0.5 TABLET ORAL at 07:52

## 2019-02-25 RX ADMIN — GABAPENTIN 400 MG: 400 CAPSULE ORAL at 11:26

## 2019-02-25 RX ADMIN — ONDANSETRON 4 MG: 2 INJECTION INTRAMUSCULAR; INTRAVENOUS at 18:40

## 2019-02-25 RX ADMIN — HEPARIN SODIUM 5000 UNITS: 5000 INJECTION INTRAVENOUS; SUBCUTANEOUS at 21:07

## 2019-02-25 RX ADMIN — PANCRELIPASE 24000 UNITS: 24000; 76000; 120000 CAPSULE, DELAYED RELEASE PELLETS ORAL at 16:59

## 2019-02-25 RX ADMIN — TOPIRAMATE 150 MG: 100 TABLET, FILM COATED ORAL at 11:26

## 2019-02-25 RX ADMIN — ONDANSETRON 4 MG: 2 INJECTION INTRAMUSCULAR; INTRAVENOUS at 12:47

## 2019-02-25 RX ADMIN — PHENOBARBITAL 64.8 MG: 64.8 TABLET ORAL at 17:00

## 2019-02-25 RX ADMIN — BUPRENORPHINE HYDROCHLORIDE, NALOXONE HYDROCHLORIDE 1 FILM: 8; 2 FILM, SOLUBLE BUCCAL; SUBLINGUAL at 15:20

## 2019-02-25 RX ADMIN — BUPRENORPHINE HYDROCHLORIDE, NALOXONE HYDROCHLORIDE 1 FILM: 8; 2 FILM, SOLUBLE BUCCAL; SUBLINGUAL at 09:22

## 2019-02-25 RX ADMIN — ONDANSETRON 4 MG: 2 INJECTION INTRAMUSCULAR; INTRAVENOUS at 07:26

## 2019-02-25 NOTE — PROGRESS NOTES
Progress Note - Neurology   Justo Henson 33 y o  female MRN: [id number]  Unit/Bed#: [facility] 720-01 Encounter: [id number]        Assessment/Plan :  3  34 yo female with seizure disorder: etiology unclear if epileptic vs non-epileptic spells in the setting of ETOH abuse and increased life stressors  Patient titrated off gabapentin and Topamax while on vEEG, but continued on phenobarb over the weekend  Today, RN reporting patient with headache and reporting not tolerating wean from medications  - Discussed with Epileptologist, vEEG remains normal with some diffuse slowing likely due to medication effect of benzodiazapines and barbituates  - Continue vEEG for 24 hours  - Called to discuss plan of care with patient's  [facility] Neurologist Dr Malia Peng [phone]; Will continue with current medication regimen Topamax, gabapentin, and attempt to start weaning phenobarbital while on vEEG  - restarted Topamax 150 mg BID and Gabapentin 400 mg TID  - will discuss titration of phenobarbital with Attending Neurologist; current dose Phenobarbital  64 8 mg BID   - recommend follow up with outpatient psychiatrist, and counselor for CBT for multiple life stressors    2  Chronic ETOH abuse: ethanol level 324  on admission; patient reports withdrawal symptoms feeling generally unwell with headache, diarrhea, poor concentration and anxiety  - continued on CIWA protocol  - continued on thiamine and folic acid  - management per Primary team  - IV maintenance fluids initiated  - started on on clonidine 0 1 mg PRN for withdrawal symptoms    3  H/o former substance abuse:  - continued on home suboxone    4  Tobacco use:   - continued on Nicoderm trasdermal  - recommend tobacco cessation education      Subjective:   No acute events overnight  RN reporting patient and reporting headache  States patient believes it's due to titration of medications  Patient tearful, reporting that she feels generally unwell   Reports chronic alcohol abuse worsening after the recent death of her sister  She currently feels like she is in withdrawal with headache, diarrhea, poor concentration, and anxiety  She is concerned with her medication being stopped  She reports no seizure like episodes thus far in hospital and states she feels safe here  Patient requested, this examiner return to room for discussion of additional Ativan dose for withdrawal symptoms of increased pain, agitation, diaphoresis, and "shaking all over"  Discussed with patient clonidine was added to her PRN medication for withdrawal symptoms along with CIWA protocol  Also, discussed gabapentin and topamax were also restarted for headache and pain  Counseled patient on safe administration of multiple sedation medications, CIWA protocol, and monitoring of vital signs  Per CIWA protocol, Ativan 0 5mg PO can be given but with current vital signs, an additional Ativan dose can not be safetly administered at this time  Vitals: Blood pressure 112/51, pulse 58, temperature 98 4 °F (36 9 °C), temperature source Oral, resp  rate 18, height 5' 7" (1 702 m), weight 77 5 kg (170 lb 13 7 oz), SpO2 98 %, not currently breastfeeding  ,Body mass index is 26 76 kg/m²      MEDS:    Current Facility-Administered Medications:  acetaminophen 650 mg Oral Q6H PRN Berenice Whyte MD   buprenorphine-naloxone 1 Film Sublingual Daily Berenice Whyte MD   citalopram 20 mg Oral Daily Berenice Whyte MD   folic acid 1 mg Oral Daily Berenice Whyte MD   heparin (porcine) 5,000 Units Subcutaneous [facility] Berenice Whyte MD   LORazepam 2 mg Intravenous Q4H PRN Berenice Whyte MD   LORazepam 0 5 mg Oral Q6H PRN Berenice Whyte MD   methocarbamol 500 mg Oral Q6H PRN Berenice Whyte MD   nicotine 14 mg Transdermal Daily Berenice Whyte MD   ondansetron 4 mg Intravenous Q6H PRN Berenice Whyte MD   pancrelipase (Lip-Prot-Amyl) 24,000 Units Oral TID With Meals Berenice Whyte MD   PHENobarbital 64 8 mg Oral BID Berenice Whyte MD thiamine 100 mg Oral Daily Fern Desir MD     Physical Exam   Constitutional: She is oriented to person, place, and time  Tearful, but well appearing   HENT:   Head: Normocephalic and atraumatic  Mouth/Throat: Oropharynx is clear and moist    Eyes: Pupils are equal, round, and reactive to light  EOM are normal    Cardiovascular: Normal rate, regular rhythm, normal heart sounds and intact distal pulses  No murmur heard  Pulmonary/Chest: Effort normal and breath sounds normal  No respiratory distress  Musculoskeletal: Normal range of motion  She exhibits no edema  Neurological: She is alert and oriented to person, place, and time  She has normal strength  She has a normal Finger-Nose-Finger Test    Skin: Skin is warm and dry  Psychiatric: Her speech is normal    Vitals reviewed  Neurologic Exam     Mental Status   Oriented to person, place, and time  Attention: normal  Concentration: normal    Speech: speech is normal   Level of consciousness: alert    Cranial Nerves   Cranial nerves II through XII intact  CN III, IV, VI   Pupils are equal, round, and reactive to light  Extraocular motions are normal      Motor Exam   Muscle bulk: normal  Overall muscle tone: normal    Strength   Strength 5/5 throughout  Sensory Exam   Light touch normal      Gait, Coordination, and Reflexes     Coordination   Finger to nose coordination: normal  Trace tremor bilateral upper extremites       Lab Results:   I have personally reviewed pertinent reports      CBC:   Results from last 7 days   Lab Units 02/24/19  0544 02/23/19 0453 02/22/19  0619   WBC Thousand/uL 4 29* 4 33 5 20   RBC Million/uL 4 32 4 00 4 44   HEMOGLOBIN g/dL 11 9 10 9* 12 0   HEMATOCRIT % 37 3 34 7* 38 5   MCV fL 86 87 87   PLATELETS Thousands/uL 157 127* 164   BMP/CMP:   Results from last 7 days   Lab Units 02/24/19  0544 02/23/19  0453 02/22/19  1836  02/22/19  0619 02/22/19  0043   SODIUM mmol/L 135* 138 137   < > 141 141 POTASSIUM mmol/L 3 9 3 5 3 8   < > 4 0 3 8   CHLORIDE mmol/L 108 110* 108   < > 114* 106   CO2 mmol/L 22 22 22   < > 17* 18*   BUN mg/dL 9 6 6   < > 7 6   CREATININE mg/dL 0 59* 0 44* 0 45*   < > 0 41* 0 51*   CALCIUM mg/dL 8 6 7 5* 7 3*   < > 7 1* 7 8*   AST U/L  --   --   --   --  36 39   ALT U/L  --   --   --   --  30 33   ALK PHOS U/L  --   --   --   --  114 129*   EGFR ml/min/1 73sq m 126 139 138   < > 142 132    < > = values in this interval not displayed  , Vitamin B12:   , HgBA1C:   , TSH:   , Coagulation:   , Lipid Profile:   , Ammonia:   , Urinalysis:   Results from last 7 days   Lab Units 02/22/19  0053   COLOR UA  Yellow  Yellow   CLARITY UA  Clear   SPEC GRAV UA  <=1 005   PH UA  5 5   LEUKOCYTES UA  Negative   NITRITE UA  Negative   GLUCOSE UA mg/dl Negative   KETONES UA mg/dl Negative   BILIRUBIN UA  Negative   BLOOD UA  Negative   , Drug Screen:   Results from last 7 days   Lab Units 02/22/19  0053   BARBITURATE UR  Positive*   BENZODIAZEPINE UR  Negative   THC UR  Negative   COCAINE UR  Negative   METHADONE URINE  Negative   OPIATE UR  Negative   PCP UR  Negative   , Medication Drug Levels:   Results from last 7 days   Lab Units 02/22/19  0232   PHENYTOIN LVL ug/mL 0 4*       Imaging Studies: I have personally reviewed pertinent reports  and I have personally reviewed pertinent films in PACS     2/22/19 CT cervical spine:  IMPRESSION:   No cervical spine fracture or traumatic malalignment  02/22/2019 CT head without contrast  IMPRESSION:   No intracranial hemorrhage or calvarial fracture  EEG, Pathology, and Other Studies: I have personally reviewed pertinent reports  and I have personally reviewed pertinent films in PACS    02/24/2019 continuous video EEG monitoring 24 hour:  Interpretation:   Normal 24 hour continuous video-EEG recording  The continuous diffuse low amplitude beta activity is most likely medication effect from benzodiazepines and barbiturates    Ariel Marley MD [address] [facility]    2/23/19 continuous video EEG monitoring 24 hour:  Interpretation:   Mildly abnormal 21 hour continuous video-EEG recording, due to mild background irregularities and intermixed theta slowing  These findings may indicate mild diffuse cerebral dysfunction or may just be due to drowsiness      No focal abnormalities nor interictal epileptiform discharges were noted  No electrographic seizures occurred during the recording  Orlando Rodarte, [address] [facility]    VTE Prophylaxis: Sequential compression device (Venodyne)  and Heparin     Counseling / Coordination of Care  Total time spent today 60 minutes  Greater than 50% of total time was spent with the patient and / or family counseling and / or coordination of care  A description of the counseling / coordination of care: coordination of care with outpatient Neurologist Dr Donavan Dalton, RN, and Charge Nurse  Discussion with patient CIWA protocol and counseled on patient safety with administration of multiple sedation medication, monitoring vital signs   Additional doses of Ativan can not be safely administered at this time

## 2019-02-25 NOTE — PROGRESS NOTES
Progress Note - Sinai Morse 1990, 33 y o  female MRN: [id number]    Unit/Bed#: [facility] 720-01 Encounter: [id number]    Primary Care Provider: Suyapa Balderrama DO   Date and time admitted to hospital: 2/22/2019  4:16 AM        * Seizures [facility])  Assessment & Plan  Patient is being monitored on video EEG  Discussed with Neurology      Anxiety disorder  Assessment & Plan  Ativan p r n  Patient was reassured  Outpatient follow-up with Psychiatry    Polysubstance abuse [facility])  Assessment & Plan  Monitor  HOST referral discussed with case management    Called [facility] and spoke to RN - patient is on Suboxone 3 times daily, medication has been updated inpatient    Toxic metabolic encephalopathy  Assessment & Plan  Resolved  Monitor    Hypokalemia  Assessment & Plan  Replete monitor    Alcohol abuse  Assessment & Plan  CIWA protocol in place  Ativan for withdrawal  Will placed on clonidine 0 1 mg q 8 hours as needed  IV fluids  Supportive care         VTE Pharmacologic Prophylaxis:   Pharmacologic: Heparin  Mechanical VTE Prophylaxis in Place: Yes    Patient Centered Rounds: I have performed bedside rounds with nursing staff today  Discussions with Specialists or Other Care Team Provider:  Neurology    Education and Discussions with Family / Patient:  Discussed the patient, called and updated father Anabelle Zelaya over phone in detail    Time Spent for Care: 45 minutes  More than 50% of total time spent on counseling and coordination of care as described above      Current Length of Stay: 3 day(s)    Current Patient Status: Inpatient   Certification Statement: The patient will continue to require additional inpatient hospital stay due to As mentioned    Discharge Plan:  Awaiting clinical symptomatic improvement    Code Status: Level 1 - Full Code      Subjective:     Comfortably lying in bed  Reports feeling anxious, patient is tearful  Reports nausea  She is requesting medications for her withdrawal symptoms  Extended care coordination Rounds along with Neurology and RN at bedside      Objective:     Vitals:   Temp (24hrs), Av 6 °F (37 °C), Min:98 2 °F (36 8 °C), Max:99 °F (37 2 °C)    Temp:  [98 2 °F (36 8 °C)-99 °F (37 2 °C)] 98 2 °F (36 8 °C)  HR:  [58-82] 64  Resp:  [18] 18  BP: (109-140)/(51-84) 122/71  SpO2:  [96 %-99 %] 97 %  Body mass index is 26 76 kg/m²  Input and Output Summary (last 24 hours): Intake/Output Summary (Last 24 hours) at 2019 1415  Last data filed at 2019 1322  Gross per 24 hour   Intake 360 ml   Output 1000 ml   Net -640 ml       Physical Exam:     Physical Exam    Comfortably in bed  Patient is tearful  Neck supple  Mucous members are moist  Lungs clear to auscultation diminished breath sounds bases  Heart sounds S1 and S2 noted  Abdomen soft  Awake obeys simple commands  No rash  Pulses noted  No pedal edema      Additional Data:     Labs:    Results from last 7 days   Lab Units 19  0544   WBC Thousand/uL 4 29*   HEMOGLOBIN g/dL 11 9   HEMATOCRIT % 37 3   PLATELETS Thousands/uL 157   NEUTROS PCT % 45   LYMPHS PCT % 42   MONOS PCT % 11   EOS PCT % 2     Results from last 7 days   Lab Units 19  0544  19  0619   SODIUM mmol/L 135*   < > 141   POTASSIUM mmol/L 3 9   < > 4 0   CHLORIDE mmol/L 108   < > 114*   CO2 mmol/L 22   < > 17*   BUN mg/dL 9   < > 7   CREATININE mg/dL 0 59*   < > 0 41*   ANION GAP mmol/L 5   < > 10   CALCIUM mg/dL 8 6   < > 7 1*   ALBUMIN g/dL  --   --  3 4*   TOTAL BILIRUBIN mg/dL  --   --  0 18*   ALK PHOS U/L  --   --  114   ALT U/L  --   --  30   AST U/L  --   --  36   GLUCOSE RANDOM mg/dL 98   < > 89    < > = values in this interval not displayed  Results from last 7 days   Lab Units 19  0704   POC GLUCOSE mg/dl 86         Results from last 7 days   Lab Units 19  0619   LACTIC ACID mmol/L 1 3           * I Have Reviewed All Lab Data Listed Above  * Additional Pertinent Lab Tests Reviewed:  All Labs Within Last 24 Hours Reviewed    Imaging:    Imaging Reports Reviewed Today Include:   Imaging Personally Reviewed by Myself Includes:    Recent Cultures (last 7 days):           Last 24 Hours Medication List:     Current Facility-Administered Medications:  acetaminophen 650 mg Oral Q6H PRN Iraj Palomo MD    buprenorphine-naloxone 1 Film Sublingual TID Yvette Alford MD    citalopram 20 mg Oral Daily Iraj Palomo MD    cloNIDine 0 1 mg Oral Q12H PRN Yvette Alford MD    folic acid 1 mg Oral Daily Iraj Palomo MD    gabapentin 400 mg Oral BID DESHAWN Mcnulty    heparin (porcine) 5,000 Units Subcutaneous [facility] Iraj Palomo MD    LORazepam 2 mg Intravenous Q4H PRN Iraj Palomo MD    LORazepam 0 5 mg Oral Q6H PRN Iraj Palomo MD    methocarbamol 500 mg Oral Q6H PRN Iraj Palomo MD    nicotine 14 mg Transdermal Daily Iraj Palomo MD    ondansetron 4 mg Intravenous Q4H PRN Yvette Alford MD    pancrelipase (Lip-Prot-Amyl) 24,000 Units Oral TID With Meals Iraj Palomo MD    PHENobarbital 64 8 mg Oral BID Iraj Palomo MD    sodium chloride 125 mL/hr Intravenous Continuous Yvette Alford MD Last Rate: 125 mL/hr (02/25/19 1110)   thiamine 100 mg Oral Daily Iraj Palomo MD    topiramate 150 mg Oral BID DESHAWN Mcnulty         Today, Patient Was Seen By: Yvette Alford MD    ** Please Note: Dictation voice to text software may have been used in the creation of this document   **

## 2019-02-25 NOTE — PROGRESS NOTES
Patient was very emotional and anxious at change of shift relating to her med management  According to previous nurse she was calm and scored a 5 for CIWA at 1830  Post assessment, the patient was very upset that she was not going to be given the 2mg of Ativan for withdrawal  At change of shift the patient was crying, very upset, anxious, and agitated  She reported a severe headache and nausea  Patient had been on the phone with her dad, and her dad had been in contact with the charge nurse  CIWA assessment was not due, but if I were to score the patient, she would have been about a 14  The patient had recently ([phone])  been given 0 5mg of Ativan for anxiety, SLIM paged  1 mg of IV ativan given once to aid the patient's symptoms until next CIWA assessment  The charge nurse, Annamarie, called the patient's father back and explained the situation and our interventions  The patient is now resting  Will continue to monitor

## 2019-02-25 NOTE — SOCIAL WORK
Met with pt and explained Cm role  Pt stated taht she was staying with her boyfriend in an apt but she stated taht her boyfriend has gotten rid of the apt since she was in the hospital   Pt reported that she will be staying with her parents in a 2 story house with [address] and [address] and BD on 2nd floor after dc  Pt was independent PTA and does not drive  Pt's PCP is Dr Felicita Alvares  No DME avail  No hx of HHC or rehab  Pt has a hx of anxiety, depression, and bipolar  She stated that she has had an inpt [address] stay in 2016 at [place] unit  Pt stated taht she was at [facility] about a yr and a half ago for heroin and opiate use and she has not used since  She stated that she has been using alcohol recently since her sister passed away and she has been drinking over a case of beer a day  Pt stated that she has an I[employer], but she is unsure of the agency she works for  Pt has a prescription plan and uses [facility] on [address] in [facility] for her prescriptions  Primary contact is pt's dad Trish Avila, contact # [phone]  Pt's dad will provide pt with transportation home upon discharge  Pt does not have a POA  CM offered [facility]   Pt was agreeable to the program       CM contacted [facility] and spoke with Pool Lozano to make referral       CM reviewed d/c planning process including the following: identifying help at home, patient preference for d/c planning needs, Discharge Lounge, [facility] program, availability of treatment team to discuss questions or concerns patient and/or family may have regarding understanding medications and recognizing signs and symptoms once discharged  CM also encouraged patient to follow up with all recommended appointments after discharge  Patient advised of importance for patient and family to participate in managing patients medical well being  Patient/caregiver received discharge checklist  Content reviewed   Patient/caregiver encouraged to participate in discharge plan of care prior to discharge home

## 2019-02-25 NOTE — ASSESSMENT & PLAN NOTE
CIWA protocol in place  Ativan for withdrawal  Will placed on clonidine 0 1 mg q 8 hours as needed  IV fluids  Supportive care

## 2019-02-25 NOTE — SOCIAL WORK
CM received t/c from pt's father/emergency contact Beryle Ducking re: request for new nurse due to pt being upset with current nurse  Advised father that request would be given to charge nurse  CM made charge nurse aware and provided father's number for c/b  Subsequent t/c from pt's father who was increasingly agitated regarding additional calls from pt  Father given update that per charge nurse, pt has new nurse following 7:00pm shift change  Father offered phone number for hospital supervisor regarding additional concerns  Father declined this number and advised he will f/u on Monday

## 2019-02-25 NOTE — PLAN OF CARE
Problem: Potential for Falls  Goal: Patient will remain free of falls  Description  INTERVENTIONS:  - Assess patient frequently for physical needs  -  Identify cognitive and physical deficits and behaviors that affect risk of falls    -  [place] fall precautions as indicated by assessment   - Educate patient/family on patient safety including physical limitations  - Instruct patient to call for assistance with activity based on assessment  - Modify environment to reduce risk of injury  - Consider OT/PT consult to assist with strengthening/mobility  Outcome: Progressing     Problem: Prexisting or High Potential for Compromised Skin Integrity  Goal: Skin integrity is maintained or improved  Description  INTERVENTIONS:  - Identify patients at risk for skin breakdown  - Assess and monitor skin integrity  - Assess and monitor nutrition and hydration status  - Monitor labs (i e  albumin)  - Assess for incontinence   - Turn and reposition patient  - Assist with mobility/ambulation  - Relieve pressure over bony prominences  - Avoid friction and shearing  - Provide appropriate hygiene as needed including keeping skin clean and dry  - Evaluate need for skin moisturizer/barrier cream  - Collaborate with interdisciplinary team (i e  Nutrition, Rehabilitation, etc )   - Patient/family teaching  Outcome: Progressing     Problem: PAIN - ADULT  Goal: Verbalizes/displays adequate comfort level or baseline comfort level  Description  Interventions:  - Encourage patient to monitor pain and request assistance  - Assess pain using appropriate pain scale  - Administer analgesics based on type and severity of pain and evaluate response  - Implement non-pharmacological measures as appropriate and evaluate response  - Consider social influences on pain and pain management  - Notify physician/advanced practitioner if interventions unsuccessful or patient reports new pain   Outcome: Progressing     Problem: SAFETY ADULT  Goal: Maintain or return to baseline ADL function  Description  INTERVENTIONS:  -  Assess patient's ability to carry out ADLs; assess patient's baseline for ADL function and identify physical deficits which impact ability to perform ADLs (bathing, care of mouth/teeth, toileting, grooming, dressing, etc )  - Assess/evaluate cause of self-care deficits   - Assess range of motion  - Assess patient's mobility; develop plan if impaired  - Assess patient's need for assistive devices and provide as appropriate  - Encourage maximum independence but intervene and supervise when necessary  ¯ Involve family in performance of ADLs  ¯ Assess for home care needs following discharge   ¯ Request OT consult to assist with ADL evaluation and planning for discharge  ¯ Provide patient education as appropriate  Outcome: Progressing  Goal: Maintain or return mobility status to optimal level  Description  INTERVENTIONS:  - Assess patient's baseline mobility status (ambulation, transfers, stairs, etc )    - Identify cognitive and physical deficits and behaviors that affect mobility  - Identify mobility aids required to assist with transfers and/or ambulation (gait belt, sit-to-stand, lift, walker, cane, etc )  - [place] fall precautions as indicated by assessment  - Record patient progress and toleration of activity level on Mobility SBAR; progress patient to next Phase/Stage  - Instruct patient to call for assistance with activity based on assessment  - Request Rehabilitation consult to assist with strengthening/weightbearing, etc   Outcome: Progressing     Problem: DISCHARGE PLANNING  Goal: Discharge to home or other facility with appropriate resources  Description  INTERVENTIONS:  - Identify barriers to discharge w/patient and caregiver  - Arrange for needed discharge resources and transportation as appropriate  - Identify discharge learning needs (meds, wound care, etc )  - Refer to Case Management Department for coordinating discharge planning if the patient needs post-hospital services based on physician/advanced practitioner order or complex needs related to functional status, cognitive ability, or social support system   Outcome: Progressing     Problem: Knowledge Deficit  Goal: Patient/family/caregiver demonstrates understanding of disease process, treatment plan, medications, and discharge instructions  Description  Complete learning assessment and assess knowledge base    Interventions:  - Provide teaching at level of understanding  - Provide teaching via preferred learning methods  Outcome: Progressing     Problem: INFECTION - ADULT  Goal: Absence or prevention of progression during hospitalization  Description  INTERVENTIONS:  - Assess and monitor for signs and symptoms of infection  - Monitor lab/diagnostic results  - Monitor all insertion sites, i e  indwelling lines, tubes, and drains  - [place] appropriate cooling/warming therapies per order  - Administer medications as ordered  - Instruct and encourage patient and family to use good hand hygiene technique  - Identify and instruct in appropriate isolation precautions for identified infection/condition   Outcome: Progressing     Problem: SUBSTANCE USE/ABUSE  Goal: Will have no detox symptoms and will verbalize plan for changing substance-related behavior  Description  INTERVENTIONS:  - Monitor physical status and assess for symptoms of withdrawal  - Administer medication as ordered  - Provide emotional support with 1 on 1 interaction with staff  - Encourage recovery focused program/ addiction education  - Assess for verbalization of changing behaviors related to substance abuse  - Initiate consults and referrals as appropriate (Case Management, Spiritual Care, etc )  Outcome: Progressing  Goal: By discharge, will develop insight into their chemical dependency and sustain motivation to continue in recovery  Description  INTERVENTIONS:  - Attends all daily group sessions and scheduled AA groups  - Actively practices coping skills through participation in the therapeutic community and adherence to program rules  - Reviews and completes assignments from individual treatment plan  - Assist patient development of understanding of their personal cycle of addiction and relapse triggers  Outcome: Progressing  Goal: By discharge, patient will have ongoing treatment plan addressing chemical dependency  Description  INTERVENTIONS:  - Assist patient with resources and/or appointments for ongoing recovery based living  Outcome: Progressing     Problem: COPING  Goal: Will report anxiety at manageable levels  Description  INTERVENTIONS:  - Administer medication as ordered  - Teach and encourage coping skills  - Provide emotional support  - Assess patient/family for anxiety and ability to cope  Outcome: Progressing

## 2019-02-25 NOTE — ASSESSMENT & PLAN NOTE
Monitor  HOST referral discussed with case management    Called [facility] and spoke to RN - patient is on Suboxone 3 times daily, medication has been updated inpatient
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
## 2019-02-26 ENCOUNTER — APPOINTMENT (INPATIENT)
Dept: NEUROLOGY | Facility: AMBULATORY SURGERY CENTER | Age: 29
DRG: 053 | End: 2019-02-26
Payer: COMMERCIAL

## 2019-02-26 PROCEDURE — 95951 PR EEG MONITORING/VIDEORECORD: CPT | Performed by: PSYCHIATRY & NEUROLOGY

## 2019-02-26 PROCEDURE — 99232 SBSQ HOSP IP/OBS MODERATE 35: CPT | Performed by: FAMILY MEDICINE

## 2019-02-26 PROCEDURE — 95951 HB EEG MONITORING/VIDEORECORD: CPT

## 2019-02-26 PROCEDURE — 99232 SBSQ HOSP IP/OBS MODERATE 35: CPT | Performed by: PSYCHIATRY & NEUROLOGY

## 2019-02-26 RX ORDER — NICOTINE 21 MG/24HR
14 PATCH, TRANSDERMAL 24 HOURS TRANSDERMAL DAILY
Status: DISCONTINUED | OUTPATIENT
Start: 2019-02-26 | End: 2019-02-27 | Stop reason: HOSPADM

## 2019-02-26 RX ADMIN — GABAPENTIN 400 MG: 400 CAPSULE ORAL at 17:51

## 2019-02-26 RX ADMIN — BUPRENORPHINE HYDROCHLORIDE, NALOXONE HYDROCHLORIDE 1 FILM: 8; 2 FILM, SOLUBLE BUCCAL; SUBLINGUAL at 16:40

## 2019-02-26 RX ADMIN — PHENOBARBITAL 32.4 MG: 64.8 TABLET ORAL at 10:40

## 2019-02-26 RX ADMIN — FOLIC ACID 1 MG: 1 TABLET ORAL at 10:36

## 2019-02-26 RX ADMIN — CLONIDINE HYDROCHLORIDE 0.1 MG: 0.1 TABLET ORAL at 11:44

## 2019-02-26 RX ADMIN — CLONIDINE HYDROCHLORIDE 0.1 MG: 0.1 TABLET ORAL at 19:59

## 2019-02-26 RX ADMIN — METHOCARBAMOL 500 MG: 500 TABLET, FILM COATED ORAL at 03:43

## 2019-02-26 RX ADMIN — NICOTINE 14 MG: 14 PATCH TRANSDERMAL at 10:32

## 2019-02-26 RX ADMIN — PANCRELIPASE 24000 UNITS: 24000; 76000; 120000 CAPSULE, DELAYED RELEASE PELLETS ORAL at 17:51

## 2019-02-26 RX ADMIN — BUPRENORPHINE HYDROCHLORIDE, NALOXONE HYDROCHLORIDE 1 FILM: 8; 2 FILM, SOLUBLE BUCCAL; SUBLINGUAL at 21:57

## 2019-02-26 RX ADMIN — HEPARIN SODIUM 5000 UNITS: 5000 INJECTION INTRAVENOUS; SUBCUTANEOUS at 14:04

## 2019-02-26 RX ADMIN — ACETAMINOPHEN 650 MG: 325 TABLET ORAL at 20:04

## 2019-02-26 RX ADMIN — LORAZEPAM 0.5 MG: 0.5 TABLET ORAL at 22:54

## 2019-02-26 RX ADMIN — THIAMINE HCL TAB 100 MG 100 MG: 100 TAB at 10:36

## 2019-02-26 RX ADMIN — PHENOBARBITAL 32.4 MG: 64.8 TABLET ORAL at 17:54

## 2019-02-26 RX ADMIN — CLONIDINE HYDROCHLORIDE 0.1 MG: 0.1 TABLET ORAL at 03:44

## 2019-02-26 RX ADMIN — SODIUM CHLORIDE 125 ML/HR: 0.9 INJECTION, SOLUTION INTRAVENOUS at 06:26

## 2019-02-26 RX ADMIN — ONDANSETRON 4 MG: 2 INJECTION INTRAMUSCULAR; INTRAVENOUS at 11:44

## 2019-02-26 RX ADMIN — LORAZEPAM 0.5 MG: 0.5 TABLET ORAL at 03:06

## 2019-02-26 RX ADMIN — PANCRELIPASE 24000 UNITS: 24000; 76000; 120000 CAPSULE, DELAYED RELEASE PELLETS ORAL at 11:44

## 2019-02-26 RX ADMIN — ACETAMINOPHEN 650 MG: 325 TABLET ORAL at 00:11

## 2019-02-26 RX ADMIN — BUPRENORPHINE HYDROCHLORIDE, NALOXONE HYDROCHLORIDE 1 FILM: 8; 2 FILM, SOLUBLE BUCCAL; SUBLINGUAL at 10:33

## 2019-02-26 RX ADMIN — LORAZEPAM 0.5 MG: 0.5 TABLET ORAL at 15:15

## 2019-02-26 RX ADMIN — CITALOPRAM HYDROBROMIDE 20 MG: 20 TABLET ORAL at 10:34

## 2019-02-26 RX ADMIN — TOPIRAMATE 150 MG: 100 TABLET, FILM COATED ORAL at 10:34

## 2019-02-26 RX ADMIN — GABAPENTIN 400 MG: 400 CAPSULE ORAL at 10:33

## 2019-02-26 RX ADMIN — TOPIRAMATE 150 MG: 100 TABLET, FILM COATED ORAL at 17:51

## 2019-02-26 RX ADMIN — NICOTINE 14 MG: 14 PATCH TRANSDERMAL at 18:15

## 2019-02-26 NOTE — PROGRESS NOTES
Progress Note - Mouna Rodriguez 1990, 33 y o  female MRN: [id number]    Unit/Bed#: [facility] 720-01 Encounter: [id number]    Primary Care Provider: Lenard Bourne DO   Date and time admitted to hospital: 2019  4:16 AM        Anxiety disorder  Assessment & Plan  Ativan p r n  Patient was reassured  Outpatient follow-up with Psychiatry    Polysubstance abuse [facility])  Assessment & Plan  Monitor  HOST referral discussed with case management  - patient is on Suboxone 3 times daily, medication has been updated inpatient  Waiting for inpatient rehab placement    Toxic metabolic encephalopathy  Assessment & Plan  Resolved  Monitor    Alcohol abuse  Assessment & Plan  CIWA protocol in place  Ativan for withdrawal  Continue with the clonidine  Supportive care  Patient is waiting  to go to rehab      * Seizures [facility])  Assessment & Plan  Patient is currently off of the video EEG  Neurology med adjustment in her medications  Monitor for now          VTE Pharmacologic Prophylaxis:   Pharmacologic: Enoxaparin (Lovenox)  Mechanical VTE Prophylaxis in Place: Yes    Patient Centered Rounds: I have performed bedside rounds with nursing staff today  Discussions with Specialists or Other Care Team Provider:     Education and Discussions with Family / Patient:  Patient    Time Spent for Care: 30 minutes  More than 50% of total time spent on counseling and coordination of care as described above  Current Length of Stay: 4 day(s)    Current Patient Status: Inpatient   Certification Statement: The patient will continue to require additional inpatient hospital stay due to Pending rehab placement    Discharge Plan:     Code Status: Level 1 - Full Code      Subjective:   Patient seen and examined  Patient reported that she is still withdrawing but improving  Using the clonidine    Patient do not appear to be tachycardic or tremulous    Objective:     Vitals:   Temp (24hrs), Av 2 °F (36 8 °C), Min:97 5 °F (36 4 °C), Max:98 7 °F (37 1 °C)    Temp:  [97 5 °F (36 4 °C)-98 7 °F (37 1 °C)] 98 7 °F (37 1 °C)  HR:  [55-69] 69  Resp:  [16] 16  BP: ()/(52-83) 113/62  SpO2:  [96 %-100 %] 97 %  Body mass index is 28 66 kg/m²  Input and Output Summary (last 24 hours): Intake/Output Summary (Last 24 hours) at 2/26/2019 1656  Last data filed at 2/26/2019 1116  Gross per 24 hour   Intake 1660 ml   Output 3400 ml   Net -1740 ml       Physical Exam:     Physical Exam   Constitutional: She is oriented to person, place, and time  She appears well-developed and well-nourished  No distress  HENT:   Head: Normocephalic and atraumatic  Eyes: Pupils are equal, round, and reactive to light  EOM are normal  Left eye exhibits no discharge  Neck: Normal range of motion  Neck supple  No thyromegaly present  Cardiovascular: Normal rate and regular rhythm  Pulmonary/Chest: Effort normal and breath sounds normal  No stridor  No respiratory distress  Abdominal: Soft  Bowel sounds are normal  She exhibits no distension  There is no tenderness  Musculoskeletal: Normal range of motion  She exhibits no edema  Neurological: She is alert and oriented to person, place, and time  No cranial nerve deficit  Skin: Skin is warm and dry  Additional Data:     Labs:    Results from last 7 days   Lab Units 02/24/19  0544   WBC Thousand/uL 4 29*   HEMOGLOBIN g/dL 11 9   HEMATOCRIT % 37 3   PLATELETS Thousands/uL 157   NEUTROS PCT % 45   LYMPHS PCT % 42   MONOS PCT % 11   EOS PCT % 2     Results from last 7 days   Lab Units 02/24/19  0544  02/22/19  0619   POTASSIUM mmol/L 3 9   < > 4 0   CHLORIDE mmol/L 108   < > 114*   CO2 mmol/L 22   < > 17*   BUN mg/dL 9   < > 7   CREATININE mg/dL 0 59*   < > 0 41*   CALCIUM mg/dL 8 6   < > 7 1*   ALK PHOS U/L  --   --  114   ALT U/L  --   --  30   AST U/L  --   --  36    < > = values in this interval not displayed  * I Have Reviewed All Lab Data Listed Above    * Additional Pertinent Lab Tests Reviewed: [facility] 67 Admission Reviewed    Imaging:    Imaging Reports Reviewed Today Include:   Imaging Personally Reviewed by Myself Includes:      Recent Cultures (last 7 days):           Last 24 Hours Medication List:     Current Facility-Administered Medications:  acetaminophen 650 mg Oral Q6H PRN Fern Desir MD    buprenorphine-naloxone 1 Film Sublingual TID Mirella Nash MD    citalopram 20 mg Oral Daily Fern Desir MD    cloNIDine 0 1 mg Oral Q8H PRN Joan Cruz PA-C    folic acid 1 mg Oral Daily Fern Desir MD    gabapentin 400 mg Oral BID Parry Bernheim, CRNP    heparin (porcine) 5,000 Units Subcutaneous [facility] Fern Desir MD    LORazepam 2 mg Intravenous Q4H PRN Fern Desir MD    LORazepam 0 5 mg Oral Q6H PRN Fern Desir MD    methocarbamol 500 mg Oral Q6H PRN Fern Desir MD    nicotine 14 mg Transdermal Daily Fern Desir MD    ondansetron 4 mg Intravenous Q4H PRN Mirella Nash MD    pancrelipase (Lip-Prot-Amyl) 24,000 Units Oral TID With Meals Fern Desir MD    PHENobarbital 32 4 mg Oral BID Marko Nunez DO    sodium chloride 125 mL/hr Intravenous Continuous Mirella Nash MD Last Rate: 125 mL/hr (02/26/19 1092)   thiamine 100 mg Oral Daily Fern Desir MD    topiramate 150 mg Oral BID Parry Bernheim, CRNP         Today, Patient Was Seen By: Faviola Monterroso MD    ** Please Note: Dictation voice to text software may have been used in the creation of this document   **

## 2019-02-26 NOTE — ASSESSMENT & PLAN NOTE
Patient is currently off of the video EEG  Neurology med adjustment in her medications  Monitor for now

## 2019-02-26 NOTE — SOCIAL WORK
ELSA called [facility] and spoke to Alida who states admission staff states a bed is available  Alida states a letter is required stating pt is ok to remain on Suboxone during treatment and pt will also  Need to provide 30 days of Suboxone  CM called [facility] and spoke to Corrine in admissions who states case is being reviewed by physician  Corrine will contact CM regarding determination

## 2019-02-26 NOTE — PROGRESS NOTES
Progress Note - Neurology   Jessica Go 33 y o  female MRN: [id number]  Unit/Bed#: [facility] 720-01 Encounter: [id number]    Assessment:  Jessica Go is a 33 y o  female with past medical history of seizure disorder with suspicion for non epileptic spells, alcohol and substance abuse who presented with 3 GTC seizure like episodes  Patient admitted for vEEG to try to capture and characterize events, which was unsuccessful do to lack of events  Plan:  -D/c vEEG, has been unrevealing throughout admission  -Attending spoke with outpatient neurologist to coordinate care, Phenobarbital decreased 32 4mg BID and restarted Topamax 150mg BID and Gabapentin 400mg BID  Follow up with current neurologist as outpatient   -Patient stable for d/c from neurology standpoint to substance/alcohol rehab facility  Subjective:   No acute events overnight  No seizure-like events  Video EEG unremarkable  Patient continues to report her trough symptoms despite normal vital signs  Reports posterior headache, but does not appear uncomfortable  Denies CP, SOB,  dizziness, vision changes, N/V, abdominal pain, weakness or numbness  ROS:  12 point ROS performed, as stated above, all others negative  Medications:   All current active meds have been reviewed and current meds:  Scheduled Meds:  Current Facility-Administered Medications:  acetaminophen 650 mg Oral Q6H PRN Laine Casas MD    buprenorphine-naloxone 1 Film Sublingual TID Giles Santiago MD    citalopram 20 mg Oral Daily Laine Casas MD    cloNIDine 0 1 mg Oral Q8H PRN Joan Cruz PA-C    folic acid 1 mg Oral Daily Laine Casas MD    gabapentin 400 mg Oral BID DESHAWN Guerrero    heparin (porcine) 5,000 Units Subcutaneous [facility] Laine Casas MD    LORazepam 2 mg Intravenous Q4H PRN Laine Casas MD    LORazepam 0 5 mg Oral Q6H PRN Laine Casas MD    methocarbamol 500 mg Oral Q6H PRN Laine Casas MD    nicotine 14 mg Transdermal Daily Makeda Quach Ney Yañez MD    ondansetron 4 mg Intravenous Q4H PRN Uri Pina MD    pancrelipase (Lip-Prot-Amyl) 24,000 Units Oral TID With Meals Seun Shaffer MD    PHENobarbital 32 4 mg Oral BID Marko Nunez,     sodium chloride 125 mL/hr Intravenous Continuous Uri Pina MD Last Rate: 125 mL/hr (02/26/19 1451)   thiamine 100 mg Oral Daily Seun Shaffer MD    topiramate 150 mg Oral BID Shelba Im, CRNP      Continuous Infusions:  sodium chloride 125 mL/hr Last Rate: 125 mL/hr (02/26/19 0626)     PRN Meds:   acetaminophen    cloNIDine    LORazepam    LORazepam    methocarbamol    ondansetron     Vitals: Blood pressure 121/83, pulse 65, temperature 98 4 °F (36 9 °C), temperature source Oral, resp  rate 16, height 5' 7" (1 702 m), weight 83 kg (182 lb 15 7 oz), SpO2 99 %, not currently breastfeeding  ,Body mass index is 28 66 kg/m²  Physical Exam:   Physical Exam   Constitutional: She is oriented to person, place, and time  No distress  HENT:   Head: Normocephalic and atraumatic  Eyes: Pupils are equal, round, and reactive to light  EOM are normal    Neck: Normal range of motion  Neck supple  Cardiovascular: Normal rate and regular rhythm  Pulmonary/Chest: Effort normal    Neurological: She is oriented to person, place, and time  She has normal strength  She has a normal Finger-Nose-Finger Test    Skin: Skin is warm and dry  She is not diaphoretic  Psychiatric: She has a normal mood and affect  Her speech is normal and behavior is normal  Judgment and thought content normal      Neurologic Exam     Mental Status   Oriented to person, place, and time  Attention: normal  Concentration: normal    Speech: speech is normal   Level of consciousness: alert    Cranial Nerves     CN III, IV, VI   Pupils are equal, round, and reactive to light  Extraocular motions are normal      CN V   Facial sensation intact  CN VII   Facial expression full, symmetric       CN VIII   CN VIII normal      CN IX, X   CN IX normal    CN X normal      CN XI   CN XI normal      CN XII   CN XII normal      Motor Exam   Right arm pronator drift: absent  Left arm pronator drift: absent    Strength   Strength 5/5 throughout  Sensory Exam   Light touch normal      Gait, Coordination, and Reflexes     Coordination   Finger to nose coordination: normal    Reflexes   Right plantar: equivocal  Left plantar: equivocal      Lab Results: I have personally reviewed pertinent reports  No results found for this or any previous visit (from the past 24 hour(s))  Imaging Studies: I have personally reviewed pertinent reports and I have personally reviewed pertinent films in PACS  EKG, Pathology, and Other Studies: I have personally reviewed pertinent reports    VTE Prophylaxis: Heparin

## 2019-02-26 NOTE — ASSESSMENT & PLAN NOTE
CIWA protocol in place  Ativan for withdrawal  Continue with the clonidine  Supportive care  Patient is waiting  to go to rehab

## 2019-02-26 NOTE — PROGRESS NOTES
RN handoff report done at bedside with Jose Francisco RN and Karla Ferro RN  Pt aware that she can get her ativan PRN 0 5mg and robaxin PRN at 2030   Will monitor

## 2019-02-26 NOTE — PLAN OF CARE
Problem: Potential for Falls  Goal: Patient will remain free of falls  Description  INTERVENTIONS:  - Assess patient frequently for physical needs  -  Identify cognitive and physical deficits and behaviors that affect risk of falls    -  [place] fall precautions as indicated by assessment   - Educate patient/family on patient safety including physical limitations  - Instruct patient to call for assistance with activity based on assessment  - Modify environment to reduce risk of injury  - Consider OT/PT consult to assist with strengthening/mobility  Outcome: Progressing     Problem: Prexisting or High Potential for Compromised Skin Integrity  Goal: Skin integrity is maintained or improved  Description  INTERVENTIONS:  - Identify patients at risk for skin breakdown  - Assess and monitor skin integrity  - Assess and monitor nutrition and hydration status  - Monitor labs (i e  albumin)  - Assess for incontinence   - Turn and reposition patient  - Assist with mobility/ambulation  - Relieve pressure over bony prominences  - Avoid friction and shearing  - Provide appropriate hygiene as needed including keeping skin clean and dry  - Evaluate need for skin moisturizer/barrier cream  - Collaborate with interdisciplinary team (i e  Nutrition, Rehabilitation, etc )   - Patient/family teaching  Outcome: Progressing     Problem: PAIN - ADULT  Goal: Verbalizes/displays adequate comfort level or baseline comfort level  Description  Interventions:  - Encourage patient to monitor pain and request assistance  - Assess pain using appropriate pain scale  - Administer analgesics based on type and severity of pain and evaluate response  - Implement non-pharmacological measures as appropriate and evaluate response  - Consider social influences on pain and pain management  - Notify physician/advanced practitioner if interventions unsuccessful or patient reports new pain   Outcome: Progressing     Problem: SAFETY ADULT  Goal: Maintain or return to baseline ADL function  Description  INTERVENTIONS:  -  Assess patient's ability to carry out ADLs; assess patient's baseline for ADL function and identify physical deficits which impact ability to perform ADLs (bathing, care of mouth/teeth, toileting, grooming, dressing, etc )  - Assess/evaluate cause of self-care deficits   - Assess range of motion  - Assess patient's mobility; develop plan if impaired  - Assess patient's need for assistive devices and provide as appropriate  - Encourage maximum independence but intervene and supervise when necessary  ¯ Involve family in performance of ADLs  ¯ Assess for home care needs following discharge   ¯ Request OT consult to assist with ADL evaluation and planning for discharge  ¯ Provide patient education as appropriate  Outcome: Progressing  Goal: Maintain or return mobility status to optimal level  Description  INTERVENTIONS:  - Assess patient's baseline mobility status (ambulation, transfers, stairs, etc )    - Identify cognitive and physical deficits and behaviors that affect mobility  - Identify mobility aids required to assist with transfers and/or ambulation (gait belt, sit-to-stand, lift, walker, cane, etc )  - [place] fall precautions as indicated by assessment  - Record patient progress and toleration of activity level on Mobility SBAR; progress patient to next Phase/Stage  - Instruct patient to call for assistance with activity based on assessment  - Request Rehabilitation consult to assist with strengthening/weightbearing, etc   Outcome: Progressing     Problem: DISCHARGE PLANNING  Goal: Discharge to home or other facility with appropriate resources  Description  INTERVENTIONS:  - Identify barriers to discharge w/patient and caregiver  - Arrange for needed discharge resources and transportation as appropriate  - Identify discharge learning needs (meds, wound care, etc )  - Refer to Case Management Department for coordinating discharge planning if the patient needs post-hospital services based on physician/advanced practitioner order or complex needs related to functional status, cognitive ability, or social support system   Outcome: Progressing     Problem: Knowledge Deficit  Goal: Patient/family/caregiver demonstrates understanding of disease process, treatment plan, medications, and discharge instructions  Description  Complete learning assessment and assess knowledge base    Interventions:  - Provide teaching at level of understanding  - Provide teaching via preferred learning methods  Outcome: Progressing     Problem: INFECTION - ADULT  Goal: Absence or prevention of progression during hospitalization  Description  INTERVENTIONS:  - Assess and monitor for signs and symptoms of infection  - Monitor lab/diagnostic results  - Monitor all insertion sites, i e  indwelling lines, tubes, and drains  - [place] appropriate cooling/warming therapies per order  - Administer medications as ordered  - Instruct and encourage patient and family to use good hand hygiene technique  - Identify and instruct in appropriate isolation precautions for identified infection/condition   Outcome: Progressing     Problem: SUBSTANCE USE/ABUSE  Goal: Will have no detox symptoms and will verbalize plan for changing substance-related behavior  Description  INTERVENTIONS:  - Monitor physical status and assess for symptoms of withdrawal  - Administer medication as ordered  - Provide emotional support with 1 on 1 interaction with staff  - Encourage recovery focused program/ addiction education  - Assess for verbalization of changing behaviors related to substance abuse  - Initiate consults and referrals as appropriate (Case Management, Spiritual Care, etc )  Outcome: Progressing  Goal: By discharge, will develop insight into their chemical dependency and sustain motivation to continue in recovery  Description  INTERVENTIONS:  - Attends all daily group sessions and scheduled AA groups  - Actively practices coping skills through participation in the therapeutic community and adherence to program rules  - Reviews and completes assignments from individual treatment plan  - Assist patient development of understanding of their personal cycle of addiction and relapse triggers  Outcome: Progressing  Goal: By discharge, patient will have ongoing treatment plan addressing chemical dependency  Description  INTERVENTIONS:  - Assist patient with resources and/or appointments for ongoing recovery based living  Outcome: Progressing     Problem: COPING  Goal: Will report anxiety at manageable levels  Description  INTERVENTIONS:  - Administer medication as ordered  - Teach and encourage coping skills  - Provide emotional support  - Assess patient/family for anxiety and ability to cope  Outcome: Progressing     Problem: DISCHARGE PLANNING - CARE MANAGEMENT  Goal: Discharge to post-acute care or home with appropriate resources  Description  INTERVENTIONS:  - Conduct assessment to determine patient/family and health care team treatment goals, and need for post-acute services based on payer coverage, community resources, and patient preferences, and barriers to discharge  - Address psychosocial, clinical, and financial barriers to discharge as identified in assessment in conjunction with the patient/family and health care team  - Arrange appropriate level of post-acute services according to patient's   needs and preference and payer coverage in collaboration with the physician and health care team  - Communicate with and update the patient/family, physician, and health care team regarding progress on the discharge plan  - Arrange appropriate transportation to post-acute venues  - Pt will be discharged to home self care   Outcome: Progressing

## 2019-02-26 NOTE — SOCIAL WORK
ELSA spoke to Sadie Carpenter with [facility] who states pt is recommended for for level 4WM--Medically Managed Inpatient Detox program   Sadie Carpenter requested CM fax clinicals to [facility] fax# [phone] and [facility] fax# [phone]  Fax sent to both

## 2019-02-27 VITALS
SYSTOLIC BLOOD PRESSURE: 121 MMHG | RESPIRATION RATE: 16 BRPM | BODY MASS INDEX: 28.03 KG/M2 | OXYGEN SATURATION: 100 % | DIASTOLIC BLOOD PRESSURE: 75 MMHG | TEMPERATURE: 99.4 F | HEART RATE: 90 BPM | HEIGHT: 67 IN | WEIGHT: 178.57 LBS

## 2019-02-27 PROBLEM — E87.6 HYPOKALEMIA: Status: RESOLVED | Noted: 2019-02-23 | Resolved: 2019-02-27

## 2019-02-27 PROCEDURE — 95813 EEG EXTND MNTR 61-119 MIN: CPT | Performed by: PSYCHIATRY & NEUROLOGY

## 2019-02-27 PROCEDURE — 99239 HOSP IP/OBS DSCHRG MGMT >30: CPT | Performed by: FAMILY MEDICINE

## 2019-02-27 RX ORDER — LORAZEPAM 1 MG/1
1 TABLET ORAL EVERY 6 HOURS PRN
Status: DISCONTINUED | OUTPATIENT
Start: 2019-02-27 | End: 2019-02-27 | Stop reason: HOSPADM

## 2019-02-27 RX ORDER — ONDANSETRON 4 MG/1
4 TABLET, ORALLY DISINTEGRATING ORAL EVERY 4 HOURS PRN
Status: DISCONTINUED | OUTPATIENT
Start: 2019-02-27 | End: 2019-02-27 | Stop reason: HOSPADM

## 2019-02-27 RX ORDER — PHENOBARBITAL 32.4 MG/1
32.4 TABLET ORAL 2 TIMES DAILY
Qty: 60 TABLET | Refills: 0 | Status: SHIPPED | OUTPATIENT
Start: 2019-02-27 | End: 2019-11-16

## 2019-02-27 RX ORDER — METHOCARBAMOL 500 MG/1
500 TABLET, FILM COATED ORAL 3 TIMES DAILY PRN
Refills: 0 | Status: SHIPPED | OUTPATIENT
Start: 2019-02-27 | End: 2019-11-16

## 2019-02-27 RX ORDER — LORAZEPAM 0.5 MG/1
0.5 TABLET ORAL ONCE
Status: COMPLETED | OUTPATIENT
Start: 2019-02-27 | End: 2019-02-27

## 2019-02-27 RX ORDER — GABAPENTIN 400 MG/1
400 CAPSULE ORAL 2 TIMES DAILY
Qty: 60 CAPSULE | Refills: 0 | Status: SHIPPED | OUTPATIENT
Start: 2019-02-27 | End: 2020-07-03 | Stop reason: HOSPADM

## 2019-02-27 RX ORDER — ACETAMINOPHEN 325 MG/1
650 TABLET ORAL EVERY 6 HOURS PRN
Qty: 30 TABLET | Refills: 0 | Status: SHIPPED | OUTPATIENT
Start: 2019-02-27

## 2019-02-27 RX ORDER — CLONIDINE HYDROCHLORIDE 0.1 MG/1
0.1 TABLET ORAL EVERY 8 HOURS PRN
Qty: 30 TABLET | Refills: 0 | Status: SHIPPED | OUTPATIENT
Start: 2019-02-27 | End: 2020-07-03 | Stop reason: HOSPADM

## 2019-02-27 RX ORDER — BUPRENORPHINE AND NALOXONE 8; 2 MG/1; MG/1
1 FILM, SOLUBLE BUCCAL; SUBLINGUAL 3 TIMES DAILY
Qty: 30 FILM | Refills: 0 | Status: SHIPPED | OUTPATIENT
Start: 2019-02-27 | End: 2019-03-09

## 2019-02-27 RX ORDER — TOPIRAMATE 50 MG/1
150 TABLET, FILM COATED ORAL 2 TIMES DAILY
Qty: 120 TABLET | Refills: 0 | Status: SHIPPED | OUTPATIENT
Start: 2019-02-27 | End: 2019-09-29

## 2019-02-27 RX ORDER — NICOTINE 21 MG/24HR
1 PATCH, TRANSDERMAL 24 HOURS TRANSDERMAL DAILY
Qty: 28 PATCH | Refills: 0 | Status: SHIPPED | OUTPATIENT
Start: 2019-02-28 | End: 2019-11-16

## 2019-02-27 RX ADMIN — ONDANSETRON 4 MG: 4 TABLET, ORALLY DISINTEGRATING ORAL at 03:24

## 2019-02-27 RX ADMIN — BUPRENORPHINE HYDROCHLORIDE, NALOXONE HYDROCHLORIDE 1 FILM: 8; 2 FILM, SOLUBLE BUCCAL; SUBLINGUAL at 09:50

## 2019-02-27 RX ADMIN — CITALOPRAM HYDROBROMIDE 20 MG: 20 TABLET ORAL at 09:49

## 2019-02-27 RX ADMIN — LORAZEPAM 1 MG: 1 TABLET ORAL at 14:32

## 2019-02-27 RX ADMIN — ONDANSETRON 4 MG: 4 TABLET, ORALLY DISINTEGRATING ORAL at 13:02

## 2019-02-27 RX ADMIN — LORAZEPAM 0.5 MG: 0.5 TABLET ORAL at 14:25

## 2019-02-27 RX ADMIN — METHOCARBAMOL 500 MG: 500 TABLET, FILM COATED ORAL at 02:42

## 2019-02-27 RX ADMIN — CLONIDINE HYDROCHLORIDE 0.1 MG: 0.1 TABLET ORAL at 13:16

## 2019-02-27 RX ADMIN — THIAMINE HCL TAB 100 MG 100 MG: 100 TAB at 09:49

## 2019-02-27 RX ADMIN — CLONIDINE HYDROCHLORIDE 0.1 MG: 0.1 TABLET ORAL at 04:09

## 2019-02-27 RX ADMIN — PANCRELIPASE 24000 UNITS: 24000; 76000; 120000 CAPSULE, DELAYED RELEASE PELLETS ORAL at 09:50

## 2019-02-27 RX ADMIN — PANCRELIPASE 24000 UNITS: 24000; 76000; 120000 CAPSULE, DELAYED RELEASE PELLETS ORAL at 14:29

## 2019-02-27 RX ADMIN — GABAPENTIN 400 MG: 400 CAPSULE ORAL at 09:49

## 2019-02-27 RX ADMIN — METHOCARBAMOL 500 MG: 500 TABLET, FILM COATED ORAL at 13:14

## 2019-02-27 RX ADMIN — NICOTINE 14 MG: 14 PATCH TRANSDERMAL at 09:51

## 2019-02-27 RX ADMIN — PHENOBARBITAL 32.4 MG: 64.8 TABLET ORAL at 10:04

## 2019-02-27 RX ADMIN — FOLIC ACID 1 MG: 1 TABLET ORAL at 09:48

## 2019-02-27 RX ADMIN — TOPIRAMATE 150 MG: 100 TABLET, FILM COATED ORAL at 09:49

## 2019-02-27 NOTE — SOCIAL WORK
Received a call from Corrine at [facility] who states pt is accepted  Corrine is requesting a letter from pt's Suboxone clinic  Met with pt to discuss  Pt prefers to receive treatment at [facility]  Pt states she receives her Suboxone from [facility] in [address] CM called and spoke to ARELI/Blayne Barlow who states she will provide a letter  Gulshan Estrada to fax letter to ELSA HUNTER spoke to [place] with [facility] who states she will begin precert

## 2019-02-27 NOTE — SOCIAL WORK
CM received fax from Lalito Rogers at [facility]  [facility]  Pt is approved and a bed is available today  Susana at [facility] precert was received for level 4A--Detox for 2 days  [facility]  [facility] states [facility] is able to provide transport and will arrive at 2:30pm  CM notified pt and pt's bedside RN of same  Pt states she will inform her parents of same

## 2019-02-28 NOTE — ASSESSMENT & PLAN NOTE
Patient is currently off of the video EEG  Neurology med adjustment in her medications  Continue with the current dose of Topamax and phenobarbital

## 2019-02-28 NOTE — ASSESSMENT & PLAN NOTE
CIWA protocol in place  Ativan for withdrawal  Continue with the clonidine  Supportive care  Patient is going to inpatient alcohol and drug rehabilitation today

## 2019-02-28 NOTE — DISCHARGE SUMMARY
Discharge- Jessica Phy 1990, 33 y o  female MRN: [id number]    Unit/Bed#: [facility] 720-01 Encounter: [id number]    Primary Care Provider: Scot Resendez DO   Date and time admitted to hospital: 2/22/2019  4:16 AM        Anxiety disorder  Assessment & Plan  Ativan p r n    Patient was reassured  Outpatient follow-up with Psychiatry    Polysubstance abuse [facility])  Assessment & Plan  Monitor  [facility] referral discussed with case management  - patient is on Suboxone 3 times daily, medication has been updated inpatient  Going to drug and alcohol rehabilitation    Toxic metabolic encephalopathy  Assessment & Plan  Resolved      Alcohol abuse  Assessment & Plan  CIWA protocol in place  Ativan for withdrawal  Continue with the clonidine  Supportive care  Patient is going to inpatient alcohol and drug rehabilitation today      * Seizures [facility])  Assessment & Plan  Patient is currently off of the video EEG  Neurology med adjustment in her medications  Continue with the current dose of Topamax and phenobarbital      Hypokalemia-resolved as of 2/27/2019  Assessment & Plan  Resolved          Discharging Physician / Practitioner: Roseanne Draper MD  PCP: Scot Resendez DO  Admission Date:   Admission Orders (From admission, onward)    Ordered        02/22/19 0450  Inpatient Admission  Once     Order ID Start Status   [id number] 02/22/19 0450 Completed              Discharge Date: 02/27/19    Resolved Problems  Date Reviewed: 2/27/2019          Resolved    Hypokalemia 2/27/2019     Resolved by  Roseanne Draper MD    High anion gap metabolic acidosis 5/66/9522     Resolved by  Margaret Dumont PA-C          Consultations During Hospital Stay:  · Neurology    Procedures Performed:   · Continuous  EEG monitor    Significant Findings / Test Results:   · CT cervical spine-no traumatic malalignment  · CT head-the intracranial hemorrhage or calvarial fracture  · Chest x-ray-no acute cardiopulmonary disease    Incidental Findings:   ·     Test Results Pending at Discharge (will require follow up):   ·      Outpatient Tests Requested:      Complications:  none    Reason for Admission:     Hospital Course:     Padmini Harmon is a 33 y o  female patient who originally presented to the hospital on 2/22/2019 due to multiple seizure events at home  Patient with known history of seizure disorder and on Topamax phenobarbitone and Dilantin  Patient was at a bar and noted to have tonic-clonic seizures and he was transported to the emergency department in [facility] and found to have 2 more seizures and she received Ativan with the loading dose of phenobarbital   Patient alcohol level was noted to be 324 in the emergency room and UDS was positive for barbiturates  Patient was admitted to the intensive care unit in started on IV fluids  Patient was evaluated by Neurology and made changes in her seizure medication  Topamax was increased to t i d  And also phenobarbital dose was adjusted  Neurology contacted her primary neurologist regarding the change in medication and she will be following up with the primary neurologist as an outpatient patient also had continuous video EEG monitoring patient was eventually transferred out of the intensive care unit on 2/24/2019  [facility] was contacted given her alcohol history  Patient was accepted for inpatient drug and alcohol rehabilitation and was transferred to the rehabilitation facility on 2/27/2019  For details refer to the chart    Please see above list of diagnoses and related plan for additional information  Condition at Discharge: good     Discharge Day Visit / Exam:     Subjective:  Patient seen and examined  Patient still complaining of anxiety    Decreased appetite with occasional nausea  Vitals: Blood Pressure: 121/75 (02/27/19 0726)  Pulse: 90 (02/27/19 0726)  Temperature: 99 4 °F (37 4 °C) (02/27/19 0726)  Temp Source: Oral (02/27/19 0726)  Respirations: 16 (02/27/19 0726)  Height: 5' 7" (170 2 cm) (02/24/19 0206)  Weight - Scale: 81 kg (178 lb 9 2 oz) (02/27/19 0554)  SpO2: 100 % (02/27/19 0726)  Exam:   Physical Exam   Constitutional: She appears well-developed and well-nourished  HENT:   Head: Normocephalic and atraumatic  Eyes: Pupils are equal, round, and reactive to light  EOM are normal    Neck: Normal range of motion  Neck supple  No thyromegaly present  Cardiovascular: Normal rate and regular rhythm  No murmur heard  Pulmonary/Chest: Effort normal  No respiratory distress  Abdominal: Soft  Bowel sounds are normal  She exhibits no distension  Musculoskeletal: Normal range of motion  She exhibits no edema  Neurological: She is alert  No cranial nerve deficit  Coordination normal    Skin: Skin is warm and dry  Discussion with Family:  Updated patient    Discharge instructions/Information to patient and family:   See after visit summary for information provided to patient and family  Provisions for Follow-Up Care:  See after visit summary for information related to follow-up care and any pertinent home health orders  Disposition:     Other: Inpatient drug and alcohol rehabilitation    ·     Planned Readmission: none     Discharge Statement:  I spent 45 minutes discharging the patient  This time was spent on the day of discharge  I had direct contact with the patient on the day of discharge  Greater than 50% of the total time was spent examining patient, answering all patient questions, arranging and discussing plan of care with patient as well as directly providing post-discharge instructions  Additional time then spent on discharge activities  Discharge Medications:  See after visit summary for reconciled discharge medications provided to patient and family        ** Please Note: This note has been constructed using a voice recognition system **

## 2019-02-28 NOTE — ASSESSMENT & PLAN NOTE
Monitor  [facility] referral discussed with case management  - patient is on Suboxone 3 times daily, medication has been updated inpatient  Going to drug and alcohol rehabilitation

## 2019-02-28 NOTE — PROGRESS NOTES
Pt asking for PRN anxiety medication prior to leaving for rehab  Pt had 1 mg ordered but was only getting 0 5 mg the whole time she was here  I got a 1 time order of ativan 0 5mg and it was given  Went in with the  0 5mg and she took it but stated she wanted 1 mg instead  Pt already swallowed 0 5 mg dose  Took a 1 mg out of accudose and gave 0 5 mg of it and wasted the other 0 5 mg  Pt annoyed because she thought she was getting a total of 1 5mg prior to leaving  Explained she was only allowed 0 5mg or 1 mg and nothing additional  Pt states she understood once we educated her  Pt ride arriving in a few minutes  Will continue to monitor

## 2019-06-23 ENCOUNTER — HOSPITAL ENCOUNTER (EMERGENCY)
Facility: HOSPITAL | Age: 29
Discharge: HOME | End: 2019-06-24
Attending: EMERGENCY MEDICINE | Admitting: EMERGENCY MEDICINE
Payer: COMMERCIAL

## 2019-06-23 DIAGNOSIS — R68.89 WITHDRAWAL COMPLAINT: ICD-10-CM

## 2019-06-23 DIAGNOSIS — K29.70 GASTRITIS: ICD-10-CM

## 2019-06-23 DIAGNOSIS — K59.00 CONSTIPATION: ICD-10-CM

## 2019-06-23 DIAGNOSIS — F41.9 ANXIETY: Primary | ICD-10-CM

## 2019-06-23 DIAGNOSIS — Z87.898 HISTORY OF SEIZURES: ICD-10-CM

## 2019-06-23 LAB
ALBUMIN SERPL BCP-MCNC: 3.7 G/DL (ref 3.5–5)
ALP SERPL-CCNC: 93 U/L (ref 46–116)
ALT SERPL W P-5'-P-CCNC: 47 U/L (ref 12–78)
ANION GAP SERPL CALCULATED.3IONS-SCNC: 15 MMOL/L (ref 4–13)
AST SERPL W P-5'-P-CCNC: 35 U/L (ref 5–45)
BASOPHILS # BLD MANUAL: 0 THOUSAND/UL (ref 0–0.1)
BASOPHILS NFR MAR MANUAL: 0 % (ref 0–1)
BILIRUB SERPL-MCNC: 0.18 MG/DL (ref 0.2–1)
BUN SERPL-MCNC: 12 MG/DL (ref 5–25)
CALCIUM SERPL-MCNC: 8 MG/DL (ref 8.3–10.1)
CHLORIDE SERPL-SCNC: 106 MMOL/L (ref 100–108)
CK SERPL-CCNC: 124 U/L (ref 26–192)
CO2 SERPL-SCNC: 22 MMOL/L (ref 21–32)
CREAT SERPL-MCNC: 0.71 MG/DL (ref 0.6–1.3)
EOSINOPHIL # BLD MANUAL: 0.23 THOUSAND/UL (ref 0–0.4)
EOSINOPHIL NFR BLD MANUAL: 2 % (ref 0–6)
ERYTHROCYTE [DISTWIDTH] IN BLOOD BY AUTOMATED COUNT: 13.8 % (ref 11.6–15.1)
ETHANOL SERPL-MCNC: <3 MG/DL (ref 0–3)
GFR SERPL CREATININE-BSD FRML MDRD: 117 ML/MIN/1.73SQ M
GLUCOSE SERPL-MCNC: 78 MG/DL (ref 65–140)
GLUCOSE SERPL-MCNC: 84 MG/DL (ref 65–140)
HCT VFR BLD AUTO: 39.9 % (ref 34.8–46.1)
HGB BLD-MCNC: 12.7 G/DL (ref 11.5–15.4)
LYMPHOCYTES # BLD AUTO: 5.84 THOUSAND/UL (ref 0.6–4.47)
LYMPHOCYTES # BLD AUTO: 50 % (ref 14–44)
MCH RBC QN AUTO: 26.8 PG (ref 26.8–34.3)
MCHC RBC AUTO-ENTMCNC: 31.8 G/DL (ref 31.4–37.4)
MCV RBC AUTO: 84 FL (ref 82–98)
METAMYELOCYTES NFR BLD MANUAL: 1 % (ref 0–1)
MONOCYTES # BLD AUTO: 0.82 THOUSAND/UL (ref 0–1.22)
MONOCYTES NFR BLD: 7 % (ref 4–12)
NEUTROPHILS # BLD MANUAL: 4.67 THOUSAND/UL (ref 1.85–7.62)
NEUTS BAND NFR BLD MANUAL: 3 % (ref 0–8)
NEUTS SEG NFR BLD AUTO: 37 % (ref 43–75)
NRBC BLD AUTO-RTO: 0 /100 WBCS
PLATELET # BLD AUTO: 267 THOUSANDS/UL (ref 149–390)
PLATELET BLD QL SMEAR: ADEQUATE
PMV BLD AUTO: 10.4 FL (ref 8.9–12.7)
POTASSIUM SERPL-SCNC: 3.4 MMOL/L (ref 3.5–5.3)
PROT SERPL-MCNC: 7.7 G/DL (ref 6.4–8.2)
RBC # BLD AUTO: 4.74 MILLION/UL (ref 3.81–5.12)
RBC MORPH BLD: NORMAL
SODIUM SERPL-SCNC: 143 MMOL/L (ref 136–145)
TOTAL CELLS COUNTED SPEC: 100
WBC # BLD AUTO: 11.67 THOUSAND/UL (ref 4.31–10.16)

## 2019-06-23 PROCEDURE — 82550 ASSAY OF CK (CPK): CPT | Performed by: EMERGENCY MEDICINE

## 2019-06-23 PROCEDURE — 96374 THER/PROPH/DIAG INJ IV PUSH: CPT

## 2019-06-23 PROCEDURE — 96376 TX/PRO/DX INJ SAME DRUG ADON: CPT

## 2019-06-23 PROCEDURE — 96361 HYDRATE IV INFUSION ADD-ON: CPT

## 2019-06-23 PROCEDURE — 80053 COMPREHEN METABOLIC PANEL: CPT | Performed by: EMERGENCY MEDICINE

## 2019-06-23 PROCEDURE — 83690 ASSAY OF LIPASE: CPT | Performed by: EMERGENCY MEDICINE

## 2019-06-23 PROCEDURE — 99284 EMERGENCY DEPT VISIT MOD MDM: CPT | Performed by: EMERGENCY MEDICINE

## 2019-06-23 PROCEDURE — 36415 COLL VENOUS BLD VENIPUNCTURE: CPT | Performed by: EMERGENCY MEDICINE

## 2019-06-23 PROCEDURE — 99285 EMERGENCY DEPT VISIT HI MDM: CPT

## 2019-06-23 PROCEDURE — 85007 BL SMEAR W/DIFF WBC COUNT: CPT | Performed by: EMERGENCY MEDICINE

## 2019-06-23 PROCEDURE — 96375 TX/PRO/DX INJ NEW DRUG ADDON: CPT

## 2019-06-23 PROCEDURE — 81025 URINE PREGNANCY TEST: CPT | Performed by: EMERGENCY MEDICINE

## 2019-06-23 PROCEDURE — 85027 COMPLETE CBC AUTOMATED: CPT | Performed by: EMERGENCY MEDICINE

## 2019-06-23 PROCEDURE — 82948 REAGENT STRIP/BLOOD GLUCOSE: CPT

## 2019-06-23 PROCEDURE — 80320 DRUG SCREEN QUANTALCOHOLS: CPT | Performed by: EMERGENCY MEDICINE

## 2019-06-23 RX ORDER — LORAZEPAM 2 MG/ML
2 INJECTION INTRAMUSCULAR ONCE
Status: COMPLETED | OUTPATIENT
Start: 2019-06-23 | End: 2019-06-23

## 2019-06-23 RX ORDER — ONDANSETRON 2 MG/ML
4 INJECTION INTRAMUSCULAR; INTRAVENOUS ONCE
Status: COMPLETED | OUTPATIENT
Start: 2019-06-23 | End: 2019-06-23

## 2019-06-23 RX ORDER — LORAZEPAM 2 MG/ML
1 INJECTION INTRAMUSCULAR ONCE
Status: COMPLETED | OUTPATIENT
Start: 2019-06-23 | End: 2019-06-23

## 2019-06-23 RX ADMIN — LORAZEPAM 2 MG: 2 INJECTION INTRAMUSCULAR; INTRAVENOUS at 22:15

## 2019-06-23 RX ADMIN — SODIUM CHLORIDE 1000 ML: 0.9 INJECTION, SOLUTION INTRAVENOUS at 22:10

## 2019-06-23 RX ADMIN — ONDANSETRON 4 MG: 2 INJECTION INTRAMUSCULAR; INTRAVENOUS at 22:39

## 2019-06-23 RX ADMIN — LORAZEPAM 2 MG: 2 INJECTION INTRAMUSCULAR; INTRAVENOUS at 22:52

## 2019-06-24 ENCOUNTER — APPOINTMENT (EMERGENCY)
Dept: CT IMAGING | Facility: HOSPITAL | Age: 29
End: 2019-06-24
Payer: COMMERCIAL

## 2019-06-24 VITALS
TEMPERATURE: 98.5 F | HEART RATE: 90 BPM | OXYGEN SATURATION: 95 % | SYSTOLIC BLOOD PRESSURE: 119 MMHG | RESPIRATION RATE: 16 BRPM | DIASTOLIC BLOOD PRESSURE: 57 MMHG | WEIGHT: 201.06 LBS | BODY MASS INDEX: 31.49 KG/M2

## 2019-06-24 LAB
AMPHETAMINES SERPL QL SCN: NEGATIVE
BACTERIA UR QL AUTO: ABNORMAL /HPF
BARBITURATES UR QL: NEGATIVE
BENZODIAZ UR QL: POSITIVE
BILIRUB UR QL STRIP: NEGATIVE
CLARITY UR: CLEAR
COCAINE UR QL: NEGATIVE
COLOR UR: YELLOW
EXT PREG TEST URINE: NEGATIVE
EXT. CONTROL ED NAV: NORMAL
GLUCOSE UR STRIP-MCNC: NEGATIVE MG/DL
HGB UR QL STRIP.AUTO: ABNORMAL
KETONES UR STRIP-MCNC: NEGATIVE MG/DL
LEUKOCYTE ESTERASE UR QL STRIP: NEGATIVE
LIPASE SERPL-CCNC: 69 U/L (ref 73–393)
METHADONE UR QL: NEGATIVE
NITRITE UR QL STRIP: NEGATIVE
NON-SQ EPI CELLS URNS QL MICRO: ABNORMAL /HPF
OPIATES UR QL SCN: NEGATIVE
PCP UR QL: NEGATIVE
PH UR STRIP.AUTO: 5.5 [PH] (ref 4.5–8)
PROT UR STRIP-MCNC: NEGATIVE MG/DL
RBC #/AREA URNS AUTO: ABNORMAL /HPF
SP GR UR STRIP.AUTO: >=1.03 (ref 1–1.03)
THC UR QL: NEGATIVE
UROBILINOGEN UR QL STRIP.AUTO: 0.2 E.U./DL
WBC #/AREA URNS AUTO: ABNORMAL /HPF

## 2019-06-24 PROCEDURE — 81001 URINALYSIS AUTO W/SCOPE: CPT

## 2019-06-24 PROCEDURE — 80307 DRUG TEST PRSMV CHEM ANLYZR: CPT | Performed by: EMERGENCY MEDICINE

## 2019-06-24 PROCEDURE — 74177 CT ABD & PELVIS W/CONTRAST: CPT

## 2019-06-24 PROCEDURE — 96375 TX/PRO/DX INJ NEW DRUG ADDON: CPT

## 2019-06-24 RX ORDER — KETOROLAC TROMETHAMINE 30 MG/ML
30 INJECTION, SOLUTION INTRAMUSCULAR; INTRAVENOUS ONCE
Status: COMPLETED | OUTPATIENT
Start: 2019-06-24 | End: 2019-06-24

## 2019-06-24 RX ORDER — CLONAZEPAM 0.5 MG/1
1 TABLET ORAL ONCE
Status: COMPLETED | OUTPATIENT
Start: 2019-06-24 | End: 2019-06-24

## 2019-06-24 RX ORDER — SUCRALFATE ORAL 1 G/10ML
1 SUSPENSION ORAL
Qty: 420 ML | Refills: 0 | Status: SHIPPED | OUTPATIENT
Start: 2019-06-24 | End: 2019-11-16

## 2019-06-24 RX ORDER — SUCRALFATE ORAL 1 G/10ML
1000 SUSPENSION ORAL ONCE
Status: COMPLETED | OUTPATIENT
Start: 2019-06-24 | End: 2019-06-24

## 2019-06-24 RX ADMIN — IOHEXOL 100 ML: 350 INJECTION, SOLUTION INTRAVENOUS at 04:32

## 2019-06-24 RX ADMIN — KETOROLAC TROMETHAMINE 30 MG: 30 INJECTION, SOLUTION INTRAMUSCULAR; INTRAVENOUS at 03:45

## 2019-06-24 RX ADMIN — FAMOTIDINE 20 MG: 10 INJECTION, SOLUTION INTRAVENOUS at 04:57

## 2019-06-24 RX ADMIN — SUCRALFATE 1000 MG: 1 SUSPENSION ORAL at 04:56

## 2019-06-24 RX ADMIN — CLONAZEPAM 1 MG: 0.5 TABLET ORAL at 04:56

## 2019-09-26 ENCOUNTER — OFFICE VISIT (OUTPATIENT)
Dept: URGENT CARE | Facility: CLINIC | Age: 29
End: 2019-09-26
Payer: COMMERCIAL

## 2019-09-26 VITALS
SYSTOLIC BLOOD PRESSURE: 120 MMHG | TEMPERATURE: 98.4 F | OXYGEN SATURATION: 98 % | HEART RATE: 84 BPM | RESPIRATION RATE: 20 BRPM | DIASTOLIC BLOOD PRESSURE: 82 MMHG

## 2019-09-26 DIAGNOSIS — L03.211 CELLULITIS OF FACE: Primary | ICD-10-CM

## 2019-09-26 PROCEDURE — 87070 CULTURE OTHR SPECIMN AEROBIC: CPT | Performed by: NURSE PRACTITIONER

## 2019-09-26 PROCEDURE — 87147 CULTURE TYPE IMMUNOLOGIC: CPT | Performed by: NURSE PRACTITIONER

## 2019-09-26 PROCEDURE — G0382 LEV 3 HOSP TYPE B ED VISIT: HCPCS | Performed by: NURSE PRACTITIONER

## 2019-09-26 PROCEDURE — 99203 OFFICE O/P NEW LOW 30 MIN: CPT | Performed by: NURSE PRACTITIONER

## 2019-09-26 PROCEDURE — 99283 EMERGENCY DEPT VISIT LOW MDM: CPT | Performed by: NURSE PRACTITIONER

## 2019-09-26 PROCEDURE — 87205 SMEAR GRAM STAIN: CPT | Performed by: NURSE PRACTITIONER

## 2019-09-26 PROCEDURE — 87186 SC STD MICRODIL/AGAR DIL: CPT | Performed by: NURSE PRACTITIONER

## 2019-09-26 RX ORDER — CLINDAMYCIN HYDROCHLORIDE 300 MG/1
300 CAPSULE ORAL 4 TIMES DAILY
Qty: 40 CAPSULE | Refills: 0 | Status: SHIPPED | OUTPATIENT
Start: 2019-09-26 | End: 2019-10-06

## 2019-09-26 NOTE — PATIENT INSTRUCTIONS
Cellulitis   WHAT YOU NEED TO KNOW:   Cellulitis is a skin infection caused by bacteria  Cellulitis may go away on its own or you may need treatment  Your healthcare provider may draw a [place] around the outside edges of your cellulitis  If your cellulitis spreads, your healthcare provider will see it outside of the [place]  DISCHARGE INSTRUCTIONS:   Call 911 if:   · You have sudden trouble breathing or chest pain  Return to the emergency department if:   · Your wound gets larger and more painful  · You feel a crackling under your skin when you touch it  · You have purple dots or bumps on your skin, or you see bleeding under your skin  · You have new swelling and pain in your legs  · The red, warm, swollen area gets larger  · You see red streaks coming from the infected area  Contact your healthcare provider if:   · You have a fever  · Your fever or pain does not go away or gets worse  · The area does not get smaller after 2 days of antibiotics  · Your skin is flaking or peeling off  · You have questions or concerns about your condition or care  Medicines:   · Antibiotics  help treat the bacterial infection  · NSAIDs , such as ibuprofen, help decrease swelling, pain, and fever  NSAIDs can cause stomach bleeding or kidney problems in certain people  If you take blood thinner medicine, always ask if NSAIDs are safe for you  Always read the medicine label and follow directions  Do not give these medicines to children under 10months of age without direction from your child's healthcare provider  · Acetaminophen  decreases pain and fever  It is available without a doctor's order  Ask how much to take and how often to take it  Follow directions  Read the labels of all other medicines you are using to see if they also contain acetaminophen, or ask your doctor or pharmacist  Acetaminophen can cause liver damage if not taken correctly   Do not use more than 4 grams (4,000 milligrams) total of acetaminophen in one day  · Take your medicine as directed  Contact your healthcare provider if you think your medicine is not helping or if you have side effects  Tell him or her if you are allergic to any medicine  Keep a list of the medicines, vitamins, and herbs you take  Include the amounts, and when and why you take them  Bring the list or the pill bottles to follow-up visits  Carry your medicine list with you in case of an emergency  Self-care:   · Elevate the area above the level of your heart  as often as you can  This will help decrease swelling and pain  Prop the area on pillows or blankets to keep it elevated comfortably  · Clean the area daily until the wound scabs over  Gently wash the area with soap and water  Pat dry  Use dressings as directed  · Place cool or warm, wet cloths on the area as directed  Use clean cloths and clean water  Leave it on the area until the cloth is room temperature  Pat the area dry with a clean, dry cloth  The cloths may help decrease pain  Prevent cellulitis:   · Do not scratch bug bites or areas of injury  You increase your risk for cellulitis by scratching these areas  · Do not share personal items, such as towels, clothing, and razors  · Clean exercise equipment  with germ-killing  before and after you use it  · Wash your hands often  Use soap and water  Wash your hands after you use the bathroom, change a child's diapers, or sneeze  Wash your hands before you prepare or eat food  Use lotion to prevent dry, cracked skin  · Wear pressure stockings as directed  You may be told to wear the stockings if you have peripheral edema  The stockings improve blood flow and decrease swelling  · Treat athlete's foot  This can help prevent the spread of a bacterial skin infection  Follow up with your healthcare provider within 3 days, or as directed:   Your healthcare provider will check if your cellulitis is getting better  You may need different medicine  Write down your questions so you remember to ask them during your visits  © 2017 [employer] Information is for End User's use only and may not be sold, redistributed or otherwise used for commercial purposes  All illustrations and images included in CareNotes® are the copyrighted property of A [employer] M [employer]  or [employer]  The above information is an  only  It is not intended as medical advice for individual conditions or treatments  Talk to your doctor, nurse or pharmacist before following any medical regimen to see if it is safe and effective for you

## 2019-09-26 NOTE — PROGRESS NOTES
[facility]        NAME: Valeri Gabriel is a 33 y o  female  : 1990    MRN: [id number]  DATE: 2019  TIME: 2:45 PM    Assessment and Plan   Cellulitis of face [L03 211]  1  Cellulitis of face  clindamycin (CLEOCIN) 300 MG capsule    mupirocin (BACTROBAN) 2 % ointment     Start course of topical bactroban and oral clindamycin   No peroxide on face  Stop picking/popping area  F/u with derm if no improvement   Culture obtained   Pt in agreement with plan     Patient Instructions     Follow up with PCP in 3-5 days  Proceed to  ER if symptoms worsen  Chief Complaint     Chief Complaint   Patient presents with    Skin Problem     Patient states that for the past 4 days she has been fighting an infection on her face          History of Present Illness   Valeri Gabriel presents to the clinic c/o    Pt presents for evaluation of open wounds/lesions on her face  States started about 2 wks ago - more on the right side of the chin  That has gone away but returned on the left side of the face  Has been using peroxide and metrogel with no relief  States painful         Review of Systems   Review of Systems   All other systems reviewed and are negative          Current Medications     Long-Term Medications   Medication Sig Dispense Refill    citalopram (CeleXA) 20 mg tablet Take 40 mg by mouth daily       cloNIDine (CATAPRES) 0 1 mg tablet Take 1 tablet (0 1 mg total) by mouth every 8 (eight) hours as needed (anxiety, withdrawl symptoms) 30 tablet 0    folic acid (FOLVITE) 1 mg tablet Take 1 mg by mouth daily      LORazepam (ATIVAN) 1 mg tablet Take 0 5 tablets by mouth every 8 (eight) hours as needed for anxiety (moderate anxiety) for up to 10 doses      Pancrelipase, Lip-Prot-Amyl, (CREON) 36818 units CPEP Take 1 capsule by mouth 3 (three) times a day      thiamine 100 MG tablet Take 1 tablet by mouth daily 30 tablet 0    topiramate (TOPAMAX) 50 MG tablet Take 3 tablets (150 mg total) by mouth 2 (two) times a day 120 tablet 0    gabapentin (NEURONTIN) 400 mg capsule Take 1 capsule (400 mg total) by mouth 2 (two) times a day (Patient taking differently: Take 800 mg by mouth 3 (three) times a day ) 60 capsule 0    methocarbamol (ROBAXIN) 500 mg tablet Take 1 tablet (500 mg total) by mouth 3 (three) times a day as needed for muscle spasms (Patient not taking: Reported on 9/26/2019)  0    mupirocin (BACTROBAN) 2 % ointment Apply topically 2 (two) times a day 22 g 0    nicotine polacrilex (NICORETTE) 2 mg gum Chew 1 each as needed for smoking cessation (1 piece of gum max every 1 to 2 hours) Chew and then park for approximately 30 minutes PRN (Patient not taking: Reported on 9/26/2019) 100 each 0    ondansetron (ZOFRAN-ODT) 4 mg disintegrating tablet Take 1 tablet (4 mg total) by mouth every 8 (eight) hours as needed for nausea or vomiting for up to 10 doses (Patient not taking: Reported on 9/26/2019) 10 tablet 0    PHENobarbital 32 4 mg tablet Take 1 tablet (32 4 mg total) by mouth 2 (two) times a day (Patient not taking: Reported on 9/26/2019) 60 tablet 0    sucralfate (CARAFATE) 1 g/10 mL suspension Take 10 mL (1 g total) by mouth 4 (four) times a day (with meals and at bedtime) (Patient not taking: Reported on 9/26/2019) 420 mL 0       Current Allergies     Allergies as of 09/26/2019 - Reviewed 09/26/2019   Allergen Reaction Noted    Amoxicillin  10/10/2016    Other  06/07/2016    Penicillin g  03/25/2017    Penicillins Hives 04/03/2016    Percocet [oxycodone-acetaminophen] GI Intolerance 04/27/2017    Sulfa antibiotics  03/25/2017    Sulfur  10/10/2016    Erythromycin Rash and Other (See Comments) 04/03/2016            The following portions of the patient's history were reviewed and updated as appropriate: allergies, current medications, past family history, past medical history, past social history, past surgical history and problem list     Objective   /82   Pulse 84   Temp 98 4 °F (36 9 °C) (Tympanic)   Resp 20   LMP 09/12/2019   SpO2 98%        Physical Exam     Physical Exam   Constitutional: She is oriented to person, place, and time  Vital signs are normal  She appears well-developed and well-nourished  She is active and cooperative  HENT:   Head: Normocephalic and atraumatic  Eyes: Conjunctivae and lids are normal    Cardiovascular: Normal rate, regular rhythm, S1 normal, S2 normal and normal heart sounds  Pulmonary/Chest: Effort normal and breath sounds normal    Neurological: She is alert and oriented to person, place, and time  Skin: Skin is warm and dry  Abrasion (with surrounding erythema) noted  Psychiatric: She has a normal mood and affect  Her speech is normal and behavior is normal  Judgment and thought content normal  Cognition and memory are normal    Nursing note and vitals reviewed

## 2019-09-28 LAB
BACTERIA WND AEROBE CULT: ABNORMAL
GRAM STN SPEC: ABNORMAL

## 2019-09-29 ENCOUNTER — HOSPITAL ENCOUNTER (EMERGENCY)
Facility: HOSPITAL | Age: 29
Discharge: HOME | End: 2019-09-29
Attending: EMERGENCY MEDICINE | Admitting: EMERGENCY MEDICINE
Payer: COMMERCIAL

## 2019-09-29 ENCOUNTER — TELEPHONE (OUTPATIENT)
Dept: URGENT CARE | Facility: CLINIC | Age: 29
End: 2019-09-29

## 2019-09-29 VITALS
DIASTOLIC BLOOD PRESSURE: 72 MMHG | OXYGEN SATURATION: 100 % | HEART RATE: 73 BPM | TEMPERATURE: 98.7 F | RESPIRATION RATE: 18 BRPM | WEIGHT: 187.39 LBS | BODY MASS INDEX: 29.35 KG/M2 | SYSTOLIC BLOOD PRESSURE: 132 MMHG

## 2019-09-29 DIAGNOSIS — L30.9 DERMATITIS: ICD-10-CM

## 2019-09-29 DIAGNOSIS — L01.00 IMPETIGO: Primary | ICD-10-CM

## 2019-09-29 LAB
ALBUMIN SERPL BCP-MCNC: 3.7 G/DL (ref 3.5–5)
ALP SERPL-CCNC: 96 U/L (ref 46–116)
ALT SERPL W P-5'-P-CCNC: 27 U/L (ref 12–78)
ANION GAP SERPL CALCULATED.3IONS-SCNC: 11 MMOL/L (ref 4–13)
APTT PPP: 33 SECONDS (ref 23–37)
AST SERPL W P-5'-P-CCNC: 29 U/L (ref 5–45)
BACTERIA UR QL AUTO: ABNORMAL /HPF
BASOPHILS # BLD AUTO: 0.02 THOUSANDS/ΜL (ref 0–0.1)
BASOPHILS NFR BLD AUTO: 0 % (ref 0–1)
BILIRUB DIRECT SERPL-MCNC: 0.05 MG/DL (ref 0–0.2)
BILIRUB SERPL-MCNC: 0.18 MG/DL (ref 0.2–1)
BILIRUB UR QL STRIP: NEGATIVE
BUN SERPL-MCNC: 9 MG/DL (ref 5–25)
CALCIUM SERPL-MCNC: 8.7 MG/DL (ref 8.3–10.1)
CHLORIDE SERPL-SCNC: 105 MMOL/L (ref 100–108)
CLARITY UR: CLEAR
CLARITY, POC: CLEAR
CO2 SERPL-SCNC: 25 MMOL/L (ref 21–32)
COLOR UR: YELLOW
COLOR, POC: YELLOW
CREAT SERPL-MCNC: 0.76 MG/DL (ref 0.6–1.3)
EOSINOPHIL # BLD AUTO: 0.16 THOUSAND/ΜL (ref 0–0.61)
EOSINOPHIL NFR BLD AUTO: 3 % (ref 0–6)
ERYTHROCYTE [DISTWIDTH] IN BLOOD BY AUTOMATED COUNT: 14.2 % (ref 11.6–15.1)
EXT PREG TEST URINE: NEGATIVE
EXT. CONTROL ED NAV: NORMAL
GFR SERPL CREATININE-BSD FRML MDRD: 107 ML/MIN/1.73SQ M
GLUCOSE SERPL-MCNC: 104 MG/DL (ref 65–140)
GLUCOSE UR STRIP-MCNC: NEGATIVE MG/DL
HCT VFR BLD AUTO: 34.6 % (ref 34.8–46.1)
HGB BLD-MCNC: 10.9 G/DL (ref 11.5–15.4)
HGB UR QL STRIP.AUTO: ABNORMAL
IMM GRANULOCYTES # BLD AUTO: 0.01 THOUSAND/UL (ref 0–0.2)
IMM GRANULOCYTES NFR BLD AUTO: 0 % (ref 0–2)
INR PPP: 0.95 (ref 0.84–1.19)
KETONES UR STRIP-MCNC: NEGATIVE MG/DL
LACTATE SERPL-SCNC: 0.9 MMOL/L (ref 0.5–2)
LEUKOCYTE ESTERASE UR QL STRIP: NEGATIVE
LYMPHOCYTES # BLD AUTO: 1.94 THOUSANDS/ΜL (ref 0.6–4.47)
LYMPHOCYTES NFR BLD AUTO: 32 % (ref 14–44)
MAGNESIUM SERPL-MCNC: 1.9 MG/DL (ref 1.6–2.6)
MCH RBC QN AUTO: 26 PG (ref 26.8–34.3)
MCHC RBC AUTO-ENTMCNC: 31.5 G/DL (ref 31.4–37.4)
MCV RBC AUTO: 83 FL (ref 82–98)
MONOCYTES # BLD AUTO: 0.52 THOUSAND/ΜL (ref 0.17–1.22)
MONOCYTES NFR BLD AUTO: 9 % (ref 4–12)
NEUTROPHILS # BLD AUTO: 3.33 THOUSANDS/ΜL (ref 1.85–7.62)
NEUTS SEG NFR BLD AUTO: 56 % (ref 43–75)
NITRITE UR QL STRIP: NEGATIVE
NON-SQ EPI CELLS URNS QL MICRO: ABNORMAL /HPF
NRBC BLD AUTO-RTO: 0 /100 WBCS
PH UR STRIP.AUTO: 6.5 [PH] (ref 4.5–8)
PLATELET # BLD AUTO: 185 THOUSANDS/UL (ref 149–390)
PMV BLD AUTO: 10.9 FL (ref 8.9–12.7)
POTASSIUM SERPL-SCNC: 3.6 MMOL/L (ref 3.5–5.3)
PROT SERPL-MCNC: 7.4 G/DL (ref 6.4–8.2)
PROT UR STRIP-MCNC: NEGATIVE MG/DL
PROTHROMBIN TIME: 12.8 SECONDS (ref 11.6–14.5)
RBC # BLD AUTO: 4.19 MILLION/UL (ref 3.81–5.12)
RBC #/AREA URNS AUTO: ABNORMAL /HPF
SODIUM SERPL-SCNC: 141 MMOL/L (ref 136–145)
SP GR UR STRIP.AUTO: 1.01 (ref 1–1.03)
UROBILINOGEN UR QL STRIP.AUTO: 0.2 E.U./DL
WBC # BLD AUTO: 5.98 THOUSAND/UL (ref 4.31–10.16)
WBC #/AREA URNS AUTO: ABNORMAL /HPF

## 2019-09-29 PROCEDURE — 85025 COMPLETE CBC W/AUTO DIFF WBC: CPT | Performed by: EMERGENCY MEDICINE

## 2019-09-29 PROCEDURE — 99283 EMERGENCY DEPT VISIT LOW MDM: CPT

## 2019-09-29 PROCEDURE — 80076 HEPATIC FUNCTION PANEL: CPT | Performed by: EMERGENCY MEDICINE

## 2019-09-29 PROCEDURE — 85610 PROTHROMBIN TIME: CPT | Performed by: EMERGENCY MEDICINE

## 2019-09-29 PROCEDURE — 85730 THROMBOPLASTIN TIME PARTIAL: CPT | Performed by: EMERGENCY MEDICINE

## 2019-09-29 PROCEDURE — 36415 COLL VENOUS BLD VENIPUNCTURE: CPT | Performed by: EMERGENCY MEDICINE

## 2019-09-29 PROCEDURE — 83605 ASSAY OF LACTIC ACID: CPT | Performed by: EMERGENCY MEDICINE

## 2019-09-29 PROCEDURE — 81025 URINE PREGNANCY TEST: CPT | Performed by: EMERGENCY MEDICINE

## 2019-09-29 PROCEDURE — 83735 ASSAY OF MAGNESIUM: CPT | Performed by: EMERGENCY MEDICINE

## 2019-09-29 PROCEDURE — 80048 BASIC METABOLIC PNL TOTAL CA: CPT | Performed by: EMERGENCY MEDICINE

## 2019-09-29 PROCEDURE — 81001 URINALYSIS AUTO W/SCOPE: CPT

## 2019-09-29 PROCEDURE — 99284 EMERGENCY DEPT VISIT MOD MDM: CPT | Performed by: EMERGENCY MEDICINE

## 2019-09-29 PROCEDURE — 87040 BLOOD CULTURE FOR BACTERIA: CPT | Performed by: EMERGENCY MEDICINE

## 2019-09-29 RX ORDER — LORAZEPAM 1 MG/1
1 TABLET ORAL ONCE
Status: COMPLETED | OUTPATIENT
Start: 2019-09-29 | End: 2019-09-29

## 2019-09-29 RX ORDER — PREDNISONE 20 MG/1
40 TABLET ORAL DAILY
Qty: 10 TABLET | Refills: 0 | Status: SHIPPED | OUTPATIENT
Start: 2019-09-29 | End: 2019-10-04

## 2019-09-29 RX ORDER — PREDNISONE 20 MG/1
60 TABLET ORAL ONCE
Status: DISCONTINUED | OUTPATIENT
Start: 2019-09-29 | End: 2019-09-29 | Stop reason: HOSPADM

## 2019-09-29 RX ORDER — TOPIRAMATE 100 MG/1
200 TABLET, FILM COATED ORAL 2 TIMES DAILY
COMMUNITY

## 2019-09-29 RX ADMIN — LORAZEPAM 1 MG: 1 TABLET ORAL at 16:53

## 2019-09-29 NOTE — TELEPHONE ENCOUNTER
Spoke with pt on phone at time of her incoming call  She started clindamycin on Thursday and develped a rash to the body since this morning, her feet has swollen up and feels that they are bruised and rash getting worse  Told pt to stop medications and go to the ER for evaluation  Pt has hx of mulitple mediation allergy  She stated that she took benadryl but doesn't feel right and told her to stop medications and go to the ER  Pt verbally understood directions     DESHAWN Gibbs

## 2019-09-30 NOTE — ED PROVIDER NOTES
History  Chief Complaint   Patient presents with    Ankle Swelling     Pt reports "I had an allergic reaction to my medicine" antibiotic for cellulitis, generalized itchy rash, B/L ankle swelling, taking benadryl without relief, called prescriber at urgent care and referred to ED   Allergic Reaction       History provided by:  Patient   used: No    Medical Problem - Major   Location:  Rash to face, treated for cellulits with clinda and mupirocin for the last 2-3 days  Last night diffuse rash over legs, part of trunk and arms, itchiness  Also leg swelling  Rash on face is better  Severity:  Severe  Onset quality:  Sudden  Duration:  5 days  Timing:  Constant  Progression since onset: Facial rash better but rash to legs, trunk and arms new  Chronicity:  New  Relieved by:  Nothing  Worsened by:  Nothing  Ineffective treatments:  Benadryl  Associated symptoms: rash    Associated symptoms: no abdominal pain, no chest pain, no congestion, no cough, no fever, no headaches, no nausea, no shortness of breath, no sore throat and no vomiting        Prior to Admission Medications   Prescriptions Last Dose Informant Patient Reported? Taking?    LORazepam (ATIVAN) 1 mg tablet   No No   Sig: Take 0 5 tablets by mouth every 8 (eight) hours as needed for anxiety (moderate anxiety) for up to 10 doses   Melatonin 5 MG TABS   Yes No   Sig: Take 1 tablet by mouth daily at bedtime   PHENobarbital 32 4 mg tablet   No No   Sig: Take 1 tablet (32 4 mg total) by mouth 2 (two) times a day   Patient not taking: Reported on 9/26/2019   Pancrelipase, Lip-Prot-Amyl, (CREON) 28116 units CPEP   Yes No   Sig: Take 1 capsule by mouth 3 (three) times a day   acetaminophen (TYLENOL) 325 mg tablet   No Yes   Sig: Take 2 tablets (650 mg total) by mouth every 6 (six) hours as needed for mild pain or headaches   citalopram (CeleXA) 40 mg tablet  Self Yes No   Sig: Take 40 mg by mouth daily    clindamycin (CLEOCIN) 300 MG capsule No No   Sig: Take 1 capsule (300 mg total) by mouth 4 (four) times a day for 10 days   cloNIDine (CATAPRES) 0 1 mg tablet   No No   Sig: Take 1 tablet (0 1 mg total) by mouth every 8 (eight) hours as needed (anxiety, withdrawl symptoms)   folic acid (FOLVITE) 1 mg tablet   Yes No   Sig: Take 1 mg by mouth daily   gabapentin (NEURONTIN) 400 mg capsule  Self No No   Sig: Take 1 capsule (400 mg total) by mouth 2 (two) times a day   Patient taking differently: Take 800 mg by mouth 2 (two) times a day    methocarbamol (ROBAXIN) 500 mg tablet   No No   Sig: Take 1 tablet (500 mg total) by mouth 3 (three) times a day as needed for muscle spasms   Patient not taking: Reported on 9/26/2019   mupirocin (BACTROBAN) 2 % ointment   No No   Sig: Apply topically 2 (two) times a day   nicotine (NICODERM CQ) 14 mg/24hr TD 24 hr patch   No No   Sig: Place 1 patch on the skin daily   Patient not taking: Reported on 9/26/2019   nicotine polacrilex (NICORETTE) 2 mg gum   No No   Sig: Chew 1 each as needed for smoking cessation (1 piece of gum max every 1 to 2 hours) Chew and then park for approximately 30 minutes PRN   Patient not taking: Reported on 9/26/2019   ondansetron (ZOFRAN-ODT) 4 mg disintegrating tablet   No No   Sig: Take 1 tablet (4 mg total) by mouth every 8 (eight) hours as needed for nausea or vomiting for up to 10 doses   Patient not taking: Reported on 9/26/2019   sucralfate (CARAFATE) 1 g/10 mL suspension   No No   Sig: Take 10 mL (1 g total) by mouth 4 (four) times a day (with meals and at bedtime)   Patient not taking: Reported on 9/26/2019   thiamine 100 MG tablet   No No   Sig: Take 1 tablet by mouth daily   topiramate (TOPAMAX) 100 mg tablet   Yes Yes   Sig: Take 100 mg by mouth daily   topiramate (TOPAMAX) 200 MG tablet   Yes Yes   Sig: Take 200 mg by mouth 2 (two) times a day      Facility-Administered Medications: None       Past Medical History:   Diagnosis Date    Alcohol abuse     Alcohol abuse     Anxiety     Asthma     Bipolar disorder ([facility] )     Depression     Pancreatitis     Panic disorder 10/11/2016    Psychiatric disorder     PTSD (post-traumatic stress disorder)     Seizures ([facility] )     Self-injurious behavior        Past Surgical History:   Procedure Laterality Date    ESOPHAGOGASTRODUODENOSCOPY N/A 4/10/2017    Procedure: ESOPHAGOGASTRODUODENOSCOPY (EGD); Surgeon: Stuart De MD;  Location: [facility]; Service:     INDUCED       WISDOM TOOTH EXTRACTION         Family History   Problem Relation Age of Onset    Lupus Father     Coronary artery disease Father     Stroke Father      I have reviewed and agree with the history as documented  Social History     Tobacco Use    Smoking status: Current Some Day Smoker     Packs/day: 1 00     Years: 10 00     Pack years: 10 00    Smokeless tobacco: Never Used   Substance Use Topics    Alcohol use: Not Currently     Comment: last drink today    Drug use: No        Review of Systems   Constitutional: Negative for chills and fever  HENT: Negative for congestion, sore throat, trouble swallowing and voice change  Respiratory: Negative for cough, chest tightness and shortness of breath  Cardiovascular: Positive for leg swelling  Negative for chest pain  Gastrointestinal: Negative for abdominal pain, nausea and vomiting  Genitourinary: Negative for difficulty urinating and dysuria  Musculoskeletal: Negative for gait problem  Skin: Positive for rash  Neurological: Negative for headaches  All other systems reviewed and are negative  Physical Exam  Physical Exam   Constitutional: She appears well-developed and well-nourished  She is cooperative  Non-toxic appearance  She does not have a sickly appearance  She does not appear ill  No distress  HENT:   Head: Normocephalic and atraumatic  Right Ear: Hearing normal  No drainage or swelling  Left Ear: Hearing normal  No drainage or swelling     Nose: Nose normal    Mouth/Throat: Uvula is midline, oropharynx is clear and moist and mucous membranes are normal  No oropharyngeal exudate  Eyes: Conjunctivae and lids are normal  Right eye exhibits no discharge  Left eye exhibits no discharge  Neck: Trachea normal and normal range of motion  No JVD present  Cardiovascular: Normal rate, regular rhythm, normal heart sounds, intact distal pulses and normal pulses  Exam reveals no gallop and no friction rub  No murmur heard  Pulmonary/Chest: Effort normal and breath sounds normal  No stridor  No respiratory distress  She has no wheezes  She has no rales  Abdominal: Soft  Normal appearance  There is no tenderness  There is no rebound, no guarding and no CVA tenderness  Musculoskeletal: Normal range of motion  She exhibits edema  She exhibits no tenderness  Bilateral edema to just below the knees  Lymphadenopathy:     She has no cervical adenopathy  Neurological: She is alert  She has normal strength  No cranial nerve deficit or sensory deficit  She exhibits normal muscle tone  GCS eye subscore is 4  GCS verbal subscore is 5  GCS motor subscore is 6  Skin: Skin is warm, dry and intact  Rash noted  She is not diaphoretic  No pallor  Mostly on extremities and had some on the back  Erythematous and papular, blanching and appear to be around the follicles  Do not see any anteriorly  Psychiatric: She has a normal mood and affect  Her speech is normal  Cognition and memory are normal    Nursing note and vitals reviewed                    Vital Signs  ED Triage Vitals [09/29/19 1533]   Temperature Pulse Respirations Blood Pressure SpO2   98 7 °F (37 1 °C) 104 20 147/85 97 %      Temp Source Heart Rate Source Patient Position - Orthostatic VS BP Location FiO2 (%)   Oral Monitor Sitting Right arm --      Pain Score       5           Vitals:    09/29/19 1533 09/29/19 1726   BP: 147/85 132/72   Pulse: 104 73   Patient Position - Orthostatic VS: Sitting Lying Visual Acuity      ED Medications  Medications   LORazepam (ATIVAN) tablet 1 mg (1 mg Oral Given 9/29/19 1653)       Diagnostic Studies  Results Reviewed     Procedure Component Value Units Date/Time    Urine Microscopic [[id number]]  (Abnormal) Collected:  09/29/19 1709    Lab Status:  Final result Specimen:  Urine, Clean Catch Updated:  09/29/19 1735     RBC, UA 4-10 /hpf      WBC, UA None Seen /hpf      Epithelial Cells Occasional /hpf      Bacteria, UA None Seen /hpf     Blood culture #1 [[id number]] Collected:  09/29/19 1721    Lab Status:   In process Specimen:  Blood from Arm, Left Updated:  09/29/19 1731    POCT urinalysis dipstick [[id number]]  (Abnormal) Resulted:  09/29/19 1711    Lab Status:  Final result Specimen:  Urine Updated:  09/29/19 1715     Color, UA yellow     Clarity, UA clear    POCT pregnancy, urine [[id number]]  (Normal) Resulted:  09/29/19 1711    Lab Status:  Final result Updated:  09/29/19 1714     EXT PREG TEST UR (Ref: Negative) negative     Control Valid    ED Urine Macroscopic [[id number]]  (Abnormal) Collected:  09/29/19 1709    Lab Status:  Final result Specimen:  Urine Updated:  09/29/19 1710     Color, UA Yellow     Clarity, UA Clear     pH, UA 6 5     Leukocytes, UA Negative     Nitrite, UA Negative     Protein, UA Negative mg/dl      Glucose, UA Negative mg/dl      Ketones, UA Negative mg/dl      Urobilinogen, UA 0 2 E U /dl      Bilirubin, UA Negative     Blood, UA Large     Specific [place], UA 1 010    Narrative:       CLINITEK RESULT    Protime-INR [[id number]]  (Normal) Collected:  09/29/19 1627    Lab Status:  Final result Specimen:  Blood from Arm, Right Updated:  09/29/19 1709     Protime 12 8 seconds      INR 0 95    APTT [[id number]]  (Normal) Collected:  09/29/19 1627    Lab Status:  Final result Specimen:  Blood from Arm, Right Updated:  09/29/19 1709     PTT 33 seconds     Lactic acid, plasma x2 [[id number]]  (Normal) Collected:  09/29/19 1627    Lab Status:  Final result Specimen:  Blood from Arm, Right Updated:  09/29/19 1651     LACTIC ACID 0 9 mmol/L     Narrative:       Result may be elevated if tourniquet was used during collection  Blood culture #2 [[id number]] Collected:  09/29/19 1646    Lab Status:   In process Specimen:  Blood from Hand, Right Updated:  09/29/19 6390    Basic metabolic panel [[id number]] Collected:  09/29/19 1627    Lab Status:  Final result Specimen:  Blood from Arm, Right Updated:  09/29/19 1648     Sodium 141 mmol/L      Potassium 3 6 mmol/L      Chloride 105 mmol/L      CO2 25 mmol/L      ANION GAP 11 mmol/L      BUN 9 mg/dL      Creatinine 0 76 mg/dL      Glucose 104 mg/dL      Calcium 8 7 mg/dL      eGFR 107 ml/min/1 73sq m     Narrative:       [place] guidelines for Chronic Kidney Disease (CKD):     Stage 1 with normal or high GFR (GFR > 90 mL/min/1 73 square meters)    Stage 2 Mild CKD (GFR = 60-89 mL/min/1 73 square meters)    Stage 3A Moderate CKD (GFR = 45-59 mL/min/1 73 square meters)    Stage 3B Moderate CKD (GFR = 30-44 mL/min/1 73 square meters)    Stage 4 Severe CKD (GFR = 15-29 mL/min/1 73 square meters)    Stage 5 End Stage CKD (GFR <15 mL/min/1 73 square meters)  Note: GFR calculation is accurate only with a steady state creatinine    Hepatic function panel [[id number]]  (Abnormal) Collected:  09/29/19 1627    Lab Status:  Final result Specimen:  Blood from Arm, Right Updated:  09/29/19 1648     Total Bilirubin 0 18 mg/dL      Bilirubin, Direct 0 05 mg/dL      Alkaline Phosphatase 96 U/L      AST 29 U/L      ALT 27 U/L      Total Protein 7 4 g/dL      Albumin 3 7 g/dL     Magnesium [[id number]]  (Normal) Collected:  09/29/19 1627    Lab Status:  Final result Specimen:  Blood from Arm, Right Updated:  09/29/19 1648     Magnesium 1 9 mg/dL     CBC and differential [[id number]]  (Abnormal) Collected:  09/29/19 1627    Lab Status:  Final result Specimen:  Blood from Arm, Right Updated:  09/29/19 1633 WBC 5 98 Thousand/uL      RBC 4 19 Million/uL      Hemoglobin 10 9 g/dL      Hematocrit 34 6 %      MCV 83 fL      MCH 26 0 pg      MCHC 31 5 g/dL      RDW 14 2 %      MPV 10 9 fL      Platelets 410 Thousands/uL      nRBC 0 /100 WBCs      Neutrophils Relative 56 %      Immat GRANS % 0 %      Lymphocytes Relative 32 %      Monocytes Relative 9 %      Eosinophils Relative 3 %      Basophils Relative 0 %      Neutrophils Absolute 3 33 Thousands/µL      Immature Grans Absolute 0 01 Thousand/uL      Lymphocytes Absolute 1 94 Thousands/µL      Monocytes Absolute 0 52 Thousand/µL      Eosinophils Absolute 0 16 Thousand/µL      Basophils Absolute 0 02 Thousands/µL                  No orders to display              Procedures  Procedures       ED Course                               MDM  Number of Diagnoses or Management Options  Dermatitis:   Impetigo:   Diagnosis management comments: Suspect impetigo  Possible it could be allergic reaction although may be related to infectious process  Lab studies and urine unremarkable, does not involve mucus membranes  Case discussed with ID on call and agree with steroids for now and needs follow up with dermatology  Patient was given number to call for that and can also follow up with PCP  Amount and/or Complexity of Data Reviewed  Clinical lab tests: reviewed and ordered    Patient Progress  Patient progress: stable      Disposition  Final diagnoses:   Impetigo   Dermatitis     Time reflects when diagnosis was documented in both MDM as applicable and the Disposition within this note     Time User Action Codes Description Comment    9/29/2019  6:08 PM Darion Fortis Add [L01 00] Impetigo     9/29/2019  6:09 PM Darion Fortis Add [L30 9] Dermatitis       ED Disposition     ED Disposition Condition Date/Time Comment    Discharge Stable Sun Sep 29, 2019  6:08 PM Nisa Tellez discharge to home/self care              Follow-up Information     Follow up With Specialties Details Why Contact Info    Grace Rincon MD Dermatology Schedule an appointment as soon as possible for a visit in 1 week  [address]  [phone]            Discharge Medication List as of 9/29/2019  6:10 PM      START taking these medications    Details   predniSONE 20 mg tablet Take 2 tablets (40 mg total) by mouth daily for 5 days, Starting Sun 9/29/2019, Until Fri 10/4/2019, Print         CONTINUE these medications which have NOT CHANGED    Details   acetaminophen (TYLENOL) 325 mg tablet Take 2 tablets (650 mg total) by mouth every 6 (six) hours as needed for mild pain or headaches, Starting Wed 2/27/2019, Print      !! topiramate (TOPAMAX) 100 mg tablet Take 100 mg by mouth daily, Historical Med      !! topiramate (TOPAMAX) 200 MG tablet Take 200 mg by mouth 2 (two) times a day, Historical Med      citalopram (CeleXA) 40 mg tablet Take 40 mg by mouth daily , Historical Med      clindamycin (CLEOCIN) 300 MG capsule Take 1 capsule (300 mg total) by mouth 4 (four) times a day for 10 days, Starting u 9/26/2019, Until Sun 10/6/2019, Normal      cloNIDine (CATAPRES) 0 1 mg tablet Take 1 tablet (0 1 mg total) by mouth every 8 (eight) hours as needed (anxiety, withdrawl symptoms), Starting Wed 7/46/8979, Print      folic acid (FOLVITE) 1 mg tablet Take 1 mg by mouth daily, Until Discontinued, Historical Med      gabapentin (NEURONTIN) 400 mg capsule Take 1 capsule (400 mg total) by mouth 2 (two) times a day, Starting Wed 2/27/2019, Print      LORazepam (ATIVAN) 1 mg tablet Take 0 5 tablets by mouth every 8 (eight) hours as needed for anxiety (moderate anxiety) for up to 10 doses, Starting 4/11/2017, Until Discontinued, No Print      Melatonin 5 MG TABS Take 1 tablet by mouth daily at bedtime, Historical Med      methocarbamol (ROBAXIN) 500 mg tablet Take 1 tablet (500 mg total) by mouth 3 (three) times a day as needed for muscle spasms, Starting Wed 2/27/2019, Print      mupirocin (BACTROBAN) 2 % ointment Apply topically 2 (two) times a day, Starting Thu 9/26/2019, Normal      nicotine (NICODERM CQ) 14 mg/24hr TD 24 hr patch Place 1 patch on the skin daily, Starting Thu 2/28/2019, Print      nicotine polacrilex (NICORETTE) 2 mg gum Chew 1 each as needed for smoking cessation (1 piece of gum max every 1 to 2 hours) Chew and then park for approximately 30 minutes PRN, Starting 6/7/2017, Until Discontinued, Print      ondansetron (ZOFRAN-ODT) 4 mg disintegrating tablet Take 1 tablet (4 mg total) by mouth every 8 (eight) hours as needed for nausea or vomiting for up to 10 doses, Starting Tue 2/12/2019, Print      Pancrelipase, Lip-Prot-Amyl, (CREON) 36516 units CPEP Take 1 capsule by mouth 3 (three) times a day, Historical Med      PHENobarbital 32 4 mg tablet Take 1 tablet (32 4 mg total) by mouth 2 (two) times a day, Starting Wed 2/27/2019, Print      sucralfate (CARAFATE) 1 g/10 mL suspension Take 10 mL (1 g total) by mouth 4 (four) times a day (with meals and at bedtime), Starting Mon 6/24/2019, Print      thiamine 100 MG tablet Take 1 tablet by mouth daily, Starting 6/7/2017, Until Discontinued, Print       !! - Potential duplicate medications found  Please discuss with provider  No discharge procedures on file      ED Provider  Electronically Signed by           Tom Joiner MD  09/30/19 1483

## 2019-10-04 LAB
BACTERIA BLD CULT: NORMAL
BACTERIA BLD CULT: NORMAL

## 2019-11-16 ENCOUNTER — HOSPITAL ENCOUNTER (EMERGENCY)
Facility: HOSPITAL | Age: 29
Discharge: HOME | End: 2019-11-16
Attending: EMERGENCY MEDICINE | Admitting: EMERGENCY MEDICINE
Payer: COMMERCIAL

## 2019-11-16 ENCOUNTER — HOSPITAL ENCOUNTER (EMERGENCY)
Facility: HOSPITAL | Age: 29
Discharge: HOME | End: 2019-11-16
Attending: EMERGENCY MEDICINE
Payer: COMMERCIAL

## 2019-11-16 VITALS
DIASTOLIC BLOOD PRESSURE: 76 MMHG | TEMPERATURE: 98.3 F | HEART RATE: 99 BPM | BODY MASS INDEX: 30.59 KG/M2 | OXYGEN SATURATION: 97 % | SYSTOLIC BLOOD PRESSURE: 136 MMHG | WEIGHT: 195.33 LBS | RESPIRATION RATE: 16 BRPM

## 2019-11-16 VITALS
RESPIRATION RATE: 16 BRPM | HEART RATE: 78 BPM | OXYGEN SATURATION: 97 % | DIASTOLIC BLOOD PRESSURE: 77 MMHG | SYSTOLIC BLOOD PRESSURE: 152 MMHG | TEMPERATURE: 99.5 F

## 2019-11-16 DIAGNOSIS — D64.9 ANEMIA: ICD-10-CM

## 2019-11-16 DIAGNOSIS — L01.00 IMPETIGO: Primary | ICD-10-CM

## 2019-11-16 DIAGNOSIS — R53.83 FATIGUE: ICD-10-CM

## 2019-11-16 LAB
ALBUMIN SERPL BCP-MCNC: 4.2 G/DL (ref 3.5–5)
ALP SERPL-CCNC: 90 U/L (ref 46–116)
ALT SERPL W P-5'-P-CCNC: 26 U/L (ref 12–78)
ANION GAP SERPL CALCULATED.3IONS-SCNC: 10 MMOL/L (ref 4–13)
AST SERPL W P-5'-P-CCNC: 19 U/L (ref 5–45)
BACTERIA UR QL AUTO: NORMAL /HPF
BASOPHILS # BLD AUTO: 0.03 THOUSANDS/ΜL (ref 0–0.1)
BASOPHILS NFR BLD AUTO: 0 % (ref 0–1)
BILIRUB SERPL-MCNC: 0.24 MG/DL (ref 0.2–1)
BILIRUB UR QL STRIP: NEGATIVE
BUN SERPL-MCNC: 9 MG/DL (ref 5–25)
CALCIUM SERPL-MCNC: 9 MG/DL (ref 8.3–10.1)
CHLORIDE SERPL-SCNC: 107 MMOL/L (ref 100–108)
CLARITY UR: CLEAR
CO2 SERPL-SCNC: 24 MMOL/L (ref 21–32)
COLOR UR: YELLOW
COLOR, POC: YELLOW
CREAT SERPL-MCNC: 0.75 MG/DL (ref 0.6–1.3)
EOSINOPHIL # BLD AUTO: 0.12 THOUSAND/ΜL (ref 0–0.61)
EOSINOPHIL NFR BLD AUTO: 1 % (ref 0–6)
ERYTHROCYTE [DISTWIDTH] IN BLOOD BY AUTOMATED COUNT: 15.5 % (ref 11.6–15.1)
EXT PREG TEST URINE: NEGATIVE
EXT. CONTROL ED NAV: NORMAL
GFR SERPL CREATININE-BSD FRML MDRD: 109 ML/MIN/1.73SQ M
GLUCOSE SERPL-MCNC: 100 MG/DL (ref 65–140)
GLUCOSE UR STRIP-MCNC: NEGATIVE MG/DL
HCT VFR BLD AUTO: 35.5 % (ref 34.8–46.1)
HGB BLD-MCNC: 11.1 G/DL (ref 11.5–15.4)
HGB UR QL STRIP.AUTO: ABNORMAL
HYALINE CASTS #/AREA URNS LPF: NORMAL /LPF
IMM GRANULOCYTES # BLD AUTO: 0.03 THOUSAND/UL (ref 0–0.2)
IMM GRANULOCYTES NFR BLD AUTO: 0 % (ref 0–2)
KETONES UR STRIP-MCNC: NEGATIVE MG/DL
LEUKOCYTE ESTERASE UR QL STRIP: NEGATIVE
LYMPHOCYTES # BLD AUTO: 2.37 THOUSANDS/ΜL (ref 0.6–4.47)
LYMPHOCYTES NFR BLD AUTO: 24 % (ref 14–44)
MCH RBC QN AUTO: 25.4 PG (ref 26.8–34.3)
MCHC RBC AUTO-ENTMCNC: 31.3 G/DL (ref 31.4–37.4)
MCV RBC AUTO: 81 FL (ref 82–98)
MONOCYTES # BLD AUTO: 0.54 THOUSAND/ΜL (ref 0.17–1.22)
MONOCYTES NFR BLD AUTO: 6 % (ref 4–12)
NEUTROPHILS # BLD AUTO: 6.72 THOUSANDS/ΜL (ref 1.85–7.62)
NEUTS SEG NFR BLD AUTO: 69 % (ref 43–75)
NITRITE UR QL STRIP: NEGATIVE
NON-SQ EPI CELLS URNS QL MICRO: NORMAL /HPF
NRBC BLD AUTO-RTO: 0 /100 WBCS
PH UR STRIP.AUTO: 7.5 [PH] (ref 4.5–8)
PLATELET # BLD AUTO: 199 THOUSANDS/UL (ref 149–390)
PMV BLD AUTO: 10.2 FL (ref 8.9–12.7)
POTASSIUM SERPL-SCNC: 3.9 MMOL/L (ref 3.5–5.3)
PROT SERPL-MCNC: 7.4 G/DL (ref 6.4–8.2)
PROT UR STRIP-MCNC: NEGATIVE MG/DL
RBC # BLD AUTO: 4.37 MILLION/UL (ref 3.81–5.12)
RBC #/AREA URNS AUTO: NORMAL /HPF
SODIUM SERPL-SCNC: 141 MMOL/L (ref 136–145)
SP GR UR STRIP.AUTO: 1.01 (ref 1–1.03)
UROBILINOGEN UR QL STRIP.AUTO: 0.2 E.U./DL
WBC # BLD AUTO: 9.81 THOUSAND/UL (ref 4.31–10.16)
WBC #/AREA URNS AUTO: NORMAL /HPF

## 2019-11-16 PROCEDURE — 99284 EMERGENCY DEPT VISIT MOD MDM: CPT

## 2019-11-16 PROCEDURE — 80053 COMPREHEN METABOLIC PANEL: CPT | Performed by: EMERGENCY MEDICINE

## 2019-11-16 PROCEDURE — 85025 COMPLETE CBC W/AUTO DIFF WBC: CPT | Performed by: EMERGENCY MEDICINE

## 2019-11-16 PROCEDURE — 99284 EMERGENCY DEPT VISIT MOD MDM: CPT | Performed by: PHYSICIAN ASSISTANT

## 2019-11-16 PROCEDURE — 99284 EMERGENCY DEPT VISIT MOD MDM: CPT | Performed by: EMERGENCY MEDICINE

## 2019-11-16 PROCEDURE — 99283 EMERGENCY DEPT VISIT LOW MDM: CPT

## 2019-11-16 PROCEDURE — 36415 COLL VENOUS BLD VENIPUNCTURE: CPT | Performed by: EMERGENCY MEDICINE

## 2019-11-16 PROCEDURE — 81001 URINALYSIS AUTO W/SCOPE: CPT

## 2019-11-16 PROCEDURE — 81025 URINE PREGNANCY TEST: CPT | Performed by: EMERGENCY MEDICINE

## 2019-11-16 RX ORDER — DOXYCYCLINE HYCLATE 100 MG/1
100 CAPSULE ORAL 2 TIMES DAILY
Qty: 14 CAPSULE | Refills: 0 | Status: SHIPPED | OUTPATIENT
Start: 2019-11-16 | End: 2019-11-16 | Stop reason: SDUPTHER

## 2019-11-16 RX ORDER — DOXYCYCLINE HYCLATE 100 MG/1
100 CAPSULE ORAL ONCE
Status: COMPLETED | OUTPATIENT
Start: 2019-11-16 | End: 2019-11-16

## 2019-11-16 RX ORDER — DOXYCYCLINE HYCLATE 100 MG/1
100 CAPSULE ORAL 2 TIMES DAILY
Qty: 14 CAPSULE | Refills: 0 | Status: SHIPPED | OUTPATIENT
Start: 2019-11-16 | End: 2019-11-23

## 2019-11-16 RX ORDER — LIDOCAINE 40 MG/G
CREAM TOPICAL ONCE
Status: COMPLETED | OUTPATIENT
Start: 2019-11-16 | End: 2019-11-16

## 2019-11-16 RX ADMIN — DOXYCYCLINE 100 MG: 100 CAPSULE ORAL at 17:13

## 2019-11-16 RX ADMIN — LIDOCAINE 1 APPLICATION: 4 CREAM TOPICAL at 18:52

## 2019-11-16 NOTE — ED ATTENDING ATTESTATION
11/16/2019  Laurie Luna DO, saw and evaluated the patient  I have discussed the patient with the resident/non-physician practitioner and agree with the resident's/non-physician practitioner's findings, Plan of Care, and MDM as documented in the resident's/non-physician practitioner's note, except where noted  All available labs and Radiology studies were reviewed  I was present for key portions of any procedure(s) performed by the resident/non-physician practitioner and I was immediately available to provide assistance  At this point I agree with the current assessment done in the Emergency Department  I have conducted an independent evaluation of this patient a history and physical is as follows:    30 yof with skin rash on face for two months  She has been treated with clindamycin and mupirocin  She was seen at [facility] ED today where she insisted on blood work and left AMA  Past Medical History:   Diagnosis Date    Alcohol abuse     Alcohol abuse     Anxiety     Asthma     Bipolar disorder ([address] )     Depression     Pancreatitis     Panic disorder 10/11/2016    Psychiatric disorder     PTSD (post-traumatic stress disorder)     Seizures (HCC)     Self-injurious behavior      /91   Pulse 95   Temp 99 5 °F (37 5 °C) (Oral)   Resp 16   LMP 11/04/2019   SpO2 98%   NAD, honey-colored, scaling rash on left side of mouth  Doxy prescribed earlier at [facility]  Pt insisting on bloodwork  Will chck CBC, CMP and UA             ED Course         Critical Care Time  Procedures

## 2019-11-16 NOTE — ED PROVIDER NOTES
History  Chief Complaint   Patient presents with    Facial Swelling     States with facial pain and swelling x1 week also brown green drainge from site      Cehyenne Lemus is a 29 yo F,  presenting with with past medical history of impetigo, panic disorder, bipolar, polysubstance abuse, presenting with left-sided facial and lip pain and rash  Patient was evaluated several times over the past 2 months for the symptoms  Patient was started on oral clindamycin on 09/26, but was only able to take approximately 2 days of this medication due to a generalized rash  After that point, patient was evaluated in the emergency department on 09/29, diagnosed with impetigo, and placed on topical mupirocin as well as oral steroid after Infectious Disease consult  Wound cultures revealed Staph aureus  Patient was instructed follow up with Dermatology after this visit, but she states she failed to do so  Patient states she has not been on on oral antibiotics since that point  Patient states she used this topical antibiotic as prescribed without significant relief  Patient states symptoms have waxed and waned since this visit, but that symptoms have worsened over the past 5-6 days  Patient was evaluated on 11/13 at [facility] in emergency department, where the patient states Dean Parikh did nothing for me  Patient does admit to occasional spontaneous yellow/green discharge  Denies fevers or chills  Denies eye pain, redness, or decreased visual acuity  Denies oral ulcers or sore throat  Denies rash elsewhere on body at this time  Patient denies illicit substance use at this time        History provided by:  Patient   used: No    Rash   Location:  Face and mouth  Facial rash location:  L cheek  Mouth rash location:  Lower outer lip and upper outer lip  Quality: itchiness and painful    Duration:  2 months  Timing:  Constant  Progression:  Waxing and waning  Context: not animal contact, not chemical exposure, not exposure to similar rash, not insect bite/sting, not new detergent/soap, not plant contact and not sick contacts    Relieved by:  Nothing  Worsened by:  Nothing  Ineffective treatments:  Antibiotic cream  Associated symptoms: no abdominal pain, no diarrhea, no fever, no headaches, no hoarse voice, no myalgias, no nausea, no periorbital edema, no shortness of breath, no sore throat, no throat swelling, no tongue swelling, not vomiting and not wheezing        Prior to Admission Medications   Prescriptions Last Dose Informant Patient Reported? Taking?    LORazepam (ATIVAN) 1 mg tablet   No No   Sig: Take 0 5 tablets by mouth every 8 (eight) hours as needed for anxiety (moderate anxiety) for up to 10 doses   Melatonin 5 MG TABS   Yes No   Sig: Take 1 tablet by mouth daily at bedtime   PHENobarbital 32 4 mg tablet   No No   Sig: Take 1 tablet (32 4 mg total) by mouth 2 (two) times a day   Patient not taking: Reported on 9/26/2019   Pancrelipase, Lip-Prot-Amyl, (CREON) 56586 units CPEP   Yes No   Sig: Take 1 capsule by mouth 3 (three) times a day   acetaminophen (TYLENOL) 325 mg tablet   No No   Sig: Take 2 tablets (650 mg total) by mouth every 6 (six) hours as needed for mild pain or headaches   citalopram (CeleXA) 40 mg tablet  Self Yes No   Sig: Take 40 mg by mouth daily    cloNIDine (CATAPRES) 0 1 mg tablet   No No   Sig: Take 1 tablet (0 1 mg total) by mouth every 8 (eight) hours as needed (anxiety, withdrawl symptoms)   folic acid (FOLVITE) 1 mg tablet   Yes No   Sig: Take 1 mg by mouth daily   gabapentin (NEURONTIN) 400 mg capsule  Self No No   Sig: Take 1 capsule (400 mg total) by mouth 2 (two) times a day   Patient taking differently: Take 800 mg by mouth 2 (two) times a day    methocarbamol (ROBAXIN) 500 mg tablet   No No   Sig: Take 1 tablet (500 mg total) by mouth 3 (three) times a day as needed for muscle spasms   Patient not taking: Reported on 9/26/2019   mupirocin (BACTROBAN) 2 % ointment   No No   Sig: Apply topically 2 (two) times a day   nicotine (NICODERM CQ) 14 mg/24hr TD 24 hr patch   No No   Sig: Place 1 patch on the skin daily   Patient not taking: Reported on 2019   nicotine polacrilex (NICORETTE) 2 mg gum   No No   Sig: Chew 1 each as needed for smoking cessation (1 piece of gum max every 1 to 2 hours) Chew and then park for approximately 30 minutes PRN   Patient not taking: Reported on 2019   ondansetron (ZOFRAN-ODT) 4 mg disintegrating tablet   No No   Sig: Take 1 tablet (4 mg total) by mouth every 8 (eight) hours as needed for nausea or vomiting for up to 10 doses   Patient not taking: Reported on 2019   sucralfate (CARAFATE) 1 g/10 mL suspension   No No   Sig: Take 10 mL (1 g total) by mouth 4 (four) times a day (with meals and at bedtime)   Patient not taking: Reported on 2019   thiamine 100 MG tablet   No No   Sig: Take 1 tablet by mouth daily   topiramate (TOPAMAX) 100 mg tablet   Yes No   Sig: Take 100 mg by mouth daily   topiramate (TOPAMAX) 200 MG tablet   Yes No   Sig: Take 200 mg by mouth 2 (two) times a day      Facility-Administered Medications: None       Past Medical History:   Diagnosis Date    Alcohol abuse     Alcohol abuse     Anxiety     Asthma     [facility]     Depression     Pancreatitis     Panic disorder 10/11/2016    Psychiatric disorder     PTSD (post-traumatic stress disorder)     Seizures ([facility] )     Self-injurious behavior        Past Surgical History:   Procedure Laterality Date    ESOPHAGOGASTRODUODENOSCOPY N/A 4/10/2017    Procedure: ESOPHAGOGASTRODUODENOSCOPY (EGD); Surgeon: Grayson Jeff MD;  Location: [facility]; Service:     INDUCED       WISDOM TOOTH EXTRACTION         Family History   Problem Relation Age of Onset    Lupus Father     Coronary artery disease Father     Stroke Father      I have reviewed and agree with the history as documented      Social History     Tobacco Use    Smoking status: Current Some Day Smoker     Packs/day: 1 00     Years: 10 00     Pack years: 10 00    Smokeless tobacco: Never Used   Substance Use Topics    Alcohol use: Not Currently     Comment: in NA at this time    Drug use: No     Comment: in NA at this time        Review of Systems   Constitutional: Negative for chills and fever  HENT: Negative for congestion, dental problem, ear discharge, ear pain, hoarse voice, mouth sores, rhinorrhea, sinus pressure, sinus pain, sore throat, trouble swallowing and voice change  Eyes: Negative for pain, discharge, redness, itching and visual disturbance  Respiratory: Negative for cough, chest tightness, shortness of breath and wheezing  Cardiovascular: Negative for chest pain and palpitations  Gastrointestinal: Negative for abdominal pain, constipation, diarrhea, nausea and vomiting  Genitourinary: Negative for dysuria, frequency, genital sores and urgency  Musculoskeletal: Negative for back pain, myalgias, neck pain and neck stiffness  Skin: Positive for rash  Negative for pallor and wound  Neurological: Negative for dizziness, weakness, light-headedness, numbness and headaches  Psychiatric/Behavioral: Negative for hallucinations and suicidal ideas  The patient is nervous/anxious  Physical Exam  Physical Exam   Constitutional: She is oriented to person, place, and time  She appears well-developed and well-nourished  Non-toxic appearance  She does not appear ill  No distress  HENT:   Head: Normocephalic and atraumatic  Head is without right periorbital erythema and without left periorbital erythema  Right Ear: Tympanic membrane, external ear and ear canal normal    Left Ear: Tympanic membrane, external ear and ear canal normal    Nose: Nose normal  No mucosal edema  Mouth/Throat: Uvula is midline and oropharynx is clear and moist  No oral lesions  No trismus in the jaw  No dental abscesses   No oropharyngeal exudate, posterior oropharyngeal edema or posterior oropharyngeal erythema  Eyes: Pupils are equal, round, and reactive to light  Conjunctivae, EOM and lids are normal  Right eye exhibits no discharge and no exudate  Left eye exhibits no discharge and no exudate  Right conjunctiva is not injected  Left conjunctiva is not injected  Neck: Normal range of motion and full passive range of motion without pain  Neck supple  Cardiovascular: Normal rate, regular rhythm and normal heart sounds  Exam reveals no gallop and no friction rub  No murmur heard  Pulmonary/Chest: Effort normal and breath sounds normal  No stridor  No respiratory distress  She has no wheezes  She has no rales  Abdominal: Soft  Bowel sounds are normal  She exhibits no distension  There is no tenderness  There is no guarding  Lymphadenopathy:     She has no cervical adenopathy  Neurological: She is alert and oriented to person, place, and time  She exhibits normal muscle tone  Coordination normal    Skin: Skin is warm and dry  Capillary refill takes less than 2 seconds  Rash noted  She is not diaphoretic  No erythema  No pallor  Honey-crusted rash to left upper and lower lip, left corner of mouth, and left cheek proximal to lip border, consistent with impetigo  No vesicles or bulla  No spontaneous drainage or discharge at this time  No induration or fluctuance to suggest abscess  Psychiatric: Her speech is normal  Judgment and thought content normal  Her mood appears anxious  Her affect is labile  She is agitated  She expresses no suicidal plans and no homicidal plans  Patient is anxious throughout exam   Patient is labile and verbally confrontational   Patient denies suicidal ideations or attempts, denies homicidal ideations  Nursing note and vitals reviewed        Vital Signs  ED Triage Vitals [11/16/19 1641]   Temperature Pulse Respirations Blood Pressure SpO2   98 3 °F (36 8 °C) 99 16 136/76 97 %      Temp Source Heart Rate Source Patient Position - Orthostatic VS BP Location FiO2 (%)   Temporal Monitor Sitting Right arm --      Pain Score       No Pain           Vitals:    11/16/19 1641   BP: 136/76   Pulse: 99   Patient Position - Orthostatic VS: Sitting         Visual Acuity      ED Medications  Medications   doxycycline hyclate (VIBRAMYCIN) capsule 100 mg (100 mg Oral Given 11/16/19 1713)       Diagnostic Studies  Results Reviewed     None                 No orders to display              Procedures  Procedures       ED Course  ED Course as of Nov 16 1823   Sat Nov 16, 2019   1652 Pt left after evaluation but prior to discharge  Pt refuses medications or further workup  Pt states "I'm going to [facility]"  Explained to pt we can evaluate/treat her  Pt repeatedly declines and verbalizes risks of declining medical therapy  1707 Pt returned to room and is now agreeable to oral antibiotic therapy  Will provide first dose of oral antibiotic prior to discharge  MDM  Number of Diagnoses or Management Options  Impetigo:   Diagnosis management comments: Rash to upper/lower lip and left cheek waxing waning for approximately 2 months  Patient with several prior evaluations for similar  No improvement previously with topical antibiotics  Rash at this time is again consistent with impetigo  Due to failure of topical therapy and allergy list, will prescribe doxycycline b i d  For 7 days  Patient instructed to promptly follow up with Dermatology for re-evaluation  Will also provide follow-up with wound care  The patient verbalizes understanding of the importance of this follow-up, as well as of strict return indications including not limited to worsening symptoms, pain, fevers or chills, facial swelling, inability to tolerate by mouth, or any new or worsening symptoms of concern  Patient is stable for discharge       Patient Progress  Patient progress: stable      Disposition  Final diagnoses:   Impetigo     Time reflects when diagnosis was documented in both MDM as applicable and the Disposition within this note     Time User Action Codes Description Comment    11/16/2019  5:08 PM Linh Elam Add [L01 00] Impetigo       ED Disposition     ED Disposition Condition Date/Time Comment    Discharge Stable Sat Nov 16, 2019  5:06 PM Natividad Lozada discharge to home/self care              Follow-up Information     Follow up With Specialties Details Why Contact Info Additional [address] [facility] Dermatology Schedule an appointment as soon as possible for a visit   [facility] [address] [facility], [address] [place], [place], [address] [facility] Schedule an appointment as soon as possible for a visit   [facility] [address] [facility] [address] [facility], [address] [facility] , [place], [place], [address] [facility]          Discharge Medication List as of 11/16/2019  5:10 PM      START taking these medications    Details   doxycycline hyclate (VIBRAMYCIN) 100 mg capsule Take 1 capsule (100 mg total) by mouth 2 (two) times a day for 7 days, Starting Sat 11/16/2019, Until Sat 11/23/2019, Normal         CONTINUE these medications which have NOT CHANGED    Details   acetaminophen (TYLENOL) 325 mg tablet Take 2 tablets (650 mg total) by mouth every 6 (six) hours as needed for mild pain or headaches, Starting Wed 2/27/2019, Print      citalopram (CeleXA) 40 mg tablet Take 40 mg by mouth daily , Historical Med      cloNIDine (CATAPRES) 0 1 mg tablet Take 1 tablet (0 1 mg total) by mouth every 8 (eight) hours as needed (anxiety, withdrawl symptoms), Starting Wed 7/56/3221, Print      folic acid (FOLVITE) 1 mg tablet Take 1 mg by mouth daily, Until Discontinued, Historical Med      gabapentin (NEURONTIN) 400 mg capsule Take 1 capsule (400 mg total) by mouth 2 (two) times a day, Starting Wed 2/27/2019, Print      LORazepam (ATIVAN) 1 mg tablet Take 0 5 tablets by mouth every 8 (eight) hours as needed for anxiety (moderate anxiety) for up to 10 doses, Starting 4/11/2017, Until Discontinued, No Print      Melatonin 5 MG TABS Take 1 tablet by mouth daily at bedtime, Historical Med      methocarbamol (ROBAXIN) 500 mg tablet Take 1 tablet (500 mg total) by mouth 3 (three) times a day as needed for muscle spasms, Starting Wed 2/27/2019, Print      mupirocin (BACTROBAN) 2 % ointment Apply topically 2 (two) times a day, Starting Thu 9/26/2019, Normal      nicotine (NICODERM CQ) 14 mg/24hr TD 24 hr patch Place 1 patch on the skin daily, Starting Thu 2/28/2019, Print      nicotine polacrilex (NICORETTE) 2 mg gum Chew 1 each as needed for smoking cessation (1 piece of gum max every 1 to 2 hours) Chew and then park for approximately 30 minutes PRN, Starting 6/7/2017, Until Discontinued, Print      ondansetron (ZOFRAN-ODT) 4 mg disintegrating tablet Take 1 tablet (4 mg total) by mouth every 8 (eight) hours as needed for nausea or vomiting for up to 10 doses, Starting Tue 2/12/2019, Print      Pancrelipase, Lip-Prot-Amyl, (CREON) 36813 units CPEP Take 1 capsule by mouth 3 (three) times a day, Historical Med      PHENobarbital 32 4 mg tablet Take 1 tablet (32 4 mg total) by mouth 2 (two) times a day, Starting Wed 2/27/2019, Print      sucralfate (CARAFATE) 1 g/10 mL suspension Take 10 mL (1 g total) by mouth 4 (four) times a day (with meals and at bedtime), Starting Mon 6/24/2019, Print      thiamine 100 MG tablet Take 1 tablet by mouth daily, Starting 6/7/2017, Until Discontinued, Print      !! topiramate (TOPAMAX) 100 mg tablet Take 100 mg by mouth daily, Historical Med      !! topiramate (TOPAMAX) 200 MG tablet Take 200 mg by mouth 2 (two) times a day, Historical Med       !! - Potential duplicate medications found  Please discuss with provider  No discharge procedures on file      ED Provider  Electronically Signed by           Pamela Solares PA-C  11/16/19 8188

## 2019-11-16 NOTE — ED NOTES
Pt in [facility] argumentative, demanding blood work be done   States if we will not be doing blood work here she will go to Tina Calle RN  11/16/19 0801

## 2019-11-16 NOTE — ED NOTES
Pt's parents at bedside argumentative with staff demanding a second opinion made aware pt needs to follow up with dermatology         Bernie Ragland RN  11/16/19 7565

## 2019-11-17 NOTE — DISCHARGE INSTRUCTIONS
the antibiotic prescription that was sent in from the [facility] emergency department and use as directed  Schedule an appointment with Dermatology and follow up with your primary care provider

## 2019-11-17 NOTE — ED PROVIDER NOTES
History  Chief Complaint   Patient presents with    Skin Irritation     for the last few months pt has had facial swelling with drainage  has been treated for it but its getting worse and the pt wants help as she feels no one is listening to me about this and its my face   Edema     b/l leg swelling for as long as the face has been swelling      44-year-old female with history of bipolar, panic disorder and polysubstance abuse presents to the emergency department complaining of left-sided facial rash  The patient was seen at a different emergency department just prior to her arrival and was diagnosed with impetigo, given 1 dose of doxycycline in the emergency department and given a prescription for a course of doxycycline  The patient says she was unhappy when she left the other ED because she needed to have blood work done because she knows something else is wrong and no blood work was done  The patient is now also complaining of increased fatigue over the past week  The patient denies fever, chills, nausea, vomiting, diarrhea, chest pain and shortness of breath  On chart review:  Patient was started on oral clindamycin on 09/26, but was only able to take approximately 2 days of this medication due to a generalized rash  After that point, patient was evaluated in the emergency department on 09/29, diagnosed with impetigo, and placed on topical mupirocin as well as oral steroid after Infectious Disease consult  Wound cultures revealed Staph aureus  Patient was instructed follow up with Dermatology after this visit, but she states she failed to do so  Patient states she has not been on on oral antibiotics since that point  Patient states she used this topical antibiotic as prescribed without significant relief  Patient states symptoms have waxed and waned since this visit, but that symptoms have worsened over the past 5-6 days                Prior to Admission Medications   Prescriptions Last Dose Informant Patient Reported? Taking?    LORazepam (ATIVAN) 1 mg tablet   No No   Sig: Take 0 5 tablets by mouth every 8 (eight) hours as needed for anxiety (moderate anxiety) for up to 10 doses   Melatonin 5 MG TABS   Yes No   Sig: Take 1 tablet by mouth daily at bedtime   Pancrelipase, Lip-Prot-Amyl, (CREON) 12042 units CPEP   Yes No   Sig: Take 1 capsule by mouth 3 (three) times a day   acetaminophen (TYLENOL) 325 mg tablet   No No   Sig: Take 2 tablets (650 mg total) by mouth every 6 (six) hours as needed for mild pain or headaches   citalopram (CeleXA) 40 mg tablet  Self Yes No   Sig: Take 40 mg by mouth daily    cloNIDine (CATAPRES) 0 1 mg tablet   No No   Sig: Take 1 tablet (0 1 mg total) by mouth every 8 (eight) hours as needed (anxiety, withdrawl symptoms)   doxycycline hyclate (VIBRAMYCIN) 100 mg capsule   No No   Sig: Take 1 capsule (100 mg total) by mouth 2 (two) times a day for 7 days   folic acid (FOLVITE) 1 mg tablet   Yes No   Sig: Take 1 mg by mouth daily   gabapentin (NEURONTIN) 400 mg capsule  Self No No   Sig: Take 1 capsule (400 mg total) by mouth 2 (two) times a day   Patient taking differently: Take 800 mg by mouth 2 (two) times a day    mupirocin (BACTROBAN) 2 % ointment   No No   Sig: Apply topically 2 (two) times a day   thiamine 100 MG tablet   No No   Sig: Take 1 tablet by mouth daily   topiramate (TOPAMAX) 100 mg tablet   Yes No   Sig: Take 100 mg by mouth daily   topiramate (TOPAMAX) 200 MG tablet   Yes No   Sig: Take 200 mg by mouth 2 (two) times a day      Facility-Administered Medications: None       Past Medical History:   Diagnosis Date    Alcohol abuse     Alcohol abuse     Anxiety     Asthma     Bipolar disorder ([facility] [address] )     Depression     Pancreatitis     Panic disorder 10/11/2016    Psychiatric disorder     PTSD (post-traumatic stress disorder)     Seizures (HCC)     Self-injurious behavior        Past Surgical History:   Procedure Laterality Date    ESOPHAGOGASTRODUODENOSCOPY N/A 4/10/2017    Procedure: ESOPHAGOGASTRODUODENOSCOPY (EGD); Surgeon: Carlin Bravo MD;  Location: [facility]; Service:     INDUCED       WISDOM TOOTH EXTRACTION         Family History   Problem Relation Age of Onset    Lupus Father     Coronary artery disease Father     Stroke Father      I have reviewed and agree with the history as documented  Social History     Tobacco Use    Smoking status: Current Some Day Smoker     Packs/day: 1 00     Years: 10 00     Pack years: 10 00    Smokeless tobacco: Never Used   Substance Use Topics    Alcohol use: Not Currently     Comment: in NA at this time    Drug use: No     Comment: in NA at this time        Review of Systems   Constitutional: Positive for fatigue  Negative for chills and fever  HENT: Negative for congestion, sore throat and voice change  Eyes: Negative for photophobia and visual disturbance  Respiratory: Negative for cough, chest tightness and shortness of breath  Cardiovascular: Negative for chest pain, palpitations and leg swelling  Gastrointestinal: Negative for abdominal pain, constipation, diarrhea, nausea and vomiting  Endocrine: Negative for polydipsia, polyphagia and polyuria  Genitourinary: Negative for difficulty urinating, dysuria, flank pain and urgency  Musculoskeletal: Negative for back pain, gait problem and neck pain  Skin: Positive for rash  Negative for pallor  Neurological: Negative for dizziness, syncope, weakness, light-headedness, numbness and headaches  Psychiatric/Behavioral: Negative for agitation and confusion  All other systems reviewed and are negative        Physical Exam  ED Triage Vitals   Temperature Pulse Respirations Blood Pressure SpO2   19 1803 19 1803 19 1803 19 1803 19 1803   99 5 °F (37 5 °C) 95 16 159/91 98 %      Temp Source Heart Rate Source Patient Position - Orthostatic VS BP Location FiO2 (%)   19 1803 11/16/19 2012 11/16/19 2012 11/16/19 2012 --   Oral Monitor Lying Right arm       Pain Score       11/16/19 1803       7             Orthostatic Vital Signs  Vitals:    11/16/19 1803 11/16/19 2012   BP: 159/91 152/77   Pulse: 95 78   Patient Position - Orthostatic VS:  Lying       Physical Exam   Constitutional: She is oriented to person, place, and time  She appears well-developed and well-nourished  HENT:   Head: Normocephalic and atraumatic  Honey-crusted rash to left upper and lower lip, left corner of mouth, and left cheek proximal to lip border, consistent with impetigo  No vesicles or bulla  No spontaneous drainage or discharge at this time  No induration or fluctuance to suggest abscess  Eyes: Pupils are equal, round, and reactive to light  EOM are normal    Neck: Normal range of motion  Neck supple  Cardiovascular: Normal rate, regular rhythm, normal heart sounds and intact distal pulses  Pulmonary/Chest: Effort normal and breath sounds normal    Abdominal: Soft  Bowel sounds are normal  She exhibits no distension  There is no tenderness  There is no rebound and no guarding  Neurological: She is alert and oriented to person, place, and time  Skin: Skin is warm and dry  Capillary refill takes less than 2 seconds  Rash noted  Psychiatric: Her mood appears anxious  Her affect is labile  She expresses no homicidal and no suicidal ideation  She expresses no suicidal plans and no homicidal plans  Nursing note and vitals reviewed        ED Medications  Medications   lidocaine (LMX) 4 % cream (1 application Topical Given 11/16/19 1852)       Diagnostic Studies  Results Reviewed     Procedure Component Value Units Date/Time    Urine Microscopic [[id number]]  (Normal) Collected:  11/16/19 1951    Lab Status:  Final result Specimen:  Urine, Clean Catch Updated:  11/16/19 2024     RBC, UA None Seen /hpf      WBC, UA None Seen /hpf      Epithelial Cells None Seen /hpf      Bacteria, UA None Seen /hpf Hyaline Casts, UA None Seen /lpf     POCT urinalysis dipstick [[id number]]  (Normal) Resulted:  11/16/19 2004    Lab Status:  Final result Specimen:  Urine Updated:  11/16/19 2004     Color, UA YELLOW    POCT pregnancy, urine [[id number]]  (Normal) Resulted:  11/16/19 2003    Lab Status:  Final result Updated:  11/16/19 2004     EXT PREG TEST UR (Ref: Negative) NEGATIVE     Control VALID    Urine Macroscopic, POC [[id number]]  (Abnormal) Collected:  11/16/19 1951    Lab Status:  Final result Specimen:  Urine Updated:  11/16/19 1951     Color, UA Yellow     Clarity, UA Clear     pH, UA 7 5     Leukocytes, UA Negative     Nitrite, UA Negative     Protein, UA Negative mg/dl      Glucose, UA Negative mg/dl      Ketones, UA Negative mg/dl      Urobilinogen, UA 0 2 E U /dl      Bilirubin, UA Negative     Blood, UA Trace     Specific [place], UA 1 015    Narrative:       CLINITEK RESULT    Comprehensive metabolic panel [[id number]] Collected:  11/16/19 1832    Lab Status:  Final result Specimen:  Blood from Hand, Left Updated:  11/16/19 1903     Sodium 141 mmol/L      Potassium 3 9 mmol/L      Chloride 107 mmol/L      CO2 24 mmol/L      ANION GAP 10 mmol/L      BUN 9 mg/dL      Creatinine 0 75 mg/dL      Glucose 100 mg/dL      Calcium 9 0 mg/dL      AST 19 U/L      ALT 26 U/L      Alkaline Phosphatase 90 U/L      Total Protein 7 4 g/dL      Albumin 4 2 g/dL      Total Bilirubin 0 24 mg/dL      eGFR 109 ml/min/1 73sq m     Narrative:       [facility] guidelines for Chronic Kidney Disease (CKD):     Stage 1 with normal or high GFR (GFR > 90 mL/min/1 73 square meters)    Stage 2 Mild CKD (GFR = 60-89 mL/min/1 73 square meters)    Stage 3A Moderate CKD (GFR = 45-59 mL/min/1 73 square meters)    Stage 3B Moderate CKD (GFR = 30-44 mL/min/1 73 square meters)    Stage 4 Severe CKD (GFR = 15-29 mL/min/1 73 square meters)    Stage 5 End Stage CKD (GFR <15 mL/min/1 73 square meters)  Note: GFR calculation is accurate only with a steady state creatinine    CBC and differential [[id number]]  (Abnormal) Collected:  11/16/19 1832    Lab Status:  Final result Specimen:  Blood from Hand, Left Updated:  11/16/19 1842     WBC 9 81 Thousand/uL      RBC 4 37 Million/uL      Hemoglobin 11 1 g/dL      Hematocrit 35 5 %      MCV 81 fL      MCH 25 4 pg      MCHC 31 3 g/dL      RDW 15 5 %      MPV 10 2 fL      Platelets 122 Thousands/uL      nRBC 0 /100 WBCs      Neutrophils Relative 69 %      Immat GRANS % 0 %      Lymphocytes Relative 24 %      Monocytes Relative 6 %      Eosinophils Relative 1 %      Basophils Relative 0 %      Neutrophils Absolute 6 72 Thousands/µL      Immature Grans Absolute 0 03 Thousand/uL      Lymphocytes Absolute 2 37 Thousands/µL      Monocytes Absolute 0 54 Thousand/µL      Eosinophils Absolute 0 12 Thousand/µL      Basophils Absolute 0 03 Thousands/µL                  No orders to display         Procedures  Procedures        ED Course                               MDM  Number of Diagnoses or Management Options  Fatigue:   Impetigo:   Diagnosis management comments: 46-year-old female presented to the emergency department for evaluation of rash and fatigue  The patient was already diagnosed with impetigo and started on a course of doxycycline  I agree with the previous diagnosis and current management is appropriate  On exam patient was anxious and was picking at her face  Blood work in the emergency department showed the patient had mild anemia but was otherwise unremarkable  Recommendation was made to the patient to fill the prescription that was given to her at the previous emergency department and to complete the course of antibiotics as instructed  Also recommended she follow up with her primary care provider regarding her mild anemia and to follow up with Dermatology for her recurrent rashes    The patient was counseled about picking at her face as a could lead to additional areas for future infection  Patient agrees with the plan for discharge and feels comfortable to go home with proper f/u  Advised to return for worsening or additional problems  Diagnostic tests were reviewed and questions answered  Diagnosis, care plan and treatment options were discussed  The patient understands instructions and will follow up as directed  Disposition  Final diagnoses:   Impetigo   Fatigue   Anemia     Time reflects when diagnosis was documented in both MDM as applicable and the Disposition within this note     Time User Action Codes Description Comment    11/16/2019  8:07 PM Ne Bridegroom Add [L01 00] Impetigo     11/16/2019  8:07 PM Ne Bridegroom Add [R53 83] Fatigue     11/16/2019  8:07 PM Ne Bridegroom Add [D64 9] Anemia     11/16/2019  8:08 PM Ne Bridegroom Remove [D64 9] Anemia     11/17/2019 12:42 PM Ne Bridegroom Add [D64 9] Anemia       ED Disposition     ED Disposition Condition Date/Time Comment    Discharge Stable Sat Nov 16, 2019  8:07 PM Matthew Shy discharge to home/self care              Follow-up Information     Follow up With Specialties Details Why Contact Info Additional [address],  Internal Medicine Schedule an appointment as soon as possible for a visit   [address] [facility]  49  [phone]  [address] [facility] Dermatology Schedule an appointment as soon as possible for a visit   [address] [facility], [address] [place], [place], [address] Emergency Department Emergency Medicine Go to  If symptoms worsen [address]  [phone]  ED, [address], [facility]   [phone]          Discharge Medication List as of 11/16/2019  8:13 PM      CONTINUE these medications which have NOT CHANGED    Details   acetaminophen (TYLENOL) 325 mg tablet Take 2 tablets (650 mg total) by mouth every 6 (six) hours as needed for mild pain or headaches, Starting Wed 2/27/2019, Print      citalopram (CeleXA) 40 mg tablet Take 40 mg by mouth daily , Historical Med      cloNIDine (CATAPRES) 0 1 mg tablet Take 1 tablet (0 1 mg total) by mouth every 8 (eight) hours as needed (anxiety, withdrawl symptoms), Starting Wed 2/27/2019, Print      doxycycline hyclate (VIBRAMYCIN) 100 mg capsule Take 1 capsule (100 mg total) by mouth 2 (two) times a day for 7 days, Starting Sat 11/16/2019, Until Sat 44/08/4669, Print      folic acid (FOLVITE) 1 mg tablet Take 1 mg by mouth daily, Until Discontinued, Historical Med      gabapentin (NEURONTIN) 400 mg capsule Take 1 capsule (400 mg total) by mouth 2 (two) times a day, Starting Wed 2/27/2019, Print      LORazepam (ATIVAN) 1 mg tablet Take 0 5 tablets by mouth every 8 (eight) hours as needed for anxiety (moderate anxiety) for up to 10 doses, Starting 4/11/2017, Until Discontinued, No Print      Melatonin 5 MG TABS Take 1 tablet by mouth daily at bedtime, Historical Med      mupirocin (BACTROBAN) 2 % ointment Apply topically 2 (two) times a day, Starting Thu 9/26/2019, Normal      Pancrelipase, Lip-Prot-Amyl, (CREON) 88794 units CPEP Take 1 capsule by mouth 3 (three) times a day, Historical Med      thiamine 100 MG tablet Take 1 tablet by mouth daily, Starting 6/7/2017, Until Discontinued, Print      !! topiramate (TOPAMAX) 100 mg tablet Take 100 mg by mouth daily, Historical Med      !! topiramate (TOPAMAX) 200 MG tablet Take 200 mg by mouth 2 (two) times a day, Historical Med       !! - Potential duplicate medications found  Please discuss with provider  No discharge procedures on file  ED Provider  Attending physically available and evaluated [facility]  I managed the patient along with the ED Attending      Electronically Signed by         Lilly Venegas, MD  11/17/19 2820

## 2020-01-20 ENCOUNTER — HOSPITAL ENCOUNTER (EMERGENCY)
Facility: HOSPITAL | Age: 30
Discharge: HOME | End: 2020-01-20
Attending: EMERGENCY MEDICINE
Payer: COMMERCIAL

## 2020-01-20 VITALS
SYSTOLIC BLOOD PRESSURE: 169 MMHG | HEIGHT: 68 IN | HEART RATE: 100 BPM | TEMPERATURE: 99.4 F | OXYGEN SATURATION: 98 % | DIASTOLIC BLOOD PRESSURE: 93 MMHG | BODY MASS INDEX: 27.28 KG/M2 | RESPIRATION RATE: 18 BRPM | WEIGHT: 180 LBS

## 2020-01-20 DIAGNOSIS — R21 RASH: Primary | ICD-10-CM

## 2020-01-20 PROCEDURE — 99283 EMERGENCY DEPT VISIT LOW MDM: CPT | Performed by: PHYSICIAN ASSISTANT

## 2020-01-20 PROCEDURE — 99283 EMERGENCY DEPT VISIT LOW MDM: CPT

## 2020-01-20 RX ORDER — HYDROXYZINE HYDROCHLORIDE 25 MG/1
25 TABLET, FILM COATED ORAL 3 TIMES DAILY PRN
Qty: 30 TABLET | Refills: 0 | Status: ON HOLD | OUTPATIENT
Start: 2020-01-20 | End: 2020-04-21 | Stop reason: HOSPADM

## 2020-01-20 RX ORDER — SULFAMETHOXAZOLE AND TRIMETHOPRIM 800; 160 MG/1; MG/1
1 TABLET ORAL 2 TIMES DAILY
Qty: 20 TABLET | Refills: 0 | Status: SHIPPED | OUTPATIENT
Start: 2020-01-20 | End: 2020-01-30

## 2020-01-20 NOTE — DISCHARGE INSTRUCTIONS
Contact Dermatitis   WHAT YOU NEED TO KNOW:   Contact dermatitis is a skin rash  It develops when you touch something that irritates your skin or causes an allergic reaction  DISCHARGE INSTRUCTIONS:   Call 911 for any of the following:   · You have sudden trouble breathing  · Your throat swells and you have trouble eating  · Your face is swollen  Contact your healthcare provider if:   · You have a fever  · Your blisters are draining pus  · Your rash spreads or does not get better, even after treatment  · You have questions or concerns about your condition or care  Medicines:   · Medicines  help decrease itching and swelling  They will be given as a topical medicine to apply to your rash or as a pill  · Take your medicine as directed  Contact your healthcare provider if you think your medicine is not helping or if you have side effects  Tell him or her if you are allergic to any medicine  Keep a list of the medicines, vitamins, and herbs you take  Include the amounts, and when and why you take them  Bring the list or the pill bottles to follow-up visits  Carry your medicine list with you in case of an emergency  Manage contact dermatitis:   · Take short baths or showers in cool water  Use mild soap or soap-free cleansers  Add oatmeal, baking soda, or cornstarch to the bath water to help decrease skin irritation  · Avoid skin irritants , such as makeup, hair products, soaps, and cleansers  Use products that do not contain perfume or dye  · Apply a cool compress to your rash  This will help soothe your skin  · Keep your skin moist   Rub unscented cream or lotion on your skin to prevent dryness and itching  Do this right after a bath or shower when your skin is still damp  Follow up with your healthcare provider or dermatologist in 2 to 3 days:  Write down your questions so you remember to ask them during your visits     © 2017 [address] [employer] Information is for End User's use only and may not be sold, redistributed or otherwise used for commercial purposes  All illustrations and images included in CareNotes® are the copyrighted property of [employer]  or [employer]  The above information is an  only  It is not intended as medical advice for individual conditions or treatments  Talk to your doctor, nurse or pharmacist before following any medical regimen to see if it is safe and effective for you

## 2020-01-20 NOTE — ED PROVIDER NOTES
History  Chief Complaint   Patient presents with    Rash     Pt  reports she was dx with a staph infection on her face in August  Patient reports she has appt with a dermatolgist and has seen one in the past but reports her face has not cleared up  71-year-old female presents to emergency room for evaluation of rash  States it began in August   It is located on her hands and face  She has been seen multiple times for this and is frustrated that it is still here  States at some points it does get better however then begins to get worse again  Denies itching  States she notices cracks and lines in the rash  She has been on 2 antibiotics and antibiotic cream without relief  Denies changes in skin care at home  Patient admits to intermittently applying lidocaine cream to rash when it is painful  Complains of a painful lump in the left nostril which has also improved some  She states she has had a cold for a while where her nasal drainage is is different than normal   Admits to history of staph infection  Patient states she has also seen a dermatologist for this  She was told at 1 point her hands are consistent with eczema  Patient states she has tried treatment for this without relief  History provided by:  Patient      Prior to Admission Medications   Prescriptions Last Dose Informant Patient Reported? Taking?    LORazepam (ATIVAN) 1 mg tablet   No No   Sig: Take 0 5 tablets by mouth every 8 (eight) hours as needed for anxiety (moderate anxiety) for up to 10 doses   Melatonin 5 MG TABS   Yes No   Sig: Take 1 tablet by mouth daily at bedtime   Pancrelipase, Lip-Prot-Amyl, (CREON) 88697 units CPEP   Yes No   Sig: Take 1 capsule by mouth 3 (three) times a day   acetaminophen (TYLENOL) 325 mg tablet   No No   Sig: Take 2 tablets (650 mg total) by mouth every 6 (six) hours as needed for mild pain or headaches   citalopram (CeleXA) 40 mg tablet  Self Yes No   Sig: Take 40 mg by mouth daily    cloNIDine (CATAPRES) 0 1 mg tablet   No No   Sig: Take 1 tablet (0 1 mg total) by mouth every 8 (eight) hours as needed (anxiety, withdrawl symptoms)   folic acid (FOLVITE) 1 mg tablet   Yes No   Sig: Take 1 mg by mouth daily   gabapentin (NEURONTIN) 400 mg capsule  Self No No   Sig: Take 1 capsule (400 mg total) by mouth 2 (two) times a day   Patient taking differently: Take 800 mg by mouth 2 (two) times a day    mupirocin (BACTROBAN) 2 % ointment   No No   Sig: Apply topically 2 (two) times a day   thiamine 100 MG tablet   No No   Sig: Take 1 tablet by mouth daily   topiramate (TOPAMAX) 100 mg tablet   Yes No   Sig: Take 100 mg by mouth daily   topiramate (TOPAMAX) 200 MG tablet   Yes No   Sig: Take 200 mg by mouth 2 (two) times a day      Facility-Administered Medications: None       Past Medical History:   Diagnosis Date    Alcohol abuse     Alcohol abuse     Anxiety     Asthma     Bipolar disorder ([place] [address] )     Depression     Pancreatitis     Panic disorder 10/11/2016    Psychiatric disorder     PTSD (post-traumatic stress disorder)     Seizures ([facility] )     Self-injurious behavior        Past Surgical History:   Procedure Laterality Date    ESOPHAGOGASTRODUODENOSCOPY N/A 4/10/2017    Procedure: ESOPHAGOGASTRODUODENOSCOPY (EGD); Surgeon: Mitchell Khan MD;  Location: [facility]; Service:     INDUCED       WISDOM TOOTH EXTRACTION         Family History   Problem Relation Age of Onset    Lupus Father     Coronary artery disease Father     Stroke Father      I have reviewed and agree with the history as documented  Social History     Tobacco Use    Smoking status: Current Some Day Smoker     Packs/day: 1 00     Years: 10 00     Pack years: 10 00    Smokeless tobacco: Never Used   Substance Use Topics    Alcohol use: Not Currently     Comment: in NA at this time    Drug use: No     Comment: in NA at this time        Review of Systems   Constitutional: Negative for chills and fever     HENT: Positive for congestion  Negative for sore throat  Eyes: Negative for redness  Respiratory: Positive for cough  Gastrointestinal: Negative for vomiting  Skin: Positive for rash  Psychiatric/Behavioral: The patient is nervous/anxious  Physical Exam  Physical Exam   Constitutional: She appears well-developed and well-nourished  HENT:   Right Ear: Tympanic membrane and external ear normal    Left Ear: Tympanic membrane and external ear normal    Nose: No rhinorrhea, nasal deformity, septal deviation or nasal septal hematoma  Mouth/Throat: Uvula is midline, oropharynx is clear and moist and mucous membranes are normal  No tonsillar exudate  Left nostril with dried yellow mucus over the turbinate   Eyes: Conjunctivae are normal    Neck: Neck supple  Cardiovascular: Normal rate, regular rhythm and normal heart sounds  Pulmonary/Chest: Effort normal and breath sounds normal    Lymphadenopathy:     She has no cervical adenopathy  Neurological: She is alert  Skin: Skin is warm and dry  No rash noted  Dorsum of hands with dry cracked skin  Left forehead with annular abrasion, no surrounding erythema or drainage  Anterior portion of chin with scabs/scarred areas  Psychiatric: Her mood appears anxious  Nursing note and vitals reviewed        Vital Signs  ED Triage Vitals [01/20/20 1144]   Temperature Pulse Respirations Blood Pressure SpO2   99 4 °F (37 4 °C) 100 18 169/93 98 %      Temp Source Heart Rate Source Patient Position - Orthostatic VS BP Location FiO2 (%)   Oral Monitor Sitting Right arm --      Pain Score       2           Vitals:    01/20/20 1144   BP: 169/93   Pulse: 100   Patient Position - Orthostatic VS: Sitting         Visual Acuity      ED Medications  Medications - No data to display    Diagnostic Studies  Results Reviewed     Procedure Component Value Units Date/Time    Wound culture and Gram stain [[id number]]     Lab Status:  No result Specimen:  Wound No orders to display              Procedures  Procedures         ED Course                               MDM  Number of Diagnoses or Management Options  Rash:      Amount and/or Complexity of Data Reviewed  Decide to obtain previous medical records or to obtain history from someone other than the patient: yes (Patient has been on doxycycline, mupirocin, clindamycin)  Discuss the patient with other providers: yes    Risk of Complications, Morbidity, and/or Mortality  General comments: Applied saline to left nostril and attempted suctioning without successful removal of dried mucus    Upon discussion father is now present they expressed frustration and concern for ongoing rash over the course of many months  Discussed importance of follow-up with dermatologist, which they state she she has an appointment next month  I also educated her importance of rinsing nostril with saline spray to help loosen mucus  Discussed with her follow-up great with ear nose and throat to further evaluate this problem if it persists  Discussed clindamycin gel however patient is reluctant to this as last time they gave her oral clindamycin she broke out in an additional rash on her legs  Discussed with patient 1 of the medications I will be prescribing should help with anxiety of her touching and picking at the rash  She understands  She requests an antibiotic, upon review Bactrim seems most reasonable however patient states this was listed as an allergy due to an upset stomach when she was much younger, states she thinks she can tolerate and would like to try it      Patient Progress  Patient progress: stable        Disposition  Final diagnoses:   Rash     Time reflects when diagnosis was documented in both MDM as applicable and the Disposition within this note     Time User Action Codes Description Comment    1/20/2020  2:01 PM Michelle Contreras Add [R21] Rash       ED Disposition     ED Disposition Condition Date/Time Comment Discharge Stable Mon Jan 20, 2020  1:49 PM Yisel Sim discharge to home/self care              Follow-up Information     Follow up With Specialties Details Why Contact Info Additional Information    Josie Tavera MD Dermatology In 3 days  [address]  [address] [facility]       Jean Stanford MD Otolaryngology In 3 days  [address] Emergency Department Emergency Medicine  If symptoms worsen [address]  [phone]  ED, [address], [address]   [phone]          Discharge Medication List as of 1/20/2020  2:05 PM      START taking these medications    Details   hydrOXYzine HCL (ATARAX) 25 mg tablet Take 1 tablet (25 mg total) by mouth 3 (three) times a day as needed for itching for up to 10 days, Starting Mon 1/20/2020, Until Thu 1/30/2020, Normal      sulfamethoxazole-trimethoprim (BACTRIM DS) 800-160 mg per tablet Take 1 tablet by mouth 2 (two) times a day for 10 days, Starting Mon 1/20/2020, Until Thu 1/30/2020, Normal         CONTINUE these medications which have NOT CHANGED    Details   acetaminophen (TYLENOL) 325 mg tablet Take 2 tablets (650 mg total) by mouth every 6 (six) hours as needed for mild pain or headaches, Starting Wed 2/27/2019, Print      citalopram (CeleXA) 40 mg tablet Take 40 mg by mouth daily , Historical Med      cloNIDine (CATAPRES) 0 1 mg tablet Take 1 tablet (0 1 mg total) by mouth every 8 (eight) hours as needed (anxiety, withdrawl symptoms), Starting Wed 3/51/2844, Print      folic acid (FOLVITE) 1 mg tablet Take 1 mg by mouth daily, Until Discontinued, Historical Med      gabapentin (NEURONTIN) 400 mg capsule Take 1 capsule (400 mg total) by mouth 2 (two) times a day, Starting Wed 2/27/2019, Print      LORazepam (ATIVAN) 1 mg tablet Take 0 5 tablets by mouth every 8 (eight) hours as needed for anxiety (moderate anxiety) for up to 10 doses, Starting 4/11/2017, Until Discontinued, No Print      Melatonin 5 MG TABS Take 1 tablet by mouth daily at bedtime, Historical Med      mupirocin (BACTROBAN) 2 % ointment Apply topically 2 (two) times a day, Starting u 9/26/2019, Normal      Pancrelipase, Lip-Prot-Amyl, (CREON) 99519 units CPEP Take 1 capsule by mouth 3 (three) times a day, Historical Med      thiamine 100 MG tablet Take 1 tablet by mouth daily, Starting 6/7/2017, Until Discontinued, Print      !! topiramate (TOPAMAX) 100 mg tablet Take 100 mg by mouth daily, Historical Med      !! topiramate (TOPAMAX) 200 MG tablet Take 200 mg by mouth 2 (two) times a day, Historical Med       !! - Potential duplicate medications found  Please discuss with provider  No discharge procedures on file      ED Provider  Electronically Signed by           Tristan Chadwick PA-C  01/20/20 9803

## 2020-04-20 ENCOUNTER — APPOINTMENT (INPATIENT)
Dept: RADIOLOGY | Facility: HOSPITAL | Age: 30
DRG: 720 | End: 2020-04-20
Payer: COMMERCIAL

## 2020-04-20 ENCOUNTER — HOSPITAL ENCOUNTER (INPATIENT)
Facility: HOSPITAL | Age: 30
LOS: 5 days | Discharge: HOME | DRG: 720 | End: 2020-04-25
Attending: EMERGENCY MEDICINE | Admitting: EMERGENCY MEDICINE
Payer: COMMERCIAL

## 2020-04-20 ENCOUNTER — APPOINTMENT (EMERGENCY)
Dept: RADIOLOGY | Facility: HOSPITAL | Age: 30
DRG: 720 | End: 2020-04-20
Payer: COMMERCIAL

## 2020-04-20 DIAGNOSIS — J96.01 ACUTE RESPIRATORY FAILURE WITH HYPOXIA (HCC): Primary | ICD-10-CM

## 2020-04-20 DIAGNOSIS — F10.10 ALCOHOL ABUSE: ICD-10-CM

## 2020-04-20 DIAGNOSIS — J18.9 PNEUMONIA OF BOTH LOWER LOBES DUE TO INFECTIOUS ORGANISM: ICD-10-CM

## 2020-04-20 DIAGNOSIS — F41.9 ANXIETY DISORDER, UNSPECIFIED TYPE: ICD-10-CM

## 2020-04-20 DIAGNOSIS — F41.0 PANIC DISORDER: Chronic | ICD-10-CM

## 2020-04-20 LAB
ALBUMIN SERPL BCP-MCNC: 3.4 G/DL (ref 3.5–5)
ALP SERPL-CCNC: 85 U/L (ref 46–116)
ALT SERPL W P-5'-P-CCNC: 21 U/L (ref 12–78)
ANION GAP SERPL CALCULATED.3IONS-SCNC: 7 MMOL/L (ref 4–13)
APTT PPP: 31 SECONDS (ref 23–37)
AST SERPL W P-5'-P-CCNC: 63 U/L (ref 5–45)
BACTERIA UR QL AUTO: ABNORMAL /HPF
BASOPHILS # BLD MANUAL: 0.27 THOUSAND/UL (ref 0–0.1)
BASOPHILS NFR MAR MANUAL: 2 % (ref 0–1)
BILIRUB SERPL-MCNC: 0.37 MG/DL (ref 0.2–1)
BILIRUB UR QL STRIP: NEGATIVE
BUN SERPL-MCNC: 10 MG/DL (ref 5–25)
CALCIUM SERPL-MCNC: 8.7 MG/DL (ref 8.3–10.1)
CHLORIDE SERPL-SCNC: 106 MMOL/L (ref 100–108)
CLARITY UR: ABNORMAL
CO2 SERPL-SCNC: 21 MMOL/L (ref 21–32)
COLOR UR: YELLOW
CREAT SERPL-MCNC: 0.84 MG/DL (ref 0.6–1.3)
CRP SERPL QL: 153 MG/L
D DIMER PPP FEU-MCNC: 1.25 UG/ML FEU
EOSINOPHIL # BLD MANUAL: 0 THOUSAND/UL (ref 0–0.4)
EOSINOPHIL NFR BLD MANUAL: 0 % (ref 0–6)
ERYTHROCYTE [DISTWIDTH] IN BLOOD BY AUTOMATED COUNT: 16.5 % (ref 11.6–15.1)
ERYTHROCYTE [SEDIMENTATION RATE] IN BLOOD: 96 MM/HOUR (ref 0–20)
FERRITIN SERPL-MCNC: 81 NG/ML (ref 8–388)
FIBRINOGEN PPP-MCNC: 658 MG/DL (ref 227–495)
GFR SERPL CREATININE-BSD FRML MDRD: 95 ML/MIN/1.73SQ M
GLUCOSE SERPL-MCNC: 120 MG/DL (ref 65–140)
GLUCOSE UR STRIP-MCNC: NEGATIVE MG/DL
HCT VFR BLD AUTO: 32.2 % (ref 34.8–46.1)
HGB BLD-MCNC: 10 G/DL (ref 11.5–15.4)
HGB UR QL STRIP.AUTO: ABNORMAL
INR PPP: 0.96 (ref 0.84–1.19)
KETONES UR STRIP-MCNC: NEGATIVE MG/DL
LACTATE SERPL-SCNC: 2 MMOL/L (ref 0.5–2)
LDH SERPL-CCNC: 832 U/L (ref 81–234)
LEUKOCYTE ESTERASE UR QL STRIP: NEGATIVE
LYMPHOCYTES # BLD AUTO: 0.41 THOUSAND/UL (ref 0.6–4.47)
LYMPHOCYTES # BLD AUTO: 3 % (ref 14–44)
MCH RBC QN AUTO: 25.3 PG (ref 26.8–34.3)
MCHC RBC AUTO-ENTMCNC: 31.1 G/DL (ref 31.4–37.4)
MCV RBC AUTO: 82 FL (ref 82–98)
MONOCYTES # BLD AUTO: 0.41 THOUSAND/UL (ref 0–1.22)
MONOCYTES NFR BLD: 3 % (ref 4–12)
NEUTROPHILS # BLD MANUAL: 12.35 THOUSAND/UL (ref 1.85–7.62)
NEUTS SEG NFR BLD AUTO: 90 % (ref 43–75)
NITRITE UR QL STRIP: NEGATIVE
NON-SQ EPI CELLS URNS QL MICRO: ABNORMAL /HPF
NRBC BLD AUTO-RTO: 0 /100 WBCS
PH UR STRIP.AUTO: 5.5 [PH]
PLATELET # BLD AUTO: 159 THOUSANDS/UL (ref 149–390)
PLATELET # BLD AUTO: 159 THOUSANDS/UL (ref 149–390)
PLATELET BLD QL SMEAR: ADEQUATE
PMV BLD AUTO: 10.8 FL (ref 8.9–12.7)
PMV BLD AUTO: 11 FL (ref 8.9–12.7)
POLYCHROMASIA BLD QL SMEAR: PRESENT
POTASSIUM SERPL-SCNC: 3.7 MMOL/L (ref 3.5–5.3)
PROCALCITONIN SERPL-MCNC: 6.97 NG/ML
PROT SERPL-MCNC: 7.3 G/DL (ref 6.4–8.2)
PROT UR STRIP-MCNC: NEGATIVE MG/DL
PROTHROMBIN TIME: 12.4 SECONDS (ref 11.6–14.5)
RBC # BLD AUTO: 3.95 MILLION/UL (ref 3.81–5.12)
RBC #/AREA URNS AUTO: ABNORMAL /HPF
RBC MORPH BLD: PRESENT
SARS-COV-2 RNA RESP QL NAA+PROBE: NEGATIVE
SODIUM SERPL-SCNC: 134 MMOL/L (ref 136–145)
SP GR UR STRIP.AUTO: 1.01 (ref 1–1.03)
TROPONIN I SERPL-MCNC: <0.02 NG/ML
UROBILINOGEN UR QL STRIP.AUTO: 0.2 E.U./DL
VARIANT LYMPHS # BLD AUTO: 2 %
WBC # BLD AUTO: 13.72 THOUSAND/UL (ref 4.31–10.16)
WBC #/AREA URNS AUTO: ABNORMAL /HPF

## 2020-04-20 PROCEDURE — 87040 BLOOD CULTURE FOR BACTERIA: CPT | Performed by: EMERGENCY MEDICINE

## 2020-04-20 PROCEDURE — 84145 PROCALCITONIN (PCT): CPT | Performed by: EMERGENCY MEDICINE

## 2020-04-20 PROCEDURE — 87340 HEPATITIS B SURFACE AG IA: CPT | Performed by: PHYSICIAN ASSISTANT

## 2020-04-20 PROCEDURE — 85610 PROTHROMBIN TIME: CPT | Performed by: EMERGENCY MEDICINE

## 2020-04-20 PROCEDURE — 86704 HEP B CORE ANTIBODY TOTAL: CPT | Performed by: PHYSICIAN ASSISTANT

## 2020-04-20 PROCEDURE — 87633 RESP VIRUS 12-25 TARGETS: CPT | Performed by: PHYSICIAN ASSISTANT

## 2020-04-20 PROCEDURE — 36415 COLL VENOUS BLD VENIPUNCTURE: CPT | Performed by: EMERGENCY MEDICINE

## 2020-04-20 PROCEDURE — 85027 COMPLETE CBC AUTOMATED: CPT | Performed by: EMERGENCY MEDICINE

## 2020-04-20 PROCEDURE — 87581 M.PNEUMON DNA AMP PROBE: CPT | Performed by: PHYSICIAN ASSISTANT

## 2020-04-20 PROCEDURE — 99291 CRITICAL CARE FIRST HOUR: CPT

## 2020-04-20 PROCEDURE — 86803 HEPATITIS C AB TEST: CPT | Performed by: PHYSICIAN ASSISTANT

## 2020-04-20 PROCEDURE — 85007 BL SMEAR W/DIFF WBC COUNT: CPT | Performed by: EMERGENCY MEDICINE

## 2020-04-20 PROCEDURE — 85384 FIBRINOGEN ACTIVITY: CPT | Performed by: EMERGENCY MEDICINE

## 2020-04-20 PROCEDURE — 85610 PROTHROMBIN TIME: CPT | Performed by: PHYSICIAN ASSISTANT

## 2020-04-20 PROCEDURE — 85379 FIBRIN DEGRADATION QUANT: CPT | Performed by: EMERGENCY MEDICINE

## 2020-04-20 PROCEDURE — 87486 CHLMYD PNEUM DNA AMP PROBE: CPT | Performed by: PHYSICIAN ASSISTANT

## 2020-04-20 PROCEDURE — 96365 THER/PROPH/DIAG IV INF INIT: CPT

## 2020-04-20 PROCEDURE — 83605 ASSAY OF LACTIC ACID: CPT | Performed by: EMERGENCY MEDICINE

## 2020-04-20 PROCEDURE — 80053 COMPREHEN METABOLIC PANEL: CPT | Performed by: EMERGENCY MEDICINE

## 2020-04-20 PROCEDURE — 93005 ELECTROCARDIOGRAM TRACING: CPT

## 2020-04-20 PROCEDURE — 86705 HEP B CORE ANTIBODY IGM: CPT | Performed by: PHYSICIAN ASSISTANT

## 2020-04-20 PROCEDURE — 86140 C-REACTIVE PROTEIN: CPT | Performed by: EMERGENCY MEDICINE

## 2020-04-20 PROCEDURE — 99291 CRITICAL CARE FIRST HOUR: CPT | Performed by: EMERGENCY MEDICINE

## 2020-04-20 PROCEDURE — 87798 DETECT AGENT NOS DNA AMP: CPT | Performed by: PHYSICIAN ASSISTANT

## 2020-04-20 PROCEDURE — 83615 LACTATE (LD) (LDH) ENZYME: CPT | Performed by: EMERGENCY MEDICINE

## 2020-04-20 PROCEDURE — 82728 ASSAY OF FERRITIN: CPT | Performed by: EMERGENCY MEDICINE

## 2020-04-20 PROCEDURE — 99292 CRITICAL CARE ADDL 30 MIN: CPT | Performed by: PHYSICIAN ASSISTANT

## 2020-04-20 PROCEDURE — 87635 SARS-COV-2 COVID-19 AMP PRB: CPT | Performed by: PHYSICIAN ASSISTANT

## 2020-04-20 PROCEDURE — 84484 ASSAY OF TROPONIN QUANT: CPT | Performed by: PHYSICIAN ASSISTANT

## 2020-04-20 PROCEDURE — 94002 VENT MGMT INPAT INIT DAY: CPT

## 2020-04-20 PROCEDURE — 85730 THROMBOPLASTIN TIME PARTIAL: CPT | Performed by: EMERGENCY MEDICINE

## 2020-04-20 PROCEDURE — 71045 X-RAY EXAM CHEST 1 VIEW: CPT

## 2020-04-20 PROCEDURE — 87635 SARS-COV-2 COVID-19 AMP PRB: CPT | Performed by: EMERGENCY MEDICINE

## 2020-04-20 PROCEDURE — 85049 AUTOMATED PLATELET COUNT: CPT | Performed by: PHYSICIAN ASSISTANT

## 2020-04-20 PROCEDURE — 85652 RBC SED RATE AUTOMATED: CPT | Performed by: EMERGENCY MEDICINE

## 2020-04-20 PROCEDURE — 94760 N-INVAS EAR/PLS OXIMETRY 1: CPT

## 2020-04-20 PROCEDURE — 99285 EMERGENCY DEPT VISIT HI MDM: CPT | Performed by: EMERGENCY MEDICINE

## 2020-04-20 PROCEDURE — 81001 URINALYSIS AUTO W/SCOPE: CPT | Performed by: EMERGENCY MEDICINE

## 2020-04-20 RX ORDER — METHYLPREDNISOLONE SODIUM SUCCINATE 125 MG/2ML
80 INJECTION, POWDER, LYOPHILIZED, FOR SOLUTION INTRAMUSCULAR; INTRAVENOUS DAILY
Status: DISCONTINUED | OUTPATIENT
Start: 2020-04-21 | End: 2020-04-25

## 2020-04-20 RX ORDER — HYDROXYCHLOROQUINE SULFATE 200 MG/1
200 TABLET, FILM COATED ORAL EVERY 12 HOURS
Status: DISCONTINUED | OUTPATIENT
Start: 2020-04-21 | End: 2020-04-24

## 2020-04-20 RX ORDER — ZINC SULFATE 50(220)MG
220 CAPSULE ORAL DAILY
Status: DISCONTINUED | OUTPATIENT
Start: 2020-04-21 | End: 2020-04-24

## 2020-04-20 RX ORDER — ACETAMINOPHEN 325 MG/1
650 TABLET ORAL EVERY 6 HOURS PRN
Status: DISCONTINUED | OUTPATIENT
Start: 2020-04-20 | End: 2020-04-22

## 2020-04-20 RX ORDER — ATORVASTATIN CALCIUM 40 MG/1
40 TABLET, FILM COATED ORAL
Status: DISCONTINUED | OUTPATIENT
Start: 2020-04-21 | End: 2020-04-24

## 2020-04-20 RX ORDER — HYDROXYCHLOROQUINE SULFATE 200 MG/1
800 TABLET, FILM COATED ORAL EVERY 24 HOURS
Status: COMPLETED | OUTPATIENT
Start: 2020-04-20 | End: 2020-04-20

## 2020-04-20 RX ORDER — DOXYCYCLINE HYCLATE 100 MG/1
100 CAPSULE ORAL EVERY 12 HOURS
Status: DISCONTINUED | OUTPATIENT
Start: 2020-04-20 | End: 2020-04-24

## 2020-04-20 RX ORDER — MULTIVITAMIN/IRON/FOLIC ACID 18MG-0.4MG
1 TABLET ORAL DAILY
Status: DISCONTINUED | OUTPATIENT
Start: 2020-04-28 | End: 2020-04-24

## 2020-04-20 RX ORDER — SENNOSIDES 8.6 MG
1 TABLET ORAL DAILY
Status: DISCONTINUED | OUTPATIENT
Start: 2020-04-21 | End: 2020-04-25 | Stop reason: HOSPADM

## 2020-04-20 RX ORDER — HEPARIN SODIUM 5000 [USP'U]/ML
5000 INJECTION, SOLUTION INTRAVENOUS; SUBCUTANEOUS EVERY 8 HOURS SCHEDULED
Status: DISCONTINUED | OUTPATIENT
Start: 2020-04-20 | End: 2020-04-22

## 2020-04-20 RX ORDER — ASCORBIC ACID 500 MG
1000 TABLET ORAL EVERY 12 HOURS SCHEDULED
Status: DISCONTINUED | OUTPATIENT
Start: 2020-04-21 | End: 2020-04-24

## 2020-04-20 RX ORDER — METHYLPREDNISOLONE SODIUM SUCCINATE 125 MG/2ML
80 INJECTION, POWDER, LYOPHILIZED, FOR SOLUTION INTRAMUSCULAR; INTRAVENOUS DAILY
Status: DISCONTINUED | OUTPATIENT
Start: 2020-04-21 | End: 2020-04-20

## 2020-04-20 RX ORDER — ALPRAZOLAM 0.5 MG/1
0.5 TABLET ORAL ONCE
Status: COMPLETED | OUTPATIENT
Start: 2020-04-20 | End: 2020-04-21

## 2020-04-20 RX ORDER — ALBUTEROL SULFATE 90 UG/1
6 AEROSOL, METERED RESPIRATORY (INHALATION) ONCE
Status: COMPLETED | OUTPATIENT
Start: 2020-04-20 | End: 2020-04-20

## 2020-04-20 RX ADMIN — ALBUTEROL SULFATE 6 PUFF: 90 AEROSOL, METERED RESPIRATORY (INHALATION) at 20:04

## 2020-04-20 RX ADMIN — AZITHROMYCIN MONOHYDRATE 500 MG: 500 INJECTION, POWDER, LYOPHILIZED, FOR SOLUTION INTRAVENOUS at 22:17

## 2020-04-20 RX ADMIN — HEPARIN SODIUM 5000 UNITS: 5000 INJECTION INTRAVENOUS; SUBCUTANEOUS at 23:15

## 2020-04-20 RX ADMIN — Medication 1000 MG: at 20:04

## 2020-04-20 RX ADMIN — HYDROXYCHLOROQUINE SULFATE 800 MG: 200 TABLET, FILM COATED ORAL at 23:16

## 2020-04-20 RX ADMIN — DOXYCYCLINE 100 MG: 100 CAPSULE ORAL at 23:16

## 2020-04-21 ENCOUNTER — APPOINTMENT (INPATIENT)
Dept: RADIOLOGY | Facility: HOSPITAL | Age: 30
DRG: 720 | End: 2020-04-21
Payer: COMMERCIAL

## 2020-04-21 PROBLEM — J96.01 ACUTE RESPIRATORY FAILURE WITH HYPOXIA (HCC): Status: ACTIVE | Noted: 2020-04-21

## 2020-04-21 LAB
ALBUMIN SERPL BCP-MCNC: 3.1 G/DL (ref 3.5–5)
ALP SERPL-CCNC: 79 U/L (ref 46–116)
ALT SERPL W P-5'-P-CCNC: 26 U/L (ref 12–78)
ANION GAP SERPL CALCULATED.3IONS-SCNC: 8 MMOL/L (ref 4–13)
AST SERPL W P-5'-P-CCNC: 80 U/L (ref 5–45)
ATRIAL RATE: 119 BPM
B PARAP IS1001 DNA NPH QL NAA+NON-PROBE: NOT DETECTED
B PERT.PT PRMT NPH QL NAA+NON-PROBE: NOT DETECTED
BILIRUB SERPL-MCNC: 0.35 MG/DL (ref 0.2–1)
BUN SERPL-MCNC: 8 MG/DL (ref 5–25)
C PNEUM DNA NPH QL NAA+NON-PROBE: NOT DETECTED
CALCIUM SERPL-MCNC: 8.8 MG/DL (ref 8.3–10.1)
CHLORIDE SERPL-SCNC: 113 MMOL/L (ref 100–108)
CK SERPL-CCNC: 126 U/L (ref 26–192)
CO2 SERPL-SCNC: 21 MMOL/L (ref 21–32)
CREAT SERPL-MCNC: 0.66 MG/DL (ref 0.6–1.3)
CRP SERPL QL: 207 MG/L
D DIMER PPP FEU-MCNC: 1.27 UG/ML FEU
ERYTHROCYTE [DISTWIDTH] IN BLOOD BY AUTOMATED COUNT: 17 % (ref 11.6–15.1)
FERRITIN SERPL-MCNC: 87 NG/ML (ref 8–388)
FIBRINOGEN PPP-MCNC: 740 MG/DL (ref 227–495)
FLUAV RNA NPH QL NAA+NON-PROBE: NOT DETECTED
FLUBV RNA NPH QL NAA+NON-PROBE: NOT DETECTED
GFR SERPL CREATININE-BSD FRML MDRD: 121 ML/MIN/1.73SQ M
GLUCOSE SERPL-MCNC: 123 MG/DL (ref 65–140)
HADV DNA NPH QL NAA+NON-PROBE: NOT DETECTED
HBV CORE AB SER QL: NORMAL
HBV CORE IGM SER QL: NORMAL
HBV SURFACE AG SER QL: NORMAL
HCT VFR BLD AUTO: 33.1 % (ref 34.8–46.1)
HCV AB SER QL: NORMAL
HGB BLD-MCNC: 10.3 G/DL (ref 11.5–15.4)
HMPV RNA NPH QL NAA+NON-PROBE: NOT DETECTED
HPIV 1+2+3+4 RNA NPH QL NAA+NON-PROBE: NOT DETECTED
HPIV 1+2+3+4 RNA NPH QL NAA+NON-PROBE: NOT DETECTED
INR PPP: 1.06 (ref 0.84–1.19)
LDH SERPL-CCNC: 1096 U/L (ref 81–234)
M PNEUMO DNA NPH QL NAA+NON-PROBE: NOT DETECTED
MAGNESIUM SERPL-MCNC: 2.2 MG/DL (ref 1.6–2.6)
MCH RBC QN AUTO: 25.2 PG (ref 26.8–34.3)
MCHC RBC AUTO-ENTMCNC: 31.1 G/DL (ref 31.4–37.4)
MCV RBC AUTO: 81 FL (ref 82–98)
NRBC BLD AUTO-RTO: 0 /100 WBCS
P AXIS: 40 DEGREES
PHOSPHATE SERPL-MCNC: 2.3 MG/DL (ref 2.7–4.5)
PLATELET # BLD AUTO: 201 THOUSANDS/UL (ref 149–390)
PMV BLD AUTO: 11.3 FL (ref 8.9–12.7)
POTASSIUM SERPL-SCNC: 4.8 MMOL/L (ref 3.5–5.3)
PR INTERVAL: 198 MS
PROCALCITONIN SERPL-MCNC: 3.81 NG/ML
PROT SERPL-MCNC: 7.4 G/DL (ref 6.4–8.2)
PROTHROMBIN TIME: 13.4 SECONDS (ref 11.6–14.5)
QRS AXIS: 30 DEGREES
QRSD INTERVAL: 92 MS
QT INTERVAL: 298 MS
QTC INTERVAL: 419 MS
RBC # BLD AUTO: 4.09 MILLION/UL (ref 3.81–5.12)
RSV RNA NPH QL NAA+NON-PROBE: NOT DETECTED
RV+EV RNA NPH QL NAA+NON-PROBE: NOT DETECTED
SARS-COV-2 RNA RESP QL NAA+PROBE: NEGATIVE
SODIUM SERPL-SCNC: 142 MMOL/L (ref 136–145)
T WAVE AXIS: 25 DEGREES
TRIGL SERPL-MCNC: 99 MG/DL
VENTRICULAR RATE: 119 BPM
WBC # BLD AUTO: 18.77 THOUSAND/UL (ref 4.31–10.16)

## 2020-04-21 PROCEDURE — 94760 N-INVAS EAR/PLS OXIMETRY 1: CPT

## 2020-04-21 PROCEDURE — 80053 COMPREHEN METABOLIC PANEL: CPT | Performed by: PHYSICIAN ASSISTANT

## 2020-04-21 PROCEDURE — 87081 CULTURE SCREEN ONLY: CPT | Performed by: PHYSICIAN ASSISTANT

## 2020-04-21 PROCEDURE — 84145 PROCALCITONIN (PCT): CPT | Performed by: PHYSICIAN ASSISTANT

## 2020-04-21 PROCEDURE — 71275 CT ANGIOGRAPHY CHEST: CPT

## 2020-04-21 PROCEDURE — 99291 CRITICAL CARE FIRST HOUR: CPT | Performed by: NURSE PRACTITIONER

## 2020-04-21 PROCEDURE — 82728 ASSAY OF FERRITIN: CPT | Performed by: PHYSICIAN ASSISTANT

## 2020-04-21 PROCEDURE — 83735 ASSAY OF MAGNESIUM: CPT | Performed by: PHYSICIAN ASSISTANT

## 2020-04-21 PROCEDURE — 86140 C-REACTIVE PROTEIN: CPT | Performed by: PHYSICIAN ASSISTANT

## 2020-04-21 PROCEDURE — 85384 FIBRINOGEN ACTIVITY: CPT | Performed by: PHYSICIAN ASSISTANT

## 2020-04-21 PROCEDURE — 94002 VENT MGMT INPAT INIT DAY: CPT

## 2020-04-21 PROCEDURE — 85027 COMPLETE CBC AUTOMATED: CPT | Performed by: PHYSICIAN ASSISTANT

## 2020-04-21 PROCEDURE — 93010 ELECTROCARDIOGRAM REPORT: CPT | Performed by: INTERNAL MEDICINE

## 2020-04-21 PROCEDURE — 84100 ASSAY OF PHOSPHORUS: CPT | Performed by: PHYSICIAN ASSISTANT

## 2020-04-21 PROCEDURE — 82550 ASSAY OF CK (CPK): CPT | Performed by: PHYSICIAN ASSISTANT

## 2020-04-21 PROCEDURE — 85379 FIBRIN DEGRADATION QUANT: CPT | Performed by: PHYSICIAN ASSISTANT

## 2020-04-21 PROCEDURE — 84478 ASSAY OF TRIGLYCERIDES: CPT | Performed by: PHYSICIAN ASSISTANT

## 2020-04-21 PROCEDURE — 83615 LACTATE (LD) (LDH) ENZYME: CPT | Performed by: PHYSICIAN ASSISTANT

## 2020-04-21 RX ORDER — THIAMINE MONONITRATE (VIT B1) 100 MG
100 TABLET ORAL DAILY
Status: DISCONTINUED | OUTPATIENT
Start: 2020-04-21 | End: 2020-04-25

## 2020-04-21 RX ORDER — LORAZEPAM 2 MG/ML
1 INJECTION INTRAMUSCULAR ONCE
Status: COMPLETED | OUTPATIENT
Start: 2020-04-21 | End: 2020-04-21

## 2020-04-21 RX ORDER — BUPRENORPHINE AND NALOXONE 8; 2 MG/1; MG/1
1 FILM, SOLUBLE BUCCAL; SUBLINGUAL 3 TIMES DAILY
Status: DISCONTINUED | OUTPATIENT
Start: 2020-04-21 | End: 2020-04-25 | Stop reason: HOSPADM

## 2020-04-21 RX ORDER — TOPIRAMATE 100 MG/1
200 TABLET, FILM COATED ORAL 2 TIMES DAILY
Status: DISCONTINUED | OUTPATIENT
Start: 2020-04-21 | End: 2020-04-25 | Stop reason: HOSPADM

## 2020-04-21 RX ORDER — LANOLIN ALCOHOL/MO/W.PET/CERES
3 CREAM (GRAM) TOPICAL
Status: DISCONTINUED | OUTPATIENT
Start: 2020-04-21 | End: 2020-04-22

## 2020-04-21 RX ORDER — GABAPENTIN 400 MG/1
800 CAPSULE ORAL 2 TIMES DAILY
Status: DISCONTINUED | OUTPATIENT
Start: 2020-04-21 | End: 2020-04-25 | Stop reason: HOSPADM

## 2020-04-21 RX ORDER — LORAZEPAM 2 MG/ML
0.5 INJECTION INTRAMUSCULAR ONCE
Status: COMPLETED | OUTPATIENT
Start: 2020-04-21 | End: 2020-04-21

## 2020-04-21 RX ORDER — FOLIC ACID 1 MG/1
1 TABLET ORAL DAILY
Status: DISCONTINUED | OUTPATIENT
Start: 2020-04-21 | End: 2020-04-25

## 2020-04-21 RX ORDER — QUETIAPINE FUMARATE 25 MG/1
25 TABLET, FILM COATED ORAL
Status: DISCONTINUED | OUTPATIENT
Start: 2020-04-21 | End: 2020-04-25 | Stop reason: HOSPADM

## 2020-04-21 RX ORDER — BUPRENORPHINE HYDROCHLORIDE AND NALOXONE HYDROCHLORIDE DIHYDRATE 8; 2 MG/1; MG/1
1 TABLET SUBLINGUAL 3 TIMES DAILY
COMMUNITY

## 2020-04-21 RX ORDER — GABAPENTIN 400 MG/1
800 CAPSULE ORAL 2 TIMES DAILY
Status: DISCONTINUED | OUTPATIENT
Start: 2020-04-21 | End: 2020-04-21

## 2020-04-21 RX ORDER — BUPRENORPHINE AND NALOXONE 8; 2 MG/1; MG/1
1 FILM, SOLUBLE BUCCAL; SUBLINGUAL EVERY 12 HOURS
Status: DISCONTINUED | OUTPATIENT
Start: 2020-04-21 | End: 2020-04-21

## 2020-04-21 RX ADMIN — TOPIRAMATE 200 MG: 100 TABLET, FILM COATED ORAL at 20:20

## 2020-04-21 RX ADMIN — OXYCODONE HYDROCHLORIDE AND ACETAMINOPHEN 1000 MG: 500 TABLET ORAL at 20:19

## 2020-04-21 RX ADMIN — GABAPENTIN 800 MG: 400 CAPSULE ORAL at 20:21

## 2020-04-21 RX ADMIN — SENNOSIDES 8.6 MG: 8.6 TABLET, FILM COATED ORAL at 09:07

## 2020-04-21 RX ADMIN — ACETAMINOPHEN 650 MG: 325 TABLET ORAL at 05:28

## 2020-04-21 RX ADMIN — BUPRENORPHINE AND NALOXONE 1 FILM: 8; 2 FILM BUCCAL; SUBLINGUAL at 20:20

## 2020-04-21 RX ADMIN — DOXYCYCLINE 100 MG: 100 CAPSULE ORAL at 23:17

## 2020-04-21 RX ADMIN — CEFTRIAXONE SODIUM 1000 MG: 10 INJECTION, POWDER, FOR SOLUTION INTRAVENOUS at 20:23

## 2020-04-21 RX ADMIN — BUPRENORPHINE HYDROCHLORIDE, NALOXONE HYDROCHLORIDE 1 FILM: 8; 2 FILM, SOLUBLE BUCCAL; SUBLINGUAL at 09:06

## 2020-04-21 RX ADMIN — HEPARIN SODIUM 5000 UNITS: 5000 INJECTION INTRAVENOUS; SUBCUTANEOUS at 21:10

## 2020-04-21 RX ADMIN — ZINC SULFATE 220 MG (50 MG) CAPSULE 220 MG: CAPSULE at 09:06

## 2020-04-21 RX ADMIN — DOXYCYCLINE 100 MG: 100 CAPSULE ORAL at 11:51

## 2020-04-21 RX ADMIN — OXYCODONE HYDROCHLORIDE AND ACETAMINOPHEN 1000 MG: 500 TABLET ORAL at 09:06

## 2020-04-21 RX ADMIN — HEPARIN SODIUM 5000 UNITS: 5000 INJECTION INTRAVENOUS; SUBCUTANEOUS at 05:05

## 2020-04-21 RX ADMIN — IOHEXOL 100 ML: 350 INJECTION, SOLUTION INTRAVENOUS at 01:08

## 2020-04-21 RX ADMIN — ACETAMINOPHEN 650 MG: 325 TABLET ORAL at 12:20

## 2020-04-21 RX ADMIN — METHYLPREDNISOLONE SODIUM SUCCINATE 80 MG: 125 INJECTION, POWDER, FOR SOLUTION INTRAMUSCULAR; INTRAVENOUS at 23:17

## 2020-04-21 RX ADMIN — ATORVASTATIN CALCIUM 40 MG: 40 TABLET, FILM COATED ORAL at 21:10

## 2020-04-21 RX ADMIN — HYDROXYCHLOROQUINE SULFATE 200 MG: 200 TABLET, FILM COATED ORAL at 23:17

## 2020-04-21 RX ADMIN — THIAMINE HCL TAB 100 MG 100 MG: 100 TAB at 11:55

## 2020-04-21 RX ADMIN — LORAZEPAM 0.5 MG: 2 INJECTION, SOLUTION INTRAMUSCULAR; INTRAVENOUS at 21:26

## 2020-04-21 RX ADMIN — FOLIC ACID 1 MG: 1 TABLET ORAL at 11:53

## 2020-04-21 RX ADMIN — ATORVASTATIN CALCIUM 40 MG: 40 TABLET, FILM COATED ORAL at 00:51

## 2020-04-21 RX ADMIN — NICOTINE 1 PATCH: 7 PATCH, EXTENDED RELEASE TRANSDERMAL at 09:07

## 2020-04-21 RX ADMIN — MELATONIN 3 MG: at 23:17

## 2020-04-21 RX ADMIN — QUETIAPINE FUMARATE 25 MG: 25 TABLET ORAL at 23:52

## 2020-04-21 RX ADMIN — METHYLPREDNISOLONE SODIUM SUCCINATE 80 MG: 125 INJECTION, POWDER, FOR SOLUTION INTRAMUSCULAR; INTRAVENOUS at 00:44

## 2020-04-21 RX ADMIN — BUPRENORPHINE AND NALOXONE 1 FILM: 8; 2 FILM BUCCAL; SUBLINGUAL at 17:37

## 2020-04-21 RX ADMIN — LORAZEPAM 1 MG: 2 INJECTION, SOLUTION INTRAMUSCULAR; INTRAVENOUS at 02:24

## 2020-04-21 RX ADMIN — PANCRELIPASE 36000 UNITS: 24000; 76000; 120000 CAPSULE, DELAYED RELEASE PELLETS ORAL at 20:19

## 2020-04-21 RX ADMIN — OXYCODONE HYDROCHLORIDE AND ACETAMINOPHEN 1000 MG: 500 TABLET ORAL at 00:51

## 2020-04-21 RX ADMIN — ALPRAZOLAM 0.5 MG: 0.5 TABLET ORAL at 00:44

## 2020-04-22 PROBLEM — K59.03 THERAPEUTIC OPIOID INDUCED CONSTIPATION: Status: ACTIVE | Noted: 2020-04-22

## 2020-04-22 PROBLEM — T40.2X5A THERAPEUTIC OPIOID INDUCED CONSTIPATION: Status: ACTIVE | Noted: 2020-04-22

## 2020-04-22 PROBLEM — Z72.0 TOBACCO ABUSE: Status: ACTIVE | Noted: 2020-04-22

## 2020-04-22 PROBLEM — F41.9 ANXIETY DISORDER: Status: RESOLVED | Noted: 2019-02-25 | Resolved: 2020-04-22

## 2020-04-22 LAB
ALBUMIN SERPL BCP-MCNC: 2.8 G/DL (ref 3.5–5)
ALP SERPL-CCNC: 76 U/L (ref 46–116)
ALT SERPL W P-5'-P-CCNC: 22 U/L (ref 12–78)
ANION GAP SERPL CALCULATED.3IONS-SCNC: 8 MMOL/L (ref 4–13)
AST SERPL W P-5'-P-CCNC: 49 U/L (ref 5–45)
BILIRUB SERPL-MCNC: 0.37 MG/DL (ref 0.2–1)
BUN SERPL-MCNC: 13 MG/DL (ref 5–25)
CALCIUM SERPL-MCNC: 8.9 MG/DL (ref 8.3–10.1)
CHLORIDE SERPL-SCNC: 110 MMOL/L (ref 100–108)
CO2 SERPL-SCNC: 21 MMOL/L (ref 21–32)
CREAT SERPL-MCNC: 0.56 MG/DL (ref 0.6–1.3)
CRP SERPL QL: 98.2 MG/L
ERYTHROCYTE [DISTWIDTH] IN BLOOD BY AUTOMATED COUNT: 17.2 % (ref 11.6–15.1)
FERRITIN SERPL-MCNC: 82 NG/ML (ref 8–388)
GFR SERPL CREATININE-BSD FRML MDRD: 127 ML/MIN/1.73SQ M
GLUCOSE SERPL-MCNC: 128 MG/DL (ref 65–140)
HCT VFR BLD AUTO: 34.8 % (ref 34.8–46.1)
HGB BLD-MCNC: 10.5 G/DL (ref 11.5–15.4)
HIV 1+2 AB+HIV1 P24 AG SERPL QL IA: NORMAL
HIV1 P24 AG SER QL: NORMAL
L PNEUMO1 AG UR QL IA.RAPID: NEGATIVE
MAGNESIUM SERPL-MCNC: 2.1 MG/DL (ref 1.6–2.6)
MCH RBC QN AUTO: 25.2 PG (ref 26.8–34.3)
MCHC RBC AUTO-ENTMCNC: 30.2 G/DL (ref 31.4–37.4)
MCV RBC AUTO: 84 FL (ref 82–98)
MRSA NOSE QL CULT: NORMAL
NRBC BLD AUTO-RTO: 0 /100 WBCS
PHOSPHATE SERPL-MCNC: 3.6 MG/DL (ref 2.7–4.5)
PLATELET # BLD AUTO: 239 THOUSANDS/UL (ref 149–390)
PMV BLD AUTO: 10.8 FL (ref 8.9–12.7)
POTASSIUM SERPL-SCNC: 4.6 MMOL/L (ref 3.5–5.3)
PROCALCITONIN SERPL-MCNC: 2.52 NG/ML
PROT SERPL-MCNC: 7.1 G/DL (ref 6.4–8.2)
RBC # BLD AUTO: 4.17 MILLION/UL (ref 3.81–5.12)
S PNEUM AG UR QL: NEGATIVE
SODIUM SERPL-SCNC: 139 MMOL/L (ref 136–145)
WBC # BLD AUTO: 20.23 THOUSAND/UL (ref 4.31–10.16)

## 2020-04-22 PROCEDURE — 94760 N-INVAS EAR/PLS OXIMETRY 1: CPT

## 2020-04-22 PROCEDURE — NC001 PR NO CHARGE: Performed by: NURSE PRACTITIONER

## 2020-04-22 PROCEDURE — 85027 COMPLETE CBC AUTOMATED: CPT | Performed by: PHYSICIAN ASSISTANT

## 2020-04-22 PROCEDURE — 99221 1ST HOSP IP/OBS SF/LOW 40: CPT | Performed by: PHYSICIAN ASSISTANT

## 2020-04-22 PROCEDURE — 86140 C-REACTIVE PROTEIN: CPT | Performed by: PHYSICIAN ASSISTANT

## 2020-04-22 PROCEDURE — 84145 PROCALCITONIN (PCT): CPT | Performed by: PHYSICIAN ASSISTANT

## 2020-04-22 PROCEDURE — 83735 ASSAY OF MAGNESIUM: CPT | Performed by: PHYSICIAN ASSISTANT

## 2020-04-22 PROCEDURE — 97166 OT EVAL MOD COMPLEX 45 MIN: CPT

## 2020-04-22 PROCEDURE — 99233 SBSQ HOSP IP/OBS HIGH 50: CPT | Performed by: NURSE PRACTITIONER

## 2020-04-22 PROCEDURE — 84100 ASSAY OF PHOSPHORUS: CPT | Performed by: PHYSICIAN ASSISTANT

## 2020-04-22 PROCEDURE — 80053 COMPREHEN METABOLIC PANEL: CPT | Performed by: PHYSICIAN ASSISTANT

## 2020-04-22 PROCEDURE — 99255 IP/OBS CONSLTJ NEW/EST HI 80: CPT | Performed by: INTERNAL MEDICINE

## 2020-04-22 PROCEDURE — 87806 HIV AG W/HIV1&2 ANTB W/OPTIC: CPT | Performed by: INTERNAL MEDICINE

## 2020-04-22 PROCEDURE — 87449 NOS EACH ORGANISM AG IA: CPT | Performed by: PHYSICIAN ASSISTANT

## 2020-04-22 PROCEDURE — 94003 VENT MGMT INPAT SUBQ DAY: CPT

## 2020-04-22 PROCEDURE — 97163 PT EVAL HIGH COMPLEX 45 MIN: CPT

## 2020-04-22 PROCEDURE — 82728 ASSAY OF FERRITIN: CPT | Performed by: PHYSICIAN ASSISTANT

## 2020-04-22 RX ORDER — HALOPERIDOL 5 MG/ML
20 INJECTION INTRAMUSCULAR ONCE
Status: DISCONTINUED | OUTPATIENT
Start: 2020-04-22 | End: 2020-04-22

## 2020-04-22 RX ORDER — HALOPERIDOL 5 MG/ML
10 INJECTION INTRAMUSCULAR ONCE
Status: COMPLETED | OUTPATIENT
Start: 2020-04-22 | End: 2020-04-22

## 2020-04-22 RX ORDER — ACETAMINOPHEN 325 MG/1
650 TABLET ORAL EVERY 6 HOURS PRN
Status: DISCONTINUED | OUTPATIENT
Start: 2020-04-22 | End: 2020-04-25 | Stop reason: HOSPADM

## 2020-04-22 RX ORDER — LORAZEPAM 0.5 MG/1
0.5 TABLET ORAL EVERY 8 HOURS PRN
Status: DISCONTINUED | OUTPATIENT
Start: 2020-04-22 | End: 2020-04-25 | Stop reason: HOSPADM

## 2020-04-22 RX ORDER — LIDOCAINE 50 MG/G
1 PATCH TOPICAL DAILY
Status: DISCONTINUED | OUTPATIENT
Start: 2020-04-22 | End: 2020-04-25

## 2020-04-22 RX ORDER — LANOLIN ALCOHOL/MO/W.PET/CERES
6 CREAM (GRAM) TOPICAL
Status: DISCONTINUED | OUTPATIENT
Start: 2020-04-22 | End: 2020-04-25 | Stop reason: HOSPADM

## 2020-04-22 RX ORDER — MELATONIN
2000 DAILY
Status: DISCONTINUED | OUTPATIENT
Start: 2020-04-22 | End: 2020-04-24

## 2020-04-22 RX ADMIN — LORAZEPAM 0.5 MG: 0.5 TABLET ORAL at 17:42

## 2020-04-22 RX ADMIN — DOXYCYCLINE 100 MG: 100 CAPSULE ORAL at 10:18

## 2020-04-22 RX ADMIN — HYDROXYCHLOROQUINE SULFATE 200 MG: 200 TABLET, FILM COATED ORAL at 10:18

## 2020-04-22 RX ADMIN — QUETIAPINE FUMARATE 25 MG: 25 TABLET ORAL at 23:25

## 2020-04-22 RX ADMIN — DOXYCYCLINE 100 MG: 100 CAPSULE ORAL at 23:27

## 2020-04-22 RX ADMIN — NICOTINE 1 PATCH: 7 PATCH, EXTENDED RELEASE TRANSDERMAL at 09:17

## 2020-04-22 RX ADMIN — BUPRENORPHINE AND NALOXONE 1 FILM: 8; 2 FILM BUCCAL; SUBLINGUAL at 20:36

## 2020-04-22 RX ADMIN — ACETAMINOPHEN 650 MG: 325 TABLET ORAL at 17:44

## 2020-04-22 RX ADMIN — SENNOSIDES 8.6 MG: 8.6 TABLET, FILM COATED ORAL at 09:26

## 2020-04-22 RX ADMIN — HYDROXYCHLOROQUINE SULFATE 200 MG: 200 TABLET, FILM COATED ORAL at 23:25

## 2020-04-22 RX ADMIN — FOLIC ACID 1 MG: 1 TABLET ORAL at 09:07

## 2020-04-22 RX ADMIN — CEFTRIAXONE SODIUM 1000 MG: 10 INJECTION, POWDER, FOR SOLUTION INTRAVENOUS at 21:34

## 2020-04-22 RX ADMIN — GABAPENTIN 800 MG: 400 CAPSULE ORAL at 17:42

## 2020-04-22 RX ADMIN — METHYLPREDNISOLONE SODIUM SUCCINATE 80 MG: 125 INJECTION, POWDER, FOR SOLUTION INTRAMUSCULAR; INTRAVENOUS at 23:26

## 2020-04-22 RX ADMIN — BUPRENORPHINE AND NALOXONE 1 FILM: 8; 2 FILM BUCCAL; SUBLINGUAL at 16:02

## 2020-04-22 RX ADMIN — MELATONIN 6 MG: at 20:36

## 2020-04-22 RX ADMIN — BUPRENORPHINE AND NALOXONE 1 FILM: 8; 2 FILM BUCCAL; SUBLINGUAL at 09:26

## 2020-04-22 RX ADMIN — MELATONIN 2000 UNITS: at 16:13

## 2020-04-22 RX ADMIN — THIAMINE HCL TAB 100 MG 100 MG: 100 TAB at 09:10

## 2020-04-22 RX ADMIN — ZINC SULFATE 220 MG (50 MG) CAPSULE 220 MG: CAPSULE at 09:07

## 2020-04-22 RX ADMIN — PANCRELIPASE 36000 UNITS: 24000; 76000; 120000 CAPSULE, DELAYED RELEASE PELLETS ORAL at 20:36

## 2020-04-22 RX ADMIN — OXYCODONE HYDROCHLORIDE AND ACETAMINOPHEN 1000 MG: 500 TABLET ORAL at 09:07

## 2020-04-22 RX ADMIN — HEPARIN SODIUM 5000 UNITS: 5000 INJECTION INTRAVENOUS; SUBCUTANEOUS at 05:04

## 2020-04-22 RX ADMIN — ATORVASTATIN CALCIUM 40 MG: 40 TABLET, FILM COATED ORAL at 23:25

## 2020-04-22 RX ADMIN — OXYCODONE HYDROCHLORIDE AND ACETAMINOPHEN 1000 MG: 500 TABLET ORAL at 20:36

## 2020-04-22 RX ADMIN — ENOXAPARIN SODIUM 40 MG: 40 INJECTION SUBCUTANEOUS at 09:57

## 2020-04-22 RX ADMIN — HALOPERIDOL LACTATE 10 MG: 5 INJECTION INTRAMUSCULAR at 10:30

## 2020-04-22 RX ADMIN — ACETAMINOPHEN 650 MG: 325 TABLET ORAL at 10:18

## 2020-04-22 RX ADMIN — LIDOCAINE 1 PATCH: 50 PATCH TOPICAL at 10:05

## 2020-04-22 RX ADMIN — ACETAMINOPHEN 650 MG: 325 TABLET ORAL at 03:22

## 2020-04-22 RX ADMIN — GABAPENTIN 800 MG: 400 CAPSULE ORAL at 09:07

## 2020-04-22 RX ADMIN — TOPIRAMATE 200 MG: 100 TABLET, FILM COATED ORAL at 17:45

## 2020-04-23 PROCEDURE — 99232 SBSQ HOSP IP/OBS MODERATE 35: CPT | Performed by: INTERNAL MEDICINE

## 2020-04-23 RX ORDER — DOCUSATE SODIUM 100 MG/1
100 CAPSULE, LIQUID FILLED ORAL 2 TIMES DAILY
Status: DISCONTINUED | OUTPATIENT
Start: 2020-04-23 | End: 2020-04-23

## 2020-04-23 RX ADMIN — LIDOCAINE 1 PATCH: 50 PATCH TOPICAL at 08:47

## 2020-04-23 RX ADMIN — TOPIRAMATE 200 MG: 100 TABLET, FILM COATED ORAL at 17:12

## 2020-04-23 RX ADMIN — DOXYCYCLINE 100 MG: 100 CAPSULE ORAL at 21:43

## 2020-04-23 RX ADMIN — HYDROXYCHLOROQUINE SULFATE 200 MG: 200 TABLET, FILM COATED ORAL at 11:20

## 2020-04-23 RX ADMIN — METHYLPREDNISOLONE SODIUM SUCCINATE 80 MG: 125 INJECTION, POWDER, FOR SOLUTION INTRAMUSCULAR; INTRAVENOUS at 21:41

## 2020-04-23 RX ADMIN — ENOXAPARIN SODIUM 40 MG: 40 INJECTION SUBCUTANEOUS at 08:46

## 2020-04-23 RX ADMIN — HYDROXYCHLOROQUINE SULFATE 200 MG: 200 TABLET, FILM COATED ORAL at 21:49

## 2020-04-23 RX ADMIN — FOLIC ACID 1 MG: 1 TABLET ORAL at 08:46

## 2020-04-23 RX ADMIN — ATORVASTATIN CALCIUM 40 MG: 40 TABLET, FILM COATED ORAL at 21:47

## 2020-04-23 RX ADMIN — DOXYCYCLINE 100 MG: 100 CAPSULE ORAL at 11:21

## 2020-04-23 RX ADMIN — GABAPENTIN 800 MG: 400 CAPSULE ORAL at 08:46

## 2020-04-23 RX ADMIN — BUPRENORPHINE AND NALOXONE 1 FILM: 8; 2 FILM BUCCAL; SUBLINGUAL at 15:58

## 2020-04-23 RX ADMIN — PANCRELIPASE 36000 UNITS: 24000; 76000; 120000 CAPSULE, DELAYED RELEASE PELLETS ORAL at 15:58

## 2020-04-23 RX ADMIN — MELATONIN 2000 UNITS: at 08:46

## 2020-04-23 RX ADMIN — QUETIAPINE FUMARATE 25 MG: 25 TABLET ORAL at 21:47

## 2020-04-23 RX ADMIN — ACETAMINOPHEN 650 MG: 325 TABLET ORAL at 00:14

## 2020-04-23 RX ADMIN — TOPIRAMATE 200 MG: 100 TABLET, FILM COATED ORAL at 08:46

## 2020-04-23 RX ADMIN — OXYCODONE HYDROCHLORIDE AND ACETAMINOPHEN 1000 MG: 500 TABLET ORAL at 08:46

## 2020-04-23 RX ADMIN — MELATONIN 6 MG: at 21:47

## 2020-04-23 RX ADMIN — NICOTINE 1 PATCH: 7 PATCH, EXTENDED RELEASE TRANSDERMAL at 08:47

## 2020-04-23 RX ADMIN — LORAZEPAM 0.5 MG: 0.5 TABLET ORAL at 03:11

## 2020-04-23 RX ADMIN — CEFTRIAXONE SODIUM 1000 MG: 10 INJECTION, POWDER, FOR SOLUTION INTRAVENOUS at 22:06

## 2020-04-23 RX ADMIN — ACETAMINOPHEN 650 MG: 325 TABLET ORAL at 21:47

## 2020-04-23 RX ADMIN — THIAMINE HCL TAB 100 MG 100 MG: 100 TAB at 08:46

## 2020-04-23 RX ADMIN — ZINC SULFATE 220 MG (50 MG) CAPSULE 220 MG: CAPSULE at 08:46

## 2020-04-23 RX ADMIN — GABAPENTIN 800 MG: 400 CAPSULE ORAL at 17:12

## 2020-04-23 RX ADMIN — LORAZEPAM 0.5 MG: 0.5 TABLET ORAL at 21:47

## 2020-04-23 RX ADMIN — PANCRELIPASE 36000 UNITS: 24000; 76000; 120000 CAPSULE, DELAYED RELEASE PELLETS ORAL at 21:43

## 2020-04-23 RX ADMIN — BUPRENORPHINE AND NALOXONE 1 FILM: 8; 2 FILM BUCCAL; SUBLINGUAL at 08:50

## 2020-04-23 RX ADMIN — BUPRENORPHINE AND NALOXONE 1 FILM: 8; 2 FILM BUCCAL; SUBLINGUAL at 21:43

## 2020-04-23 RX ADMIN — PANCRELIPASE 36000 UNITS: 24000; 76000; 120000 CAPSULE, DELAYED RELEASE PELLETS ORAL at 08:48

## 2020-04-23 RX ADMIN — SENNOSIDES 8.6 MG: 8.6 TABLET, FILM COATED ORAL at 08:46

## 2020-04-23 RX ADMIN — LORAZEPAM 0.5 MG: 0.5 TABLET ORAL at 11:20

## 2020-04-23 RX ADMIN — OXYCODONE HYDROCHLORIDE AND ACETAMINOPHEN 1000 MG: 500 TABLET ORAL at 21:47

## 2020-04-24 LAB
ALBUMIN SERPL BCP-MCNC: 3.2 G/DL (ref 3.5–5)
ALP SERPL-CCNC: 71 U/L (ref 46–116)
ALT SERPL W P-5'-P-CCNC: 23 U/L (ref 12–78)
ANION GAP SERPL CALCULATED.3IONS-SCNC: 10 MMOL/L (ref 4–13)
AST SERPL W P-5'-P-CCNC: 9 U/L (ref 5–45)
BILIRUB SERPL-MCNC: 0.28 MG/DL (ref 0.2–1)
BUN SERPL-MCNC: 17 MG/DL (ref 5–25)
CALCIUM SERPL-MCNC: 9.4 MG/DL (ref 8.3–10.1)
CHLORIDE SERPL-SCNC: 109 MMOL/L (ref 100–108)
CO2 SERPL-SCNC: 20 MMOL/L (ref 21–32)
CREAT SERPL-MCNC: 0.53 MG/DL (ref 0.6–1.3)
CRP SERPL QL: 39.8 MG/L
ERYTHROCYTE [DISTWIDTH] IN BLOOD BY AUTOMATED COUNT: 16 % (ref 11.6–15.1)
FERRITIN SERPL-MCNC: 48 NG/ML (ref 8–388)
GFR SERPL CREATININE-BSD FRML MDRD: 130 ML/MIN/1.73SQ M
GLUCOSE SERPL-MCNC: 93 MG/DL (ref 65–140)
HCT VFR BLD AUTO: 35.9 % (ref 34.8–46.1)
HGB BLD-MCNC: 11.5 G/DL (ref 11.5–15.4)
MAGNESIUM SERPL-MCNC: 2.2 MG/DL (ref 1.6–2.6)
MCH RBC QN AUTO: 24.9 PG (ref 26.8–34.3)
MCHC RBC AUTO-ENTMCNC: 32 G/DL (ref 31.4–37.4)
MCV RBC AUTO: 78 FL (ref 82–98)
PHOSPHATE SERPL-MCNC: 4.2 MG/DL (ref 2.7–4.5)
PLATELET # BLD AUTO: 279 THOUSANDS/UL (ref 149–390)
PMV BLD AUTO: 10.3 FL (ref 8.9–12.7)
POTASSIUM SERPL-SCNC: 3.4 MMOL/L (ref 3.5–5.3)
PROCALCITONIN SERPL-MCNC: 0.49 NG/ML
PROT SERPL-MCNC: 7.5 G/DL (ref 6.4–8.2)
RBC # BLD AUTO: 4.61 MILLION/UL (ref 3.81–5.12)
SODIUM SERPL-SCNC: 139 MMOL/L (ref 136–145)
WBC # BLD AUTO: 12.16 THOUSAND/UL (ref 4.31–10.16)

## 2020-04-24 PROCEDURE — 85027 COMPLETE CBC AUTOMATED: CPT | Performed by: INTERNAL MEDICINE

## 2020-04-24 PROCEDURE — 80053 COMPREHEN METABOLIC PANEL: CPT | Performed by: INTERNAL MEDICINE

## 2020-04-24 PROCEDURE — 82728 ASSAY OF FERRITIN: CPT | Performed by: INTERNAL MEDICINE

## 2020-04-24 PROCEDURE — 84145 PROCALCITONIN (PCT): CPT | Performed by: INTERNAL MEDICINE

## 2020-04-24 PROCEDURE — 86140 C-REACTIVE PROTEIN: CPT | Performed by: INTERNAL MEDICINE

## 2020-04-24 PROCEDURE — 84100 ASSAY OF PHOSPHORUS: CPT | Performed by: INTERNAL MEDICINE

## 2020-04-24 PROCEDURE — 83735 ASSAY OF MAGNESIUM: CPT | Performed by: INTERNAL MEDICINE

## 2020-04-24 PROCEDURE — 99232 SBSQ HOSP IP/OBS MODERATE 35: CPT | Performed by: INTERNAL MEDICINE

## 2020-04-24 PROCEDURE — 99233 SBSQ HOSP IP/OBS HIGH 50: CPT | Performed by: INTERNAL MEDICINE

## 2020-04-24 RX ADMIN — FOLIC ACID 1 MG: 1 TABLET ORAL at 08:04

## 2020-04-24 RX ADMIN — DOXYCYCLINE 100 MG: 100 CAPSULE ORAL at 10:27

## 2020-04-24 RX ADMIN — QUETIAPINE FUMARATE 25 MG: 25 TABLET ORAL at 21:20

## 2020-04-24 RX ADMIN — BUPRENORPHINE AND NALOXONE 1 FILM: 8; 2 FILM BUCCAL; SUBLINGUAL at 21:20

## 2020-04-24 RX ADMIN — GABAPENTIN 800 MG: 400 CAPSULE ORAL at 17:27

## 2020-04-24 RX ADMIN — LORAZEPAM 0.5 MG: 0.5 TABLET ORAL at 07:56

## 2020-04-24 RX ADMIN — LIDOCAINE 1 PATCH: 50 PATCH TOPICAL at 08:05

## 2020-04-24 RX ADMIN — BUPRENORPHINE AND NALOXONE 1 FILM: 8; 2 FILM BUCCAL; SUBLINGUAL at 08:05

## 2020-04-24 RX ADMIN — BUPRENORPHINE AND NALOXONE 1 FILM: 8; 2 FILM BUCCAL; SUBLINGUAL at 15:59

## 2020-04-24 RX ADMIN — SENNOSIDES 8.6 MG: 8.6 TABLET, FILM COATED ORAL at 08:05

## 2020-04-24 RX ADMIN — ACETAMINOPHEN 650 MG: 325 TABLET ORAL at 21:20

## 2020-04-24 RX ADMIN — MELATONIN 2000 UNITS: at 08:05

## 2020-04-24 RX ADMIN — LORAZEPAM 0.5 MG: 0.5 TABLET ORAL at 15:59

## 2020-04-24 RX ADMIN — PANCRELIPASE 36000 UNITS: 24000; 76000; 120000 CAPSULE, DELAYED RELEASE PELLETS ORAL at 15:59

## 2020-04-24 RX ADMIN — MELATONIN 6 MG: at 21:20

## 2020-04-24 RX ADMIN — NICOTINE 1 PATCH: 7 PATCH, EXTENDED RELEASE TRANSDERMAL at 18:17

## 2020-04-24 RX ADMIN — TOPIRAMATE 200 MG: 100 TABLET, FILM COATED ORAL at 17:27

## 2020-04-24 RX ADMIN — PANCRELIPASE 36000 UNITS: 24000; 76000; 120000 CAPSULE, DELAYED RELEASE PELLETS ORAL at 21:19

## 2020-04-24 RX ADMIN — METHYLPREDNISOLONE SODIUM SUCCINATE 80 MG: 125 INJECTION, POWDER, FOR SOLUTION INTRAMUSCULAR; INTRAVENOUS at 21:21

## 2020-04-24 RX ADMIN — ZINC SULFATE 220 MG (50 MG) CAPSULE 220 MG: CAPSULE at 08:05

## 2020-04-24 RX ADMIN — TOPIRAMATE 200 MG: 100 TABLET, FILM COATED ORAL at 08:05

## 2020-04-24 RX ADMIN — HYDROXYCHLOROQUINE SULFATE 200 MG: 200 TABLET, FILM COATED ORAL at 10:27

## 2020-04-24 RX ADMIN — THIAMINE HCL TAB 100 MG 100 MG: 100 TAB at 08:05

## 2020-04-24 RX ADMIN — OXYCODONE HYDROCHLORIDE AND ACETAMINOPHEN 1000 MG: 500 TABLET ORAL at 08:05

## 2020-04-24 RX ADMIN — PANCRELIPASE 36000 UNITS: 24000; 76000; 120000 CAPSULE, DELAYED RELEASE PELLETS ORAL at 08:04

## 2020-04-24 RX ADMIN — ENOXAPARIN SODIUM 40 MG: 40 INJECTION SUBCUTANEOUS at 08:04

## 2020-04-24 RX ADMIN — GABAPENTIN 800 MG: 400 CAPSULE ORAL at 08:05

## 2020-04-25 VITALS
TEMPERATURE: 98.6 F | RESPIRATION RATE: 20 BRPM | BODY MASS INDEX: 28.87 KG/M2 | DIASTOLIC BLOOD PRESSURE: 86 MMHG | WEIGHT: 190.48 LBS | SYSTOLIC BLOOD PRESSURE: 147 MMHG | HEART RATE: 101 BPM | HEIGHT: 68 IN | OXYGEN SATURATION: 98 %

## 2020-04-25 LAB
ALBUMIN SERPL BCP-MCNC: 3.3 G/DL (ref 3.5–5)
ALP SERPL-CCNC: 77 U/L (ref 46–116)
ALT SERPL W P-5'-P-CCNC: 24 U/L (ref 12–78)
ANION GAP SERPL CALCULATED.3IONS-SCNC: 7 MMOL/L (ref 4–13)
AST SERPL W P-5'-P-CCNC: 19 U/L (ref 5–45)
BACTERIA BLD CULT: NORMAL
BACTERIA BLD CULT: NORMAL
BASOPHILS # BLD AUTO: 0.03 THOUSANDS/ΜL (ref 0–0.1)
BASOPHILS NFR BLD AUTO: 0 % (ref 0–1)
BILIRUB SERPL-MCNC: 0.28 MG/DL (ref 0.2–1)
BUN SERPL-MCNC: 14 MG/DL (ref 5–25)
CALCIUM SERPL-MCNC: 9.5 MG/DL (ref 8.3–10.1)
CHLORIDE SERPL-SCNC: 111 MMOL/L (ref 100–108)
CO2 SERPL-SCNC: 22 MMOL/L (ref 21–32)
CREAT SERPL-MCNC: 0.56 MG/DL (ref 0.6–1.3)
EOSINOPHIL # BLD AUTO: 0.04 THOUSAND/ΜL (ref 0–0.61)
EOSINOPHIL NFR BLD AUTO: 0 % (ref 0–6)
ERYTHROCYTE [DISTWIDTH] IN BLOOD BY AUTOMATED COUNT: 16.3 % (ref 11.6–15.1)
GFR SERPL CREATININE-BSD FRML MDRD: 127 ML/MIN/1.73SQ M
GLUCOSE SERPL-MCNC: 156 MG/DL (ref 65–140)
HCT VFR BLD AUTO: 39.3 % (ref 34.8–46.1)
HGB BLD-MCNC: 12.5 G/DL (ref 11.5–15.4)
IMM GRANULOCYTES # BLD AUTO: 0.19 THOUSAND/UL (ref 0–0.2)
IMM GRANULOCYTES NFR BLD AUTO: 2 % (ref 0–2)
LYMPHOCYTES # BLD AUTO: 1.95 THOUSANDS/ΜL (ref 0.6–4.47)
LYMPHOCYTES NFR BLD AUTO: 19 % (ref 14–44)
MAGNESIUM SERPL-MCNC: 2.3 MG/DL (ref 1.6–2.6)
MCH RBC QN AUTO: 25.3 PG (ref 26.8–34.3)
MCHC RBC AUTO-ENTMCNC: 31.8 G/DL (ref 31.4–37.4)
MCV RBC AUTO: 80 FL (ref 82–98)
MONOCYTES # BLD AUTO: 0.5 THOUSAND/ΜL (ref 0.17–1.22)
MONOCYTES NFR BLD AUTO: 5 % (ref 4–12)
NEUTROPHILS # BLD AUTO: 7.61 THOUSANDS/ΜL (ref 1.85–7.62)
NEUTS SEG NFR BLD AUTO: 74 % (ref 43–75)
NRBC BLD AUTO-RTO: 0 /100 WBCS
PHOSPHATE SERPL-MCNC: 5.2 MG/DL (ref 2.7–4.5)
PLATELET # BLD AUTO: 287 THOUSANDS/UL (ref 149–390)
PMV BLD AUTO: 10.3 FL (ref 8.9–12.7)
POTASSIUM SERPL-SCNC: 4.3 MMOL/L (ref 3.5–5.3)
PROCALCITONIN SERPL-MCNC: 0.3 NG/ML
PROT SERPL-MCNC: 7.8 G/DL (ref 6.4–8.2)
RBC # BLD AUTO: 4.94 MILLION/UL (ref 3.81–5.12)
SODIUM SERPL-SCNC: 140 MMOL/L (ref 136–145)
WBC # BLD AUTO: 10.32 THOUSAND/UL (ref 4.31–10.16)

## 2020-04-25 PROCEDURE — 80053 COMPREHEN METABOLIC PANEL: CPT | Performed by: INTERNAL MEDICINE

## 2020-04-25 PROCEDURE — 99238 HOSP IP/OBS DSCHRG MGMT 30/<: CPT | Performed by: INTERNAL MEDICINE

## 2020-04-25 PROCEDURE — 84145 PROCALCITONIN (PCT): CPT | Performed by: INTERNAL MEDICINE

## 2020-04-25 PROCEDURE — 85025 COMPLETE CBC W/AUTO DIFF WBC: CPT | Performed by: INTERNAL MEDICINE

## 2020-04-25 PROCEDURE — 84100 ASSAY OF PHOSPHORUS: CPT | Performed by: INTERNAL MEDICINE

## 2020-04-25 PROCEDURE — 83735 ASSAY OF MAGNESIUM: CPT | Performed by: INTERNAL MEDICINE

## 2020-04-25 RX ORDER — LEVOFLOXACIN 750 MG/1
750 TABLET ORAL EVERY 24 HOURS
Qty: 5 TABLET | Refills: 0 | Status: SHIPPED | OUTPATIENT
Start: 2020-04-25 | End: 2020-04-30

## 2020-04-25 RX ORDER — PREDNISONE 20 MG/1
TABLET ORAL
Qty: 9 TABLET | Refills: 0 | Status: SHIPPED | OUTPATIENT
Start: 2020-04-25 | End: 2020-05-13

## 2020-04-25 RX ADMIN — GABAPENTIN 800 MG: 400 CAPSULE ORAL at 08:10

## 2020-04-25 RX ADMIN — TOPIRAMATE 200 MG: 100 TABLET, FILM COATED ORAL at 08:13

## 2020-04-25 RX ADMIN — THIAMINE HCL TAB 100 MG 100 MG: 100 TAB at 08:12

## 2020-04-25 RX ADMIN — NICOTINE 1 PATCH: 7 PATCH, EXTENDED RELEASE TRANSDERMAL at 08:12

## 2020-04-25 RX ADMIN — PANCRELIPASE 36000 UNITS: 24000; 76000; 120000 CAPSULE, DELAYED RELEASE PELLETS ORAL at 08:24

## 2020-04-25 RX ADMIN — FOLIC ACID 1 MG: 1 TABLET ORAL at 08:12

## 2020-04-25 RX ADMIN — Medication 1 TABLET: at 08:10

## 2020-04-25 RX ADMIN — LORAZEPAM 0.5 MG: 0.5 TABLET ORAL at 08:10

## 2020-04-25 RX ADMIN — BUPRENORPHINE AND NALOXONE 1 FILM: 8; 2 FILM BUCCAL; SUBLINGUAL at 08:10

## 2020-04-25 RX ADMIN — LIDOCAINE 1 PATCH: 50 PATCH TOPICAL at 08:12

## 2020-06-27 ENCOUNTER — HOSPITAL ENCOUNTER (INPATIENT)
Facility: HOSPITAL | Age: 30
LOS: 6 days | Discharge: HOME | DRG: 121 | End: 2020-07-03
Attending: EMERGENCY MEDICINE | Admitting: INTERNAL MEDICINE
Payer: COMMERCIAL

## 2020-06-27 ENCOUNTER — APPOINTMENT (EMERGENCY)
Dept: CT IMAGING | Facility: HOSPITAL | Age: 30
DRG: 121 | End: 2020-06-27
Payer: COMMERCIAL

## 2020-06-27 ENCOUNTER — APPOINTMENT (EMERGENCY)
Dept: RADIOLOGY | Facility: HOSPITAL | Age: 30
DRG: 121 | End: 2020-06-27
Payer: COMMERCIAL

## 2020-06-27 DIAGNOSIS — F10.10 ALCOHOL ABUSE: ICD-10-CM

## 2020-06-27 DIAGNOSIS — M54.50 ACUTE BILATERAL LOW BACK PAIN WITHOUT SCIATICA: ICD-10-CM

## 2020-06-27 DIAGNOSIS — Z72.0 TOBACCO ABUSE: ICD-10-CM

## 2020-06-27 DIAGNOSIS — Z20.822 SUSPECTED COVID-19 VIRUS INFECTION: ICD-10-CM

## 2020-06-27 DIAGNOSIS — M05.80 POLYARTHRITIS WITH POSITIVE RHEUMATOID FACTOR (HCC): ICD-10-CM

## 2020-06-27 DIAGNOSIS — R13.19 ESOPHAGEAL DYSPHAGIA: ICD-10-CM

## 2020-06-27 DIAGNOSIS — J18.9 MULTIFOCAL PNEUMONIA: Primary | ICD-10-CM

## 2020-06-27 PROBLEM — M54.9 BACK PAIN, ACUTE: Status: ACTIVE | Noted: 2020-06-27

## 2020-06-27 PROBLEM — R13.10 DYSPHAGIA: Status: ACTIVE | Noted: 2020-06-27

## 2020-06-27 PROBLEM — R09.02 HYPOXIA: Status: ACTIVE | Noted: 2020-04-21

## 2020-06-27 LAB
ALBUMIN SERPL BCP-MCNC: 3.2 G/DL (ref 3.5–5)
ALP SERPL-CCNC: 69 U/L (ref 46–116)
ALT SERPL W P-5'-P-CCNC: 30 U/L (ref 12–78)
ANION GAP SERPL CALCULATED.3IONS-SCNC: 10 MMOL/L (ref 4–13)
APTT PPP: 30 SECONDS (ref 23–37)
AST SERPL W P-5'-P-CCNC: 36 U/L (ref 5–45)
BASOPHILS # BLD AUTO: 0.02 THOUSANDS/ΜL (ref 0–0.1)
BASOPHILS NFR BLD AUTO: 0 % (ref 0–1)
BILIRUB SERPL-MCNC: 0.2 MG/DL (ref 0.2–1)
BUN SERPL-MCNC: 11 MG/DL (ref 5–25)
CALCIUM SERPL-MCNC: 8.6 MG/DL (ref 8.3–10.1)
CHLORIDE SERPL-SCNC: 106 MMOL/L (ref 100–108)
CLARITY, POC: CLEAR
CO2 SERPL-SCNC: 24 MMOL/L (ref 21–32)
COLOR, POC: YELLOW
CREAT SERPL-MCNC: 0.64 MG/DL (ref 0.6–1.3)
EOSINOPHIL # BLD AUTO: 0.01 THOUSAND/ΜL (ref 0–0.61)
EOSINOPHIL NFR BLD AUTO: 0 % (ref 0–6)
ERYTHROCYTE [DISTWIDTH] IN BLOOD BY AUTOMATED COUNT: 16 % (ref 11.6–15.1)
EXT BILIRUBIN, UA: NORMAL
EXT BLOOD URINE: NORMAL
EXT GLUCOSE, UA: NORMAL
EXT KETONES: NORMAL
EXT NITRITE, UA: NORMAL
EXT PH, UA: 8
EXT PREG TEST URINE: NEGATIVE
EXT PROTEIN, UA: NORMAL
EXT SPECIFIC GRAVITY, UA: 1
EXT UROBILINOGEN: 2
EXT. CONTROL ED NAV: NORMAL
GFR SERPL CREATININE-BSD FRML MDRD: 122 ML/MIN/1.73SQ M
GLUCOSE SERPL-MCNC: 160 MG/DL (ref 65–140)
HCT VFR BLD AUTO: 31.9 % (ref 34.8–46.1)
HGB BLD-MCNC: 10.3 G/DL (ref 11.5–15.4)
IMM GRANULOCYTES # BLD AUTO: 0.06 THOUSAND/UL (ref 0–0.2)
IMM GRANULOCYTES NFR BLD AUTO: 1 % (ref 0–2)
INR PPP: 1.03 (ref 0.84–1.19)
LYMPHOCYTES # BLD AUTO: 1.09 THOUSANDS/ΜL (ref 0.6–4.47)
LYMPHOCYTES NFR BLD AUTO: 10 % (ref 14–44)
MCH RBC QN AUTO: 27.3 PG (ref 26.8–34.3)
MCHC RBC AUTO-ENTMCNC: 32.3 G/DL (ref 31.4–37.4)
MCV RBC AUTO: 85 FL (ref 82–98)
MONOCYTES # BLD AUTO: 0.47 THOUSAND/ΜL (ref 0.17–1.22)
MONOCYTES NFR BLD AUTO: 4 % (ref 4–12)
NEUTROPHILS # BLD AUTO: 9.45 THOUSANDS/ΜL (ref 1.85–7.62)
NEUTS SEG NFR BLD AUTO: 85 % (ref 43–75)
NRBC BLD AUTO-RTO: 0 /100 WBCS
PLATELET # BLD AUTO: 155 THOUSANDS/UL (ref 149–390)
PMV BLD AUTO: 11 FL (ref 8.9–12.7)
POTASSIUM SERPL-SCNC: 4.3 MMOL/L (ref 3.5–5.3)
PROCALCITONIN SERPL-MCNC: 0.29 NG/ML
PROT SERPL-MCNC: 6.6 G/DL (ref 6.4–8.2)
PROTHROMBIN TIME: 13.2 SECONDS (ref 11.6–14.5)
RBC # BLD AUTO: 3.77 MILLION/UL (ref 3.81–5.12)
SARS-COV-2 RNA RESP QL NAA+PROBE: NEGATIVE
SODIUM SERPL-SCNC: 140 MMOL/L (ref 136–145)
WBC # BLD AUTO: 11.1 THOUSAND/UL (ref 4.31–10.16)
WBC # BLD EST: NORMAL 10*3/UL

## 2020-06-27 PROCEDURE — 85610 PROTHROMBIN TIME: CPT | Performed by: EMERGENCY MEDICINE

## 2020-06-27 PROCEDURE — 99285 EMERGENCY DEPT VISIT HI MDM: CPT

## 2020-06-27 PROCEDURE — 85730 THROMBOPLASTIN TIME PARTIAL: CPT | Performed by: EMERGENCY MEDICINE

## 2020-06-27 PROCEDURE — 81025 URINE PREGNANCY TEST: CPT | Performed by: EMERGENCY MEDICINE

## 2020-06-27 PROCEDURE — 80053 COMPREHEN METABOLIC PANEL: CPT | Performed by: EMERGENCY MEDICINE

## 2020-06-27 PROCEDURE — 71275 CT ANGIOGRAPHY CHEST: CPT

## 2020-06-27 PROCEDURE — 99285 EMERGENCY DEPT VISIT HI MDM: CPT | Performed by: EMERGENCY MEDICINE

## 2020-06-27 PROCEDURE — 36415 COLL VENOUS BLD VENIPUNCTURE: CPT | Performed by: EMERGENCY MEDICINE

## 2020-06-27 PROCEDURE — 85025 COMPLETE CBC W/AUTO DIFF WBC: CPT | Performed by: EMERGENCY MEDICINE

## 2020-06-27 PROCEDURE — 99223 1ST HOSP IP/OBS HIGH 75: CPT | Performed by: PHYSICIAN ASSISTANT

## 2020-06-27 PROCEDURE — 71045 X-RAY EXAM CHEST 1 VIEW: CPT

## 2020-06-27 PROCEDURE — 87635 SARS-COV-2 COVID-19 AMP PRB: CPT | Performed by: EMERGENCY MEDICINE

## 2020-06-27 PROCEDURE — 84145 PROCALCITONIN (PCT): CPT | Performed by: PHYSICIAN ASSISTANT

## 2020-06-27 PROCEDURE — 81002 URINALYSIS NONAUTO W/O SCOPE: CPT | Performed by: EMERGENCY MEDICINE

## 2020-06-27 PROCEDURE — 94640 AIRWAY INHALATION TREATMENT: CPT

## 2020-06-27 PROCEDURE — 96374 THER/PROPH/DIAG INJ IV PUSH: CPT

## 2020-06-27 RX ORDER — METHOCARBAMOL 500 MG/1
500 TABLET, FILM COATED ORAL 3 TIMES DAILY
COMMUNITY

## 2020-06-27 RX ORDER — PANTOPRAZOLE SODIUM 40 MG/1
40 TABLET, DELAYED RELEASE ORAL 2 TIMES DAILY
Status: DISCONTINUED | OUTPATIENT
Start: 2020-06-27 | End: 2020-07-03 | Stop reason: HOSPADM

## 2020-06-27 RX ORDER — IPRATROPIUM BROMIDE AND ALBUTEROL SULFATE 2.5; .5 MG/3ML; MG/3ML
3 SOLUTION RESPIRATORY (INHALATION) ONCE
Status: COMPLETED | OUTPATIENT
Start: 2020-06-27 | End: 2020-06-27

## 2020-06-27 RX ORDER — METHOCARBAMOL 500 MG/1
500 TABLET, FILM COATED ORAL 3 TIMES DAILY
Status: DISCONTINUED | OUTPATIENT
Start: 2020-06-27 | End: 2020-07-03 | Stop reason: HOSPADM

## 2020-06-27 RX ORDER — CITALOPRAM 20 MG/1
40 TABLET ORAL DAILY
Status: DISCONTINUED | OUTPATIENT
Start: 2020-06-28 | End: 2020-07-03 | Stop reason: HOSPADM

## 2020-06-27 RX ORDER — TOPIRAMATE 100 MG/1
200 TABLET, FILM COATED ORAL
Status: DISCONTINUED | OUTPATIENT
Start: 2020-06-27 | End: 2020-07-03 | Stop reason: HOSPADM

## 2020-06-27 RX ORDER — GABAPENTIN 400 MG/1
400 CAPSULE ORAL 3 TIMES DAILY
Status: DISCONTINUED | OUTPATIENT
Start: 2020-06-27 | End: 2020-07-03 | Stop reason: HOSPADM

## 2020-06-27 RX ORDER — ACETAMINOPHEN 325 MG/1
975 TABLET ORAL EVERY 8 HOURS PRN
Status: DISCONTINUED | OUTPATIENT
Start: 2020-06-27 | End: 2020-07-01

## 2020-06-27 RX ORDER — DIPHENOXYLATE HYDROCHLORIDE AND ATROPINE SULFATE 2.5; .025 MG/1; MG/1
1 TABLET ORAL DAILY
COMMUNITY

## 2020-06-27 RX ORDER — GUAIFENESIN 600 MG
1200 TABLET, EXTENDED RELEASE 12 HR ORAL 2 TIMES DAILY
Status: DISCONTINUED | OUTPATIENT
Start: 2020-06-27 | End: 2020-07-03 | Stop reason: HOSPADM

## 2020-06-27 RX ORDER — TOPIRAMATE 25 MG/1
100 TABLET ORAL DAILY
Status: DISCONTINUED | OUTPATIENT
Start: 2020-06-28 | End: 2020-07-03 | Stop reason: HOSPADM

## 2020-06-27 RX ORDER — ONDANSETRON 2 MG/ML
4 INJECTION INTRAMUSCULAR; INTRAVENOUS EVERY 6 HOURS PRN
Status: DISCONTINUED | OUTPATIENT
Start: 2020-06-27 | End: 2020-07-03 | Stop reason: HOSPADM

## 2020-06-27 RX ORDER — CEFEPIME HYDROCHLORIDE 2 G/50ML
2000 INJECTION, SOLUTION INTRAVENOUS EVERY 8 HOURS
Status: DISCONTINUED | OUTPATIENT
Start: 2020-06-28 | End: 2020-06-27

## 2020-06-27 RX ORDER — PRAZOSIN HYDROCHLORIDE 1 MG/1
5 CAPSULE ORAL
Status: DISCONTINUED | OUTPATIENT
Start: 2020-06-27 | End: 2020-07-03 | Stop reason: HOSPADM

## 2020-06-27 RX ORDER — CEFTRIAXONE 1 G/50ML
1000 INJECTION, SOLUTION INTRAVENOUS ONCE
Status: COMPLETED | OUTPATIENT
Start: 2020-06-27 | End: 2020-06-27

## 2020-06-27 RX ORDER — CEFEPIME HYDROCHLORIDE 2 G/50ML
2000 INJECTION, SOLUTION INTRAVENOUS EVERY 8 HOURS
Status: DISCONTINUED | OUTPATIENT
Start: 2020-06-27 | End: 2020-07-01

## 2020-06-27 RX ORDER — THIAMINE MONONITRATE (VIT B1) 100 MG
100 TABLET ORAL DAILY
Status: DISCONTINUED | OUTPATIENT
Start: 2020-06-28 | End: 2020-07-03 | Stop reason: HOSPADM

## 2020-06-27 RX ORDER — FOLIC ACID 1 MG/1
1 TABLET ORAL DAILY
Status: DISCONTINUED | OUTPATIENT
Start: 2020-06-28 | End: 2020-07-03 | Stop reason: HOSPADM

## 2020-06-27 RX ORDER — SODIUM CHLORIDE FOR INHALATION 0.9 %
3 VIAL, NEBULIZER (ML) INHALATION ONCE
Status: COMPLETED | OUTPATIENT
Start: 2020-06-27 | End: 2020-06-27

## 2020-06-27 RX ORDER — BUPRENORPHINE AND NALOXONE 8; 2 MG/1; MG/1
1 FILM, SOLUBLE BUCCAL; SUBLINGUAL EVERY 8 HOURS
Status: DISCONTINUED | OUTPATIENT
Start: 2020-06-27 | End: 2020-07-03 | Stop reason: HOSPADM

## 2020-06-27 RX ORDER — CEFEPIME HYDROCHLORIDE 2 G/1
2000 INJECTION, POWDER, FOR SOLUTION INTRAVENOUS EVERY 8 HOURS
Status: DISCONTINUED | OUTPATIENT
Start: 2020-06-27 | End: 2020-06-27

## 2020-06-27 RX ORDER — PANTOPRAZOLE SODIUM 40 MG/1
40 TABLET, DELAYED RELEASE ORAL 2 TIMES DAILY
COMMUNITY

## 2020-06-27 RX ORDER — ZINC GLUCONATE 50 MG
50 TABLET ORAL DAILY
COMMUNITY

## 2020-06-27 RX ORDER — PRAZOSIN HYDROCHLORIDE 5 MG/1
1 CAPSULE ORAL
COMMUNITY

## 2020-06-27 RX ORDER — LIDOCAINE 50 MG/G
1 PATCH TOPICAL DAILY
Status: DISCONTINUED | OUTPATIENT
Start: 2020-06-28 | End: 2020-07-03 | Stop reason: HOSPADM

## 2020-06-27 RX ADMIN — GUAIFENESIN 1200 MG: 600 TABLET ORAL at 23:25

## 2020-06-27 RX ADMIN — TOPIRAMATE 200 MG: 100 TABLET, FILM COATED ORAL at 23:25

## 2020-06-27 RX ADMIN — CEFTRIAXONE 1000 MG: 1 INJECTION, SOLUTION INTRAVENOUS at 19:18

## 2020-06-27 RX ADMIN — PRAZOSIN HYDROCHLORIDE 5 MG: 1 CAPSULE ORAL at 23:25

## 2020-06-27 RX ADMIN — IPRATROPIUM BROMIDE AND ALBUTEROL SULFATE 3 ML: .5; 3 SOLUTION RESPIRATORY (INHALATION) at 15:46

## 2020-06-27 RX ADMIN — METRONIDAZOLE 500 MG: 500 SOLUTION INTRAVENOUS at 23:24

## 2020-06-27 RX ADMIN — PANTOPRAZOLE SODIUM 40 MG: 40 TABLET, DELAYED RELEASE ORAL at 23:25

## 2020-06-27 RX ADMIN — GABAPENTIN 400 MG: 400 CAPSULE ORAL at 23:25

## 2020-06-27 RX ADMIN — METHOCARBAMOL 500 MG: 500 TABLET, FILM COATED ORAL at 23:25

## 2020-06-27 RX ADMIN — DOXYCYCLINE 100 MG: 100 INJECTION, POWDER, LYOPHILIZED, FOR SOLUTION INTRAVENOUS at 20:25

## 2020-06-27 RX ADMIN — ISODIUM CHLORIDE 3 ML: 0.03 SOLUTION RESPIRATORY (INHALATION) at 15:46

## 2020-06-27 RX ADMIN — BUPRENORPHINE AND NALOXONE 1 FILM: 8; 2 FILM BUCCAL; SUBLINGUAL at 23:25

## 2020-06-27 RX ADMIN — IOHEXOL 100 ML: 350 INJECTION, SOLUTION INTRAVENOUS at 17:58

## 2020-06-28 LAB
ALBUMIN SERPL BCP-MCNC: 2.9 G/DL (ref 3.5–5)
ALP SERPL-CCNC: 63 U/L (ref 46–116)
ALT SERPL W P-5'-P-CCNC: 24 U/L (ref 12–78)
ANION GAP SERPL CALCULATED.3IONS-SCNC: 10 MMOL/L (ref 4–13)
AST SERPL W P-5'-P-CCNC: 32 U/L (ref 5–45)
BASOPHILS # BLD AUTO: 0.03 THOUSANDS/ΜL (ref 0–0.1)
BASOPHILS NFR BLD AUTO: 0 % (ref 0–1)
BILIRUB SERPL-MCNC: 0.5 MG/DL (ref 0.2–1)
BUN SERPL-MCNC: 9 MG/DL (ref 5–25)
CALCIUM SERPL-MCNC: 8 MG/DL (ref 8.3–10.1)
CHLORIDE SERPL-SCNC: 110 MMOL/L (ref 100–108)
CO2 SERPL-SCNC: 22 MMOL/L (ref 21–32)
CREAT SERPL-MCNC: 0.68 MG/DL (ref 0.6–1.3)
EOSINOPHIL # BLD AUTO: 0.12 THOUSAND/ΜL (ref 0–0.61)
EOSINOPHIL NFR BLD AUTO: 1 % (ref 0–6)
ERYTHROCYTE [DISTWIDTH] IN BLOOD BY AUTOMATED COUNT: 16.1 % (ref 11.6–15.1)
GFR SERPL CREATININE-BSD FRML MDRD: 119 ML/MIN/1.73SQ M
GLUCOSE SERPL-MCNC: 94 MG/DL (ref 65–140)
HCT VFR BLD AUTO: 30.8 % (ref 34.8–46.1)
HGB BLD-MCNC: 10 G/DL (ref 11.5–15.4)
IMM GRANULOCYTES # BLD AUTO: 0.07 THOUSAND/UL (ref 0–0.2)
IMM GRANULOCYTES NFR BLD AUTO: 1 % (ref 0–2)
L PNEUMO1 AG UR QL IA.RAPID: NEGATIVE
LYMPHOCYTES # BLD AUTO: 2.36 THOUSANDS/ΜL (ref 0.6–4.47)
LYMPHOCYTES NFR BLD AUTO: 19 % (ref 14–44)
MCH RBC QN AUTO: 27.6 PG (ref 26.8–34.3)
MCHC RBC AUTO-ENTMCNC: 32.5 G/DL (ref 31.4–37.4)
MCV RBC AUTO: 85 FL (ref 82–98)
MONOCYTES # BLD AUTO: 0.7 THOUSAND/ΜL (ref 0.17–1.22)
MONOCYTES NFR BLD AUTO: 6 % (ref 4–12)
NEUTROPHILS # BLD AUTO: 8.87 THOUSANDS/ΜL (ref 1.85–7.62)
NEUTS SEG NFR BLD AUTO: 73 % (ref 43–75)
NRBC BLD AUTO-RTO: 0 /100 WBCS
PLATELET # BLD AUTO: 156 THOUSANDS/UL (ref 149–390)
PMV BLD AUTO: 11.1 FL (ref 8.9–12.7)
POTASSIUM SERPL-SCNC: 3.4 MMOL/L (ref 3.5–5.3)
PROCALCITONIN SERPL-MCNC: 0.25 NG/ML
PROT SERPL-MCNC: 6.1 G/DL (ref 6.4–8.2)
RBC # BLD AUTO: 3.62 MILLION/UL (ref 3.81–5.12)
S PNEUM AG UR QL: NEGATIVE
SARS-COV-2 IGG+IGM SERPL QL IA: NORMAL
SODIUM SERPL-SCNC: 142 MMOL/L (ref 136–145)
WBC # BLD AUTO: 12.15 THOUSAND/UL (ref 4.31–10.16)

## 2020-06-28 PROCEDURE — 86769 SARS-COV-2 COVID-19 ANTIBODY: CPT | Performed by: INTERNAL MEDICINE

## 2020-06-28 PROCEDURE — 83520 IMMUNOASSAY QUANT NOS NONAB: CPT | Performed by: INTERNAL MEDICINE

## 2020-06-28 PROCEDURE — 94664 DEMO&/EVAL PT USE INHALER: CPT

## 2020-06-28 PROCEDURE — 94668 MNPJ CHEST WALL SBSQ: CPT

## 2020-06-28 PROCEDURE — 87449 NOS EACH ORGANISM AG IA: CPT | Performed by: PHYSICIAN ASSISTANT

## 2020-06-28 PROCEDURE — 87070 CULTURE OTHR SPECIMN AEROBIC: CPT | Performed by: PHYSICIAN ASSISTANT

## 2020-06-28 PROCEDURE — 99232 SBSQ HOSP IP/OBS MODERATE 35: CPT | Performed by: INTERNAL MEDICINE

## 2020-06-28 PROCEDURE — 80053 COMPREHEN METABOLIC PANEL: CPT | Performed by: PHYSICIAN ASSISTANT

## 2020-06-28 PROCEDURE — 94760 N-INVAS EAR/PLS OXIMETRY 1: CPT

## 2020-06-28 PROCEDURE — 86038 ANTINUCLEAR ANTIBODIES: CPT | Performed by: INTERNAL MEDICINE

## 2020-06-28 PROCEDURE — 85025 COMPLETE CBC W/AUTO DIFF WBC: CPT | Performed by: PHYSICIAN ASSISTANT

## 2020-06-28 PROCEDURE — 87205 SMEAR GRAM STAIN: CPT | Performed by: PHYSICIAN ASSISTANT

## 2020-06-28 PROCEDURE — 84145 PROCALCITONIN (PCT): CPT | Performed by: PHYSICIAN ASSISTANT

## 2020-06-28 PROCEDURE — 99253 IP/OBS CNSLTJ NEW/EST LOW 45: CPT | Performed by: INTERNAL MEDICINE

## 2020-06-28 PROCEDURE — 99255 IP/OBS CONSLTJ NEW/EST HI 80: CPT | Performed by: INTERNAL MEDICINE

## 2020-06-28 RX ORDER — LORAZEPAM 0.5 MG/1
0.5 TABLET ORAL EVERY 8 HOURS PRN
Status: DISCONTINUED | OUTPATIENT
Start: 2020-06-28 | End: 2020-07-01

## 2020-06-28 RX ORDER — ZINC SULFATE 50(220)MG
220 CAPSULE ORAL DAILY
Status: DISCONTINUED | OUTPATIENT
Start: 2020-06-29 | End: 2020-07-03 | Stop reason: HOSPADM

## 2020-06-28 RX ORDER — CLONIDINE HYDROCHLORIDE 0.1 MG/1
0.1 TABLET ORAL EVERY 8 HOURS PRN
Status: DISCONTINUED | OUTPATIENT
Start: 2020-06-28 | End: 2020-06-28

## 2020-06-28 RX ORDER — CLONAZEPAM 0.5 MG/1
0.5 TABLET ORAL 2 TIMES DAILY PRN
Status: DISCONTINUED | OUTPATIENT
Start: 2020-06-28 | End: 2020-07-01

## 2020-06-28 RX ORDER — ALBUTEROL SULFATE 90 UG/1
2 AEROSOL, METERED RESPIRATORY (INHALATION) EVERY 4 HOURS PRN
Status: DISCONTINUED | OUTPATIENT
Start: 2020-06-28 | End: 2020-07-03 | Stop reason: HOSPADM

## 2020-06-28 RX ORDER — POTASSIUM CHLORIDE 20 MEQ/1
40 TABLET, EXTENDED RELEASE ORAL ONCE
Status: COMPLETED | OUTPATIENT
Start: 2020-06-28 | End: 2020-06-28

## 2020-06-28 RX ORDER — FLUCONAZOLE 100 MG/1
100 TABLET ORAL DAILY
Status: DISCONTINUED | OUTPATIENT
Start: 2020-06-28 | End: 2020-07-03 | Stop reason: HOSPADM

## 2020-06-28 RX ORDER — ALBUTEROL SULFATE 90 UG/1
2 AEROSOL, METERED RESPIRATORY (INHALATION) 4 TIMES DAILY
Status: DISCONTINUED | OUTPATIENT
Start: 2020-06-28 | End: 2020-07-03 | Stop reason: HOSPADM

## 2020-06-28 RX ORDER — METHYLPREDNISOLONE SODIUM SUCCINATE 40 MG/ML
40 INJECTION, POWDER, LYOPHILIZED, FOR SOLUTION INTRAMUSCULAR; INTRAVENOUS EVERY 12 HOURS SCHEDULED
Status: DISCONTINUED | OUTPATIENT
Start: 2020-06-28 | End: 2020-07-01

## 2020-06-28 RX ORDER — FAMOTIDINE 20 MG/1
40 TABLET, FILM COATED ORAL DAILY
Status: DISCONTINUED | OUTPATIENT
Start: 2020-06-28 | End: 2020-07-03 | Stop reason: HOSPADM

## 2020-06-28 RX ADMIN — LIDOCAINE 1 PATCH: 50 PATCH TOPICAL at 09:09

## 2020-06-28 RX ADMIN — ALBUTEROL SULFATE 2 PUFF: 90 AEROSOL, METERED RESPIRATORY (INHALATION) at 17:05

## 2020-06-28 RX ADMIN — ALBUTEROL SULFATE 2 PUFF: 90 AEROSOL, METERED RESPIRATORY (INHALATION) at 21:39

## 2020-06-28 RX ADMIN — DOXYCYCLINE 100 MG: 100 INJECTION, POWDER, LYOPHILIZED, FOR SOLUTION INTRAVENOUS at 21:33

## 2020-06-28 RX ADMIN — GUAIFENESIN 1200 MG: 600 TABLET ORAL at 17:03

## 2020-06-28 RX ADMIN — PRAZOSIN HYDROCHLORIDE 5 MG: 1 CAPSULE ORAL at 21:34

## 2020-06-28 RX ADMIN — BUPRENORPHINE AND NALOXONE 1 FILM: 8; 2 FILM BUCCAL; SUBLINGUAL at 07:35

## 2020-06-28 RX ADMIN — ACETAMINOPHEN 975 MG: 325 TABLET ORAL at 01:14

## 2020-06-28 RX ADMIN — TOPIRAMATE 100 MG: 25 TABLET, FILM COATED ORAL at 09:07

## 2020-06-28 RX ADMIN — METHOCARBAMOL 500 MG: 500 TABLET, FILM COATED ORAL at 09:08

## 2020-06-28 RX ADMIN — METRONIDAZOLE 500 MG: 500 SOLUTION INTRAVENOUS at 23:06

## 2020-06-28 RX ADMIN — CEFEPIME HYDROCHLORIDE 2000 MG: 2 INJECTION, SOLUTION INTRAVENOUS at 23:06

## 2020-06-28 RX ADMIN — GUAIFENESIN 1200 MG: 600 TABLET ORAL at 09:08

## 2020-06-28 RX ADMIN — CEFEPIME HYDROCHLORIDE 2000 MG: 2 INJECTION, SOLUTION INTRAVENOUS at 17:01

## 2020-06-28 RX ADMIN — GABAPENTIN 400 MG: 400 CAPSULE ORAL at 09:08

## 2020-06-28 RX ADMIN — METRONIDAZOLE 500 MG: 500 SOLUTION INTRAVENOUS at 07:35

## 2020-06-28 RX ADMIN — ENOXAPARIN SODIUM 40 MG: 40 INJECTION SUBCUTANEOUS at 09:07

## 2020-06-28 RX ADMIN — CEFEPIME HYDROCHLORIDE 2000 MG: 2 INJECTION, SOLUTION INTRAVENOUS at 00:24

## 2020-06-28 RX ADMIN — CLONAZEPAM 0.5 MG: 0.5 TABLET ORAL at 16:01

## 2020-06-28 RX ADMIN — ACETAMINOPHEN 975 MG: 325 TABLET ORAL at 23:06

## 2020-06-28 RX ADMIN — FAMOTIDINE 40 MG: 20 TABLET ORAL at 21:34

## 2020-06-28 RX ADMIN — PANTOPRAZOLE SODIUM 40 MG: 40 TABLET, DELAYED RELEASE ORAL at 17:03

## 2020-06-28 RX ADMIN — LORAZEPAM 0.5 MG: 0.5 TABLET ORAL at 21:36

## 2020-06-28 RX ADMIN — ACETAMINOPHEN 975 MG: 325 TABLET ORAL at 13:20

## 2020-06-28 RX ADMIN — BUPRENORPHINE AND NALOXONE 1 FILM: 8; 2 FILM BUCCAL; SUBLINGUAL at 16:01

## 2020-06-28 RX ADMIN — NICOTINE 1 PATCH: 7 PATCH TRANSDERMAL at 09:08

## 2020-06-28 RX ADMIN — NYSTATIN 500000 UNITS: 100000 SUSPENSION ORAL at 17:03

## 2020-06-28 RX ADMIN — CEFEPIME HYDROCHLORIDE 2000 MG: 2 INJECTION, SOLUTION INTRAVENOUS at 07:35

## 2020-06-28 RX ADMIN — METHOCARBAMOL 500 MG: 500 TABLET, FILM COATED ORAL at 21:36

## 2020-06-28 RX ADMIN — PANCRELIPASE 36000 UNITS: 24000; 76000; 120000 CAPSULE, DELAYED RELEASE PELLETS ORAL at 17:02

## 2020-06-28 RX ADMIN — METHYLPREDNISOLONE SODIUM SUCCINATE 40 MG: 40 INJECTION, POWDER, FOR SOLUTION INTRAMUSCULAR; INTRAVENOUS at 22:36

## 2020-06-28 RX ADMIN — DOXYCYCLINE 100 MG: 100 INJECTION, POWDER, LYOPHILIZED, FOR SOLUTION INTRAVENOUS at 08:58

## 2020-06-28 RX ADMIN — THIAMINE HCL TAB 100 MG 100 MG: 100 TAB at 09:08

## 2020-06-28 RX ADMIN — LORAZEPAM 0.5 MG: 0.5 TABLET ORAL at 13:21

## 2020-06-28 RX ADMIN — TOPIRAMATE 200 MG: 100 TABLET, FILM COATED ORAL at 21:35

## 2020-06-28 RX ADMIN — PANTOPRAZOLE SODIUM 40 MG: 40 TABLET, DELAYED RELEASE ORAL at 09:08

## 2020-06-28 RX ADMIN — METRONIDAZOLE 500 MG: 500 SOLUTION INTRAVENOUS at 16:01

## 2020-06-28 RX ADMIN — BUPRENORPHINE AND NALOXONE 1 FILM: 8; 2 FILM BUCCAL; SUBLINGUAL at 22:36

## 2020-06-28 RX ADMIN — FOLIC ACID 1 MG: 1 TABLET ORAL at 09:08

## 2020-06-28 RX ADMIN — PANCRELIPASE 36000 UNITS: 24000; 76000; 120000 CAPSULE, DELAYED RELEASE PELLETS ORAL at 09:08

## 2020-06-28 RX ADMIN — POTASSIUM CHLORIDE 40 MEQ: 1500 TABLET, EXTENDED RELEASE ORAL at 17:03

## 2020-06-28 RX ADMIN — FLUCONAZOLE 100 MG: 100 TABLET ORAL at 16:01

## 2020-06-28 RX ADMIN — ALBUTEROL SULFATE 2 PUFF: 90 AEROSOL, METERED RESPIRATORY (INHALATION) at 13:57

## 2020-06-28 RX ADMIN — METHYLPREDNISOLONE SODIUM SUCCINATE 40 MG: 40 INJECTION, POWDER, FOR SOLUTION INTRAMUSCULAR; INTRAVENOUS at 13:21

## 2020-06-28 RX ADMIN — GABAPENTIN 400 MG: 400 CAPSULE ORAL at 17:03

## 2020-06-28 RX ADMIN — CITALOPRAM HYDROBROMIDE 40 MG: 20 TABLET ORAL at 09:07

## 2020-06-28 RX ADMIN — METHOCARBAMOL 500 MG: 500 TABLET, FILM COATED ORAL at 17:02

## 2020-06-28 RX ADMIN — GABAPENTIN 400 MG: 400 CAPSULE ORAL at 21:36

## 2020-06-29 ENCOUNTER — APPOINTMENT (INPATIENT)
Dept: CT IMAGING | Facility: HOSPITAL | Age: 30
DRG: 121 | End: 2020-06-29
Payer: COMMERCIAL

## 2020-06-29 PROBLEM — R13.10 DYSPHAGIA: Status: RESOLVED | Noted: 2020-06-27 | Resolved: 2020-06-29

## 2020-06-29 LAB
ANION GAP SERPL CALCULATED.3IONS-SCNC: 12 MMOL/L (ref 4–13)
BASOPHILS # BLD AUTO: 0.02 THOUSANDS/ΜL (ref 0–0.1)
BASOPHILS NFR BLD AUTO: 0 % (ref 0–1)
BUN SERPL-MCNC: 10 MG/DL (ref 5–25)
CALCIUM SERPL-MCNC: 8.8 MG/DL (ref 8.3–10.1)
CHLORIDE SERPL-SCNC: 108 MMOL/L (ref 100–108)
CO2 SERPL-SCNC: 21 MMOL/L (ref 21–32)
CREAT SERPL-MCNC: 0.65 MG/DL (ref 0.6–1.3)
EOSINOPHIL # BLD AUTO: 0 THOUSAND/ΜL (ref 0–0.61)
EOSINOPHIL NFR BLD AUTO: 0 % (ref 0–6)
ERYTHROCYTE [DISTWIDTH] IN BLOOD BY AUTOMATED COUNT: 15 % (ref 11.6–15.1)
GFR SERPL CREATININE-BSD FRML MDRD: 121 ML/MIN/1.73SQ M
GLUCOSE SERPL-MCNC: 131 MG/DL (ref 65–140)
HCT VFR BLD AUTO: 33.9 % (ref 34.8–46.1)
HGB BLD-MCNC: 10.8 G/DL (ref 11.5–15.4)
IMM GRANULOCYTES # BLD AUTO: 0.1 THOUSAND/UL (ref 0–0.2)
IMM GRANULOCYTES NFR BLD AUTO: 1 % (ref 0–2)
LYMPHOCYTES # BLD AUTO: 1.06 THOUSANDS/ΜL (ref 0.6–4.47)
LYMPHOCYTES NFR BLD AUTO: 8 % (ref 14–44)
MAGNESIUM SERPL-MCNC: 2.1 MG/DL (ref 1.6–2.6)
MCH RBC QN AUTO: 27 PG (ref 26.8–34.3)
MCHC RBC AUTO-ENTMCNC: 31.9 G/DL (ref 31.4–37.4)
MCV RBC AUTO: 85 FL (ref 82–98)
MONOCYTES # BLD AUTO: 0.62 THOUSAND/ΜL (ref 0.17–1.22)
MONOCYTES NFR BLD AUTO: 5 % (ref 4–12)
NEUTROPHILS # BLD AUTO: 10.81 THOUSANDS/ΜL (ref 1.85–7.62)
NEUTS SEG NFR BLD AUTO: 86 % (ref 43–75)
NRBC BLD AUTO-RTO: 0 /100 WBCS
PHOSPHATE SERPL-MCNC: 3.8 MG/DL (ref 2.7–4.5)
PLATELET # BLD AUTO: 190 THOUSANDS/UL (ref 149–390)
PMV BLD AUTO: 11.1 FL (ref 8.9–12.7)
POTASSIUM SERPL-SCNC: 4.5 MMOL/L (ref 3.5–5.3)
PROCALCITONIN SERPL-MCNC: 0.1 NG/ML
RBC # BLD AUTO: 4 MILLION/UL (ref 3.81–5.12)
RYE IGE QN: NEGATIVE
SARS-COV-2 RNA RESP QL NAA+PROBE: NEGATIVE
SODIUM SERPL-SCNC: 141 MMOL/L (ref 136–145)
WBC # BLD AUTO: 12.61 THOUSAND/UL (ref 4.31–10.16)

## 2020-06-29 PROCEDURE — 0B9C8ZX DRAINAGE OF RIGHT UPPER LUNG LOBE, VIA NATURAL OR ARTIFICIAL OPENING ENDOSCOPIC, DIAGNOSTIC: ICD-10-PCS | Performed by: INTERNAL MEDICINE

## 2020-06-29 PROCEDURE — 99232 SBSQ HOSP IP/OBS MODERATE 35: CPT | Performed by: INTERNAL MEDICINE

## 2020-06-29 PROCEDURE — 92610 EVALUATE SWALLOWING FUNCTION: CPT

## 2020-06-29 PROCEDURE — 0B9D8ZX DRAINAGE OF RIGHT MIDDLE LUNG LOBE, VIA NATURAL OR ARTIFICIAL OPENING ENDOSCOPIC, DIAGNOSTIC: ICD-10-PCS | Performed by: INTERNAL MEDICINE

## 2020-06-29 PROCEDURE — 83735 ASSAY OF MAGNESIUM: CPT | Performed by: INTERNAL MEDICINE

## 2020-06-29 PROCEDURE — 85025 COMPLETE CBC W/AUTO DIFF WBC: CPT | Performed by: INTERNAL MEDICINE

## 2020-06-29 PROCEDURE — 0WCQ8ZZ EXTIRPATION OF MATTER FROM RESPIRATORY TRACT, VIA NATURAL OR ARTIFICIAL OPENING ENDOSCOPIC: ICD-10-PCS | Performed by: INTERNAL MEDICINE

## 2020-06-29 PROCEDURE — 87635 SARS-COV-2 COVID-19 AMP PRB: CPT | Performed by: INTERNAL MEDICINE

## 2020-06-29 PROCEDURE — 84100 ASSAY OF PHOSPHORUS: CPT | Performed by: INTERNAL MEDICINE

## 2020-06-29 PROCEDURE — 84145 PROCALCITONIN (PCT): CPT | Performed by: INTERNAL MEDICINE

## 2020-06-29 PROCEDURE — 70486 CT MAXILLOFACIAL W/O DYE: CPT

## 2020-06-29 PROCEDURE — 80048 BASIC METABOLIC PNL TOTAL CA: CPT | Performed by: INTERNAL MEDICINE

## 2020-06-29 PROCEDURE — 0B9H8ZX DRAINAGE OF LUNG LINGULA, VIA NATURAL OR ARTIFICIAL OPENING ENDOSCOPIC, DIAGNOSTIC: ICD-10-PCS | Performed by: INTERNAL MEDICINE

## 2020-06-29 PROCEDURE — 0B9G8ZX DRAINAGE OF LEFT UPPER LUNG LOBE, VIA NATURAL OR ARTIFICIAL OPENING ENDOSCOPIC, DIAGNOSTIC: ICD-10-PCS | Performed by: INTERNAL MEDICINE

## 2020-06-29 RX ORDER — KETOROLAC TROMETHAMINE 30 MG/ML
15 INJECTION, SOLUTION INTRAMUSCULAR; INTRAVENOUS ONCE
Status: COMPLETED | OUTPATIENT
Start: 2020-06-29 | End: 2020-06-29

## 2020-06-29 RX ADMIN — GABAPENTIN 400 MG: 400 CAPSULE ORAL at 10:04

## 2020-06-29 RX ADMIN — GABAPENTIN 400 MG: 400 CAPSULE ORAL at 20:31

## 2020-06-29 RX ADMIN — BUPRENORPHINE AND NALOXONE 1 FILM: 8; 2 FILM BUCCAL; SUBLINGUAL at 10:05

## 2020-06-29 RX ADMIN — LORAZEPAM 0.5 MG: 0.5 TABLET ORAL at 10:05

## 2020-06-29 RX ADMIN — NYSTATIN 500000 UNITS: 100000 SUSPENSION ORAL at 14:54

## 2020-06-29 RX ADMIN — ZINC SULFATE 220 MG (50 MG) CAPSULE 220 MG: CAPSULE at 10:03

## 2020-06-29 RX ADMIN — CLONAZEPAM 0.5 MG: 0.5 TABLET ORAL at 00:52

## 2020-06-29 RX ADMIN — NICOTINE 1 PATCH: 7 PATCH TRANSDERMAL at 10:23

## 2020-06-29 RX ADMIN — PRAZOSIN HYDROCHLORIDE 5 MG: 1 CAPSULE ORAL at 22:28

## 2020-06-29 RX ADMIN — PANTOPRAZOLE SODIUM 40 MG: 40 TABLET, DELAYED RELEASE ORAL at 17:04

## 2020-06-29 RX ADMIN — FOLIC ACID 1 MG: 1 TABLET ORAL at 10:02

## 2020-06-29 RX ADMIN — CEFEPIME HYDROCHLORIDE 2000 MG: 2 INJECTION, SOLUTION INTRAVENOUS at 17:06

## 2020-06-29 RX ADMIN — LIDOCAINE 1 PATCH: 50 PATCH TOPICAL at 10:15

## 2020-06-29 RX ADMIN — CITALOPRAM HYDROBROMIDE 40 MG: 20 TABLET ORAL at 10:01

## 2020-06-29 RX ADMIN — ZINC 1 TABLET: TAB ORAL at 10:02

## 2020-06-29 RX ADMIN — TOPIRAMATE 200 MG: 100 TABLET, FILM COATED ORAL at 22:28

## 2020-06-29 RX ADMIN — ENOXAPARIN SODIUM 40 MG: 40 INJECTION SUBCUTANEOUS at 10:32

## 2020-06-29 RX ADMIN — FAMOTIDINE 40 MG: 20 TABLET ORAL at 20:31

## 2020-06-29 RX ADMIN — METHOCARBAMOL 500 MG: 500 TABLET, FILM COATED ORAL at 20:33

## 2020-06-29 RX ADMIN — METHYLPREDNISOLONE SODIUM SUCCINATE 40 MG: 40 INJECTION, POWDER, FOR SOLUTION INTRAMUSCULAR; INTRAVENOUS at 20:40

## 2020-06-29 RX ADMIN — PANCRELIPASE 36000 UNITS: 24000; 76000; 120000 CAPSULE, DELAYED RELEASE PELLETS ORAL at 17:04

## 2020-06-29 RX ADMIN — NYSTATIN 500000 UNITS: 100000 SUSPENSION ORAL at 10:04

## 2020-06-29 RX ADMIN — NYSTATIN 500000 UNITS: 100000 SUSPENSION ORAL at 17:05

## 2020-06-29 RX ADMIN — METRONIDAZOLE 500 MG: 500 SOLUTION INTRAVENOUS at 10:16

## 2020-06-29 RX ADMIN — FLUCONAZOLE 100 MG: 100 TABLET ORAL at 10:04

## 2020-06-29 RX ADMIN — ALBUTEROL SULFATE 2 PUFF: 90 AEROSOL, METERED RESPIRATORY (INHALATION) at 22:27

## 2020-06-29 RX ADMIN — KETOROLAC TROMETHAMINE 15 MG: 30 INJECTION, SOLUTION INTRAMUSCULAR at 20:41

## 2020-06-29 RX ADMIN — CEFEPIME HYDROCHLORIDE 2000 MG: 2 INJECTION, SOLUTION INTRAVENOUS at 10:30

## 2020-06-29 RX ADMIN — ALBUTEROL SULFATE 2 PUFF: 90 AEROSOL, METERED RESPIRATORY (INHALATION) at 14:53

## 2020-06-29 RX ADMIN — NYSTATIN 500000 UNITS: 100000 SUSPENSION ORAL at 22:29

## 2020-06-29 RX ADMIN — TOPIRAMATE 100 MG: 25 TABLET, FILM COATED ORAL at 10:03

## 2020-06-29 RX ADMIN — METHOCARBAMOL 500 MG: 500 TABLET, FILM COATED ORAL at 10:03

## 2020-06-29 RX ADMIN — PANCRELIPASE 36000 UNITS: 24000; 76000; 120000 CAPSULE, DELAYED RELEASE PELLETS ORAL at 10:02

## 2020-06-29 RX ADMIN — METHOCARBAMOL 500 MG: 500 TABLET, FILM COATED ORAL at 17:04

## 2020-06-29 RX ADMIN — GABAPENTIN 400 MG: 400 CAPSULE ORAL at 17:04

## 2020-06-29 RX ADMIN — DOXYCYCLINE 100 MG: 100 INJECTION, POWDER, LYOPHILIZED, FOR SOLUTION INTRAVENOUS at 11:00

## 2020-06-29 RX ADMIN — THIAMINE HCL TAB 100 MG 100 MG: 100 TAB at 10:01

## 2020-06-29 RX ADMIN — BUPRENORPHINE AND NALOXONE 1 FILM: 8; 2 FILM BUCCAL; SUBLINGUAL at 17:05

## 2020-06-29 RX ADMIN — ALBUTEROL SULFATE 2 PUFF: 90 AEROSOL, METERED RESPIRATORY (INHALATION) at 08:00

## 2020-06-29 RX ADMIN — ALBUTEROL SULFATE 2 PUFF: 90 AEROSOL, METERED RESPIRATORY (INHALATION) at 17:15

## 2020-06-29 RX ADMIN — CLONAZEPAM 0.5 MG: 0.5 TABLET ORAL at 17:03

## 2020-06-29 RX ADMIN — LORAZEPAM 0.5 MG: 0.5 TABLET ORAL at 23:07

## 2020-06-29 RX ADMIN — GUAIFENESIN 1200 MG: 600 TABLET ORAL at 10:03

## 2020-06-29 RX ADMIN — METHYLPREDNISOLONE SODIUM SUCCINATE 40 MG: 40 INJECTION, POWDER, FOR SOLUTION INTRAMUSCULAR; INTRAVENOUS at 10:21

## 2020-06-29 RX ADMIN — PANTOPRAZOLE SODIUM 40 MG: 40 TABLET, DELAYED RELEASE ORAL at 10:02

## 2020-06-29 RX ADMIN — BUPRENORPHINE AND NALOXONE 1 FILM: 8; 2 FILM BUCCAL; SUBLINGUAL at 23:07

## 2020-06-29 RX ADMIN — GUAIFENESIN 1200 MG: 600 TABLET ORAL at 17:05

## 2020-06-30 ENCOUNTER — ANESTHESIA EVENT (INPATIENT)
Dept: GASTROENTEROLOGY | Facility: HOSPITAL | Age: 30
DRG: 121 | End: 2020-06-30
Payer: COMMERCIAL

## 2020-06-30 ENCOUNTER — ANESTHESIA (INPATIENT)
Dept: GASTROENTEROLOGY | Facility: HOSPITAL | Age: 30
DRG: 121 | End: 2020-06-30
Payer: COMMERCIAL

## 2020-06-30 ENCOUNTER — APPOINTMENT (INPATIENT)
Dept: GASTROENTEROLOGY | Facility: HOSPITAL | Age: 30
DRG: 121 | End: 2020-06-30
Attending: INTERNAL MEDICINE
Payer: COMMERCIAL

## 2020-06-30 LAB
APTT PPP: 31 SECONDS (ref 23–37)
BACTERIA SPT RESP CULT: ABNORMAL
BACTERIA SPT RESP CULT: ABNORMAL
CRYOGLOB RF SER-ACNC: ABNORMAL [IU]/ML
EST. AVERAGE GLUCOSE BLD GHB EST-MCNC: 108 MG/DL
GRAM STN SPEC: ABNORMAL
HBA1C MFR BLD: 5.4 %
INR PPP: 1.08 (ref 0.84–1.19)
PROTHROMBIN TIME: 13.7 SECONDS (ref 11.6–14.5)
RHEUMATOID FACT SER QL LA: POSITIVE

## 2020-06-30 PROCEDURE — 85730 THROMBOPLASTIN TIME PARTIAL: CPT | Performed by: INTERNAL MEDICINE

## 2020-06-30 PROCEDURE — 86431 RHEUMATOID FACTOR QUANT: CPT | Performed by: INTERNAL MEDICINE

## 2020-06-30 PROCEDURE — 89051 BODY FLUID CELL COUNT: CPT | Performed by: PATHOLOGY

## 2020-06-30 PROCEDURE — 87102 FUNGUS ISOLATION CULTURE: CPT | Performed by: INTERNAL MEDICINE

## 2020-06-30 PROCEDURE — 99232 SBSQ HOSP IP/OBS MODERATE 35: CPT | Performed by: INTERNAL MEDICINE

## 2020-06-30 PROCEDURE — 86430 RHEUMATOID FACTOR TEST QUAL: CPT | Performed by: INTERNAL MEDICINE

## 2020-06-30 PROCEDURE — 88112 CYTOPATH CELL ENHANCE TECH: CPT | Performed by: PATHOLOGY

## 2020-06-30 PROCEDURE — 82164 ANGIOTENSIN I ENZYME TEST: CPT | Performed by: INTERNAL MEDICINE

## 2020-06-30 PROCEDURE — 86606 ASPERGILLUS ANTIBODY: CPT | Performed by: INTERNAL MEDICINE

## 2020-06-30 PROCEDURE — U0003 INFECTIOUS AGENT DETECTION BY NUCLEIC ACID (DNA OR RNA); SEVERE ACUTE RESPIRATORY SYNDROME CORONAVIRUS 2 (SARS-COV-2) (CORONAVIRUS DISEASE [COVID-19]), AMPLIFIED PROBE TECHNIQUE, MAKING USE OF HIGH THROUGHPUT TECHNOLOGIES AS DESCRIBED BY CMS-2020-01-R: HCPCS | Performed by: INTERNAL MEDICINE

## 2020-06-30 PROCEDURE — 86671 FUNGUS NES ANTIBODY: CPT | Performed by: INTERNAL MEDICINE

## 2020-06-30 PROCEDURE — 87635 SARS-COV-2 COVID-19 AMP PRB: CPT | Performed by: INTERNAL MEDICINE

## 2020-06-30 PROCEDURE — 86255 FLUORESCENT ANTIBODY SCREEN: CPT | Performed by: INTERNAL MEDICINE

## 2020-06-30 PROCEDURE — 87281 PNEUMOCYSTIS CARINII AG IF: CPT | Performed by: INTERNAL MEDICINE

## 2020-06-30 PROCEDURE — 87015 SPECIMEN INFECT AGNT CONCNTJ: CPT | Performed by: INTERNAL MEDICINE

## 2020-06-30 PROCEDURE — 31624 DX BRONCHOSCOPE/LAVAGE: CPT | Performed by: INTERNAL MEDICINE

## 2020-06-30 PROCEDURE — 86331 IMMUNODIFFUSION OUCHTERLONY: CPT | Performed by: INTERNAL MEDICINE

## 2020-06-30 PROCEDURE — 83036 HEMOGLOBIN GLYCOSYLATED A1C: CPT | Performed by: INTERNAL MEDICINE

## 2020-06-30 PROCEDURE — 87116 MYCOBACTERIA CULTURE: CPT | Performed by: INTERNAL MEDICINE

## 2020-06-30 PROCEDURE — 87206 SMEAR FLUORESCENT/ACID STAI: CPT | Performed by: INTERNAL MEDICINE

## 2020-06-30 PROCEDURE — 85610 PROTHROMBIN TIME: CPT | Performed by: INTERNAL MEDICINE

## 2020-06-30 PROCEDURE — 87070 CULTURE OTHR SPECIMN AEROBIC: CPT | Performed by: INTERNAL MEDICINE

## 2020-06-30 PROCEDURE — 87252 VIRUS INOCULATION TISSUE: CPT | Performed by: INTERNAL MEDICINE

## 2020-06-30 PROCEDURE — 87106 FUNGI IDENTIFICATION YEAST: CPT | Performed by: INTERNAL MEDICINE

## 2020-06-30 PROCEDURE — 86602 ANTINOMYCES ANTIBODY: CPT | Performed by: INTERNAL MEDICINE

## 2020-06-30 RX ORDER — LIDOCAINE HYDROCHLORIDE 20 MG/ML
1 JELLY TOPICAL ONCE
Status: DISCONTINUED | OUTPATIENT
Start: 2020-06-30 | End: 2020-06-30

## 2020-06-30 RX ORDER — KETOROLAC TROMETHAMINE 30 MG/ML
15 INJECTION, SOLUTION INTRAMUSCULAR; INTRAVENOUS EVERY 6 HOURS PRN
Status: DISPENSED | OUTPATIENT
Start: 2020-06-30 | End: 2020-07-03

## 2020-06-30 RX ORDER — LIDOCAINE HYDROCHLORIDE 10 MG/ML
30 INJECTION, SOLUTION EPIDURAL; INFILTRATION; INTRACAUDAL; PERINEURAL ONCE
Status: DISCONTINUED | OUTPATIENT
Start: 2020-06-30 | End: 2020-06-30

## 2020-06-30 RX ORDER — MIDAZOLAM HYDROCHLORIDE 2 MG/2ML
INJECTION, SOLUTION INTRAMUSCULAR; INTRAVENOUS AS NEEDED
Status: DISCONTINUED | OUTPATIENT
Start: 2020-06-30 | End: 2020-06-30 | Stop reason: SURG

## 2020-06-30 RX ORDER — SODIUM CHLORIDE 9 MG/ML
INJECTION, SOLUTION INTRAVENOUS CONTINUOUS PRN
Status: DISCONTINUED | OUTPATIENT
Start: 2020-06-30 | End: 2020-06-30 | Stop reason: SURG

## 2020-06-30 RX ORDER — MELOXICAM 15 MG/1
15 TABLET ORAL DAILY
Status: DISCONTINUED | OUTPATIENT
Start: 2020-06-30 | End: 2020-07-03 | Stop reason: HOSPADM

## 2020-06-30 RX ORDER — KETAMINE HYDROCHLORIDE 50 MG/ML
INJECTION, SOLUTION, CONCENTRATE INTRAMUSCULAR; INTRAVENOUS AS NEEDED
Status: DISCONTINUED | OUTPATIENT
Start: 2020-06-30 | End: 2020-06-30 | Stop reason: SURG

## 2020-06-30 RX ORDER — EPINEPHRINE 1 MG/ML
1 INJECTION, SOLUTION, CONCENTRATE INTRAVENOUS ONCE
Status: DISCONTINUED | OUTPATIENT
Start: 2020-06-30 | End: 2020-06-30

## 2020-06-30 RX ADMIN — KETAMINE HYDROCHLORIDE 50 MG: 50 INJECTION, SOLUTION INTRAMUSCULAR; INTRAVENOUS at 10:48

## 2020-06-30 RX ADMIN — FLUCONAZOLE 100 MG: 100 TABLET ORAL at 08:55

## 2020-06-30 RX ADMIN — SODIUM CHLORIDE: 0.9 INJECTION, SOLUTION INTRAVENOUS at 10:01

## 2020-06-30 RX ADMIN — PANCRELIPASE 36000 UNITS: 24000; 76000; 120000 CAPSULE, DELAYED RELEASE PELLETS ORAL at 22:39

## 2020-06-30 RX ADMIN — PANTOPRAZOLE SODIUM 40 MG: 40 TABLET, DELAYED RELEASE ORAL at 18:56

## 2020-06-30 RX ADMIN — NICOTINE 1 PATCH: 7 PATCH TRANSDERMAL at 08:57

## 2020-06-30 RX ADMIN — TOPIRAMATE 200 MG: 100 TABLET, FILM COATED ORAL at 22:38

## 2020-06-30 RX ADMIN — KETAMINE HYDROCHLORIDE 50 MG: 50 INJECTION, SOLUTION INTRAMUSCULAR; INTRAVENOUS at 10:43

## 2020-06-30 RX ADMIN — GABAPENTIN 400 MG: 400 CAPSULE ORAL at 08:55

## 2020-06-30 RX ADMIN — KETAMINE HYDROCHLORIDE 50 MG: 50 INJECTION, SOLUTION INTRAMUSCULAR; INTRAVENOUS at 10:30

## 2020-06-30 RX ADMIN — BUPRENORPHINE AND NALOXONE 1 FILM: 8; 2 FILM BUCCAL; SUBLINGUAL at 09:21

## 2020-06-30 RX ADMIN — ENOXAPARIN SODIUM 40 MG: 40 INJECTION SUBCUTANEOUS at 15:58

## 2020-06-30 RX ADMIN — GUAIFENESIN 1200 MG: 600 TABLET ORAL at 08:54

## 2020-06-30 RX ADMIN — NYSTATIN 500000 UNITS: 100000 SUSPENSION ORAL at 22:47

## 2020-06-30 RX ADMIN — KETAMINE HYDROCHLORIDE 50 MG: 50 INJECTION, SOLUTION INTRAMUSCULAR; INTRAVENOUS at 10:37

## 2020-06-30 RX ADMIN — METHYLPREDNISOLONE SODIUM SUCCINATE 40 MG: 40 INJECTION, POWDER, FOR SOLUTION INTRAMUSCULAR; INTRAVENOUS at 22:45

## 2020-06-30 RX ADMIN — METHOCARBAMOL 500 MG: 500 TABLET, FILM COATED ORAL at 22:38

## 2020-06-30 RX ADMIN — THIAMINE HCL TAB 100 MG 100 MG: 100 TAB at 08:55

## 2020-06-30 RX ADMIN — CEFEPIME HYDROCHLORIDE 2000 MG: 2 INJECTION, SOLUTION INTRAVENOUS at 08:44

## 2020-06-30 RX ADMIN — LIDOCAINE 1 PATCH: 50 PATCH TOPICAL at 08:58

## 2020-06-30 RX ADMIN — BUPRENORPHINE AND NALOXONE 1 FILM: 8; 2 FILM BUCCAL; SUBLINGUAL at 18:56

## 2020-06-30 RX ADMIN — PANCRELIPASE 36000 UNITS: 24000; 76000; 120000 CAPSULE, DELAYED RELEASE PELLETS ORAL at 16:52

## 2020-06-30 RX ADMIN — ALBUTEROL SULFATE 2 PUFF: 90 AEROSOL, METERED RESPIRATORY (INHALATION) at 22:47

## 2020-06-30 RX ADMIN — GUAIFENESIN 1200 MG: 600 TABLET ORAL at 18:56

## 2020-06-30 RX ADMIN — TOPIRAMATE 100 MG: 25 TABLET, FILM COATED ORAL at 08:54

## 2020-06-30 RX ADMIN — GABAPENTIN 400 MG: 400 CAPSULE ORAL at 16:52

## 2020-06-30 RX ADMIN — KETAMINE HYDROCHLORIDE 50 MG: 50 INJECTION, SOLUTION INTRAMUSCULAR; INTRAVENOUS at 10:25

## 2020-06-30 RX ADMIN — PRAZOSIN HYDROCHLORIDE 5 MG: 1 CAPSULE ORAL at 22:38

## 2020-06-30 RX ADMIN — NYSTATIN 500000 UNITS: 100000 SUSPENSION ORAL at 18:56

## 2020-06-30 RX ADMIN — LORAZEPAM 0.5 MG: 0.5 TABLET ORAL at 16:55

## 2020-06-30 RX ADMIN — FOLIC ACID 1 MG: 1 TABLET ORAL at 08:55

## 2020-06-30 RX ADMIN — METHOCARBAMOL 500 MG: 500 TABLET, FILM COATED ORAL at 08:54

## 2020-06-30 RX ADMIN — MELOXICAM 15 MG: 15 TABLET ORAL at 15:58

## 2020-06-30 RX ADMIN — ZINC 1 TABLET: TAB ORAL at 09:09

## 2020-06-30 RX ADMIN — CEFEPIME HYDROCHLORIDE 2000 MG: 2 INJECTION, SOLUTION INTRAVENOUS at 16:50

## 2020-06-30 RX ADMIN — NYSTATIN 500000 UNITS: 100000 SUSPENSION ORAL at 09:21

## 2020-06-30 RX ADMIN — CLONAZEPAM 0.5 MG: 0.5 TABLET ORAL at 22:50

## 2020-06-30 RX ADMIN — ALBUTEROL SULFATE 2 PUFF: 90 AEROSOL, METERED RESPIRATORY (INHALATION) at 18:58

## 2020-06-30 RX ADMIN — ALBUTEROL SULFATE 2 PUFF: 90 AEROSOL, METERED RESPIRATORY (INHALATION) at 08:57

## 2020-06-30 RX ADMIN — METHOCARBAMOL 500 MG: 500 TABLET, FILM COATED ORAL at 16:55

## 2020-06-30 RX ADMIN — FAMOTIDINE 40 MG: 20 TABLET ORAL at 22:39

## 2020-06-30 RX ADMIN — CEFEPIME HYDROCHLORIDE 2000 MG: 2 INJECTION, SOLUTION INTRAVENOUS at 00:10

## 2020-06-30 RX ADMIN — ZINC SULFATE 220 MG (50 MG) CAPSULE 220 MG: CAPSULE at 09:21

## 2020-06-30 RX ADMIN — PANTOPRAZOLE SODIUM 40 MG: 40 TABLET, DELAYED RELEASE ORAL at 08:55

## 2020-06-30 RX ADMIN — METHYLPREDNISOLONE SODIUM SUCCINATE 40 MG: 40 INJECTION, POWDER, FOR SOLUTION INTRAMUSCULAR; INTRAVENOUS at 08:50

## 2020-06-30 RX ADMIN — KETAMINE HYDROCHLORIDE 40 MG: 50 INJECTION, SOLUTION INTRAMUSCULAR; INTRAVENOUS at 10:33

## 2020-06-30 RX ADMIN — GABAPENTIN 400 MG: 400 CAPSULE ORAL at 22:38

## 2020-06-30 RX ADMIN — KETAMINE HYDROCHLORIDE 50 MG: 50 INJECTION, SOLUTION INTRAMUSCULAR; INTRAVENOUS at 10:51

## 2020-06-30 RX ADMIN — KETOROLAC TROMETHAMINE 15 MG: 30 INJECTION, SOLUTION INTRAMUSCULAR at 22:51

## 2020-06-30 RX ADMIN — CITALOPRAM HYDROBROMIDE 40 MG: 20 TABLET ORAL at 08:55

## 2020-06-30 RX ADMIN — MIDAZOLAM 2 MG: 1 INJECTION INTRAMUSCULAR; INTRAVENOUS at 10:24

## 2020-07-01 ENCOUNTER — APPOINTMENT (INPATIENT)
Dept: MRI IMAGING | Facility: HOSPITAL | Age: 30
DRG: 121 | End: 2020-07-01
Payer: COMMERCIAL

## 2020-07-01 PROBLEM — R09.02 HYPOXIA: Status: RESOLVED | Noted: 2020-04-21 | Resolved: 2020-07-01

## 2020-07-01 LAB
ACE SERPL-CCNC: 35 U/L (ref 14–82)
MYELOPEROXIDASE AB SER IA-ACNC: <9 U/ML (ref 0–9)

## 2020-07-01 PROCEDURE — 99232 SBSQ HOSP IP/OBS MODERATE 35: CPT | Performed by: INTERNAL MEDICINE

## 2020-07-01 PROCEDURE — 72148 MRI LUMBAR SPINE W/O DYE: CPT

## 2020-07-01 PROCEDURE — 94668 MNPJ CHEST WALL SBSQ: CPT

## 2020-07-01 RX ORDER — ACETAMINOPHEN 325 MG/1
975 TABLET ORAL EVERY 8 HOURS PRN
Status: DISCONTINUED | OUTPATIENT
Start: 2020-07-01 | End: 2020-07-02

## 2020-07-01 RX ORDER — PREDNISONE 20 MG/1
20 TABLET ORAL DAILY
Status: DISCONTINUED | OUTPATIENT
Start: 2020-07-01 | End: 2020-07-02

## 2020-07-01 RX ORDER — CLONAZEPAM 0.5 MG/1
0.5 TABLET ORAL 2 TIMES DAILY PRN
Status: DISCONTINUED | OUTPATIENT
Start: 2020-07-01 | End: 2020-07-03 | Stop reason: HOSPADM

## 2020-07-01 RX ADMIN — METHOCARBAMOL 500 MG: 500 TABLET, FILM COATED ORAL at 08:02

## 2020-07-01 RX ADMIN — NYSTATIN 500000 UNITS: 100000 SUSPENSION ORAL at 12:56

## 2020-07-01 RX ADMIN — NICOTINE 1 PATCH: 7 PATCH TRANSDERMAL at 08:03

## 2020-07-01 RX ADMIN — CLONAZEPAM 0.5 MG: 0.5 TABLET ORAL at 16:39

## 2020-07-01 RX ADMIN — FAMOTIDINE 40 MG: 20 TABLET ORAL at 22:08

## 2020-07-01 RX ADMIN — METHYLPREDNISOLONE SODIUM SUCCINATE 40 MG: 40 INJECTION, POWDER, FOR SOLUTION INTRAMUSCULAR; INTRAVENOUS at 08:02

## 2020-07-01 RX ADMIN — ALBUTEROL SULFATE 2 PUFF: 90 AEROSOL, METERED RESPIRATORY (INHALATION) at 11:37

## 2020-07-01 RX ADMIN — PANCRELIPASE 36000 UNITS: 24000; 76000; 120000 CAPSULE, DELAYED RELEASE PELLETS ORAL at 22:06

## 2020-07-01 RX ADMIN — THIAMINE HCL TAB 100 MG 100 MG: 100 TAB at 08:03

## 2020-07-01 RX ADMIN — MELOXICAM 15 MG: 15 TABLET ORAL at 08:04

## 2020-07-01 RX ADMIN — BUPRENORPHINE AND NALOXONE 1 FILM: 8; 2 FILM BUCCAL; SUBLINGUAL at 08:04

## 2020-07-01 RX ADMIN — ENOXAPARIN SODIUM 40 MG: 40 INJECTION SUBCUTANEOUS at 08:02

## 2020-07-01 RX ADMIN — LORAZEPAM 0.5 MG: 0.5 TABLET ORAL at 08:19

## 2020-07-01 RX ADMIN — PANCRELIPASE 36000 UNITS: 24000; 76000; 120000 CAPSULE, DELAYED RELEASE PELLETS ORAL at 16:22

## 2020-07-01 RX ADMIN — FLUCONAZOLE 100 MG: 100 TABLET ORAL at 08:03

## 2020-07-01 RX ADMIN — LIDOCAINE 1 PATCH: 50 PATCH TOPICAL at 08:00

## 2020-07-01 RX ADMIN — TOPIRAMATE 100 MG: 25 TABLET, FILM COATED ORAL at 08:03

## 2020-07-01 RX ADMIN — FOLIC ACID 1 MG: 1 TABLET ORAL at 08:02

## 2020-07-01 RX ADMIN — ZINC SULFATE 220 MG (50 MG) CAPSULE 220 MG: CAPSULE at 08:14

## 2020-07-01 RX ADMIN — CEFEPIME HYDROCHLORIDE 2000 MG: 2 INJECTION, SOLUTION INTRAVENOUS at 01:25

## 2020-07-01 RX ADMIN — METHOCARBAMOL 500 MG: 500 TABLET, FILM COATED ORAL at 16:22

## 2020-07-01 RX ADMIN — METHOCARBAMOL 500 MG: 500 TABLET, FILM COATED ORAL at 22:07

## 2020-07-01 RX ADMIN — PRAZOSIN HYDROCHLORIDE 5 MG: 1 CAPSULE ORAL at 22:06

## 2020-07-01 RX ADMIN — CEFEPIME HYDROCHLORIDE 2000 MG: 2 INJECTION, SOLUTION INTRAVENOUS at 08:00

## 2020-07-01 RX ADMIN — NYSTATIN 500000 UNITS: 100000 SUSPENSION ORAL at 22:09

## 2020-07-01 RX ADMIN — CITALOPRAM HYDROBROMIDE 40 MG: 20 TABLET ORAL at 08:03

## 2020-07-01 RX ADMIN — GABAPENTIN 400 MG: 400 CAPSULE ORAL at 08:03

## 2020-07-01 RX ADMIN — PANTOPRAZOLE SODIUM 40 MG: 40 TABLET, DELAYED RELEASE ORAL at 20:08

## 2020-07-01 RX ADMIN — GABAPENTIN 400 MG: 400 CAPSULE ORAL at 22:07

## 2020-07-01 RX ADMIN — ZINC 1 TABLET: TAB ORAL at 08:03

## 2020-07-01 RX ADMIN — TOPIRAMATE 200 MG: 100 TABLET, FILM COATED ORAL at 22:08

## 2020-07-01 RX ADMIN — BUPRENORPHINE AND NALOXONE 1 FILM: 8; 2 FILM BUCCAL; SUBLINGUAL at 16:22

## 2020-07-01 RX ADMIN — PANCRELIPASE 36000 UNITS: 24000; 76000; 120000 CAPSULE, DELAYED RELEASE PELLETS ORAL at 08:03

## 2020-07-01 RX ADMIN — NYSTATIN 500000 UNITS: 100000 SUSPENSION ORAL at 20:07

## 2020-07-01 RX ADMIN — NYSTATIN 500000 UNITS: 100000 SUSPENSION ORAL at 08:14

## 2020-07-01 RX ADMIN — ALBUTEROL SULFATE 2 PUFF: 90 AEROSOL, METERED RESPIRATORY (INHALATION) at 22:10

## 2020-07-01 RX ADMIN — PANTOPRAZOLE SODIUM 40 MG: 40 TABLET, DELAYED RELEASE ORAL at 08:02

## 2020-07-01 RX ADMIN — KETOROLAC TROMETHAMINE 15 MG: 30 INJECTION, SOLUTION INTRAMUSCULAR at 12:55

## 2020-07-01 RX ADMIN — ALBUTEROL SULFATE 2 PUFF: 90 AEROSOL, METERED RESPIRATORY (INHALATION) at 20:07

## 2020-07-01 RX ADMIN — GABAPENTIN 400 MG: 400 CAPSULE ORAL at 16:22

## 2020-07-01 RX ADMIN — GUAIFENESIN 1200 MG: 600 TABLET ORAL at 20:07

## 2020-07-01 RX ADMIN — GUAIFENESIN 1200 MG: 600 TABLET ORAL at 08:02

## 2020-07-02 PROBLEM — M05.80 POLYARTHRITIS WITH POSITIVE RHEUMATOID FACTOR (HCC): Status: ACTIVE | Noted: 2020-07-02

## 2020-07-02 LAB
BACTERIA BRONCH AEROBE CULT: ABNORMAL
BACTERIA BRONCH AEROBE CULT: NO GROWTH
BACTERIA BRONCH AEROBE CULT: NO GROWTH
BACTERIA BRONCH AEROBE CULT: NORMAL
C-ANCA TITR SER IF: NORMAL TITER
GRAM STN SPEC: ABNORMAL
GRAM STN SPEC: NORMAL
MYELOPEROXIDASE AB SER IA-ACNC: <9 U/ML (ref 0–9)
P-ANCA ATYPICAL TITR SER IF: NORMAL TITER
P-ANCA TITR SER IF: NORMAL TITER
PROTEINASE3 AB SER IA-ACNC: <3.5 U/ML (ref 0–3.5)

## 2020-07-02 PROCEDURE — 99232 SBSQ HOSP IP/OBS MODERATE 35: CPT | Performed by: INTERNAL MEDICINE

## 2020-07-02 PROCEDURE — 94760 N-INVAS EAR/PLS OXIMETRY 1: CPT

## 2020-07-02 PROCEDURE — 86200 CCP ANTIBODY: CPT | Performed by: INTERNAL MEDICINE

## 2020-07-02 RX ORDER — ACETAMINOPHEN 325 MG/1
975 TABLET ORAL EVERY 8 HOURS PRN
Status: DISCONTINUED | OUTPATIENT
Start: 2020-07-02 | End: 2020-07-03 | Stop reason: HOSPADM

## 2020-07-02 RX ORDER — FAMOTIDINE 40 MG/1
40 TABLET, FILM COATED ORAL DAILY
Qty: 30 TABLET | Refills: 0 | Status: CANCELLED | OUTPATIENT
Start: 2020-07-02

## 2020-07-02 RX ORDER — TRAMADOL HYDROCHLORIDE 50 MG/1
50 TABLET ORAL EVERY 6 HOURS PRN
Status: DISCONTINUED | OUTPATIENT
Start: 2020-07-02 | End: 2020-07-03 | Stop reason: HOSPADM

## 2020-07-02 RX ORDER — PREDNISONE 20 MG/1
40 TABLET ORAL DAILY
Status: DISCONTINUED | OUTPATIENT
Start: 2020-07-03 | End: 2020-07-03 | Stop reason: HOSPADM

## 2020-07-02 RX ADMIN — ZINC SULFATE 220 MG (50 MG) CAPSULE 220 MG: CAPSULE at 08:43

## 2020-07-02 RX ADMIN — PANCRELIPASE 36000 UNITS: 24000; 76000; 120000 CAPSULE, DELAYED RELEASE PELLETS ORAL at 08:33

## 2020-07-02 RX ADMIN — PANTOPRAZOLE SODIUM 40 MG: 40 TABLET, DELAYED RELEASE ORAL at 18:46

## 2020-07-02 RX ADMIN — THIAMINE HCL TAB 100 MG 100 MG: 100 TAB at 08:32

## 2020-07-02 RX ADMIN — NYSTATIN 500000 UNITS: 100000 SUSPENSION ORAL at 08:43

## 2020-07-02 RX ADMIN — GUAIFENESIN 1200 MG: 600 TABLET ORAL at 08:33

## 2020-07-02 RX ADMIN — PANCRELIPASE 36000 UNITS: 24000; 76000; 120000 CAPSULE, DELAYED RELEASE PELLETS ORAL at 23:18

## 2020-07-02 RX ADMIN — FOLIC ACID 1 MG: 1 TABLET ORAL at 08:32

## 2020-07-02 RX ADMIN — NYSTATIN 500000 UNITS: 100000 SUSPENSION ORAL at 18:46

## 2020-07-02 RX ADMIN — METHOCARBAMOL 500 MG: 500 TABLET, FILM COATED ORAL at 18:46

## 2020-07-02 RX ADMIN — BUPRENORPHINE AND NALOXONE 1 FILM: 8; 2 FILM BUCCAL; SUBLINGUAL at 00:47

## 2020-07-02 RX ADMIN — METHOCARBAMOL 500 MG: 500 TABLET, FILM COATED ORAL at 08:34

## 2020-07-02 RX ADMIN — ALBUTEROL SULFATE 2 PUFF: 90 AEROSOL, METERED RESPIRATORY (INHALATION) at 13:22

## 2020-07-02 RX ADMIN — FLUCONAZOLE 100 MG: 100 TABLET ORAL at 08:34

## 2020-07-02 RX ADMIN — CLONAZEPAM 0.5 MG: 0.5 TABLET ORAL at 19:52

## 2020-07-02 RX ADMIN — ALBUTEROL SULFATE 2 PUFF: 90 AEROSOL, METERED RESPIRATORY (INHALATION) at 08:37

## 2020-07-02 RX ADMIN — ALBUTEROL SULFATE 2 PUFF: 90 AEROSOL, METERED RESPIRATORY (INHALATION) at 19:59

## 2020-07-02 RX ADMIN — CLONAZEPAM 0.5 MG: 0.5 TABLET ORAL at 08:33

## 2020-07-02 RX ADMIN — TRAMADOL HYDROCHLORIDE 50 MG: 50 TABLET, FILM COATED ORAL at 13:31

## 2020-07-02 RX ADMIN — NYSTATIN 500000 UNITS: 100000 SUSPENSION ORAL at 23:24

## 2020-07-02 RX ADMIN — CLONAZEPAM 0.5 MG: 0.5 TABLET ORAL at 00:48

## 2020-07-02 RX ADMIN — GABAPENTIN 400 MG: 400 CAPSULE ORAL at 23:17

## 2020-07-02 RX ADMIN — PANCRELIPASE 36000 UNITS: 24000; 76000; 120000 CAPSULE, DELAYED RELEASE PELLETS ORAL at 18:46

## 2020-07-02 RX ADMIN — PANTOPRAZOLE SODIUM 40 MG: 40 TABLET, DELAYED RELEASE ORAL at 08:33

## 2020-07-02 RX ADMIN — GABAPENTIN 400 MG: 400 CAPSULE ORAL at 08:33

## 2020-07-02 RX ADMIN — GUAIFENESIN 1200 MG: 600 TABLET ORAL at 18:46

## 2020-07-02 RX ADMIN — FAMOTIDINE 40 MG: 20 TABLET ORAL at 23:17

## 2020-07-02 RX ADMIN — PREDNISONE 20 MG: 20 TABLET ORAL at 08:33

## 2020-07-02 RX ADMIN — PRAZOSIN HYDROCHLORIDE 5 MG: 1 CAPSULE ORAL at 23:17

## 2020-07-02 RX ADMIN — ZINC 1 TABLET: TAB ORAL at 08:34

## 2020-07-02 RX ADMIN — LIDOCAINE 1 PATCH: 50 PATCH TOPICAL at 08:36

## 2020-07-02 RX ADMIN — GABAPENTIN 400 MG: 400 CAPSULE ORAL at 18:46

## 2020-07-02 RX ADMIN — ENOXAPARIN SODIUM 40 MG: 40 INJECTION SUBCUTANEOUS at 08:36

## 2020-07-02 RX ADMIN — BUPRENORPHINE AND NALOXONE 1 FILM: 8; 2 FILM BUCCAL; SUBLINGUAL at 23:17

## 2020-07-02 RX ADMIN — TOPIRAMATE 200 MG: 100 TABLET, FILM COATED ORAL at 23:18

## 2020-07-02 RX ADMIN — BUPRENORPHINE AND NALOXONE 1 FILM: 8; 2 FILM BUCCAL; SUBLINGUAL at 16:08

## 2020-07-02 RX ADMIN — MELOXICAM 15 MG: 15 TABLET ORAL at 08:34

## 2020-07-02 RX ADMIN — NYSTATIN 500000 UNITS: 100000 SUSPENSION ORAL at 13:22

## 2020-07-02 RX ADMIN — TOPIRAMATE 100 MG: 25 TABLET, FILM COATED ORAL at 08:35

## 2020-07-02 RX ADMIN — BUPRENORPHINE AND NALOXONE 1 FILM: 8; 2 FILM BUCCAL; SUBLINGUAL at 08:32

## 2020-07-02 RX ADMIN — CITALOPRAM HYDROBROMIDE 40 MG: 20 TABLET ORAL at 08:34

## 2020-07-02 RX ADMIN — NICOTINE 1 PATCH: 7 PATCH TRANSDERMAL at 08:35

## 2020-07-02 RX ADMIN — METHOCARBAMOL 500 MG: 500 TABLET, FILM COATED ORAL at 23:18

## 2020-07-03 VITALS
WEIGHT: 218.48 LBS | OXYGEN SATURATION: 95 % | DIASTOLIC BLOOD PRESSURE: 67 MMHG | TEMPERATURE: 97.8 F | BODY MASS INDEX: 33.22 KG/M2 | RESPIRATION RATE: 16 BRPM | HEART RATE: 63 BPM | SYSTOLIC BLOOD PRESSURE: 120 MMHG

## 2020-07-03 PROCEDURE — 99232 SBSQ HOSP IP/OBS MODERATE 35: CPT | Performed by: INTERNAL MEDICINE

## 2020-07-03 PROCEDURE — 94760 N-INVAS EAR/PLS OXIMETRY 1: CPT

## 2020-07-03 PROCEDURE — 94668 MNPJ CHEST WALL SBSQ: CPT

## 2020-07-03 PROCEDURE — 99239 HOSP IP/OBS DSCHRG MGMT >30: CPT | Performed by: INTERNAL MEDICINE

## 2020-07-03 RX ORDER — FLUCONAZOLE 100 MG/1
100 TABLET ORAL DAILY
Qty: 5 TABLET | Refills: 0 | Status: SHIPPED | OUTPATIENT
Start: 2020-07-03 | End: 2020-07-08

## 2020-07-03 RX ORDER — PREDNISONE 10 MG/1
TABLET ORAL
Qty: 60 TABLET | Refills: 0 | Status: SHIPPED | OUTPATIENT
Start: 2020-07-03 | End: 2020-10-01 | Stop reason: ALTCHOICE

## 2020-07-03 RX ORDER — MELOXICAM 15 MG/1
15 TABLET ORAL DAILY
Qty: 30 TABLET | Refills: 0 | Status: SHIPPED | OUTPATIENT
Start: 2020-07-03

## 2020-07-03 RX ORDER — LORAZEPAM 0.5 MG/1
0.5 TABLET ORAL ONCE
Status: COMPLETED | OUTPATIENT
Start: 2020-07-03 | End: 2020-07-03

## 2020-07-03 RX ORDER — LIDOCAINE 50 MG/G
1 PATCH TOPICAL DAILY
Qty: 10 PATCH | Refills: 0 | Status: SHIPPED | OUTPATIENT
Start: 2020-07-03

## 2020-07-03 RX ORDER — CLONAZEPAM 0.5 MG/1
0.5 TABLET ORAL 2 TIMES DAILY PRN
Qty: 20 TABLET | Refills: 0 | Status: SHIPPED | OUTPATIENT
Start: 2020-07-03 | End: 2021-10-13

## 2020-07-03 RX ORDER — GABAPENTIN 400 MG/1
400 CAPSULE ORAL 3 TIMES DAILY
Refills: 0
Start: 2020-07-03

## 2020-07-03 RX ORDER — ALBUTEROL SULFATE 90 UG/1
2 AEROSOL, METERED RESPIRATORY (INHALATION) EVERY 4 HOURS PRN
Qty: 1 INHALER | Refills: 0 | Status: SHIPPED | OUTPATIENT
Start: 2020-07-03

## 2020-07-03 RX ADMIN — ZINC SULFATE 220 MG (50 MG) CAPSULE 220 MG: CAPSULE at 12:19

## 2020-07-03 RX ADMIN — MELOXICAM 15 MG: 15 TABLET ORAL at 11:43

## 2020-07-03 RX ADMIN — FLUCONAZOLE 100 MG: 100 TABLET ORAL at 11:24

## 2020-07-03 RX ADMIN — NICOTINE 1 PATCH: 7 PATCH TRANSDERMAL at 12:18

## 2020-07-03 RX ADMIN — ALBUTEROL SULFATE 2 PUFF: 90 AEROSOL, METERED RESPIRATORY (INHALATION) at 11:46

## 2020-07-03 RX ADMIN — ENOXAPARIN SODIUM 40 MG: 40 INJECTION SUBCUTANEOUS at 11:23

## 2020-07-03 RX ADMIN — ZINC 1 TABLET: TAB ORAL at 11:42

## 2020-07-03 RX ADMIN — THIAMINE HCL TAB 100 MG 100 MG: 100 TAB at 11:44

## 2020-07-03 RX ADMIN — LIDOCAINE 1 PATCH: 50 PATCH TOPICAL at 11:24

## 2020-07-03 RX ADMIN — LORAZEPAM 0.5 MG: 0.5 TABLET ORAL at 00:45

## 2020-07-03 RX ADMIN — PANTOPRAZOLE SODIUM 40 MG: 40 TABLET, DELAYED RELEASE ORAL at 11:42

## 2020-07-03 RX ADMIN — BUPRENORPHINE AND NALOXONE 1 FILM: 8; 2 FILM BUCCAL; SUBLINGUAL at 11:25

## 2020-07-03 RX ADMIN — FOLIC ACID 1 MG: 1 TABLET ORAL at 12:16

## 2020-07-03 RX ADMIN — METHOCARBAMOL 500 MG: 500 TABLET, FILM COATED ORAL at 11:46

## 2020-07-03 RX ADMIN — CLONAZEPAM 0.5 MG: 0.5 TABLET ORAL at 11:23

## 2020-07-03 RX ADMIN — CITALOPRAM HYDROBROMIDE 40 MG: 20 TABLET ORAL at 11:23

## 2020-07-03 RX ADMIN — GUAIFENESIN 1200 MG: 600 TABLET ORAL at 11:24

## 2020-07-03 RX ADMIN — TRAMADOL HYDROCHLORIDE 50 MG: 50 TABLET, FILM COATED ORAL at 00:45

## 2020-07-03 RX ADMIN — TOPIRAMATE 100 MG: 25 TABLET, FILM COATED ORAL at 11:42

## 2020-07-03 RX ADMIN — GABAPENTIN 400 MG: 400 CAPSULE ORAL at 11:24

## 2020-07-04 LAB — CCP IGA+IGG SERPL IA-ACNC: 3 UNITS (ref 0–19)

## 2020-07-08 ENCOUNTER — TELEPHONE (OUTPATIENT)
Dept: PULMONOLOGY | Facility: CLINIC | Age: 30
End: 2020-07-08

## 2020-07-08 ENCOUNTER — TELEPHONE (OUTPATIENT)
Dept: GASTROENTEROLOGY | Facility: CLINIC | Age: 30
End: 2020-07-08

## 2020-07-08 LAB — SARS-COV-2 RNA SPEC QL NAA+PROBE: NOT DETECTED

## 2020-07-08 NOTE — TELEPHONE ENCOUNTER
Per Dr Ayesha Paul, please arrange an office visit for [facility] in 2 to 3 weeks with Russel Moss  Patient was dicharged from [address] [facility] [address] [facility] [address] 07/03/2020    Thanks

## 2020-07-09 LAB
A FUMIGATUS1 AB SER QL ID: NEGATIVE
A PULLULANS AB SER QL: NEGATIVE
LACEYELLA SACCHARI AB SER QL: NEGATIVE
PIGEON SERUM AB QL ID: NEGATIVE
S RECTIVIRGULA AB SER QL ID: NEGATIVE
T VULGARIS AB SER QL ID: NEGATIVE

## 2020-07-13 LAB
FUNGUS SPEC CULT: ABNORMAL
P JIROVECII AG SPEC QL IF: NORMAL

## 2020-07-14 LAB — FUNGUS SPEC CULT: ABNORMAL

## 2020-07-16 LAB — MISCELLANEOUS LAB TEST RESULT: NORMAL

## 2020-08-03 LAB
FUNGUS SPEC CULT: NORMAL

## 2020-08-18 LAB
MYCOBACTERIUM SPEC CULT: NORMAL
RHODAMINE-AURAMINE STN SPEC: NORMAL

## 2020-10-01 ENCOUNTER — HOSPITAL ENCOUNTER (EMERGENCY)
Facility: HOSPITAL | Age: 30
Discharge: HOME | End: 2020-10-01
Attending: EMERGENCY MEDICINE
Payer: COMMERCIAL

## 2020-10-01 ENCOUNTER — APPOINTMENT (EMERGENCY)
Dept: CT IMAGING | Facility: HOSPITAL | Age: 30
End: 2020-10-01
Payer: COMMERCIAL

## 2020-10-01 VITALS
SYSTOLIC BLOOD PRESSURE: 131 MMHG | DIASTOLIC BLOOD PRESSURE: 82 MMHG | BODY MASS INDEX: 33.15 KG/M2 | TEMPERATURE: 99.3 F | WEIGHT: 218 LBS | HEART RATE: 96 BPM | RESPIRATION RATE: 20 BRPM | OXYGEN SATURATION: 97 %

## 2020-10-01 DIAGNOSIS — J18.9 PNEUMONIA: ICD-10-CM

## 2020-10-01 DIAGNOSIS — E87.6 HYPOKALEMIA: ICD-10-CM

## 2020-10-01 DIAGNOSIS — R93.89 ABNORMAL CT OF THE CHEST: ICD-10-CM

## 2020-10-01 DIAGNOSIS — R06.00 DYSPNEA: Primary | ICD-10-CM

## 2020-10-01 DIAGNOSIS — Z86.16 HISTORY OF 2019 NOVEL CORONAVIRUS DISEASE (COVID-19): ICD-10-CM

## 2020-10-01 DIAGNOSIS — F41.9 ANXIETY: ICD-10-CM

## 2020-10-01 LAB
ANION GAP SERPL CALCULATED.3IONS-SCNC: 9 MMOL/L (ref 4–13)
BASOPHILS # BLD AUTO: 0.02 THOUSANDS/ΜL (ref 0–0.1)
BASOPHILS NFR BLD AUTO: 0 % (ref 0–1)
BUN SERPL-MCNC: 8 MG/DL (ref 5–25)
CALCIUM SERPL-MCNC: 8.9 MG/DL (ref 8.3–10.1)
CHLORIDE SERPL-SCNC: 105 MMOL/L (ref 100–108)
CO2 SERPL-SCNC: 27 MMOL/L (ref 21–32)
CREAT SERPL-MCNC: 0.81 MG/DL (ref 0.6–1.3)
EOSINOPHIL # BLD AUTO: 0.07 THOUSAND/ΜL (ref 0–0.61)
EOSINOPHIL NFR BLD AUTO: 1 % (ref 0–6)
ERYTHROCYTE [DISTWIDTH] IN BLOOD BY AUTOMATED COUNT: 14.3 % (ref 11.6–15.1)
EXT PREG TEST URINE: NEGATIVE
EXT. CONTROL ED NAV: NORMAL
GFR SERPL CREATININE-BSD FRML MDRD: 98 ML/MIN/1.73SQ M
GLUCOSE SERPL-MCNC: 117 MG/DL (ref 65–140)
HCT VFR BLD AUTO: 34.4 % (ref 34.8–46.1)
HGB BLD-MCNC: 11 G/DL (ref 11.5–15.4)
IMM GRANULOCYTES # BLD AUTO: 0.03 THOUSAND/UL (ref 0–0.2)
IMM GRANULOCYTES NFR BLD AUTO: 0 % (ref 0–2)
LYMPHOCYTES # BLD AUTO: 1.92 THOUSANDS/ΜL (ref 0.6–4.47)
LYMPHOCYTES NFR BLD AUTO: 17 % (ref 14–44)
MCH RBC QN AUTO: 26.6 PG (ref 26.8–34.3)
MCHC RBC AUTO-ENTMCNC: 32 G/DL (ref 31.4–37.4)
MCV RBC AUTO: 83 FL (ref 82–98)
MONOCYTES # BLD AUTO: 0.52 THOUSAND/ΜL (ref 0.17–1.22)
MONOCYTES NFR BLD AUTO: 5 % (ref 4–12)
NEUTROPHILS # BLD AUTO: 8.89 THOUSANDS/ΜL (ref 1.85–7.62)
NEUTS SEG NFR BLD AUTO: 77 % (ref 43–75)
NRBC BLD AUTO-RTO: 0 /100 WBCS
PLATELET # BLD AUTO: 165 THOUSANDS/UL (ref 149–390)
PMV BLD AUTO: 10.8 FL (ref 8.9–12.7)
POTASSIUM SERPL-SCNC: 3 MMOL/L (ref 3.5–5.3)
RBC # BLD AUTO: 4.14 MILLION/UL (ref 3.81–5.12)
SODIUM SERPL-SCNC: 141 MMOL/L (ref 136–145)
WBC # BLD AUTO: 11.45 THOUSAND/UL (ref 4.31–10.16)

## 2020-10-01 PROCEDURE — 80048 BASIC METABOLIC PNL TOTAL CA: CPT | Performed by: EMERGENCY MEDICINE

## 2020-10-01 PROCEDURE — 36415 COLL VENOUS BLD VENIPUNCTURE: CPT | Performed by: EMERGENCY MEDICINE

## 2020-10-01 PROCEDURE — 99285 EMERGENCY DEPT VISIT HI MDM: CPT | Performed by: EMERGENCY MEDICINE

## 2020-10-01 PROCEDURE — G1004 CDSM NDSC: HCPCS

## 2020-10-01 PROCEDURE — 81025 URINE PREGNANCY TEST: CPT | Performed by: EMERGENCY MEDICINE

## 2020-10-01 PROCEDURE — 85025 COMPLETE CBC W/AUTO DIFF WBC: CPT | Performed by: EMERGENCY MEDICINE

## 2020-10-01 PROCEDURE — 99285 EMERGENCY DEPT VISIT HI MDM: CPT

## 2020-10-01 PROCEDURE — 71275 CT ANGIOGRAPHY CHEST: CPT

## 2020-10-01 RX ORDER — LORAZEPAM 1 MG/1
1 TABLET ORAL ONCE
Status: COMPLETED | OUTPATIENT
Start: 2020-10-01 | End: 2020-10-01

## 2020-10-01 RX ORDER — DOXYCYCLINE HYCLATE 100 MG/1
100 CAPSULE ORAL EVERY 12 HOURS SCHEDULED
Qty: 14 CAPSULE | Refills: 0 | Status: SHIPPED | OUTPATIENT
Start: 2020-10-01 | End: 2020-10-08 | Stop reason: HOSPADM

## 2020-10-01 RX ORDER — POTASSIUM CHLORIDE 20 MEQ/1
40 TABLET, EXTENDED RELEASE ORAL ONCE
Status: COMPLETED | OUTPATIENT
Start: 2020-10-01 | End: 2020-10-01

## 2020-10-01 RX ADMIN — POTASSIUM CHLORIDE 40 MEQ: 1500 TABLET, EXTENDED RELEASE ORAL at 17:42

## 2020-10-01 RX ADMIN — LORAZEPAM 1 MG: 1 TABLET ORAL at 17:05

## 2020-10-01 RX ADMIN — IOHEXOL 60 ML: 350 INJECTION, SOLUTION INTRAVENOUS at 18:03

## 2020-10-04 ENCOUNTER — HOSPITAL ENCOUNTER (INPATIENT)
Facility: HOSPITAL | Age: 30
LOS: 4 days | Discharge: HOME | DRG: 720 | End: 2020-10-08
Attending: EMERGENCY MEDICINE | Admitting: INTERNAL MEDICINE
Payer: COMMERCIAL

## 2020-10-04 ENCOUNTER — APPOINTMENT (EMERGENCY)
Dept: RADIOLOGY | Facility: HOSPITAL | Age: 30
DRG: 720 | End: 2020-10-04
Payer: COMMERCIAL

## 2020-10-04 DIAGNOSIS — R50.9 FEVER: ICD-10-CM

## 2020-10-04 DIAGNOSIS — E87.6 HYPOKALEMIA: ICD-10-CM

## 2020-10-04 DIAGNOSIS — J18.9 BILATERAL PNEUMONIA: Primary | ICD-10-CM

## 2020-10-04 DIAGNOSIS — J45.909 ASTHMA: ICD-10-CM

## 2020-10-04 DIAGNOSIS — J96.01 ACUTE RESPIRATORY FAILURE WITH HYPOXIA (HCC): ICD-10-CM

## 2020-10-04 PROBLEM — G89.4 CHRONIC PAIN DISORDER: Status: ACTIVE | Noted: 2020-10-04

## 2020-10-04 PROBLEM — R19.7 DIARRHEA: Status: ACTIVE | Noted: 2020-10-04

## 2020-10-04 PROBLEM — A41.9 SEPSIS (HCC): Status: ACTIVE | Noted: 2020-10-04

## 2020-10-04 LAB
ALBUMIN SERPL BCP-MCNC: 4.4 G/DL (ref 3.5–5)
ALP SERPL-CCNC: 101 U/L (ref 46–116)
ALT SERPL W P-5'-P-CCNC: 29 U/L (ref 12–78)
ANION GAP SERPL CALCULATED.3IONS-SCNC: 14 MMOL/L (ref 4–13)
APTT PPP: 31 SECONDS (ref 23–37)
AST SERPL W P-5'-P-CCNC: 38 U/L (ref 5–45)
BACTERIA UR QL AUTO: ABNORMAL /HPF
BASOPHILS # BLD AUTO: 0.02 THOUSANDS/ΜL (ref 0–0.1)
BASOPHILS NFR BLD AUTO: 0 % (ref 0–1)
BILIRUB SERPL-MCNC: 0.34 MG/DL (ref 0.2–1)
BILIRUB UR QL STRIP: NEGATIVE
BUN SERPL-MCNC: 6 MG/DL (ref 5–25)
CALCIUM SERPL-MCNC: 9.1 MG/DL (ref 8.3–10.1)
CHLORIDE SERPL-SCNC: 99 MMOL/L (ref 100–108)
CLARITY UR: ABNORMAL
CO2 SERPL-SCNC: 22 MMOL/L (ref 21–32)
COLOR UR: ABNORMAL
CREAT SERPL-MCNC: 0.87 MG/DL (ref 0.6–1.3)
EOSINOPHIL # BLD AUTO: 0.06 THOUSAND/ΜL (ref 0–0.61)
EOSINOPHIL NFR BLD AUTO: 0 % (ref 0–6)
ERYTHROCYTE [DISTWIDTH] IN BLOOD BY AUTOMATED COUNT: 14.6 % (ref 11.6–15.1)
EXT PREG TEST URINE: NORMAL
EXT. CONTROL ED NAV: NORMAL
GFR SERPL CREATININE-BSD FRML MDRD: 90 ML/MIN/1.73SQ M
GLUCOSE SERPL-MCNC: 79 MG/DL (ref 65–140)
GLUCOSE UR STRIP-MCNC: NEGATIVE MG/DL
HCT VFR BLD AUTO: 41.4 % (ref 34.8–46.1)
HGB BLD-MCNC: 12.6 G/DL (ref 11.5–15.4)
HGB UR QL STRIP.AUTO: ABNORMAL
IMM GRANULOCYTES # BLD AUTO: 0.04 THOUSAND/UL (ref 0–0.2)
IMM GRANULOCYTES NFR BLD AUTO: 0 % (ref 0–2)
INR PPP: 1.03 (ref 0.84–1.19)
KETONES UR STRIP-MCNC: NEGATIVE MG/DL
LACTATE SERPL-SCNC: 1.8 MMOL/L (ref 0.5–2)
LEUKOCYTE ESTERASE UR QL STRIP: ABNORMAL
LYMPHOCYTES # BLD AUTO: 1.7 THOUSANDS/ΜL (ref 0.6–4.47)
LYMPHOCYTES NFR BLD AUTO: 13 % (ref 14–44)
MCH RBC QN AUTO: 26.5 PG (ref 26.8–34.3)
MCHC RBC AUTO-ENTMCNC: 30.4 G/DL (ref 31.4–37.4)
MCV RBC AUTO: 87 FL (ref 82–98)
MONOCYTES # BLD AUTO: 0.4 THOUSAND/ΜL (ref 0.17–1.22)
MONOCYTES NFR BLD AUTO: 3 % (ref 4–12)
NEUTROPHILS # BLD AUTO: 11.18 THOUSANDS/ΜL (ref 1.85–7.62)
NEUTS SEG NFR BLD AUTO: 84 % (ref 43–75)
NITRITE UR QL STRIP: NEGATIVE
NON-SQ EPI CELLS URNS QL MICRO: ABNORMAL /HPF
NRBC BLD AUTO-RTO: 0 /100 WBCS
PH UR STRIP.AUTO: 5.5 [PH]
PLATELET # BLD AUTO: 197 THOUSANDS/UL (ref 149–390)
PMV BLD AUTO: 10.9 FL (ref 8.9–12.7)
POTASSIUM SERPL-SCNC: 2.9 MMOL/L (ref 3.5–5.3)
PROCALCITONIN SERPL-MCNC: 0.1 NG/ML
PROT SERPL-MCNC: 9.3 G/DL (ref 6.4–8.2)
PROT UR STRIP-MCNC: NEGATIVE MG/DL
PROTHROMBIN TIME: 13.3 SECONDS (ref 11.6–14.5)
RBC # BLD AUTO: 4.76 MILLION/UL (ref 3.81–5.12)
RBC #/AREA URNS AUTO: ABNORMAL /HPF
SARS-COV-2 RNA RESP QL NAA+PROBE: NEGATIVE
SODIUM SERPL-SCNC: 135 MMOL/L (ref 136–145)
SP GR UR STRIP.AUTO: 1.01 (ref 1–1.03)
TROPONIN I SERPL-MCNC: <0.02 NG/ML
UROBILINOGEN UR QL STRIP.AUTO: 0.2 E.U./DL
WBC # BLD AUTO: 13.4 THOUSAND/UL (ref 4.31–10.16)
WBC #/AREA URNS AUTO: ABNORMAL /HPF

## 2020-10-04 PROCEDURE — 93005 ELECTROCARDIOGRAM TRACING: CPT

## 2020-10-04 PROCEDURE — 99285 EMERGENCY DEPT VISIT HI MDM: CPT | Performed by: EMERGENCY MEDICINE

## 2020-10-04 PROCEDURE — 71045 X-RAY EXAM CHEST 1 VIEW: CPT

## 2020-10-04 PROCEDURE — 84145 PROCALCITONIN (PCT): CPT | Performed by: EMERGENCY MEDICINE

## 2020-10-04 PROCEDURE — 99223 1ST HOSP IP/OBS HIGH 75: CPT | Performed by: INTERNAL MEDICINE

## 2020-10-04 PROCEDURE — 85025 COMPLETE CBC W/AUTO DIFF WBC: CPT | Performed by: EMERGENCY MEDICINE

## 2020-10-04 PROCEDURE — 81025 URINE PREGNANCY TEST: CPT | Performed by: EMERGENCY MEDICINE

## 2020-10-04 PROCEDURE — 80053 COMPREHEN METABOLIC PANEL: CPT | Performed by: EMERGENCY MEDICINE

## 2020-10-04 PROCEDURE — 85730 THROMBOPLASTIN TIME PARTIAL: CPT | Performed by: EMERGENCY MEDICINE

## 2020-10-04 PROCEDURE — 81001 URINALYSIS AUTO W/SCOPE: CPT | Performed by: EMERGENCY MEDICINE

## 2020-10-04 PROCEDURE — 85610 PROTHROMBIN TIME: CPT | Performed by: EMERGENCY MEDICINE

## 2020-10-04 PROCEDURE — 83605 ASSAY OF LACTIC ACID: CPT | Performed by: EMERGENCY MEDICINE

## 2020-10-04 PROCEDURE — 87040 BLOOD CULTURE FOR BACTERIA: CPT | Performed by: EMERGENCY MEDICINE

## 2020-10-04 PROCEDURE — 96365 THER/PROPH/DIAG IV INF INIT: CPT

## 2020-10-04 PROCEDURE — 84484 ASSAY OF TROPONIN QUANT: CPT | Performed by: EMERGENCY MEDICINE

## 2020-10-04 PROCEDURE — 36415 COLL VENOUS BLD VENIPUNCTURE: CPT | Performed by: EMERGENCY MEDICINE

## 2020-10-04 PROCEDURE — 96375 TX/PRO/DX INJ NEW DRUG ADDON: CPT

## 2020-10-04 PROCEDURE — 99285 EMERGENCY DEPT VISIT HI MDM: CPT

## 2020-10-04 PROCEDURE — 87635 SARS-COV-2 COVID-19 AMP PRB: CPT | Performed by: EMERGENCY MEDICINE

## 2020-10-04 RX ORDER — LORAZEPAM 0.5 MG/1
0.5 TABLET ORAL ONCE
Status: COMPLETED | OUTPATIENT
Start: 2020-10-04 | End: 2020-10-04

## 2020-10-04 RX ORDER — HEPARIN SODIUM 5000 [USP'U]/ML
5000 INJECTION, SOLUTION INTRAVENOUS; SUBCUTANEOUS EVERY 8 HOURS SCHEDULED
Status: CANCELLED | OUTPATIENT
Start: 2020-10-04

## 2020-10-04 RX ORDER — FOLIC ACID 1 MG/1
1 TABLET ORAL DAILY
Status: DISCONTINUED | OUTPATIENT
Start: 2020-10-05 | End: 2020-10-08 | Stop reason: HOSPADM

## 2020-10-04 RX ORDER — CITALOPRAM 20 MG/1
40 TABLET ORAL DAILY
Status: DISCONTINUED | OUTPATIENT
Start: 2020-10-05 | End: 2020-10-08 | Stop reason: HOSPADM

## 2020-10-04 RX ORDER — ACETAMINOPHEN 325 MG/1
650 TABLET ORAL EVERY 6 HOURS PRN
Status: DISCONTINUED | OUTPATIENT
Start: 2020-10-04 | End: 2020-10-08 | Stop reason: HOSPADM

## 2020-10-04 RX ORDER — ALBUTEROL SULFATE 90 UG/1
2 AEROSOL, METERED RESPIRATORY (INHALATION) ONCE
Status: COMPLETED | OUTPATIENT
Start: 2020-10-04 | End: 2020-10-04

## 2020-10-04 RX ORDER — SODIUM CHLORIDE 9 MG/ML
125 INJECTION, SOLUTION INTRAVENOUS CONTINUOUS
Status: CANCELLED | OUTPATIENT
Start: 2020-10-04

## 2020-10-04 RX ORDER — BUPRENORPHINE AND NALOXONE 8; 2 MG/1; MG/1
1 FILM, SOLUBLE BUCCAL; SUBLINGUAL EVERY 8 HOURS
Status: DISCONTINUED | OUTPATIENT
Start: 2020-10-04 | End: 2020-10-08 | Stop reason: HOSPADM

## 2020-10-04 RX ORDER — THIAMINE MONONITRATE (VIT B1) 100 MG
100 TABLET ORAL DAILY
Status: DISCONTINUED | OUTPATIENT
Start: 2020-10-05 | End: 2020-10-08 | Stop reason: HOSPADM

## 2020-10-04 RX ORDER — POTASSIUM CHLORIDE 20 MEQ/1
40 TABLET, EXTENDED RELEASE ORAL ONCE
Status: COMPLETED | OUTPATIENT
Start: 2020-10-04 | End: 2020-10-04

## 2020-10-04 RX ORDER — NICOTINE 21 MG/24HR
1 PATCH, TRANSDERMAL 24 HOURS TRANSDERMAL DAILY
Status: CANCELLED | OUTPATIENT
Start: 2020-10-05

## 2020-10-04 RX ORDER — LIDOCAINE 50 MG/G
1 PATCH TOPICAL DAILY
Status: DISCONTINUED | OUTPATIENT
Start: 2020-10-05 | End: 2020-10-08 | Stop reason: HOSPADM

## 2020-10-04 RX ORDER — ALBUTEROL SULFATE 90 UG/1
2 AEROSOL, METERED RESPIRATORY (INHALATION) EVERY 4 HOURS PRN
Status: DISCONTINUED | OUTPATIENT
Start: 2020-10-04 | End: 2020-10-08 | Stop reason: HOSPADM

## 2020-10-04 RX ORDER — MELOXICAM 7.5 MG/1
15 TABLET ORAL DAILY PRN
Status: DISCONTINUED | OUTPATIENT
Start: 2020-10-04 | End: 2020-10-08 | Stop reason: HOSPADM

## 2020-10-04 RX ORDER — LANOLIN ALCOHOL/MO/W.PET/CERES
3 CREAM (GRAM) TOPICAL
Status: DISCONTINUED | OUTPATIENT
Start: 2020-10-04 | End: 2020-10-08 | Stop reason: HOSPADM

## 2020-10-04 RX ORDER — KETOROLAC TROMETHAMINE 30 MG/ML
15 INJECTION, SOLUTION INTRAMUSCULAR; INTRAVENOUS ONCE
Status: COMPLETED | OUTPATIENT
Start: 2020-10-04 | End: 2020-10-04

## 2020-10-04 RX ORDER — PRAZOSIN HYDROCHLORIDE 2 MG/1
2 CAPSULE ORAL
Status: DISCONTINUED | OUTPATIENT
Start: 2020-10-04 | End: 2020-10-08 | Stop reason: HOSPADM

## 2020-10-04 RX ORDER — GABAPENTIN 400 MG/1
400 CAPSULE ORAL 3 TIMES DAILY
Status: DISCONTINUED | OUTPATIENT
Start: 2020-10-04 | End: 2020-10-08 | Stop reason: HOSPADM

## 2020-10-04 RX ORDER — PANTOPRAZOLE SODIUM 40 MG/1
40 TABLET, DELAYED RELEASE ORAL
Status: DISCONTINUED | OUTPATIENT
Start: 2020-10-05 | End: 2020-10-08 | Stop reason: HOSPADM

## 2020-10-04 RX ORDER — LORAZEPAM 1 MG/1
0.5 TABLET ORAL EVERY 8 HOURS PRN
Status: DISCONTINUED | OUTPATIENT
Start: 2020-10-04 | End: 2020-10-08 | Stop reason: HOSPADM

## 2020-10-04 RX ORDER — TOPIRAMATE 100 MG/1
200 TABLET, FILM COATED ORAL 2 TIMES DAILY
Status: DISCONTINUED | OUTPATIENT
Start: 2020-10-04 | End: 2020-10-08 | Stop reason: HOSPADM

## 2020-10-04 RX ORDER — ACETAMINOPHEN 325 MG/1
975 TABLET ORAL ONCE
Status: COMPLETED | OUTPATIENT
Start: 2020-10-04 | End: 2020-10-04

## 2020-10-04 RX ORDER — CLONAZEPAM 0.5 MG/1
0.5 TABLET ORAL 2 TIMES DAILY PRN
Status: DISCONTINUED | OUTPATIENT
Start: 2020-10-04 | End: 2020-10-08 | Stop reason: HOSPADM

## 2020-10-04 RX ORDER — METHOCARBAMOL 500 MG/1
500 TABLET, FILM COATED ORAL 3 TIMES DAILY
Status: DISCONTINUED | OUTPATIENT
Start: 2020-10-04 | End: 2020-10-08 | Stop reason: HOSPADM

## 2020-10-04 RX ADMIN — POTASSIUM CHLORIDE 40 MEQ: 1500 TABLET, EXTENDED RELEASE ORAL at 20:08

## 2020-10-04 RX ADMIN — CEFEPIME HYDROCHLORIDE 2000 MG: 2 INJECTION, POWDER, FOR SOLUTION INTRAVENOUS at 19:32

## 2020-10-04 RX ADMIN — BUPRENORPHINE AND NALOXONE 1 FILM: 8; 2 FILM BUCCAL; SUBLINGUAL at 23:31

## 2020-10-04 RX ADMIN — LORAZEPAM 0.5 MG: 0.5 TABLET ORAL at 18:16

## 2020-10-04 RX ADMIN — METHOCARBAMOL 500 MG: 500 TABLET ORAL at 23:33

## 2020-10-04 RX ADMIN — ALBUTEROL SULFATE 2 PUFF: 90 AEROSOL, METERED RESPIRATORY (INHALATION) at 19:38

## 2020-10-04 RX ADMIN — GABAPENTIN 400 MG: 400 CAPSULE ORAL at 23:33

## 2020-10-04 RX ADMIN — KETOROLAC TROMETHAMINE 15 MG: 30 INJECTION, SOLUTION INTRAMUSCULAR at 19:30

## 2020-10-04 RX ADMIN — VANCOMYCIN HYDROCHLORIDE 1500 MG: 1 INJECTION, POWDER, LYOPHILIZED, FOR SOLUTION INTRAVENOUS at 20:12

## 2020-10-04 RX ADMIN — ACETAMINOPHEN 975 MG: 325 TABLET ORAL at 21:36

## 2020-10-04 RX ADMIN — MELATONIN 3 MG: at 23:33

## 2020-10-05 ENCOUNTER — APPOINTMENT (INPATIENT)
Dept: NON INVASIVE DIAGNOSTICS | Facility: HOSPITAL | Age: 30
DRG: 720 | End: 2020-10-05
Payer: COMMERCIAL

## 2020-10-05 ENCOUNTER — APPOINTMENT (INPATIENT)
Dept: RADIOLOGY | Facility: HOSPITAL | Age: 30
DRG: 720 | End: 2020-10-05
Payer: COMMERCIAL

## 2020-10-05 PROBLEM — J18.9 SEPSIS DUE TO PNEUMONIA (HCC): Status: ACTIVE | Noted: 2020-10-04

## 2020-10-05 LAB
ATRIAL RATE: 106 BPM
IGA SERPL-MCNC: 158 MG/DL (ref 70–400)
IGG SERPL-MCNC: 835 MG/DL (ref 700–1600)
IGM SERPL-MCNC: 195 MG/DL (ref 40–230)
L PNEUMO1 AG UR QL IA.RAPID: NEGATIVE
P AXIS: 44 DEGREES
PR INTERVAL: 154 MS
PROCALCITONIN SERPL-MCNC: 0.25 NG/ML
QRS AXIS: 42 DEGREES
QRSD INTERVAL: 92 MS
QT INTERVAL: 304 MS
QTC INTERVAL: 403 MS
S PNEUM AG UR QL: NEGATIVE
T WAVE AXIS: -16 DEGREES
VENTRICULAR RATE: 106 BPM

## 2020-10-05 PROCEDURE — 84145 PROCALCITONIN (PCT): CPT | Performed by: EMERGENCY MEDICINE

## 2020-10-05 PROCEDURE — 92611 MOTION FLUOROSCOPY/SWALLOW: CPT

## 2020-10-05 PROCEDURE — 87449 NOS EACH ORGANISM AG IA: CPT | Performed by: STUDENT IN AN ORGANIZED HEALTH CARE EDUCATION/TRAINING PROGRAM

## 2020-10-05 PROCEDURE — 94640 AIRWAY INHALATION TREATMENT: CPT

## 2020-10-05 PROCEDURE — 82784 ASSAY IGA/IGD/IGG/IGM EACH: CPT | Performed by: STUDENT IN AN ORGANIZED HEALTH CARE EDUCATION/TRAINING PROGRAM

## 2020-10-05 PROCEDURE — 87081 CULTURE SCREEN ONLY: CPT | Performed by: STUDENT IN AN ORGANIZED HEALTH CARE EDUCATION/TRAINING PROGRAM

## 2020-10-05 PROCEDURE — 93010 ELECTROCARDIOGRAM REPORT: CPT

## 2020-10-05 PROCEDURE — 82785 ASSAY OF IGE: CPT | Performed by: NURSE PRACTITIONER

## 2020-10-05 PROCEDURE — 93306 TTE W/DOPPLER COMPLETE: CPT

## 2020-10-05 PROCEDURE — 99232 SBSQ HOSP IP/OBS MODERATE 35: CPT | Performed by: STUDENT IN AN ORGANIZED HEALTH CARE EDUCATION/TRAINING PROGRAM

## 2020-10-05 PROCEDURE — 99255 IP/OBS CONSLTJ NEW/EST HI 80: CPT | Performed by: INTERNAL MEDICINE

## 2020-10-05 PROCEDURE — 74230 X-RAY XM SWLNG FUNCJ C+: CPT

## 2020-10-05 PROCEDURE — 86003 ALLG SPEC IGE CRUDE XTRC EA: CPT | Performed by: NURSE PRACTITIONER

## 2020-10-05 PROCEDURE — 92610 EVALUATE SWALLOWING FUNCTION: CPT

## 2020-10-05 PROCEDURE — 94762 N-INVAS EAR/PLS OXIMTRY CONT: CPT

## 2020-10-05 PROCEDURE — 80307 DRUG TEST PRSMV CHEM ANLYZR: CPT | Performed by: STUDENT IN AN ORGANIZED HEALTH CARE EDUCATION/TRAINING PROGRAM

## 2020-10-05 RX ORDER — SUCRALFATE 1 G/1
1 TABLET ORAL
Status: DISCONTINUED | OUTPATIENT
Start: 2020-10-05 | End: 2020-10-08 | Stop reason: HOSPADM

## 2020-10-05 RX ORDER — POTASSIUM CHLORIDE 20MEQ/15ML
20 LIQUID (ML) ORAL
Status: COMPLETED | OUTPATIENT
Start: 2020-10-05 | End: 2020-10-05

## 2020-10-05 RX ORDER — LEVALBUTEROL INHALATION SOLUTION 0.63 MG/3ML
0.63 SOLUTION RESPIRATORY (INHALATION)
Status: DISCONTINUED | OUTPATIENT
Start: 2020-10-05 | End: 2020-10-08

## 2020-10-05 RX ORDER — SUCRALFATE ORAL 1 G/10ML
1 SUSPENSION ORAL 4 TIMES DAILY
COMMUNITY

## 2020-10-05 RX ADMIN — PANTOPRAZOLE SODIUM 40 MG: 40 TABLET, DELAYED RELEASE ORAL at 05:36

## 2020-10-05 RX ADMIN — SUCRALFATE 1 G: 1 TABLET ORAL at 16:30

## 2020-10-05 RX ADMIN — TOPIRAMATE 200 MG: 100 TABLET, FILM COATED ORAL at 10:28

## 2020-10-05 RX ADMIN — GABAPENTIN 400 MG: 400 CAPSULE ORAL at 16:30

## 2020-10-05 RX ADMIN — TOPIRAMATE 200 MG: 100 TABLET, FILM COATED ORAL at 18:46

## 2020-10-05 RX ADMIN — CITALOPRAM HYDROBROMIDE 40 MG: 20 TABLET ORAL at 10:28

## 2020-10-05 RX ADMIN — CLONAZEPAM 0.5 MG: 0.5 TABLET ORAL at 20:30

## 2020-10-05 RX ADMIN — ACETAMINOPHEN 650 MG: 325 TABLET ORAL at 14:39

## 2020-10-05 RX ADMIN — GABAPENTIN 400 MG: 400 CAPSULE ORAL at 10:28

## 2020-10-05 RX ADMIN — VANCOMYCIN HYDROCHLORIDE 1250 MG: 5 INJECTION, POWDER, LYOPHILIZED, FOR SOLUTION INTRAVENOUS at 16:32

## 2020-10-05 RX ADMIN — MELATONIN 3 MG: at 22:07

## 2020-10-05 RX ADMIN — SUCRALFATE 1 G: 1 TABLET ORAL at 11:28

## 2020-10-05 RX ADMIN — PRAZOSIN HYDROCHLORIDE 2 MG: 2 CAPSULE ORAL at 22:07

## 2020-10-05 RX ADMIN — LIDOCAINE 1 PATCH: 50 PATCH CUTANEOUS at 10:29

## 2020-10-05 RX ADMIN — PANCRELIPASE 36000 UNITS: 24000; 76000; 120000 CAPSULE, DELAYED RELEASE PELLETS ORAL at 22:06

## 2020-10-05 RX ADMIN — CEFEPIME HYDROCHLORIDE 2000 MG: 2 INJECTION, POWDER, FOR SOLUTION INTRAVENOUS at 10:36

## 2020-10-05 RX ADMIN — GABAPENTIN 400 MG: 400 CAPSULE ORAL at 20:30

## 2020-10-05 RX ADMIN — PANCRELIPASE 36000 UNITS: 24000; 76000; 120000 CAPSULE, DELAYED RELEASE PELLETS ORAL at 10:35

## 2020-10-05 RX ADMIN — POTASSIUM CHLORIDE 20 MEQ: 20 SOLUTION ORAL at 16:30

## 2020-10-05 RX ADMIN — PANCRELIPASE 36000 UNITS: 24000; 76000; 120000 CAPSULE, DELAYED RELEASE PELLETS ORAL at 01:25

## 2020-10-05 RX ADMIN — VANCOMYCIN HYDROCHLORIDE 1250 MG: 5 INJECTION, POWDER, LYOPHILIZED, FOR SOLUTION INTRAVENOUS at 22:03

## 2020-10-05 RX ADMIN — IPRATROPIUM BROMIDE 0.5 MG: 0.5 SOLUTION RESPIRATORY (INHALATION) at 19:15

## 2020-10-05 RX ADMIN — LORAZEPAM 0.5 MG: 1 TABLET ORAL at 14:44

## 2020-10-05 RX ADMIN — METHOCARBAMOL 500 MG: 500 TABLET ORAL at 10:28

## 2020-10-05 RX ADMIN — PANCRELIPASE 36000 UNITS: 24000; 76000; 120000 CAPSULE, DELAYED RELEASE PELLETS ORAL at 16:31

## 2020-10-05 RX ADMIN — CLONAZEPAM 0.5 MG: 0.5 TABLET ORAL at 10:36

## 2020-10-05 RX ADMIN — VANCOMYCIN HYDROCHLORIDE 1250 MG: 5 INJECTION, POWDER, LYOPHILIZED, FOR SOLUTION INTRAVENOUS at 05:35

## 2020-10-05 RX ADMIN — METHOCARBAMOL 500 MG: 500 TABLET ORAL at 16:30

## 2020-10-05 RX ADMIN — METHOCARBAMOL 500 MG: 500 TABLET ORAL at 22:08

## 2020-10-05 RX ADMIN — CEFEPIME HYDROCHLORIDE 2000 MG: 2 INJECTION, POWDER, FOR SOLUTION INTRAVENOUS at 20:30

## 2020-10-05 RX ADMIN — BUPRENORPHINE AND NALOXONE 1 FILM: 8; 2 FILM BUCCAL; SUBLINGUAL at 07:52

## 2020-10-05 RX ADMIN — FOLIC ACID 1 MG: 1 TABLET ORAL at 10:28

## 2020-10-05 RX ADMIN — LEVALBUTEROL HYDROCHLORIDE 0.63 MG: 0.63 SOLUTION RESPIRATORY (INHALATION) at 19:15

## 2020-10-05 RX ADMIN — POTASSIUM CHLORIDE 20 MEQ: 20 SOLUTION ORAL at 18:46

## 2020-10-05 RX ADMIN — THIAMINE HCL TAB 100 MG 100 MG: 100 TAB at 10:28

## 2020-10-05 RX ADMIN — SUCRALFATE 1 G: 1 TABLET ORAL at 22:07

## 2020-10-05 RX ADMIN — TOPIRAMATE 200 MG: 100 TABLET, FILM COATED ORAL at 00:04

## 2020-10-05 RX ADMIN — PERFLUTREN 0.4 ML/MIN: 6.52 INJECTION, SUSPENSION INTRAVENOUS at 12:00

## 2020-10-05 RX ADMIN — BUPRENORPHINE AND NALOXONE 1 FILM: 8; 2 FILM BUCCAL; SUBLINGUAL at 16:30

## 2020-10-06 LAB
AMPHETAMINES UR QL SCN: NEGATIVE NG/ML
ANION GAP SERPL CALCULATED.3IONS-SCNC: 10 MMOL/L (ref 4–13)
BARBITURATES UR QL SCN: NEGATIVE NG/ML
BASOPHILS # BLD AUTO: 0.01 THOUSANDS/ΜL (ref 0–0.1)
BASOPHILS NFR BLD AUTO: 0 % (ref 0–1)
BENZODIAZ UR QL: NEGATIVE NG/ML
BUN SERPL-MCNC: 8 MG/DL (ref 5–25)
BZE UR QL: NEGATIVE NG/ML
CALCIUM SERPL-MCNC: 8.8 MG/DL (ref 8.3–10.1)
CANNABINOIDS UR QL SCN: NEGATIVE NG/ML
CHLORIDE SERPL-SCNC: 107 MMOL/L (ref 100–108)
CO2 SERPL-SCNC: 20 MMOL/L (ref 21–32)
CREAT SERPL-MCNC: 0.71 MG/DL (ref 0.6–1.3)
EOSINOPHIL # BLD AUTO: 0.15 THOUSAND/ΜL (ref 0–0.61)
EOSINOPHIL NFR BLD AUTO: 2 % (ref 0–6)
ERYTHROCYTE [DISTWIDTH] IN BLOOD BY AUTOMATED COUNT: 14.8 % (ref 11.6–15.1)
GFR SERPL CREATININE-BSD FRML MDRD: 115 ML/MIN/1.73SQ M
GLUCOSE SERPL-MCNC: 115 MG/DL (ref 65–140)
HCT VFR BLD AUTO: 34.4 % (ref 34.8–46.1)
HGB BLD-MCNC: 10.5 G/DL (ref 11.5–15.4)
IMM GRANULOCYTES # BLD AUTO: 0.02 THOUSAND/UL (ref 0–0.2)
IMM GRANULOCYTES NFR BLD AUTO: 0 % (ref 0–2)
LYMPHOCYTES # BLD AUTO: 2.15 THOUSANDS/ΜL (ref 0.6–4.47)
LYMPHOCYTES NFR BLD AUTO: 28 % (ref 14–44)
MAGNESIUM SERPL-MCNC: 1.8 MG/DL (ref 1.6–2.6)
MCH RBC QN AUTO: 27.1 PG (ref 26.8–34.3)
MCHC RBC AUTO-ENTMCNC: 30.5 G/DL (ref 31.4–37.4)
MCV RBC AUTO: 89 FL (ref 82–98)
METHADONE UR QL SCN: NEGATIVE NG/ML
MONOCYTES # BLD AUTO: 0.6 THOUSAND/ΜL (ref 0.17–1.22)
MONOCYTES NFR BLD AUTO: 8 % (ref 4–12)
MRSA NOSE QL CULT: NORMAL
NEUTROPHILS # BLD AUTO: 4.72 THOUSANDS/ΜL (ref 1.85–7.62)
NEUTS SEG NFR BLD AUTO: 62 % (ref 43–75)
NRBC BLD AUTO-RTO: 0 /100 WBCS
OPIATES UR QL: NEGATIVE NG/ML
PCP UR QL: NEGATIVE NG/ML
PLATELET # BLD AUTO: 207 THOUSANDS/UL (ref 149–390)
PMV BLD AUTO: 11.3 FL (ref 8.9–12.7)
POTASSIUM SERPL-SCNC: 3.7 MMOL/L (ref 3.5–5.3)
PROCALCITONIN SERPL-MCNC: 0.06 NG/ML
PROPOXYPH UR QL SCN: NEGATIVE NG/ML
RBC # BLD AUTO: 3.88 MILLION/UL (ref 3.81–5.12)
SODIUM SERPL-SCNC: 137 MMOL/L (ref 136–145)
VANCOMYCIN TROUGH SERPL-MCNC: 18.7 UG/ML (ref 10–20)
WBC # BLD AUTO: 7.65 THOUSAND/UL (ref 4.31–10.16)

## 2020-10-06 PROCEDURE — 99232 SBSQ HOSP IP/OBS MODERATE 35: CPT | Performed by: INTERNAL MEDICINE

## 2020-10-06 PROCEDURE — 85025 COMPLETE CBC W/AUTO DIFF WBC: CPT | Performed by: STUDENT IN AN ORGANIZED HEALTH CARE EDUCATION/TRAINING PROGRAM

## 2020-10-06 PROCEDURE — 94640 AIRWAY INHALATION TREATMENT: CPT

## 2020-10-06 PROCEDURE — 84145 PROCALCITONIN (PCT): CPT | Performed by: STUDENT IN AN ORGANIZED HEALTH CARE EDUCATION/TRAINING PROGRAM

## 2020-10-06 PROCEDURE — 99232 SBSQ HOSP IP/OBS MODERATE 35: CPT | Performed by: STUDENT IN AN ORGANIZED HEALTH CARE EDUCATION/TRAINING PROGRAM

## 2020-10-06 PROCEDURE — 80202 ASSAY OF VANCOMYCIN: CPT | Performed by: INTERNAL MEDICINE

## 2020-10-06 PROCEDURE — 94762 N-INVAS EAR/PLS OXIMTRY CONT: CPT

## 2020-10-06 PROCEDURE — 80048 BASIC METABOLIC PNL TOTAL CA: CPT | Performed by: STUDENT IN AN ORGANIZED HEALTH CARE EDUCATION/TRAINING PROGRAM

## 2020-10-06 PROCEDURE — 83735 ASSAY OF MAGNESIUM: CPT | Performed by: STUDENT IN AN ORGANIZED HEALTH CARE EDUCATION/TRAINING PROGRAM

## 2020-10-06 PROCEDURE — 94760 N-INVAS EAR/PLS OXIMETRY 1: CPT

## 2020-10-06 RX ORDER — FLUTICASONE PROPIONATE 50 MCG
1 SPRAY, SUSPENSION (ML) NASAL DAILY
Status: DISCONTINUED | OUTPATIENT
Start: 2020-10-06 | End: 2020-10-08 | Stop reason: HOSPADM

## 2020-10-06 RX ORDER — CEFPODOXIME PROXETIL 200 MG/1
200 TABLET, FILM COATED ORAL 2 TIMES DAILY WITH MEALS
Status: DISCONTINUED | OUTPATIENT
Start: 2020-10-07 | End: 2020-10-07

## 2020-10-06 RX ADMIN — METHOCARBAMOL 500 MG: 500 TABLET ORAL at 21:11

## 2020-10-06 RX ADMIN — GABAPENTIN 400 MG: 400 CAPSULE ORAL at 16:37

## 2020-10-06 RX ADMIN — LORAZEPAM 0.5 MG: 1 TABLET ORAL at 14:58

## 2020-10-06 RX ADMIN — ACETAMINOPHEN 650 MG: 325 TABLET ORAL at 22:20

## 2020-10-06 RX ADMIN — IPRATROPIUM BROMIDE 0.5 MG: 0.5 SOLUTION RESPIRATORY (INHALATION) at 07:30

## 2020-10-06 RX ADMIN — LEVALBUTEROL HYDROCHLORIDE 0.63 MG: 0.63 SOLUTION RESPIRATORY (INHALATION) at 13:00

## 2020-10-06 RX ADMIN — METHOCARBAMOL 500 MG: 500 TABLET ORAL at 16:37

## 2020-10-06 RX ADMIN — ENOXAPARIN SODIUM 40 MG: 40 INJECTION SUBCUTANEOUS at 09:14

## 2020-10-06 RX ADMIN — TOPIRAMATE 200 MG: 100 TABLET, FILM COATED ORAL at 18:25

## 2020-10-06 RX ADMIN — MELATONIN 3 MG: at 21:11

## 2020-10-06 RX ADMIN — IPRATROPIUM BROMIDE 0.5 MG: 0.5 SOLUTION RESPIRATORY (INHALATION) at 13:00

## 2020-10-06 RX ADMIN — PANTOPRAZOLE SODIUM 40 MG: 40 TABLET, DELAYED RELEASE ORAL at 06:11

## 2020-10-06 RX ADMIN — SUCRALFATE 1 G: 1 TABLET ORAL at 06:11

## 2020-10-06 RX ADMIN — LEVALBUTEROL HYDROCHLORIDE 0.63 MG: 0.63 SOLUTION RESPIRATORY (INHALATION) at 07:30

## 2020-10-06 RX ADMIN — BUPRENORPHINE AND NALOXONE 1 FILM: 8; 2 FILM BUCCAL; SUBLINGUAL at 16:36

## 2020-10-06 RX ADMIN — GABAPENTIN 400 MG: 400 CAPSULE ORAL at 09:14

## 2020-10-06 RX ADMIN — BUPRENORPHINE AND NALOXONE 1 FILM: 8; 2 FILM BUCCAL; SUBLINGUAL at 01:19

## 2020-10-06 RX ADMIN — VANCOMYCIN HYDROCHLORIDE 1250 MG: 5 INJECTION, POWDER, LYOPHILIZED, FOR SOLUTION INTRAVENOUS at 06:20

## 2020-10-06 RX ADMIN — FLUTICASONE PROPIONATE 1 SPRAY: 50 SPRAY, METERED NASAL at 16:38

## 2020-10-06 RX ADMIN — PANCRELIPASE 36000 UNITS: 24000; 76000; 120000 CAPSULE, DELAYED RELEASE PELLETS ORAL at 22:21

## 2020-10-06 RX ADMIN — PANCRELIPASE 36000 UNITS: 24000; 76000; 120000 CAPSULE, DELAYED RELEASE PELLETS ORAL at 16:39

## 2020-10-06 RX ADMIN — THIAMINE HCL TAB 100 MG 100 MG: 100 TAB at 09:14

## 2020-10-06 RX ADMIN — GABAPENTIN 400 MG: 400 CAPSULE ORAL at 21:11

## 2020-10-06 RX ADMIN — SUCRALFATE 1 G: 1 TABLET ORAL at 21:11

## 2020-10-06 RX ADMIN — TOPIRAMATE 200 MG: 100 TABLET, FILM COATED ORAL at 09:16

## 2020-10-06 RX ADMIN — PRAZOSIN HYDROCHLORIDE 2 MG: 2 CAPSULE ORAL at 22:21

## 2020-10-06 RX ADMIN — IPRATROPIUM BROMIDE 0.5 MG: 0.5 SOLUTION RESPIRATORY (INHALATION) at 19:00

## 2020-10-06 RX ADMIN — PANCRELIPASE 36000 UNITS: 24000; 76000; 120000 CAPSULE, DELAYED RELEASE PELLETS ORAL at 09:17

## 2020-10-06 RX ADMIN — ACETAMINOPHEN 650 MG: 325 TABLET ORAL at 01:48

## 2020-10-06 RX ADMIN — LEVALBUTEROL HYDROCHLORIDE 0.63 MG: 0.63 SOLUTION RESPIRATORY (INHALATION) at 19:00

## 2020-10-06 RX ADMIN — CLONAZEPAM 0.5 MG: 0.5 TABLET ORAL at 22:20

## 2020-10-06 RX ADMIN — CEFEPIME HYDROCHLORIDE 2000 MG: 2 INJECTION, POWDER, FOR SOLUTION INTRAVENOUS at 09:15

## 2020-10-06 RX ADMIN — FOLIC ACID 1 MG: 1 TABLET ORAL at 09:14

## 2020-10-06 RX ADMIN — LIDOCAINE 1 PATCH: 50 PATCH CUTANEOUS at 09:15

## 2020-10-06 RX ADMIN — VANCOMYCIN HYDROCHLORIDE 1250 MG: 5 INJECTION, POWDER, LYOPHILIZED, FOR SOLUTION INTRAVENOUS at 14:46

## 2020-10-06 RX ADMIN — SUCRALFATE 1 G: 1 TABLET ORAL at 11:54

## 2020-10-06 RX ADMIN — CEFEPIME HYDROCHLORIDE 2000 MG: 2 INJECTION, POWDER, FOR SOLUTION INTRAVENOUS at 18:26

## 2020-10-06 RX ADMIN — CITALOPRAM HYDROBROMIDE 40 MG: 20 TABLET ORAL at 09:14

## 2020-10-06 RX ADMIN — SUCRALFATE 1 G: 1 TABLET ORAL at 16:37

## 2020-10-07 ENCOUNTER — APPOINTMENT (INPATIENT)
Dept: RADIOLOGY | Facility: HOSPITAL | Age: 30
DRG: 720 | End: 2020-10-07
Payer: COMMERCIAL

## 2020-10-07 PROBLEM — J96.01 ACUTE RESPIRATORY FAILURE WITH HYPOXIA (HCC): Status: ACTIVE | Noted: 2020-10-07

## 2020-10-07 LAB
A ALTERNATA IGE QN: <0.1 KUA/I
A FUMIGATUS IGE QN: <0.1 KUA/I
ALLERGEN COMMENT: ABNORMAL
BERMUDA GRASS IGE QN: <0.1 KUA/I
BOXELDER IGE QN: <0.1 KUA/I
C HERBARUM IGE QN: <0.1 KUA/I
CAT DANDER IGE QN: <0.1 KUA/I
CMN PIGWEED IGE QN: <0.1 KUA/I
COMMON RAGWEED IGE QN: <0.1 KUA/I
COTTONWOOD IGE QN: <0.1 KUA/I
D FARINAE IGE QN: 1.15 KUA/I
D PTERONYSS IGE QN: 1.2 KUA/I
DOG DANDER IGE QN: <0.1 KUA/I
LONDON PLANE IGE QN: <0.1 KUA/I
MOUSE URINE PROT IGE QN: <0.1 KUA/I
MT JUNIPER IGE QN: <0.1 KUA/I
MUGWORT IGE QN: <0.1 KUA/I
P NOTATUM IGE QN: <0.1 KUA/I
PROCALCITONIN SERPL-MCNC: 0.07 NG/ML
ROACH IGE QN: <0.1 KUA/I
SHEEP SORREL IGE QN: <0.1 KUA/I
SILVER BIRCH IGE QN: <0.1 KUA/I
TIMOTHY IGE QN: <0.1 KUA/I
TOTAL IGE SMQN RAST: 7.62 KU/L (ref 0–113)
WALNUT IGE QN: <0.1 KUA/I
WHITE ASH IGE QN: 0.32 KUA/I
WHITE ELM IGE QN: <0.1 KUA/I
WHITE MULBERRY IGE QN: <0.1 KUA/I
WHITE OAK IGE QN: <0.1 KUA/I

## 2020-10-07 PROCEDURE — 94640 AIRWAY INHALATION TREATMENT: CPT

## 2020-10-07 PROCEDURE — 84145 PROCALCITONIN (PCT): CPT | Performed by: STUDENT IN AN ORGANIZED HEALTH CARE EDUCATION/TRAINING PROGRAM

## 2020-10-07 PROCEDURE — 99232 SBSQ HOSP IP/OBS MODERATE 35: CPT | Performed by: STUDENT IN AN ORGANIZED HEALTH CARE EDUCATION/TRAINING PROGRAM

## 2020-10-07 PROCEDURE — 94760 N-INVAS EAR/PLS OXIMETRY 1: CPT

## 2020-10-07 PROCEDURE — 99232 SBSQ HOSP IP/OBS MODERATE 35: CPT | Performed by: INTERNAL MEDICINE

## 2020-10-07 PROCEDURE — 71045 X-RAY EXAM CHEST 1 VIEW: CPT

## 2020-10-07 RX ORDER — ONDANSETRON 2 MG/ML
4 INJECTION INTRAMUSCULAR; INTRAVENOUS EVERY 6 HOURS PRN
Status: DISCONTINUED | OUTPATIENT
Start: 2020-10-07 | End: 2020-10-08 | Stop reason: HOSPADM

## 2020-10-07 RX ORDER — KETOROLAC TROMETHAMINE 30 MG/ML
15 INJECTION, SOLUTION INTRAMUSCULAR; INTRAVENOUS ONCE
Status: COMPLETED | OUTPATIENT
Start: 2020-10-07 | End: 2020-10-07

## 2020-10-07 RX ADMIN — CLONAZEPAM 0.5 MG: 0.5 TABLET ORAL at 12:51

## 2020-10-07 RX ADMIN — ENOXAPARIN SODIUM 40 MG: 40 INJECTION SUBCUTANEOUS at 09:35

## 2020-10-07 RX ADMIN — SUCRALFATE 1 G: 1 TABLET ORAL at 06:14

## 2020-10-07 RX ADMIN — THIAMINE HCL TAB 100 MG 100 MG: 100 TAB at 09:34

## 2020-10-07 RX ADMIN — IPRATROPIUM BROMIDE 0.5 MG: 0.5 SOLUTION RESPIRATORY (INHALATION) at 13:23

## 2020-10-07 RX ADMIN — MELOXICAM 15 MG: 7.5 TABLET ORAL at 19:46

## 2020-10-07 RX ADMIN — BUPRENORPHINE AND NALOXONE 1 FILM: 8; 2 FILM BUCCAL; SUBLINGUAL at 00:01

## 2020-10-07 RX ADMIN — IPRATROPIUM BROMIDE 0.5 MG: 0.5 SOLUTION RESPIRATORY (INHALATION) at 07:31

## 2020-10-07 RX ADMIN — CLONAZEPAM 0.5 MG: 0.5 TABLET ORAL at 19:46

## 2020-10-07 RX ADMIN — SUCRALFATE 1 G: 1 TABLET ORAL at 17:11

## 2020-10-07 RX ADMIN — LEVALBUTEROL HYDROCHLORIDE 0.63 MG: 0.63 SOLUTION RESPIRATORY (INHALATION) at 19:01

## 2020-10-07 RX ADMIN — BUPRENORPHINE AND NALOXONE 1 FILM: 8; 2 FILM BUCCAL; SUBLINGUAL at 09:47

## 2020-10-07 RX ADMIN — METHOCARBAMOL 500 MG: 500 TABLET ORAL at 17:11

## 2020-10-07 RX ADMIN — ACETAMINOPHEN 650 MG: 325 TABLET ORAL at 06:14

## 2020-10-07 RX ADMIN — FOLIC ACID 1 MG: 1 TABLET ORAL at 09:35

## 2020-10-07 RX ADMIN — PANCRELIPASE 36000 UNITS: 24000; 76000; 120000 CAPSULE, DELAYED RELEASE PELLETS ORAL at 21:35

## 2020-10-07 RX ADMIN — ONDANSETRON 4 MG: 2 INJECTION INTRAMUSCULAR; INTRAVENOUS at 22:54

## 2020-10-07 RX ADMIN — METHOCARBAMOL 500 MG: 500 TABLET ORAL at 21:35

## 2020-10-07 RX ADMIN — PANCRELIPASE 36000 UNITS: 24000; 76000; 120000 CAPSULE, DELAYED RELEASE PELLETS ORAL at 09:38

## 2020-10-07 RX ADMIN — BUPRENORPHINE AND NALOXONE 1 FILM: 8; 2 FILM BUCCAL; SUBLINGUAL at 17:11

## 2020-10-07 RX ADMIN — IPRATROPIUM BROMIDE 0.5 MG: 0.5 SOLUTION RESPIRATORY (INHALATION) at 19:00

## 2020-10-07 RX ADMIN — MELATONIN 3 MG: at 21:35

## 2020-10-07 RX ADMIN — KETOROLAC TROMETHAMINE 15 MG: 30 INJECTION, SOLUTION INTRAMUSCULAR at 10:37

## 2020-10-07 RX ADMIN — METHOCARBAMOL 500 MG: 500 TABLET ORAL at 09:35

## 2020-10-07 RX ADMIN — BUPRENORPHINE AND NALOXONE 1 FILM: 8; 2 FILM BUCCAL; SUBLINGUAL at 22:54

## 2020-10-07 RX ADMIN — TOPIRAMATE 200 MG: 100 TABLET, FILM COATED ORAL at 17:11

## 2020-10-07 RX ADMIN — SUCRALFATE 1 G: 1 TABLET ORAL at 12:53

## 2020-10-07 RX ADMIN — GABAPENTIN 400 MG: 400 CAPSULE ORAL at 09:35

## 2020-10-07 RX ADMIN — PANTOPRAZOLE SODIUM 40 MG: 40 TABLET, DELAYED RELEASE ORAL at 06:13

## 2020-10-07 RX ADMIN — LEVALBUTEROL HYDROCHLORIDE 0.63 MG: 0.63 SOLUTION RESPIRATORY (INHALATION) at 07:31

## 2020-10-07 RX ADMIN — GABAPENTIN 400 MG: 400 CAPSULE ORAL at 17:11

## 2020-10-07 RX ADMIN — CITALOPRAM HYDROBROMIDE 40 MG: 20 TABLET ORAL at 09:36

## 2020-10-07 RX ADMIN — FLUTICASONE PROPIONATE 1 SPRAY: 50 SPRAY, METERED NASAL at 09:36

## 2020-10-07 RX ADMIN — LIDOCAINE 1 PATCH: 50 PATCH CUTANEOUS at 09:37

## 2020-10-07 RX ADMIN — LEVALBUTEROL HYDROCHLORIDE 0.63 MG: 0.63 SOLUTION RESPIRATORY (INHALATION) at 13:23

## 2020-10-07 RX ADMIN — TOPIRAMATE 200 MG: 100 TABLET, FILM COATED ORAL at 09:35

## 2020-10-07 RX ADMIN — CEFPODOXIME PROXETIL 200 MG: 200 TABLET, FILM COATED ORAL at 09:35

## 2020-10-07 RX ADMIN — MELOXICAM 15 MG: 7.5 TABLET ORAL at 01:56

## 2020-10-07 RX ADMIN — GABAPENTIN 400 MG: 400 CAPSULE ORAL at 21:35

## 2020-10-07 RX ADMIN — PRAZOSIN HYDROCHLORIDE 2 MG: 2 CAPSULE ORAL at 21:35

## 2020-10-07 RX ADMIN — SUCRALFATE 1 G: 1 TABLET ORAL at 21:35

## 2020-10-08 VITALS
OXYGEN SATURATION: 95 % | DIASTOLIC BLOOD PRESSURE: 59 MMHG | TEMPERATURE: 98.2 F | WEIGHT: 215.39 LBS | RESPIRATION RATE: 18 BRPM | HEART RATE: 68 BPM | BODY MASS INDEX: 32.64 KG/M2 | HEIGHT: 68 IN | SYSTOLIC BLOOD PRESSURE: 113 MMHG

## 2020-10-08 LAB
ANION GAP SERPL CALCULATED.3IONS-SCNC: 10 MMOL/L (ref 4–13)
BASOPHILS # BLD AUTO: 0.02 THOUSANDS/ΜL (ref 0–0.1)
BASOPHILS NFR BLD AUTO: 0 % (ref 0–1)
BUN SERPL-MCNC: 10 MG/DL (ref 5–25)
CALCIUM SERPL-MCNC: 9.3 MG/DL (ref 8.3–10.1)
CHLORIDE SERPL-SCNC: 106 MMOL/L (ref 100–108)
CO2 SERPL-SCNC: 23 MMOL/L (ref 21–32)
CREAT SERPL-MCNC: 0.64 MG/DL (ref 0.6–1.3)
EOSINOPHIL # BLD AUTO: 0.12 THOUSAND/ΜL (ref 0–0.61)
EOSINOPHIL NFR BLD AUTO: 2 % (ref 0–6)
ERYTHROCYTE [DISTWIDTH] IN BLOOD BY AUTOMATED COUNT: 14.1 % (ref 11.6–15.1)
GFR SERPL CREATININE-BSD FRML MDRD: 121 ML/MIN/1.73SQ M
GLUCOSE SERPL-MCNC: 106 MG/DL (ref 65–140)
HCT VFR BLD AUTO: 34.6 % (ref 34.8–46.1)
HGB BLD-MCNC: 10.5 G/DL (ref 11.5–15.4)
IMM GRANULOCYTES # BLD AUTO: 0.02 THOUSAND/UL (ref 0–0.2)
IMM GRANULOCYTES NFR BLD AUTO: 0 % (ref 0–2)
LYMPHOCYTES # BLD AUTO: 2.04 THOUSANDS/ΜL (ref 0.6–4.47)
LYMPHOCYTES NFR BLD AUTO: 34 % (ref 14–44)
MCH RBC QN AUTO: 26.2 PG (ref 26.8–34.3)
MCHC RBC AUTO-ENTMCNC: 30.3 G/DL (ref 31.4–37.4)
MCV RBC AUTO: 86 FL (ref 82–98)
MONOCYTES # BLD AUTO: 0.47 THOUSAND/ΜL (ref 0.17–1.22)
MONOCYTES NFR BLD AUTO: 8 % (ref 4–12)
NEUTROPHILS # BLD AUTO: 3.39 THOUSANDS/ΜL (ref 1.85–7.62)
NEUTS SEG NFR BLD AUTO: 56 % (ref 43–75)
NRBC BLD AUTO-RTO: 0 /100 WBCS
PLATELET # BLD AUTO: 218 THOUSANDS/UL (ref 149–390)
PMV BLD AUTO: 10.9 FL (ref 8.9–12.7)
POTASSIUM SERPL-SCNC: 3.9 MMOL/L (ref 3.5–5.3)
PROCALCITONIN SERPL-MCNC: <0.05 NG/ML
RBC # BLD AUTO: 4.01 MILLION/UL (ref 3.81–5.12)
SODIUM SERPL-SCNC: 139 MMOL/L (ref 136–145)
WBC # BLD AUTO: 6.06 THOUSAND/UL (ref 4.31–10.16)

## 2020-10-08 PROCEDURE — 99239 HOSP IP/OBS DSCHRG MGMT >30: CPT | Performed by: STUDENT IN AN ORGANIZED HEALTH CARE EDUCATION/TRAINING PROGRAM

## 2020-10-08 PROCEDURE — 99232 SBSQ HOSP IP/OBS MODERATE 35: CPT | Performed by: INTERNAL MEDICINE

## 2020-10-08 PROCEDURE — 84145 PROCALCITONIN (PCT): CPT | Performed by: STUDENT IN AN ORGANIZED HEALTH CARE EDUCATION/TRAINING PROGRAM

## 2020-10-08 PROCEDURE — 94640 AIRWAY INHALATION TREATMENT: CPT

## 2020-10-08 PROCEDURE — 80048 BASIC METABOLIC PNL TOTAL CA: CPT | Performed by: STUDENT IN AN ORGANIZED HEALTH CARE EDUCATION/TRAINING PROGRAM

## 2020-10-08 PROCEDURE — 97163 PT EVAL HIGH COMPLEX 45 MIN: CPT | Performed by: PHYSICAL THERAPIST

## 2020-10-08 PROCEDURE — 85025 COMPLETE CBC W/AUTO DIFF WBC: CPT | Performed by: STUDENT IN AN ORGANIZED HEALTH CARE EDUCATION/TRAINING PROGRAM

## 2020-10-08 RX ORDER — LEVALBUTEROL 1.25 MG/.5ML
1.25 SOLUTION, CONCENTRATE RESPIRATORY (INHALATION)
Status: DISCONTINUED | OUTPATIENT
Start: 2020-10-08 | End: 2020-10-08

## 2020-10-08 RX ORDER — BUDESONIDE AND FORMOTEROL FUMARATE DIHYDRATE 80; 4.5 UG/1; UG/1
2 AEROSOL RESPIRATORY (INHALATION) 2 TIMES DAILY
Qty: 1 INHALER | Refills: 0 | Status: SHIPPED | OUTPATIENT
Start: 2020-10-08

## 2020-10-08 RX ORDER — LEVALBUTEROL 1.25 MG/.5ML
1.25 SOLUTION, CONCENTRATE RESPIRATORY (INHALATION) EVERY 6 HOURS PRN
Status: DISCONTINUED | OUTPATIENT
Start: 2020-10-08 | End: 2020-10-08 | Stop reason: HOSPADM

## 2020-10-08 RX ADMIN — TOPIRAMATE 200 MG: 100 TABLET, FILM COATED ORAL at 09:22

## 2020-10-08 RX ADMIN — ENOXAPARIN SODIUM 40 MG: 40 INJECTION SUBCUTANEOUS at 09:22

## 2020-10-08 RX ADMIN — SUCRALFATE 1 G: 1 TABLET ORAL at 12:16

## 2020-10-08 RX ADMIN — SUCRALFATE 1 G: 1 TABLET ORAL at 05:38

## 2020-10-08 RX ADMIN — METHOCARBAMOL 500 MG: 500 TABLET ORAL at 15:34

## 2020-10-08 RX ADMIN — FOLIC ACID 1 MG: 1 TABLET ORAL at 09:22

## 2020-10-08 RX ADMIN — BUPRENORPHINE AND NALOXONE 1 FILM: 8; 2 FILM BUCCAL; SUBLINGUAL at 16:19

## 2020-10-08 RX ADMIN — FLUTICASONE PROPIONATE 1 SPRAY: 50 SPRAY, METERED NASAL at 09:23

## 2020-10-08 RX ADMIN — CLONAZEPAM 0.5 MG: 0.5 TABLET ORAL at 15:34

## 2020-10-08 RX ADMIN — GABAPENTIN 400 MG: 400 CAPSULE ORAL at 09:21

## 2020-10-08 RX ADMIN — PANTOPRAZOLE SODIUM 40 MG: 40 TABLET, DELAYED RELEASE ORAL at 05:38

## 2020-10-08 RX ADMIN — LORAZEPAM 0.5 MG: 1 TABLET ORAL at 00:47

## 2020-10-08 RX ADMIN — THIAMINE HCL TAB 100 MG 100 MG: 100 TAB at 09:22

## 2020-10-08 RX ADMIN — IPRATROPIUM BROMIDE 0.5 MG: 0.5 SOLUTION RESPIRATORY (INHALATION) at 08:33

## 2020-10-08 RX ADMIN — LIDOCAINE 1 PATCH: 50 PATCH CUTANEOUS at 09:22

## 2020-10-08 RX ADMIN — GABAPENTIN 400 MG: 400 CAPSULE ORAL at 15:34

## 2020-10-08 RX ADMIN — SUCRALFATE 1 G: 1 TABLET ORAL at 15:34

## 2020-10-08 RX ADMIN — METHOCARBAMOL 500 MG: 500 TABLET ORAL at 09:22

## 2020-10-08 RX ADMIN — LEVALBUTEROL HYDROCHLORIDE 1.25 MG: 1.25 SOLUTION, CONCENTRATE RESPIRATORY (INHALATION) at 08:33

## 2020-10-08 RX ADMIN — BUPRENORPHINE AND NALOXONE 1 FILM: 8; 2 FILM BUCCAL; SUBLINGUAL at 09:23

## 2020-10-08 RX ADMIN — CITALOPRAM HYDROBROMIDE 40 MG: 20 TABLET ORAL at 09:22

## 2020-10-10 LAB
BACTERIA BLD CULT: NORMAL
BACTERIA BLD CULT: NORMAL

## 2020-12-15 ENCOUNTER — HOSPITAL ENCOUNTER (EMERGENCY)
Facility: HOSPITAL | Age: 30
Discharge: HOME | End: 2020-12-16
Attending: EMERGENCY MEDICINE | Admitting: EMERGENCY MEDICINE
Payer: COMMERCIAL

## 2020-12-15 ENCOUNTER — APPOINTMENT (EMERGENCY)
Dept: CT IMAGING | Facility: HOSPITAL | Age: 30
End: 2020-12-15
Payer: COMMERCIAL

## 2020-12-15 ENCOUNTER — APPOINTMENT (EMERGENCY)
Dept: RADIOLOGY | Facility: HOSPITAL | Age: 30
End: 2020-12-15
Payer: COMMERCIAL

## 2020-12-15 VITALS
DIASTOLIC BLOOD PRESSURE: 73 MMHG | TEMPERATURE: 99.2 F | RESPIRATION RATE: 19 BRPM | WEIGHT: 213.63 LBS | SYSTOLIC BLOOD PRESSURE: 120 MMHG | OXYGEN SATURATION: 95 % | HEART RATE: 82 BPM | BODY MASS INDEX: 32.48 KG/M2

## 2020-12-15 DIAGNOSIS — R91.8 ABNORMAL CT SCAN OF LUNG: ICD-10-CM

## 2020-12-15 DIAGNOSIS — R06.02 SOB (SHORTNESS OF BREATH): Primary | ICD-10-CM

## 2020-12-15 LAB
ABO GROUP BLD: NORMAL
ALBUMIN SERPL BCP-MCNC: 3.1 G/DL (ref 3.5–5)
ALP SERPL-CCNC: 80 U/L (ref 46–116)
ALT SERPL W P-5'-P-CCNC: 22 U/L (ref 12–78)
ANION GAP SERPL CALCULATED.3IONS-SCNC: 12 MMOL/L (ref 4–13)
AST SERPL W P-5'-P-CCNC: 28 U/L (ref 5–45)
BASOPHILS # BLD AUTO: 0.02 THOUSANDS/ΜL (ref 0–0.1)
BASOPHILS NFR BLD AUTO: 0 % (ref 0–1)
BILIRUB SERPL-MCNC: 0.27 MG/DL (ref 0.2–1)
BUN SERPL-MCNC: 9 MG/DL (ref 5–25)
CALCIUM ALBUM COR SERPL-MCNC: 9.3 MG/DL (ref 8.3–10.1)
CALCIUM SERPL-MCNC: 8.6 MG/DL (ref 8.3–10.1)
CHLORIDE SERPL-SCNC: 105 MMOL/L (ref 100–108)
CK SERPL-CCNC: 52 U/L (ref 26–192)
CO2 SERPL-SCNC: 24 MMOL/L (ref 21–32)
CREAT SERPL-MCNC: 0.71 MG/DL (ref 0.6–1.3)
CRP SERPL QL: 217.1 MG/L
D DIMER PPP FEU-MCNC: 1.08 UG/ML FEU
EOSINOPHIL # BLD AUTO: 0.09 THOUSAND/ΜL (ref 0–0.61)
EOSINOPHIL NFR BLD AUTO: 1 % (ref 0–6)
ERYTHROCYTE [DISTWIDTH] IN BLOOD BY AUTOMATED COUNT: 14.6 % (ref 11.6–15.1)
ETHANOL SERPL-MCNC: <3 MG/DL (ref 0–3)
FERRITIN SERPL-MCNC: 63 NG/ML (ref 8–388)
FLUAV RNA RESP QL NAA+PROBE: NEGATIVE
FLUBV RNA RESP QL NAA+PROBE: NEGATIVE
GFR SERPL CREATININE-BSD FRML MDRD: 115 ML/MIN/1.73SQ M
GLUCOSE SERPL-MCNC: 145 MG/DL (ref 65–140)
HCT VFR BLD AUTO: 33.8 % (ref 34.8–46.1)
HGB BLD-MCNC: 10.5 G/DL (ref 11.5–15.4)
IMM GRANULOCYTES # BLD AUTO: 0.05 THOUSAND/UL (ref 0–0.2)
IMM GRANULOCYTES NFR BLD AUTO: 1 % (ref 0–2)
LACTATE SERPL-SCNC: 1.4 MMOL/L (ref 0.5–2)
LACTATE SERPL-SCNC: 2.4 MMOL/L (ref 0.5–2)
LYMPHOCYTES # BLD AUTO: 1.85 THOUSANDS/ΜL (ref 0.6–4.47)
LYMPHOCYTES NFR BLD AUTO: 17 % (ref 14–44)
MCH RBC QN AUTO: 26.9 PG (ref 26.8–34.3)
MCHC RBC AUTO-ENTMCNC: 31.1 G/DL (ref 31.4–37.4)
MCV RBC AUTO: 86 FL (ref 82–98)
MONOCYTES # BLD AUTO: 0.4 THOUSAND/ΜL (ref 0.17–1.22)
MONOCYTES NFR BLD AUTO: 4 % (ref 4–12)
NEUTROPHILS # BLD AUTO: 8.25 THOUSANDS/ΜL (ref 1.85–7.62)
NEUTS SEG NFR BLD AUTO: 77 % (ref 43–75)
NRBC BLD AUTO-RTO: 0 /100 WBCS
NT-PROBNP SERPL-MCNC: 243 PG/ML
PLATELET # BLD AUTO: 183 THOUSANDS/UL (ref 149–390)
PMV BLD AUTO: 10.6 FL (ref 8.9–12.7)
POTASSIUM SERPL-SCNC: 3.3 MMOL/L (ref 3.5–5.3)
PROCALCITONIN SERPL-MCNC: 0.36 NG/ML
PROLACTIN SERPL-MCNC: 11.6 NG/ML
PROT SERPL-MCNC: 6.6 G/DL (ref 6.4–8.2)
RBC # BLD AUTO: 3.91 MILLION/UL (ref 3.81–5.12)
RH BLD: POSITIVE
RSV RNA RESP QL NAA+PROBE: NEGATIVE
SARS-COV-2 RNA RESP QL NAA+PROBE: NEGATIVE
SODIUM SERPL-SCNC: 141 MMOL/L (ref 136–145)
TROPONIN I SERPL-MCNC: <0.02 NG/ML
TROPONIN I SERPL-MCNC: <0.02 NG/ML
WBC # BLD AUTO: 10.66 THOUSAND/UL (ref 4.31–10.16)

## 2020-12-15 PROCEDURE — 86901 BLOOD TYPING SEROLOGIC RH(D): CPT | Performed by: EMERGENCY MEDICINE

## 2020-12-15 PROCEDURE — 84145 PROCALCITONIN (PCT): CPT | Performed by: EMERGENCY MEDICINE

## 2020-12-15 PROCEDURE — 85025 COMPLETE CBC W/AUTO DIFF WBC: CPT | Performed by: EMERGENCY MEDICINE

## 2020-12-15 PROCEDURE — 82550 ASSAY OF CK (CPK): CPT | Performed by: EMERGENCY MEDICINE

## 2020-12-15 PROCEDURE — 80320 DRUG SCREEN QUANTALCOHOLS: CPT | Performed by: EMERGENCY MEDICINE

## 2020-12-15 PROCEDURE — 99285 EMERGENCY DEPT VISIT HI MDM: CPT | Performed by: EMERGENCY MEDICINE

## 2020-12-15 PROCEDURE — 96361 HYDRATE IV INFUSION ADD-ON: CPT

## 2020-12-15 PROCEDURE — 82728 ASSAY OF FERRITIN: CPT | Performed by: EMERGENCY MEDICINE

## 2020-12-15 PROCEDURE — 36415 COLL VENOUS BLD VENIPUNCTURE: CPT | Performed by: EMERGENCY MEDICINE

## 2020-12-15 PROCEDURE — G1004 CDSM NDSC: HCPCS

## 2020-12-15 PROCEDURE — 86140 C-REACTIVE PROTEIN: CPT | Performed by: EMERGENCY MEDICINE

## 2020-12-15 PROCEDURE — 85379 FIBRIN DEGRADATION QUANT: CPT | Performed by: EMERGENCY MEDICINE

## 2020-12-15 PROCEDURE — 0241U HB NFCT DS VIR RESP RNA 4 TRGT: CPT | Performed by: EMERGENCY MEDICINE

## 2020-12-15 PROCEDURE — 83880 ASSAY OF NATRIURETIC PEPTIDE: CPT | Performed by: EMERGENCY MEDICINE

## 2020-12-15 PROCEDURE — 86900 BLOOD TYPING SEROLOGIC ABO: CPT | Performed by: EMERGENCY MEDICINE

## 2020-12-15 PROCEDURE — 96360 HYDRATION IV INFUSION INIT: CPT

## 2020-12-15 PROCEDURE — 99285 EMERGENCY DEPT VISIT HI MDM: CPT

## 2020-12-15 PROCEDURE — 80053 COMPREHEN METABOLIC PANEL: CPT | Performed by: EMERGENCY MEDICINE

## 2020-12-15 PROCEDURE — 93005 ELECTROCARDIOGRAM TRACING: CPT

## 2020-12-15 PROCEDURE — 83605 ASSAY OF LACTIC ACID: CPT | Performed by: EMERGENCY MEDICINE

## 2020-12-15 PROCEDURE — 71045 X-RAY EXAM CHEST 1 VIEW: CPT

## 2020-12-15 PROCEDURE — 84146 ASSAY OF PROLACTIN: CPT | Performed by: EMERGENCY MEDICINE

## 2020-12-15 PROCEDURE — 71275 CT ANGIOGRAPHY CHEST: CPT

## 2020-12-15 PROCEDURE — 84484 ASSAY OF TROPONIN QUANT: CPT | Performed by: EMERGENCY MEDICINE

## 2020-12-15 RX ORDER — ASCORBIC ACID 500 MG
1000 TABLET ORAL EVERY 12 HOURS SCHEDULED
Status: DISCONTINUED | OUTPATIENT
Start: 2020-12-15 | End: 2020-12-16 | Stop reason: HOSPADM

## 2020-12-15 RX ORDER — MULTIVITAMIN/IRON/FOLIC ACID 18MG-0.4MG
1 TABLET ORAL DAILY
Status: DISCONTINUED | OUTPATIENT
Start: 2020-12-23 | End: 2020-12-16 | Stop reason: HOSPADM

## 2020-12-15 RX ORDER — MELATONIN
2000 DAILY
Status: DISCONTINUED | OUTPATIENT
Start: 2020-12-16 | End: 2020-12-16 | Stop reason: HOSPADM

## 2020-12-15 RX ORDER — POTASSIUM CHLORIDE 20 MEQ/1
20 TABLET, EXTENDED RELEASE ORAL ONCE
Status: COMPLETED | OUTPATIENT
Start: 2020-12-15 | End: 2020-12-15

## 2020-12-15 RX ORDER — ALBUTEROL SULFATE 90 UG/1
2 AEROSOL, METERED RESPIRATORY (INHALATION) ONCE
Status: COMPLETED | OUTPATIENT
Start: 2020-12-15 | End: 2020-12-15

## 2020-12-15 RX ORDER — ZINC SULFATE 50(220)MG
220 CAPSULE ORAL DAILY
Status: DISCONTINUED | OUTPATIENT
Start: 2020-12-16 | End: 2020-12-16 | Stop reason: HOSPADM

## 2020-12-15 RX ADMIN — IOHEXOL 85 ML: 350 INJECTION, SOLUTION INTRAVENOUS at 22:33

## 2020-12-15 RX ADMIN — SODIUM CHLORIDE 1000 ML: 0.9 INJECTION, SOLUTION INTRAVENOUS at 19:59

## 2020-12-15 RX ADMIN — OXYCODONE HYDROCHLORIDE AND ACETAMINOPHEN 1000 MG: 500 TABLET ORAL at 20:02

## 2020-12-15 RX ADMIN — POTASSIUM CHLORIDE 20 MEQ: 1500 TABLET, EXTENDED RELEASE ORAL at 20:02

## 2020-12-15 RX ADMIN — ALBUTEROL SULFATE 2 PUFF: 90 AEROSOL, METERED RESPIRATORY (INHALATION) at 22:58

## 2020-12-16 ENCOUNTER — DOCUMENTATION (OUTPATIENT)
Dept: EMERGENCY DEPT | Facility: HOSPITAL | Age: 30
End: 2020-12-16

## 2020-12-16 LAB
ATRIAL RATE: 81 BPM
ATRIAL RATE: 94 BPM
P AXIS: 29 DEGREES
P AXIS: 45 DEGREES
PR INTERVAL: 182 MS
PR INTERVAL: 186 MS
QRS AXIS: 15 DEGREES
QRS AXIS: 35 DEGREES
QRSD INTERVAL: 90 MS
QRSD INTERVAL: 92 MS
QT INTERVAL: 348 MS
QT INTERVAL: 362 MS
QTC INTERVAL: 420 MS
QTC INTERVAL: 435 MS
T WAVE AXIS: 35 DEGREES
T WAVE AXIS: 41 DEGREES
VENTRICULAR RATE: 81 BPM
VENTRICULAR RATE: 94 BPM

## 2020-12-16 PROCEDURE — 93010 ELECTROCARDIOGRAM REPORT: CPT | Performed by: INTERNAL MEDICINE

## 2020-12-28 NOTE — ASSESSMENT & PLAN NOTE
Incoming ER visit note received from [facility], patient scheduled with Dr. Rae 12/30/20 for follow up.   Monitor  [facility] referral discussed with case management  - patient is on Suboxone 3 times daily, medication has been updated inpatient  Waiting for inpatient rehab placement

## 2021-01-03 ENCOUNTER — HOSPITAL ENCOUNTER (EMERGENCY)
Facility: HOSPITAL | Age: 31
Discharge: HOME | End: 2021-01-04
Attending: EMERGENCY MEDICINE | Admitting: EMERGENCY MEDICINE
Payer: COMMERCIAL

## 2021-01-03 ENCOUNTER — APPOINTMENT (EMERGENCY)
Dept: RADIOLOGY | Facility: HOSPITAL | Age: 31
End: 2021-01-03
Payer: COMMERCIAL

## 2021-01-03 DIAGNOSIS — R46.2 BIZARRE BEHAVIOR: ICD-10-CM

## 2021-01-03 DIAGNOSIS — F10.929 ALCOHOL INTOXICATION (HCC): Primary | ICD-10-CM

## 2021-01-03 DIAGNOSIS — G89.29 CHRONIC DENTAL PAIN: ICD-10-CM

## 2021-01-03 DIAGNOSIS — K08.9 CHRONIC DENTAL PAIN: ICD-10-CM

## 2021-01-03 LAB
ALBUMIN SERPL BCP-MCNC: 3.9 G/DL (ref 3.5–5)
ALP SERPL-CCNC: 102 U/L (ref 46–116)
ALT SERPL W P-5'-P-CCNC: 49 U/L (ref 12–78)
ANION GAP SERPL CALCULATED.3IONS-SCNC: 12 MMOL/L (ref 4–13)
APAP SERPL-MCNC: <2 UG/ML (ref 10–20)
AST SERPL W P-5'-P-CCNC: 40 U/L (ref 5–45)
BASOPHILS # BLD AUTO: 0.05 THOUSANDS/ΜL (ref 0–0.1)
BASOPHILS NFR BLD AUTO: 0 % (ref 0–1)
BILIRUB SERPL-MCNC: 0.19 MG/DL (ref 0.2–1)
BUN SERPL-MCNC: 18 MG/DL (ref 5–25)
CALCIUM SERPL-MCNC: 8.6 MG/DL (ref 8.3–10.1)
CHLORIDE SERPL-SCNC: 104 MMOL/L (ref 100–108)
CO2 SERPL-SCNC: 25 MMOL/L (ref 21–32)
CREAT SERPL-MCNC: 0.47 MG/DL (ref 0.6–1.3)
EOSINOPHIL # BLD AUTO: 0.11 THOUSAND/ΜL (ref 0–0.61)
EOSINOPHIL NFR BLD AUTO: 1 % (ref 0–6)
ERYTHROCYTE [DISTWIDTH] IN BLOOD BY AUTOMATED COUNT: 15.9 % (ref 11.6–15.1)
ETHANOL SERPL-MCNC: 366 MG/DL (ref 0–3)
GFR SERPL CREATININE-BSD FRML MDRD: 134 ML/MIN/1.73SQ M
GLUCOSE SERPL-MCNC: 101 MG/DL (ref 65–140)
HCT VFR BLD AUTO: 40.7 % (ref 34.8–46.1)
HGB BLD-MCNC: 12.6 G/DL (ref 11.5–15.4)
IMM GRANULOCYTES # BLD AUTO: 0.14 THOUSAND/UL (ref 0–0.2)
IMM GRANULOCYTES NFR BLD AUTO: 1 % (ref 0–2)
LYMPHOCYTES # BLD AUTO: 7.77 THOUSANDS/ΜL (ref 0.6–4.47)
LYMPHOCYTES NFR BLD AUTO: 58 % (ref 14–44)
MCH RBC QN AUTO: 26.9 PG (ref 26.8–34.3)
MCHC RBC AUTO-ENTMCNC: 31 G/DL (ref 31.4–37.4)
MCV RBC AUTO: 87 FL (ref 82–98)
MONOCYTES # BLD AUTO: 0.86 THOUSAND/ΜL (ref 0.17–1.22)
MONOCYTES NFR BLD AUTO: 6 % (ref 4–12)
NEUTROPHILS # BLD AUTO: 4.64 THOUSANDS/ΜL (ref 1.85–7.62)
NEUTS SEG NFR BLD AUTO: 34 % (ref 43–75)
NRBC BLD AUTO-RTO: 0 /100 WBCS
PLATELET # BLD AUTO: 268 THOUSANDS/UL (ref 149–390)
PMV BLD AUTO: 9.9 FL (ref 8.9–12.7)
POTASSIUM SERPL-SCNC: 4.7 MMOL/L (ref 3.5–5.3)
PROT SERPL-MCNC: 7.4 G/DL (ref 6.4–8.2)
RBC # BLD AUTO: 4.69 MILLION/UL (ref 3.81–5.12)
SALICYLATES SERPL-MCNC: 3.4 MG/DL (ref 3–20)
SODIUM SERPL-SCNC: 141 MMOL/L (ref 136–145)
WBC # BLD AUTO: 13.57 THOUSAND/UL (ref 4.31–10.16)

## 2021-01-03 PROCEDURE — 93005 ELECTROCARDIOGRAM TRACING: CPT

## 2021-01-03 PROCEDURE — 96361 HYDRATE IV INFUSION ADD-ON: CPT

## 2021-01-03 PROCEDURE — 99285 EMERGENCY DEPT VISIT HI MDM: CPT

## 2021-01-03 PROCEDURE — 80143 DRUG ASSAY ACETAMINOPHEN: CPT | Performed by: EMERGENCY MEDICINE

## 2021-01-03 PROCEDURE — 36415 COLL VENOUS BLD VENIPUNCTURE: CPT | Performed by: EMERGENCY MEDICINE

## 2021-01-03 PROCEDURE — 80053 COMPREHEN METABOLIC PANEL: CPT | Performed by: EMERGENCY MEDICINE

## 2021-01-03 PROCEDURE — 80179 DRUG ASSAY SALICYLATE: CPT | Performed by: EMERGENCY MEDICINE

## 2021-01-03 PROCEDURE — 99283 EMERGENCY DEPT VISIT LOW MDM: CPT | Performed by: EMERGENCY MEDICINE

## 2021-01-03 PROCEDURE — 71045 X-RAY EXAM CHEST 1 VIEW: CPT

## 2021-01-03 PROCEDURE — 85025 COMPLETE CBC W/AUTO DIFF WBC: CPT | Performed by: EMERGENCY MEDICINE

## 2021-01-03 PROCEDURE — 82077 ASSAY SPEC XCP UR&BREATH IA: CPT | Performed by: EMERGENCY MEDICINE

## 2021-01-03 PROCEDURE — 96374 THER/PROPH/DIAG INJ IV PUSH: CPT

## 2021-01-03 RX ORDER — LORAZEPAM 2 MG/ML
1 INJECTION INTRAMUSCULAR ONCE
Status: COMPLETED | OUTPATIENT
Start: 2021-01-03 | End: 2021-01-03

## 2021-01-03 RX ADMIN — SODIUM CHLORIDE 1000 ML: 0.9 INJECTION, SOLUTION INTRAVENOUS at 19:31

## 2021-01-03 RX ADMIN — LORAZEPAM 1 MG: 2 INJECTION INTRAMUSCULAR; INTRAVENOUS at 18:35

## 2021-01-03 NOTE — ED PROVIDER NOTES
History  Chief Complaint   Patient presents with    Alcohol Intoxication     Per EMS pt found in the mall bathroom by mall staff who called [phone], upon EMS arrival pt a&o speaking with EMS upon arrival in ED pt started posturing  Per pts father pt with annalise when drinks alcohol     34 yo female brought by EMS from the mall, where police were called when she was found drinking alcohol and acting erratic and intoxicated in the bathroom as they were closing   Police initially intended to escort her out and manage it has public intoxication, but she became more belligerent and bizaare, so paramedics were called to manage her as a psychiatric call  She was agitated and belligerent, but was apparently redirectable and transported initially calmly by paramedics  Then she appeared to "posture" and became acutely agitated and hyperventilating, and appeared to be gasping for air  She arrived obtunded then periodically agitated, not follow commands, and required restraints on arrival   I am familiar with Ms Unique Betancourt from recent ED care for chronic, recurrent pulmonary issues and am aware she recently was admitted at [facility] for lung biopsy  She has a dressing her right chest wall  History provided by:  EMS personnel  History limited by:  Psychiatric disorder      Prior to Admission Medications   Prescriptions Last Dose Informant Patient Reported? Taking?    LORazepam (ATIVAN) 1 mg tablet   No No   Sig: Take 0 5 tablets by mouth every 8 (eight) hours as needed for anxiety (moderate anxiety) for up to 10 doses   Pancrelipase, Lip-Prot-Amyl, (CREON) 27477 units CPEP   Yes No   Sig: Take 1 capsule by mouth 3 (three) times a day   acetaminophen (TYLENOL) 325 mg tablet   No No   Sig: Take 2 tablets (650 mg total) by mouth every 6 (six) hours as needed for mild pain or headaches   albuterol (PROVENTIL HFA,VENTOLIN HFA) 90 mcg/act inhaler   No No   Sig: Inhale 2 puffs every 4 (four) hours as needed for wheezing budesonide-formoterol (SYMBICORT) 80-4 5 MCG/ACT inhaler   No No   Sig: Inhale 2 puffs 2 (two) times a day Rinse mouth after use     buprenorphine-naloxone (SUBOXONE) 8-2 mg per SL tablet  Self Yes No   Sig: Place 1 tablet under the tongue 3 (three) times a day   citalopram (CeleXA) 40 mg tablet  Self Yes No   Sig: Take 40 mg by mouth daily    clonazePAM (KlonoPIN) 0 5 mg tablet   No No   Sig: Take 1 tablet (0 5 mg total) by mouth 2 (two) times a day as needed for anxiety for up to 10 days   folic acid (FOLVITE) 1 mg tablet   Yes No   Sig: Take 1 mg by mouth daily   gabapentin (NEURONTIN) 400 mg capsule   No No   Sig: Take 1 capsule (400 mg total) by mouth 3 (three) times a day   lidocaine (LIDODERM) 5 %   No No   Sig: Apply 1 patch topically daily Remove & Discard patch within 12 hours or as directed by MD   melatonin 3 mg   Yes No   Sig: Take 1 tablet by mouth daily at bedtime    meloxicam (MOBIC) 15 mg tablet   No No   Sig: Take 1 tablet (15 mg total) by mouth daily   methocarbamol (ROBAXIN) 500 mg tablet   Yes No   Sig: Take 500 mg by mouth 3 (three) times a day   multivitamin (THERAGRAN) TABS   Yes No   Sig: Take 1 tablet by mouth daily   pantoprazole (PROTONIX) 40 mg tablet   Yes No   Sig: Take 40 mg by mouth 2 (two) times a day   prazosin (MINIPRESS) 5 mg capsule  Self Yes No   Sig: Take 2 mg by mouth daily at bedtime    sucralfate (CARAFATE) 1 g/10 mL suspension  Self Yes No   Sig: Take 1 g by mouth 4 (four) times a day   thiamine 100 MG tablet   No No   Sig: Take 1 tablet by mouth daily   topiramate (TOPAMAX) 100 mg tablet  Self Yes No   Sig: Take 200 mg by mouth 2 (two) times a day    topiramate (TOPAMAX) 200 MG tablet   Yes No   Sig: Take 200 mg by mouth daily at bedtime    zinc gluconate 50 mg tablet   Yes No   Sig: Take 50 mg by mouth daily      Facility-Administered Medications: None       Past Medical History:   Diagnosis Date    Alcohol abuse     Alcohol abuse     Anxiety     Asthma     Bipolar disorder ([facility] [address] )     Chronic pain disorder 10/4/2020    Depression     Pancreatitis     Panic disorder 10/11/2016    Psychiatric disorder     PTSD (post-traumatic stress disorder)     Seizures (HCC)     Self-injurious behavior        Past Surgical History:   Procedure Laterality Date    ESOPHAGOGASTRODUODENOSCOPY N/A 4/10/2017    Procedure: ESOPHAGOGASTRODUODENOSCOPY (EGD); Surgeon: Romaine Curran MD;  Location: [facility]; Service:     INDUCED       WISDOM TOOTH EXTRACTION         Family History   Problem Relation Age of Onset    Lupus Father     Coronary artery disease Father     Stroke Father      I have reviewed and agree with the history as documented  E-Cigarette/Vaping    E-Cigarette Use Never User      E-Cigarette/Vaping Substances    Nicotine No     THC No     CBD No     Flavoring No     Other No     Unknown No      Social History     Tobacco Use    Smoking status: Current Every Day Smoker     Packs/day: 1 00     Years: 10 00     Pack years: 10 00    Smokeless tobacco: Never Used   Substance Use Topics    Alcohol use: Never     Frequency: Never     Drinks per session: Patient refused     Binge frequency: Never     Comment: in NA at this time    Drug use: No     Comment: in NA at this time       Review of Systems    Physical Exam  Physical Exam  Vitals signs and nursing note reviewed  Constitutional:       Appearance: She is overweight  HENT:      Head: Atraumatic  Nose: Nose normal       Mouth/Throat:      Mouth: Mucous membranes are moist    Cardiovascular:      Rate and Rhythm: Tachycardia present  Pulmonary:      Effort: Tachypnea present  No accessory muscle usage or prolonged expiration  Breath sounds: No decreased air movement  No decreased breath sounds  Abdominal:      General: Abdomen is flat  Skin:     Capillary Refill: Capillary refill takes less than 2 seconds  Psychiatric:         Speech: She is noncommunicative           Behavior: Behavior is uncooperative, agitated and combative  Comments: After she was placed in restraints and we tried to decrease the amount of external stimulation, she was heard by the nurse to say "I"m about to freak out" but was not otherwise participating in any relevant details, then she began to cry "Mom"  Vital Signs  ED Triage Vitals   Temperature Pulse Respirations Blood Pressure SpO2   01/03/21 1832 01/03/21 1820 01/03/21 1820 01/03/21 1830 01/03/21 1820   98 °F (36 7 °C) (!) 122 20 145/92 93 %      Temp Source Heart Rate Source Patient Position - Orthostatic VS BP Location FiO2 (%)   01/03/21 1832 01/03/21 1820 01/03/21 2115 01/03/21 1830 --   Temporal Monitor Lying Right leg       Pain Score       01/04/21 0038       7           Vitals:    01/03/21 2115 01/03/21 2145 01/03/21 2245 01/04/21 0038   BP: 118/69 122/56 125/59 101/51   Pulse: 94 84 82 95   Patient Position - Orthostatic VS: Lying Lying Lying Lying         Visual Acuity      ED Medications  Medications   LORazepam (ATIVAN) injection 1 mg (1 mg Intravenous Given 1/3/21 1835)   sodium chloride 0 9 % bolus 1,000 mL (0 mL Intravenous Stopped 1/3/21 2218)   LORazepam (ATIVAN) tablet 1 mg (1 mg Oral Given 1/4/21 0103)   ibuprofen (MOTRIN) tablet 600 mg (600 mg Oral Given 1/4/21 0103)       Diagnostic Studies  Results Reviewed     Procedure Component Value Units Date/Time    Acetaminophen level-If concentration is detectable, please discuss with medical  on call   [[id number]]  (Abnormal) Collected: 01/03/21 1821    Lab Status: Final result Specimen: Blood from Arm, Right Updated: 01/03/21 1908     Acetaminophen Level <2 ug/mL     Ethanol [[id number]]  (Abnormal) Collected: 01/03/21 1821    Lab Status: Final result Specimen: Blood from Arm, Right Updated: 01/03/21 1905     Ethanol Lvl 294 mg/dL     Salicylate level [[id number]]  (Normal) Collected: 01/03/21 1821    Lab Status: Final result Specimen: Blood from Arm, Right Updated: 92/61/59 6990     Salicylate Lvl 3 4 mg/dL     Comprehensive metabolic panel [[id number]]  (Abnormal) Collected: 01/03/21 1821    Lab Status: Final result Specimen: Blood from Arm, Right Updated: 01/03/21 1857     Sodium 141 mmol/L      Potassium 4 7 mmol/L      Chloride 104 mmol/L      CO2 25 mmol/L      ANION GAP 12 mmol/L      BUN 18 mg/dL      Creatinine 0 47 mg/dL      Glucose 101 mg/dL      Calcium 8 6 mg/dL      AST 40 U/L      ALT 49 U/L      Alkaline Phosphatase 102 U/L      Total Protein 7 4 g/dL      Albumin 3 9 g/dL      Total Bilirubin 0 19 mg/dL      eGFR 134 ml/min/1 73sq m     Narrative:      [place] guidelines for Chronic Kidney Disease (CKD):     Stage 1 with normal or high GFR (GFR > 90 mL/min/1 73 square meters)    Stage 2 Mild CKD (GFR = 60-89 mL/min/1 73 square meters)    Stage 3A Moderate CKD (GFR = 45-59 mL/min/1 73 square meters)    Stage 3B Moderate CKD (GFR = 30-44 mL/min/1 73 square meters)    Stage 4 Severe CKD (GFR = 15-29 mL/min/1 73 square meters)    Stage 5 End Stage CKD (GFR <15 mL/min/1 73 square meters)  Note: GFR calculation is accurate only with a steady state creatinine    CBC and differential [[id number]]  (Abnormal) Collected: 01/03/21 1821    Lab Status: Final result Specimen: Blood from Arm, Right Updated: 01/03/21 1828     WBC 13 57 Thousand/uL      RBC 4 69 Million/uL      Hemoglobin 12 6 g/dL      Hematocrit 40 7 %      MCV 87 fL      MCH 26 9 pg      MCHC 31 0 g/dL      RDW 15 9 %      MPV 9 9 fL      Platelets 729 Thousands/uL      nRBC 0 /100 WBCs      Neutrophils Relative 34 %      Immat GRANS % 1 %      Lymphocytes Relative 58 %      Monocytes Relative 6 %      Eosinophils Relative 1 %      Basophils Relative 0 %      Neutrophils Absolute 4 64 Thousands/µL      Immature Grans Absolute 0 14 Thousand/uL      Lymphocytes Absolute 7 77 Thousands/µL      Monocytes Absolute 0 86 Thousand/µL      Eosinophils Absolute 0 11 Thousand/µL Basophils Absolute 0 05 Thousands/µL                  XR chest 1 view portable   ED Interpretation by Farhad Spaulding MD (01/03 1918)   No acute findings         Final Result by Fay Mendez MD (01/04 1934)      No acute cardiopulmonary disease  Workstation performed: [id number]                    Procedures  ECG 12 Lead Documentation Only    Date/Time: 1/3/2021 6:17 PM  Performed by: Farhad Spaulding MD  Authorized by: Farhad Spaulding MD     Rate:     ECG rate:  99  Rhythm:     Rhythm: sinus tachycardia               ED Course  ED Course as of Jan 04 1404   Sun Jan 03, 2021   [address]): 366   1918 No acute findings      XR chest 1 view portable   2120 She is more alert, oriented to hospital, recalls she came by ambulance, although she picked the wrong hospital   She admits to drinking alcohol, although she is not able to give details of the immediate events prior to arrival   She is calm now, c/o feeling "anxious " She is chronically prescribed  benzodiazepines, although I am not inclined to give additional intoxicants at this time   36 I am considering her stable medically and she continues to metabolize alcohol and become more sober  Her father is aware she is in the ED, and she can be discharged with him when she is consistently awake, oriented  SBIRT 20yo+      Most Recent Value   SBIRT (22 yo +)   In order to provide better care to our patients, we are screening all of our patients for alcohol and drug use  Would it be okay to ask you these screening questions? No Filed at: 01/04/2021 0121                    MDM  Number of Diagnoses or Management Options  Alcohol intoxication [facility]):   Bizarre behavior:   Chronic dental pain:   Diagnosis management comments: 00:00  Care is handed over to Dr Gladis Gonzalez, who will continue to reassess for sobriety, and consider discharging her with her father when she is able to ambulate  Disposition  Final diagnoses:   [facility] )   Bizarre behavior   Chronic dental pain     Time reflects when diagnosis was documented in both MDM as applicable and the Disposition within this note     Time User Action Codes Description Comment    1/3/2021  7:29 PM Naina Likes Add [F10 929] Alcohol intoxication ([facility] )     1/4/2021  1:54 AM Elvira HOWELL Add [R46 2] Bizarre behavior     1/4/2021  1:54 AM Ramona Beatty Add [K08 9,  G89 29] Chronic dental pain       ED Disposition     ED Disposition Condition Date/Time Comment    Discharge Stable Mon Jan 4, 2021  1:54 AM Afshin Emmanuel discharge to home/self care              Follow-up Information     Follow up With Specialties Details Why Contact Ronak Miranda, DO Internal Medicine Go to  As needed [phone]  [phone]            Discharge Medication List as of 1/4/2021  1:54 AM      CONTINUE these medications which have NOT CHANGED    Details   acetaminophen (TYLENOL) 325 mg tablet Take 2 tablets (650 mg total) by mouth every 6 (six) hours as needed for mild pain or headaches, Starting Wed 2/27/2019, Print      albuterol (PROVENTIL HFA,VENTOLIN HFA) 90 mcg/act inhaler Inhale 2 puffs every 4 (four) hours as needed for wheezing, Starting Fri 7/3/2020, Normal      budesonide-formoterol (SYMBICORT) 80-4 5 MCG/ACT inhaler Inhale 2 puffs 2 (two) times a day Rinse mouth after use , Starting Thu 10/8/2020, Normal      buprenorphine-naloxone (SUBOXONE) 8-2 mg per SL tablet Place 1 tablet under the tongue 3 (three) times a day, Historical Med      citalopram (CeleXA) 40 mg tablet Take 40 mg by mouth daily , Historical Med      clonazePAM (KlonoPIN) 0 5 mg tablet Take 1 tablet (0 5 mg total) by mouth 2 (two) times a day as needed for anxiety for up to 10 days, Starting Fri 7/3/2020, Until Sun 86/1/7354, Normal      folic acid (FOLVITE) 1 mg tablet Take 1 mg by mouth daily, Until Discontinued, Historical Med      gabapentin (NEURONTIN) 400 mg capsule Take 1 capsule (400 mg total) by mouth 3 (three) times a day, Starting Fri 7/3/2020, No Print      lidocaine (LIDODERM) 5 % Apply 1 patch topically daily Remove & Discard patch within 12 hours or as directed by MD, Starting Fri 7/3/2020, Normal      LORazepam (ATIVAN) 1 mg tablet Take 0 5 tablets by mouth every 8 (eight) hours as needed for anxiety (moderate anxiety) for up to 10 doses, Starting 4/11/2017, Until Discontinued, No Print      melatonin 3 mg Take 1 tablet by mouth daily at bedtime , Historical Med      meloxicam (MOBIC) 15 mg tablet Take 1 tablet (15 mg total) by mouth daily, Starting Fri 7/3/2020, Normal      methocarbamol (ROBAXIN) 500 mg tablet Take 500 mg by mouth 3 (three) times a day, Historical Med      multivitamin (THERAGRAN) TABS Take 1 tablet by mouth daily, Historical Med      Pancrelipase, Lip-Prot-Amyl, (CREON) 00918 units CPEP Take 1 capsule by mouth 3 (three) times a day, Historical Med      pantoprazole (PROTONIX) 40 mg tablet Take 40 mg by mouth 2 (two) times a day, Historical Med      prazosin (MINIPRESS) 5 mg capsule Take 2 mg by mouth daily at bedtime , Historical Med      sucralfate (CARAFATE) 1 g/10 mL suspension Take 1 g by mouth 4 (four) times a day, Historical Med      thiamine 100 MG tablet Take 1 tablet by mouth daily, Starting 6/7/2017, Until Discontinued, Print      !! topiramate (TOPAMAX) 100 mg tablet Take 200 mg by mouth 2 (two) times a day , Historical Med      !! topiramate (TOPAMAX) 200 MG tablet Take 200 mg by mouth daily at bedtime , Historical Med      zinc gluconate 50 mg tablet Take 50 mg by mouth daily, Historical Med       !! - Potential duplicate medications found  Please discuss with provider  No discharge procedures on file      PDMP Review       Value Time User    PDMP Reviewed  Yes 7/2/2020 10:23 AM Joey Gusman DO          ED Provider  Electronically Signed by           Abrahan Dowling Mark Torres MD  01/04/21 0000

## 2021-01-04 VITALS
HEART RATE: 95 BPM | WEIGHT: 213 LBS | BODY MASS INDEX: 32.39 KG/M2 | OXYGEN SATURATION: 99 % | SYSTOLIC BLOOD PRESSURE: 101 MMHG | DIASTOLIC BLOOD PRESSURE: 51 MMHG | TEMPERATURE: 98 F | RESPIRATION RATE: 16 BRPM

## 2021-01-04 LAB
ATRIAL RATE: 99 BPM
P AXIS: 61 DEGREES
PR INTERVAL: 178 MS
QRS AXIS: 51 DEGREES
QRSD INTERVAL: 90 MS
QT INTERVAL: 316 MS
QTC INTERVAL: 405 MS
T WAVE AXIS: 63 DEGREES
VENTRICULAR RATE: 99 BPM

## 2021-01-04 PROCEDURE — 93010 ELECTROCARDIOGRAM REPORT: CPT

## 2021-01-04 PROCEDURE — 96361 HYDRATE IV INFUSION ADD-ON: CPT

## 2021-01-04 RX ORDER — LORAZEPAM 1 MG/1
1 TABLET ORAL ONCE
Status: COMPLETED | OUTPATIENT
Start: 2021-01-04 | End: 2021-01-04

## 2021-01-04 RX ORDER — IBUPROFEN 600 MG/1
600 TABLET ORAL ONCE
Status: COMPLETED | OUTPATIENT
Start: 2021-01-04 | End: 2021-01-04

## 2021-01-04 RX ADMIN — LORAZEPAM 1 MG: 1 TABLET ORAL at 01:03

## 2021-01-04 RX ADMIN — IBUPROFEN 600 MG: 600 TABLET, FILM COATED ORAL at 01:03

## 2021-01-04 NOTE — DISCHARGE INSTRUCTIONS
Alcohol Intoxication   WHAT YOU NEED TO KNOW:   Alcohol intoxication is a harmful physical condition caused when you drink more alcohol than your body can handle  It is also called ethanol poisoning, or being drunk  Limit or avoid alcohol:  Men should not have more than 2 drinks per day  Women should not have more than 1 drink per day  A drink is 12 ounces of beer, 5 ounces of wine, or 1½ ounces of liquor  Do not drive or operate machines when you drink alcohol:  Make sure you always have someone to drive you when you drink alcohol  Contact your healthcare provider if:   You need help to stop drinking alcohol  You have trouble with work or school because you drink too much alcohol  You have physical or verbal fights because of alcohol  You have questions or concerns about your condition or care  Return to the emergency department if:   You have sudden trouble breathing or chest pain  You have a seizure  You feel sad enough to harm yourself or others  You have hallucinations (you see or hear things that are not real)  You cannot stop vomiting  You were in an accident because of alcohol

## 2021-01-04 NOTE — ED NOTES
Pt's  called updated on pt's status, made aware pt will be ready for d/c once she is more alert  Pt's case manage states she thinks pt should be admitted for alcohol rehab, believes pt's relapse is an attempt to harm her self  Case manage made aware pt will be evaluated again once one alert, also made aware pt denies SI, HI and is medically cleared there for there is no reason for pt to be admitted   states will update pt's family        Rosalba Braden RN  01/03/21 0196

## 2021-01-04 NOTE — ED NOTES
Pt's  Umer Arias [phone] called made RN aware pt was sober for a while recently relapsed   Pt's with recent stressors due health problems related to the lungs also grandmother passed aware around yeyo and sister  x2 years      Darylene Potts, RN  21

## 2021-06-02 ENCOUNTER — OFFICE VISIT (OUTPATIENT)
Dept: OBGYN CLINIC | Facility: CLINIC | Age: 31
End: 2021-06-02
Payer: COMMERCIAL

## 2021-06-02 VITALS
SYSTOLIC BLOOD PRESSURE: 149 MMHG | HEIGHT: 68 IN | BODY MASS INDEX: 32.28 KG/M2 | WEIGHT: 213 LBS | DIASTOLIC BLOOD PRESSURE: 79 MMHG | HEART RATE: 60 BPM

## 2021-06-02 DIAGNOSIS — M54.50 CHRONIC BILATERAL LOW BACK PAIN WITHOUT SCIATICA: Primary | ICD-10-CM

## 2021-06-02 DIAGNOSIS — M51.36 DDD (DEGENERATIVE DISC DISEASE), LUMBAR: ICD-10-CM

## 2021-06-02 DIAGNOSIS — G89.29 CHRONIC BILATERAL LOW BACK PAIN WITHOUT SCIATICA: Primary | ICD-10-CM

## 2021-06-02 PROCEDURE — 99204 OFFICE O/P NEW MOD 45 MIN: CPT | Performed by: ORTHOPAEDIC SURGERY

## 2021-06-02 NOTE — PROGRESS NOTES
Assessment/Plan:     back pain appears to be multifactorial and more so inflammatory in etiology  On clinical exam she is neurologically intact  She is globally tender to palpation over the thoracolumbar sacral spine  Imaging / MRI is reviewed and demonstrates mild spondylotic changes but no significant areas of stenosis  No acute fracture     she will follow-up with the PCP and rheumatologist regarding underlying etiology  I do recommend weight loss and physical therapy which she is already scheduled for to begin shortly     follow-up on a p r n  basis    No problem-specific Assessment & Plan notes found for this encounter  Chronic low back pain  · Surgery is not recommended  · Lumbar MRI reviewed with patient displaying no worrisome issues  · Patient's symptoms appear to be inflammatory in nature  · Patient advised to follow up with rheumatologist  · Patient encouraged to continue physical therapy  · Follow up as needed          Problem List Items Addressed This Visit     None      Visit Diagnoses     Chronic bilateral low back pain without sciatica    -  Primary    DDD (degenerative disc disease), lumbar                Subjective:      Patient ID: Joann Zeng is a 35 y o  female  HPI   The patient presents for initial evaluation of lumbar spine  She has had symptoms for 2 years with no injury  Today she complains of low back pain with bilateral buttock and posterior proximal bilateral thigh pain  She rates her pain at 0/10 and 10/10 at its worse  Walking aggravates while sitting alleviates  She is currently in physical therapy with limited benefit  She has history of lumbar LESTER, RFA with limited benefit at [place] [facility]  She denies past lumbar surgery  She is currently being worked up by multiple specialists  She mentions history of Covid-19 x2 with subsequent lung surgery        The following portions of the patient's history were reviewed and updated as appropriate: allergies, current medications, past family history, past medical history, past social history, past surgical history and problem list     Review of Systems   Constitutional: Negative for chills, fever and unexpected weight change  HENT: Negative for hearing loss, nosebleeds and sore throat  Eyes: Negative for pain, redness and visual disturbance  Respiratory: Negative for cough, shortness of breath and wheezing  Cardiovascular: Negative for chest pain, palpitations and leg swelling  Gastrointestinal: Negative for abdominal pain, nausea and vomiting  Genitourinary: Negative for dyspareunia, dysuria and frequency  Skin: Negative for rash and wound  Neurological: Negative for dizziness, numbness and headaches  Psychiatric/Behavioral: Negative for decreased concentration and suicidal ideas  The patient is not nervous/anxious  Objective:      /79   Pulse 60   Ht 5' 8" (1 727 m)   Wt 96 6 kg (213 lb)   BMI 32 39 kg/m²          Physical Exam  Constitutional:       Appearance: She is well-developed  HENT:      Head: Normocephalic  Eyes:      Conjunctiva/sclera: Conjunctivae normal    Neck:      Musculoskeletal: Normal range of motion  Cardiovascular:      Rate and Rhythm: Normal rate  Pulmonary:      Effort: Pulmonary effort is normal    Skin:     General: Skin is warm and dry  Neurological:      Mental Status: She is alert and oriented to person, place, and time  Patient ambulates without assistance   Tender to palpation: none   Modified straight leg raise negative bilaterally   Strength L2-S1 5/5 bilaterally   Sensation L2-S1 intact bilaterally       Imaging:  Lumbar MRI of 4/30/2021  Radiologist's Impression:    Chronic moderate compression deformity involving the superior end plate of L2 without osseous retropulsion  Overal mild degenerative changes of lumbar spine  No prominent spinal canal or neural foraminal narrowing  No definite nerve root impingement identified  Scribe Attestation    I,:  Karen Luciano am acting as a scribe while in the presence of the attending physician :       I,:  Geovanna Ramirez MD personally performed the services described in this documentation    as scribed in my presence :

## 2021-08-10 ENCOUNTER — HOSPITAL ENCOUNTER (EMERGENCY)
Facility: HOSPITAL | Age: 31
Discharge: HOME | End: 2021-08-10
Attending: EMERGENCY MEDICINE | Admitting: EMERGENCY MEDICINE
Payer: COMMERCIAL

## 2021-08-10 ENCOUNTER — APPOINTMENT (EMERGENCY)
Dept: RADIOLOGY | Facility: HOSPITAL | Age: 31
End: 2021-08-10
Payer: COMMERCIAL

## 2021-08-10 VITALS
SYSTOLIC BLOOD PRESSURE: 128 MMHG | WEIGHT: 261.02 LBS | DIASTOLIC BLOOD PRESSURE: 60 MMHG | OXYGEN SATURATION: 97 % | RESPIRATION RATE: 16 BRPM | BODY MASS INDEX: 39.69 KG/M2 | TEMPERATURE: 98.5 F | HEART RATE: 99 BPM

## 2021-08-10 DIAGNOSIS — F10.21 ALCOHOL INTOXICATION IN RELAPSED ALCOHOLIC (HCC): ICD-10-CM

## 2021-08-10 DIAGNOSIS — G40.919 BREAKTHROUGH SEIZURE (HCC): Primary | ICD-10-CM

## 2021-08-10 LAB
ANION GAP SERPL CALCULATED.3IONS-SCNC: 8 MMOL/L (ref 4–13)
BACTERIA UR QL AUTO: ABNORMAL /HPF
BASOPHILS # BLD MANUAL: 0 THOUSAND/UL (ref 0–0.1)
BASOPHILS NFR MAR MANUAL: 0 % (ref 0–1)
BILIRUB UR QL STRIP: NEGATIVE
BUN SERPL-MCNC: 7 MG/DL (ref 5–25)
CALCIUM SERPL-MCNC: 8.1 MG/DL (ref 8.3–10.1)
CHLORIDE SERPL-SCNC: 101 MMOL/L (ref 100–108)
CK MB SERPL-MCNC: 1 % (ref 0–2.5)
CK MB SERPL-MCNC: 2.3 NG/ML (ref 0–5)
CK SERPL-CCNC: 236 U/L (ref 26–192)
CLARITY UR: CLEAR
CO2 SERPL-SCNC: 24 MMOL/L (ref 21–32)
COLOR UR: YELLOW
CREAT SERPL-MCNC: 0.54 MG/DL (ref 0.6–1.3)
EOSINOPHIL # BLD MANUAL: 0.24 THOUSAND/UL (ref 0–0.4)
EOSINOPHIL NFR BLD MANUAL: 5 % (ref 0–6)
ERYTHROCYTE [DISTWIDTH] IN BLOOD BY AUTOMATED COUNT: 14.4 % (ref 11.6–15.1)
EXT PREG TEST URINE: NEGATIVE
EXT. CONTROL ED NAV: NORMAL
GFR SERPL CREATININE-BSD FRML MDRD: 128 ML/MIN/1.73SQ M
GLUCOSE SERPL-MCNC: 115 MG/DL (ref 65–140)
GLUCOSE UR STRIP-MCNC: NEGATIVE MG/DL
HCT VFR BLD AUTO: 34.1 % (ref 34.8–46.1)
HGB BLD-MCNC: 11.1 G/DL (ref 11.5–15.4)
HGB UR QL STRIP.AUTO: ABNORMAL
KETONES UR STRIP-MCNC: NEGATIVE MG/DL
LEUKOCYTE ESTERASE UR QL STRIP: NEGATIVE
LYMPHOCYTES # BLD AUTO: 2.45 THOUSAND/UL (ref 0.6–4.47)
LYMPHOCYTES # BLD AUTO: 51 % (ref 14–44)
MCH RBC QN AUTO: 28.4 PG (ref 26.8–34.3)
MCHC RBC AUTO-ENTMCNC: 32.6 G/DL (ref 31.4–37.4)
MCV RBC AUTO: 87 FL (ref 82–98)
MONOCYTES # BLD AUTO: 0.14 THOUSAND/UL (ref 0–1.22)
MONOCYTES NFR BLD: 3 % (ref 4–12)
NEUTROPHILS # BLD MANUAL: 1.97 THOUSAND/UL (ref 1.85–7.62)
NEUTS SEG NFR BLD AUTO: 41 % (ref 43–75)
NITRITE UR QL STRIP: NEGATIVE
NON-SQ EPI CELLS URNS QL MICRO: ABNORMAL /HPF
PH UR STRIP.AUTO: 6 [PH] (ref 4.5–8)
PLATELET # BLD AUTO: 192 THOUSANDS/UL (ref 149–390)
PLATELET BLD QL SMEAR: ADEQUATE
PMV BLD AUTO: 9.9 FL (ref 8.9–12.7)
POTASSIUM SERPL-SCNC: 4.7 MMOL/L (ref 3.5–5.3)
PROT UR STRIP-MCNC: NEGATIVE MG/DL
RBC # BLD AUTO: 3.91 MILLION/UL (ref 3.81–5.12)
RBC #/AREA URNS AUTO: ABNORMAL /HPF
RBC MORPH BLD: NORMAL
SODIUM SERPL-SCNC: 133 MMOL/L (ref 136–145)
SP GR UR STRIP.AUTO: 1.01 (ref 1–1.03)
TOTAL CELLS COUNTED SPEC: 100
UROBILINOGEN UR QL STRIP.AUTO: 0.2 E.U./DL
WBC # BLD AUTO: 4.81 THOUSAND/UL (ref 4.31–10.16)
WBC #/AREA URNS AUTO: ABNORMAL /HPF

## 2021-08-10 PROCEDURE — 81025 URINE PREGNANCY TEST: CPT | Performed by: EMERGENCY MEDICINE

## 2021-08-10 PROCEDURE — 85007 BL SMEAR W/DIFF WBC COUNT: CPT | Performed by: EMERGENCY MEDICINE

## 2021-08-10 PROCEDURE — 99285 EMERGENCY DEPT VISIT HI MDM: CPT | Performed by: EMERGENCY MEDICINE

## 2021-08-10 PROCEDURE — 82550 ASSAY OF CK (CPK): CPT | Performed by: EMERGENCY MEDICINE

## 2021-08-10 PROCEDURE — 71046 X-RAY EXAM CHEST 2 VIEWS: CPT

## 2021-08-10 PROCEDURE — 96374 THER/PROPH/DIAG INJ IV PUSH: CPT

## 2021-08-10 PROCEDURE — 36415 COLL VENOUS BLD VENIPUNCTURE: CPT | Performed by: EMERGENCY MEDICINE

## 2021-08-10 PROCEDURE — 81001 URINALYSIS AUTO W/SCOPE: CPT

## 2021-08-10 PROCEDURE — 80048 BASIC METABOLIC PNL TOTAL CA: CPT | Performed by: EMERGENCY MEDICINE

## 2021-08-10 PROCEDURE — 96361 HYDRATE IV INFUSION ADD-ON: CPT

## 2021-08-10 PROCEDURE — 82553 CREATINE MB FRACTION: CPT | Performed by: EMERGENCY MEDICINE

## 2021-08-10 PROCEDURE — 99284 EMERGENCY DEPT VISIT MOD MDM: CPT

## 2021-08-10 PROCEDURE — 85025 COMPLETE CBC W/AUTO DIFF WBC: CPT | Performed by: EMERGENCY MEDICINE

## 2021-08-10 PROCEDURE — 80201 ASSAY OF TOPIRAMATE: CPT | Performed by: EMERGENCY MEDICINE

## 2021-08-10 RX ORDER — LORAZEPAM 1 MG/1
1 TABLET ORAL ONCE
Status: COMPLETED | OUTPATIENT
Start: 2021-08-10 | End: 2021-08-10

## 2021-08-10 RX ORDER — KETOROLAC TROMETHAMINE 30 MG/ML
15 INJECTION, SOLUTION INTRAMUSCULAR; INTRAVENOUS ONCE
Status: DISCONTINUED | OUTPATIENT
Start: 2021-08-10 | End: 2021-08-10

## 2021-08-10 RX ORDER — KETOROLAC TROMETHAMINE 30 MG/ML
15 INJECTION, SOLUTION INTRAMUSCULAR; INTRAVENOUS ONCE
Status: COMPLETED | OUTPATIENT
Start: 2021-08-10 | End: 2021-08-10

## 2021-08-10 RX ORDER — ACETAMINOPHEN 325 MG/1
650 TABLET ORAL ONCE
Status: COMPLETED | OUTPATIENT
Start: 2021-08-10 | End: 2021-08-10

## 2021-08-10 RX ADMIN — ACETAMINOPHEN 650 MG: 325 TABLET, FILM COATED ORAL at 20:47

## 2021-08-10 RX ADMIN — LORAZEPAM 1 MG: 1 TABLET ORAL at 20:47

## 2021-08-10 RX ADMIN — KETOROLAC TROMETHAMINE 15 MG: 30 INJECTION, SOLUTION INTRAMUSCULAR; INTRAVENOUS at 19:45

## 2021-08-10 RX ADMIN — SODIUM CHLORIDE 1000 ML: 0.9 INJECTION, SOLUTION INTRAVENOUS at 20:56

## 2021-08-10 NOTE — ED ATTENDING ATTESTATION
8/10/2021  IMariana, DO, saw and evaluated the patient  I have discussed the patient with the resident/non-physician practitioner and agree with the resident's/non-physician practitioner's findings, Plan of Care, and MDM as documented in the resident's/non-physician practitioner's note, except where noted  All available labs and Radiology studies were reviewed  I was present for key portions of any procedure(s) performed by the resident/non-physician practitioner and I was immediately available to provide assistance  At this point I agree with the current assessment done in the Emergency Department  I have conducted an independent evaluation of this patient a history and physical is as follows:    ED Course     35 y o  F w/h/o seizures, alcohol abuse, polysubstance abuse, bipolar disorder p/w seizure x yesterday  Pt reports she drank alcohol yesterday for the first time in months, which typically causes breakthrough seizures  Coming in today because she doesn't want to have another seizure and that her whole body hurts, but states it could be her fibromyalgia  Also c/o left jaw/tooth pain and states she is supposed to have all her teeth removed in October  She denies F/C, HA, changes in vision, CP, SOB, abd pain, N/V, focal deficits  Plan: Labs, urine      Critical Care Time  Procedures

## 2021-08-10 NOTE — ED PROVIDER NOTES
History  Chief Complaint   Patient presents with    Seizure - Prior Hx Of     patient states, "i had a seizure last night and replased on alcohol"  patient states she started with liquor and has been drinking beer  per patient, "i just want to make sure i dont have another seizures"  also c/o jaw pain  HPI  32-year-old female history of seizures, alcohol use disorder, fibromyalgia, bipolar disorder, pancreatitis self-injurious behavior presents to ED for evaluation seizure  She states she had a seizure last night  She states she has been sober from alcohol for 8 months but she relapsed over the last 2 nights  She reports having an unwitnessed seizure yesterday  She states she was at her house and her parents rib stairs they have not see it  She reports a lot of guilt about relapsing  She describes body aches everywhere  She denies headache, vision changes, lightheadedness or dizziness  She states she is here today because she is afraid she will get another seizure  She takes Topamax and gabapentin for her seizures  Up to this point they are well controlled  She states any time she drinks she is seizure  She follows with a neurologist outpatient and has an appointment scheduled  Patient denies fevers, chills, chest pain, palpitations, abdominal pain, diarrhea, melena, hematochezia, dysuria, new skin rashes or numbness or tingling of the extremities  Prior to Admission Medications   Prescriptions Last Dose Informant Patient Reported? Taking?    LORazepam (ATIVAN) 1 mg tablet 8/9/2021 at Unknown time Self No Yes   Sig: Take 0 5 tablets by mouth every 8 (eight) hours as needed for anxiety (moderate anxiety) for up to 10 doses   Pancrelipase, Lip-Prot-Amyl, (CREON) 82104 units CPEP 8/9/2021 at Unknown time Self Yes Yes   Sig: Take 1 capsule by mouth 3 (three) times a day   acetaminophen (TYLENOL) 325 mg tablet 8/10/2021 at Unknown time Self No Yes   Sig: Take 2 tablets (650 mg total) by mouth every 6 (six) hours as needed for mild pain or headaches   albuterol (PROVENTIL HFA,VENTOLIN HFA) 90 mcg/act inhaler 8/9/2021 at Unknown time Self No Yes   Sig: Inhale 2 puffs every 4 (four) hours as needed for wheezing   budesonide-formoterol (SYMBICORT) 80-4 5 MCG/ACT inhaler 8/9/2021 at Unknown time Self No Yes   Sig: Inhale 2 puffs 2 (two) times a day Rinse mouth after use     buprenorphine-naloxone (SUBOXONE) 8-2 mg per SL tablet 8/10/2021 at Unknown time Self Yes Yes   Sig: Place 1 tablet under the tongue 3 (three) times a day   citalopram (CeleXA) 40 mg tablet 8/9/2021 at Unknown time Self Yes Yes   Sig: Take 40 mg by mouth daily    clonazePAM (KlonoPIN) 0 5 mg tablet 8/9/2021 at Unknown time  No Yes   Sig: Take 1 tablet (0 5 mg total) by mouth 2 (two) times a day as needed for anxiety for up to 10 days   folic acid (FOLVITE) 1 mg tablet Past Week at Unknown time Self Yes Yes   Sig: Take 1 mg by mouth daily   gabapentin (NEURONTIN) 400 mg capsule 8/10/2021 at Unknown time Self No Yes   Sig: Take 1 capsule (400 mg total) by mouth 3 (three) times a day   lidocaine (LIDODERM) 5 % Past Week at Unknown time Self No Yes   Sig: Apply 1 patch topically daily Remove & Discard patch within 12 hours or as directed by MD   melatonin 3 mg 8/9/2021 at Unknown time Self Yes Yes   Sig: Take 1 tablet by mouth daily at bedtime    meloxicam (MOBIC) 15 mg tablet 8/9/2021 at Unknown time Self No Yes   Sig: Take 1 tablet (15 mg total) by mouth daily   methocarbamol (ROBAXIN) 500 mg tablet 8/9/2021 at Unknown time Self Yes Yes   Sig: Take 500 mg by mouth 3 (three) times a day   multivitamin (THERAGRAN) TABS 8/9/2021 at Unknown time Self Yes Yes   Sig: Take 1 tablet by mouth daily   pantoprazole (PROTONIX) 40 mg tablet 8/10/2021 at Unknown time Self Yes Yes   Sig: Take 40 mg by mouth 2 (two) times a day   prazosin (MINIPRESS) 5 mg capsule 8/9/2021 at Unknown time Self Yes Yes   Sig: Take 2 mg by mouth daily at bedtime    sucralfate (CARAFATE) 1 g/10 mL suspension Past Week at Unknown time Self Yes Yes   Sig: Take 1 g by mouth 4 (four) times a day   thiamine 100 MG tablet 8/10/2021 at Unknown time Self No Yes   Sig: Take 1 tablet by mouth daily   topiramate (TOPAMAX) 100 mg tablet 8/10/2021 at Unknown time Self Yes Yes   Sig: Take 200 mg by mouth 2 (two) times a day    topiramate (TOPAMAX) 200 MG tablet 8/10/2021 at Unknown time Self Yes Yes   Sig: Take 200 mg by mouth daily at bedtime    zinc gluconate 50 mg tablet 8/10/2021 at Unknown time Self Yes Yes   Sig: Take 50 mg by mouth daily      Facility-Administered Medications: None       Past Medical History:   Diagnosis Date    Alcohol abuse     Alcohol abuse     Anxiety     Asthma     Bipolar disorder ([address] )     Chronic pain disorder 10/4/2020    Depression     Pancreatitis     Panic disorder 10/11/2016    Psychiatric disorder     PTSD (post-traumatic stress disorder)     Seizures (HCC)     Self-injurious behavior        Past Surgical History:   Procedure Laterality Date    ESOPHAGOGASTRODUODENOSCOPY N/A 4/10/2017    Procedure: ESOPHAGOGASTRODUODENOSCOPY (EGD); Surgeon: Orlando Song MD;  Location: [facility]; Service:     INDUCED       WISDOM TOOTH EXTRACTION         Family History   Problem Relation Age of Onset    Lupus Father     Coronary artery disease Father     Stroke Father      I have reviewed and agree with the history as documented      E-Cigarette/Vaping    E-Cigarette Use Never User      E-Cigarette/Vaping Substances    Nicotine No     THC No     CBD No     Flavoring No     Other No     Unknown No      Social History     Tobacco Use    Smoking status: Current Every Day Smoker     Packs/day: 1 00     Years: 10 00     Pack years: 10 00    Smokeless tobacco: Never Used   Vaping Use    Vaping Use: Never used   Substance Use Topics    Alcohol use: Never     Comment: in NA at this time    Drug use: No     Comment: in NA at this time        Review of Systems   All other systems reviewed and are negative  Physical Exam  ED Triage Vitals   Temperature Pulse Respirations Blood Pressure SpO2   08/10/21 1831 08/10/21 1829 08/10/21 1829 08/10/21 1829 08/10/21 1829   98 5 °F (36 9 °C) (!) 118 18 145/78 94 %      Temp Source Heart Rate Source Patient Position - Orthostatic VS BP Location FiO2 (%)   08/10/21 1831 08/10/21 2046 08/10/21 1829 08/10/21 1829 --   Oral Monitor Sitting Right arm       Pain Score       08/10/21 1945       8             Orthostatic Vital Signs  Vitals:    08/10/21 1829 08/10/21 2046 08/10/21 2244   BP: 145/78 125/60 128/60   Pulse: (!) 118 97 99   Patient Position - Orthostatic VS: Sitting Lying Lying       Physical Exam  Constitutional:  Well developed, well nourished, a nxious, non-toxic appearance    HEENT:  Conjunctiva normal  Oropharynx moist  Respiratory:  No respiratory distress, normal breath sounds  Cardiovascular:  Normal rate, normal rhythm, no murmurs  GI:  Soft, nondistended, normal bowel sounds, nontender  :  No costovertebral angle tenderness   Musculoskeletal:  No edema, no tenderness, no deformities     Integument:  Well hydrated, no rash   Lymphatic:  Chronic lower extremity lymphedema noted, no varicose veins noted, no tenderness palpation over calf  Neurologic:  Alert & oriented, normal motor function, normal sensory function, no focal deficits noted   Psychiatric:  Anxious    ED Medications  Medications   ketorolac (TORADOL) injection 15 mg (15 mg Intravenous Given 8/10/21 1945)   LORazepam (ATIVAN) tablet 1 mg (1 mg Oral Given 8/10/21 2047)   acetaminophen (TYLENOL) tablet 650 mg (650 mg Oral Given 8/10/21 2047)   sodium chloride 0 9 % bolus 1,000 mL (0 mL Intravenous Stopped 8/10/21 2200)       Diagnostic Studies  Results Reviewed     Procedure Component Value Units Date/Time    Urine Microscopic [[id number]]  (Abnormal) Collected: 08/10/21 2041    Lab Status: Final result Specimen: Urine, Clean Catch Updated: 08/10/21 2131     RBC, UA 1-2 /hpf      WBC, UA None Seen /hpf      Epithelial Cells Occasional /hpf      Bacteria, UA None Seen /hpf     POCT pregnancy, urine [[id number]]  (Normal) Resulted: 08/10/21 2044    Lab Status: Final result Updated: 08/10/21 2044     EXT PREG TEST UR (Ref: Negative) negative     Control valid    Urine Macroscopic, POC [[id number]]  (Abnormal) Collected: 08/10/21 2041    Lab Status: Final result Specimen: Urine Updated: 08/10/21 2043     Color, UA Yellow     Clarity, UA Clear     pH, UA 6 0     Leukocytes, UA Negative     Nitrite, UA Negative     Protein, UA Negative mg/dl      Glucose, UA Negative mg/dl      Ketones, UA Negative mg/dl      Urobilinogen, UA 0 2 E U /dl      Bilirubin, UA Negative     Blood, UA Trace     Specific [place], UA 1 010    Narrative:      CLINITEK RESULT    Basic metabolic panel [[id number]]  (Abnormal) Collected: 08/10/21 1945    Lab Status: Final result Specimen: Blood from Hand, Right Updated: 08/10/21 2042     Sodium 133 mmol/L      Potassium 4 7 mmol/L      Chloride 101 mmol/L      CO2 24 mmol/L      ANION GAP 8 mmol/L      BUN 7 mg/dL      Creatinine 0 54 mg/dL      Glucose 115 mg/dL      Calcium 8 1 mg/dL      eGFR 128 ml/min/1 73sq m     Narrative:      [place] guidelines for Chronic Kidney Disease (CKD):     Stage 1 with normal or high GFR (GFR > 90 mL/min/1 73 square meters)    Stage 2 Mild CKD (GFR = 60-89 mL/min/1 73 square meters)    Stage 3A Moderate CKD (GFR = 45-59 mL/min/1 73 square meters)    Stage 3B Moderate CKD (GFR = 30-44 mL/min/1 73 square meters)    Stage 4 Severe CKD (GFR = 15-29 mL/min/1 73 square meters)    Stage 5 End Stage CKD (GFR <15 mL/min/1 73 square meters)  Note: GFR calculation is accurate only with a steady state creatinine    CKMB [[id number]]  (Normal) Collected: 08/10/21 1945    Lab Status: Final result Specimen: Blood from Hand, Right Updated: 08/10/21 2042     CK-MB Index 1 0 %      CK-MB 2 3 ng/mL     CK (with reflex to MB) [[id number]]  (Abnormal) Collected: 08/10/21 1945    Lab Status: Final result Specimen: Blood from Hand, Right Updated: 08/10/21 2040     Total  U/L     Manual Differential(PHLEBS Do Not Order) [[id number]]  (Abnormal) Collected: 08/10/21 1945    Lab Status: Final result Specimen: Blood from Hand, Right Updated: 08/10/21 2019     Segmented % 41 %      Lymphocytes % 51 %      Monocytes % 3 %      Eosinophils, % 5 %      Basophils % 0 %      Absolute Neutrophils 1 97 Thousand/uL      Lymphocytes Absolute 2 45 Thousand/uL      Monocytes Absolute 0 14 Thousand/uL      Eosinophils Absolute 0 24 Thousand/uL      Basophils Absolute 0 00 Thousand/uL      Total Counted 100     RBC Morphology Normal     Platelet Estimate Adequate    CBC and differential [[id number]]  (Abnormal) Collected: 08/10/21 1945    Lab Status: Final result Specimen: Blood from Hand, Right Updated: 08/10/21 1958     WBC 4 81 Thousand/uL      RBC 3 91 Million/uL      Hemoglobin 11 1 g/dL      Hematocrit 34 1 %      MCV 87 fL      MCH 28 4 pg      MCHC 32 6 g/dL      RDW 14 4 %      MPV 9 9 fL      Platelets 074 Thousands/uL     Narrative: This is an appended report  These results have been appended to a previously verified report  Topiramate level [[id number]] Collected: 08/10/21 1945    Lab Status: In process Specimen: Blood from Hand, Right Updated: 08/10/21 1954                 XR chest 2 views   ED Interpretation by Romina Rios 24, DO (08/10 1959)   No acute abnormalities            Procedures  Procedures      ED Course  ED Course as of Aug 10 2327   Tue Aug 10, 2021   2054 CXR:  No acute cardiopulmonary process                                SBIRT 22yo+      Most Recent Value   SBIRT (23 yo +)   In order to provide better care to our patients, we are screening all of our patients for alcohol and drug use  Would it be okay to ask you these screening questions?   No Filed at: 08/10/2021 1913 MDM  Number of Diagnoses or Management Options  Alcohol intoxication in relapsed alcoholic ([facility] [address] )  Breakthrough seizure ([facility] )  Diagnosis management comments: 32-year-old female history of seizures, alcohol use disorder, fibromyalgia, bipolar disorder, pancreatitis and self-injurious behavior presents to ED for evaluation seizure  She has not had a seizure today  But she did have 1 last night after 2 days of binge drinking  Lab workup was unremarkable with exception of CK being slightly elevated which is expected after seizure activity  Patient was treated with IV fluids  Patient requested pain medications for her fibromyalgia  Then patient requested anti anxiety medication  Then patient requested pain medicine again  Patient asked to be admitted "to make sure she does not seizure again " We discussed that she needs follow-up with Neurology tomorrow or very soon, she needs to continue take her anti-epilepsy medications and she absolutely needs to stop drinking alcohol as this continues to lead to seizures  She reported understanding  Return precautions discussed including seizure-like activity         Amount and/or Complexity of Data Reviewed  Clinical lab tests: ordered and reviewed  Tests in the radiology section of CPT®: ordered and reviewed  Review and summarize past medical records: yes  Discuss the patient with other providers: yes    Risk of Complications, Morbidity, and/or Mortality  Presenting problems: moderate  Diagnostic procedures: moderate  Management options: low    Patient Progress  Patient progress: improved      Disposition  Final diagnoses:   Breakthrough seizure ([facility] )   Alcohol intoxication in relapsed alcoholic ([facility] )     Time reflects when diagnosis was documented in both MDM as applicable and the Disposition within this note     Time User Action Codes Description Comment    8/10/2021  9:59 PM Dmitri Arnold Add [G40 919] Breakthrough seizure ([facility] )     8/10/2021  9:59 PM Casa Lemus Add [F10 21] Alcohol intoxication in relapsed alcoholic [facility])       ED Disposition     ED Disposition Condition Date/Time Comment    Discharge Good Tue Aug 10, 2021 10:00 PM Cheri Lala discharge to home/self care              Follow-up Information     Follow up With Specialties Details Why Contact Jeff Valladares MD Family Medicine Schedule an appointment as soon as possible for a visit in 1 week  [address]  [phone]            Discharge Medication List as of 8/10/2021 10:00 PM      CONTINUE these medications which have NOT CHANGED    Details   acetaminophen (TYLENOL) 325 mg tablet Take 2 tablets (650 mg total) by mouth every 6 (six) hours as needed for mild pain or headaches, Starting Wed 2/27/2019, Print      albuterol (PROVENTIL HFA,VENTOLIN HFA) 90 mcg/act inhaler Inhale 2 puffs every 4 (four) hours as needed for wheezing, Starting Fri 7/3/2020, Normal      budesonide-formoterol (SYMBICORT) 80-4 5 MCG/ACT inhaler Inhale 2 puffs 2 (two) times a day Rinse mouth after use , Starting Thu 10/8/2020, Normal      buprenorphine-naloxone (SUBOXONE) 8-2 mg per SL tablet Place 1 tablet under the tongue 3 (three) times a day, Historical Med      citalopram (CeleXA) 40 mg tablet Take 40 mg by mouth daily , Historical Med      clonazePAM (KlonoPIN) 0 5 mg tablet Take 1 tablet (0 5 mg total) by mouth 2 (two) times a day as needed for anxiety for up to 10 days, Starting Fri 7/3/2020, Until Tue 8/10/2021 at 4044, Normal      folic acid (FOLVITE) 1 mg tablet Take 1 mg by mouth daily, Historical Med      gabapentin (NEURONTIN) 400 mg capsule Take 1 capsule (400 mg total) by mouth 3 (three) times a day, Starting Fri 7/3/2020, No Print      lidocaine (LIDODERM) 5 % Apply 1 patch topically daily Remove & Discard patch within 12 hours or as directed by MD, Starting Fri 7/3/2020, Normal      LORazepam (ATIVAN) 1 mg tablet Take 0 5 tablets by mouth every 8 (eight) hours as needed for anxiety (moderate anxiety) for up to 10 doses, Starting Tue 4/11/2017, No Print      melatonin 3 mg Take 1 tablet by mouth daily at bedtime , Historical Med      meloxicam (MOBIC) 15 mg tablet Take 1 tablet (15 mg total) by mouth daily, Starting Fri 7/3/2020, Normal      methocarbamol (ROBAXIN) 500 mg tablet Take 500 mg by mouth 3 (three) times a day, Historical Med      multivitamin (THERAGRAN) TABS Take 1 tablet by mouth daily, Historical Med      Pancrelipase, Lip-Prot-Amyl, (CREON) 96892 units CPEP Take 1 capsule by mouth 3 (three) times a day, Historical Med      pantoprazole (PROTONIX) 40 mg tablet Take 40 mg by mouth 2 (two) times a day, Historical Med      prazosin (MINIPRESS) 5 mg capsule Take 2 mg by mouth daily at bedtime , Historical Med      sucralfate (CARAFATE) 1 g/10 mL suspension Take 1 g by mouth 4 (four) times a day, Historical Med      thiamine 100 MG tablet Take 1 tablet by mouth daily, Starting Wed 6/7/2017, Print      !! topiramate (TOPAMAX) 100 mg tablet Take 200 mg by mouth 2 (two) times a day , Historical Med      !! topiramate (TOPAMAX) 200 MG tablet Take 200 mg by mouth daily at bedtime , Historical Med      zinc gluconate 50 mg tablet Take 50 mg by mouth daily, Historical Med       !! - Potential duplicate medications found  Please discuss with provider  No discharge procedures on file  PDMP Review       Value Time User    PDMP Reviewed  Yes 7/2/2020 10:23 AM Johanna Crawford,            ED Provider  Attending physically available and evaluated [facility]  I managed the patient along with the ED Attending      Electronically Signed by         Vidhya Sims MD  08/10/21 5945

## 2021-08-11 NOTE — ED NOTES
[facility] contacted regarding lift that was approved by charge and hospital supervisor  Ps number given for call back  Pt reports phone fully charged and okay to receive phone call  Pt waiting in waiting room with d/c paperwork        Karli Noble RN  08/10/21 3633

## 2021-08-13 LAB — TOPIRAMATE SERPL-MCNC: 9.5 UG/ML (ref 2–25)

## 2021-09-28 NOTE — PLAN OF CARE
Problem: DISCHARGE PLANNING - CARE MANAGEMENT  Goal: Discharge to post-acute care or home with appropriate resources  Description  INTERVENTIONS:  - Conduct assessment to determine patient/family and health care team treatment goals, and need for post-acute services based on payer coverage, community resources, and patient preferences, and barriers to discharge  - Address psychosocial, clinical, and financial barriers to discharge as identified in assessment in conjunction with the patient/family and health care team  - Arrange appropriate level of post-acute services according to patient's   needs and preference and payer coverage in collaboration with the physician and health care team  - Communicate with and update the patient/family, physician, and health care team regarding progress on the discharge plan  - Arrange appropriate transportation to post-acute venues  - Pt will be discharged to home self care   Outcome: Progressing None Known

## 2021-10-08 ENCOUNTER — ANESTHESIA EVENT (OUTPATIENT)
Dept: PERIOP | Facility: HOSPITAL | Age: 31
End: 2021-10-08
Payer: COMMERCIAL

## 2021-10-13 RX ORDER — DICLOFENAC SODIUM 75 MG/1
75 TABLET, DELAYED RELEASE ORAL 2 TIMES DAILY
COMMUNITY

## 2021-10-13 RX ORDER — AZATHIOPRINE 50 MG/1
50 TABLET ORAL DAILY
COMMUNITY

## 2021-10-21 ENCOUNTER — ANESTHESIA (OUTPATIENT)
Dept: PERIOP | Facility: HOSPITAL | Age: 31
End: 2021-10-21
Payer: COMMERCIAL

## 2021-10-21 ENCOUNTER — HOSPITAL ENCOUNTER (OUTPATIENT)
Facility: HOSPITAL | Age: 31
Setting detail: OUTPATIENT SURGERY
Discharge: HOME | End: 2021-10-21
Attending: DENTIST | Admitting: DENTIST
Payer: COMMERCIAL

## 2021-10-21 VITALS
TEMPERATURE: 97.5 F | HEART RATE: 85 BPM | OXYGEN SATURATION: 98 % | SYSTOLIC BLOOD PRESSURE: 155 MMHG | WEIGHT: 242.38 LBS | DIASTOLIC BLOOD PRESSURE: 85 MMHG | RESPIRATION RATE: 20 BRPM | BODY MASS INDEX: 38.04 KG/M2 | HEIGHT: 67 IN

## 2021-10-21 DIAGNOSIS — K02.9 CARIES INVOLVING MULTIPLE SURFACES OF TOOTH: Primary | ICD-10-CM

## 2021-10-21 LAB
EXT PREGNANCY TEST URINE: NEGATIVE
EXT. CONTROL: NORMAL

## 2021-10-21 PROCEDURE — 81025 URINE PREGNANCY TEST: CPT | Performed by: STUDENT IN AN ORGANIZED HEALTH CARE EDUCATION/TRAINING PROGRAM

## 2021-10-21 RX ORDER — LIDOCAINE HYDROCHLORIDE 10 MG/ML
0.5 INJECTION, SOLUTION EPIDURAL; INFILTRATION; INTRACAUDAL; PERINEURAL ONCE AS NEEDED
Status: DISCONTINUED | OUTPATIENT
Start: 2021-10-21 | End: 2021-10-21 | Stop reason: HOSPADM

## 2021-10-21 RX ORDER — BUPIVACAINE HYDROCHLORIDE 2.5 MG/ML
INJECTION, SOLUTION EPIDURAL; INFILTRATION; INTRACAUDAL AS NEEDED
Status: DISCONTINUED | OUTPATIENT
Start: 2021-10-21 | End: 2021-10-21 | Stop reason: HOSPADM

## 2021-10-21 RX ORDER — DEXAMETHASONE SODIUM PHOSPHATE 10 MG/ML
INJECTION, SOLUTION INTRAMUSCULAR; INTRAVENOUS AS NEEDED
Status: DISCONTINUED | OUTPATIENT
Start: 2021-10-21 | End: 2021-10-21

## 2021-10-21 RX ORDER — FENTANYL CITRATE 50 UG/ML
INJECTION, SOLUTION INTRAMUSCULAR; INTRAVENOUS AS NEEDED
Status: DISCONTINUED | OUTPATIENT
Start: 2021-10-21 | End: 2021-10-21

## 2021-10-21 RX ORDER — OXYCODONE AND ACETAMINOPHEN 7.5; 325 MG/1; MG/1
1 TABLET ORAL EVERY 4 HOURS PRN
Qty: 30 TABLET | Refills: 0 | Status: SHIPPED | OUTPATIENT
Start: 2021-10-21 | End: 2021-10-31

## 2021-10-21 RX ORDER — CEFOXITIN 1 G/1
INJECTION, POWDER, FOR SOLUTION INTRAVENOUS AS NEEDED
Status: DISCONTINUED | OUTPATIENT
Start: 2021-10-21 | End: 2021-10-21

## 2021-10-21 RX ORDER — LIDOCAINE HYDROCHLORIDE AND EPINEPHRINE 10; 10 MG/ML; UG/ML
INJECTION, SOLUTION INFILTRATION; PERINEURAL AS NEEDED
Status: DISCONTINUED | OUTPATIENT
Start: 2021-10-21 | End: 2021-10-21 | Stop reason: HOSPADM

## 2021-10-21 RX ORDER — BUSPIRONE HYDROCHLORIDE 10 MG/1
10 TABLET ORAL
COMMUNITY
Start: 2021-10-06

## 2021-10-21 RX ORDER — PROPOFOL 10 MG/ML
INJECTION, EMULSION INTRAVENOUS AS NEEDED
Status: DISCONTINUED | OUTPATIENT
Start: 2021-10-21 | End: 2021-10-21

## 2021-10-21 RX ORDER — SODIUM CHLORIDE, SODIUM LACTATE, POTASSIUM CHLORIDE, CALCIUM CHLORIDE 600; 310; 30; 20 MG/100ML; MG/100ML; MG/100ML; MG/100ML
50 INJECTION, SOLUTION INTRAVENOUS CONTINUOUS
Status: DISCONTINUED | OUTPATIENT
Start: 2021-10-21 | End: 2021-10-21 | Stop reason: HOSPADM

## 2021-10-21 RX ORDER — IBUPROFEN 600 MG/1
600 TABLET ORAL EVERY 6 HOURS PRN
Qty: 30 TABLET | Refills: 0 | Status: SHIPPED | OUTPATIENT
Start: 2021-10-21

## 2021-10-21 RX ORDER — GLYCOPYRROLATE 0.2 MG/ML
INJECTION INTRAMUSCULAR; INTRAVENOUS AS NEEDED
Status: DISCONTINUED | OUTPATIENT
Start: 2021-10-21 | End: 2021-10-21

## 2021-10-21 RX ORDER — ONDANSETRON 2 MG/ML
4 INJECTION INTRAMUSCULAR; INTRAVENOUS ONCE AS NEEDED
Status: DISCONTINUED | OUTPATIENT
Start: 2021-10-21 | End: 2021-10-21 | Stop reason: HOSPADM

## 2021-10-21 RX ORDER — LABETALOL 20 MG/4 ML (5 MG/ML) INTRAVENOUS SYRINGE
AS NEEDED
Status: DISCONTINUED | OUTPATIENT
Start: 2021-10-21 | End: 2021-10-21

## 2021-10-21 RX ORDER — FENTANYL CITRATE/PF 50 MCG/ML
50 SYRINGE (ML) INJECTION
Status: DISCONTINUED | OUTPATIENT
Start: 2021-10-21 | End: 2021-10-21

## 2021-10-21 RX ORDER — ONDANSETRON 2 MG/ML
INJECTION INTRAMUSCULAR; INTRAVENOUS AS NEEDED
Status: DISCONTINUED | OUTPATIENT
Start: 2021-10-21 | End: 2021-10-21

## 2021-10-21 RX ORDER — SODIUM CHLORIDE, SODIUM LACTATE, POTASSIUM CHLORIDE, CALCIUM CHLORIDE 600; 310; 30; 20 MG/100ML; MG/100ML; MG/100ML; MG/100ML
INJECTION, SOLUTION INTRAVENOUS CONTINUOUS PRN
Status: DISCONTINUED | OUTPATIENT
Start: 2021-10-21 | End: 2021-10-21

## 2021-10-21 RX ORDER — ROCURONIUM BROMIDE 10 MG/ML
INJECTION, SOLUTION INTRAVENOUS AS NEEDED
Status: DISCONTINUED | OUTPATIENT
Start: 2021-10-21 | End: 2021-10-21

## 2021-10-21 RX ORDER — ALBUTEROL SULFATE 2.5 MG/3ML
2.5 SOLUTION RESPIRATORY (INHALATION) ONCE AS NEEDED
Status: DISCONTINUED | OUTPATIENT
Start: 2021-10-21 | End: 2021-10-21 | Stop reason: HOSPADM

## 2021-10-21 RX ORDER — AZITHROMYCIN 250 MG/1
TABLET, FILM COATED ORAL
Qty: 6 TABLET | Refills: 0 | Status: SHIPPED | OUTPATIENT
Start: 2021-10-21 | End: 2021-10-25

## 2021-10-21 RX ORDER — MIDAZOLAM HYDROCHLORIDE 2 MG/2ML
INJECTION, SOLUTION INTRAMUSCULAR; INTRAVENOUS AS NEEDED
Status: DISCONTINUED | OUTPATIENT
Start: 2021-10-21 | End: 2021-10-21

## 2021-10-21 RX ORDER — NEOSTIGMINE METHYLSULFATE 1 MG/ML
INJECTION INTRAVENOUS AS NEEDED
Status: DISCONTINUED | OUTPATIENT
Start: 2021-10-21 | End: 2021-10-21

## 2021-10-21 RX ORDER — OXYCODONE HYDROCHLORIDE AND ACETAMINOPHEN 5; 325 MG/1; MG/1
1 TABLET ORAL EVERY 4 HOURS PRN
Status: DISCONTINUED | OUTPATIENT
Start: 2021-10-21 | End: 2021-10-21 | Stop reason: HOSPADM

## 2021-10-21 RX ORDER — LIDOCAINE HYDROCHLORIDE 10 MG/ML
INJECTION, SOLUTION EPIDURAL; INFILTRATION; INTRACAUDAL; PERINEURAL AS NEEDED
Status: DISCONTINUED | OUTPATIENT
Start: 2021-10-21 | End: 2021-10-21

## 2021-10-21 RX ORDER — OXYMETAZOLINE HYDROCHLORIDE 0.05 G/100ML
SPRAY NASAL AS NEEDED
Status: DISCONTINUED | OUTPATIENT
Start: 2021-10-21 | End: 2021-10-21

## 2021-10-21 RX ORDER — SUCCINYLCHOLINE/SOD CL,ISO/PF 100 MG/5ML
SYRINGE (ML) INTRAVENOUS AS NEEDED
Status: DISCONTINUED | OUTPATIENT
Start: 2021-10-21 | End: 2021-10-21

## 2021-10-21 RX ADMIN — CEFOXITIN SODIUM 2000 MG: 1 POWDER, FOR SOLUTION INTRAVENOUS at 14:44

## 2021-10-21 RX ADMIN — ROCURONIUM BROMIDE 20 MG: 50 INJECTION, SOLUTION INTRAVENOUS at 14:49

## 2021-10-21 RX ADMIN — DEXAMETHASONE SODIUM PHOSPHATE 10 MG: 10 INJECTION, SOLUTION INTRAMUSCULAR; INTRAVENOUS at 14:49

## 2021-10-21 RX ADMIN — MIDAZOLAM 2 MG: 1 INJECTION INTRAMUSCULAR; INTRAVENOUS at 14:38

## 2021-10-21 RX ADMIN — ONDANSETRON 4 MG: 2 INJECTION INTRAMUSCULAR; INTRAVENOUS at 14:49

## 2021-10-21 RX ADMIN — LIDOCAINE HYDROCHLORIDE 100 MG: 10 INJECTION, SOLUTION EPIDURAL; INFILTRATION; INTRACAUDAL; PERINEURAL at 14:44

## 2021-10-21 RX ADMIN — LABETALOL 20 MG/4 ML (5 MG/ML) INTRAVENOUS SYRINGE 20 MG: at 15:06

## 2021-10-21 RX ADMIN — FENTANYL CITRATE 200 MCG: 50 INJECTION INTRAMUSCULAR; INTRAVENOUS at 14:44

## 2021-10-21 RX ADMIN — NEOSTIGMINE METHYLSULFATE 3 MG: 1 INJECTION INTRAVENOUS at 15:37

## 2021-10-21 RX ADMIN — PROPOFOL 200 MG: 10 INJECTION, EMULSION INTRAVENOUS at 14:44

## 2021-10-21 RX ADMIN — Medication 100 MG: at 14:44

## 2021-10-21 RX ADMIN — SODIUM CHLORIDE, SODIUM LACTATE, POTASSIUM CHLORIDE, AND CALCIUM CHLORIDE: .6; .31; .03; .02 INJECTION, SOLUTION INTRAVENOUS at 14:36

## 2021-10-21 RX ADMIN — GLYCOPYRROLATE 0.2 MG: 0.2 INJECTION, SOLUTION INTRAMUSCULAR; INTRAVENOUS at 15:37

## 2021-10-21 RX ADMIN — OXYMETAZOLINE HYDROCHLORIDE 2 SPRAY: 5 SPRAY NASAL at 14:36

## 2021-10-21 RX ADMIN — OXYMETAZOLINE HYDROCHLORIDE 2 SPRAY: 5 SPRAY NASAL at 14:44

## 2022-08-29 ENCOUNTER — TELEPHONE (OUTPATIENT)
Dept: PSYCHIATRY | Facility: CLINIC | Age: 32
End: 2022-08-29

## 2022-09-22 ENCOUNTER — TELEPHONE (OUTPATIENT)
Dept: PSYCHIATRY | Facility: CLINIC | Age: 32
End: 2022-09-22

## 2022-09-22 NOTE — TELEPHONE ENCOUNTER
Pt phoned in wanting to make an appt for therapy anywhere  Pt also explained she is out of her medication and that is she currently on the other wait list  Please reach out to her regarding the therapy anywhere   [phone]

## 2023-09-02 ENCOUNTER — HOSPITAL ENCOUNTER (EMERGENCY)
Facility: HOSPITAL | Age: 33
Discharge: HOME | End: 2023-09-02
Attending: EMERGENCY MEDICINE
Payer: COMMERCIAL

## 2023-09-02 VITALS
DIASTOLIC BLOOD PRESSURE: 66 MMHG | RESPIRATION RATE: 18 BRPM | HEART RATE: 92 BPM | BODY MASS INDEX: 38.95 KG/M2 | WEIGHT: 248.68 LBS | OXYGEN SATURATION: 97 % | SYSTOLIC BLOOD PRESSURE: 130 MMHG | TEMPERATURE: 98.1 F

## 2023-09-02 DIAGNOSIS — F10.929 ALCOHOL INTOXICATION (HCC): Primary | ICD-10-CM

## 2023-09-02 PROCEDURE — 99284 EMERGENCY DEPT VISIT MOD MDM: CPT | Performed by: EMERGENCY MEDICINE

## 2023-09-02 PROCEDURE — 99284 EMERGENCY DEPT VISIT MOD MDM: CPT

## 2023-09-03 ENCOUNTER — HOSPITAL ENCOUNTER (EMERGENCY)
Facility: HOSPITAL | Age: 33
Discharge: HOME | End: 2023-09-03
Attending: EMERGENCY MEDICINE
Payer: COMMERCIAL

## 2023-09-03 ENCOUNTER — APPOINTMENT (EMERGENCY)
Dept: RADIOLOGY | Facility: HOSPITAL | Age: 33
End: 2023-09-03
Payer: COMMERCIAL

## 2023-09-03 VITALS
HEART RATE: 63 BPM | OXYGEN SATURATION: 99 % | RESPIRATION RATE: 16 BRPM | DIASTOLIC BLOOD PRESSURE: 70 MMHG | TEMPERATURE: 99.5 F | SYSTOLIC BLOOD PRESSURE: 126 MMHG

## 2023-09-03 DIAGNOSIS — F10.929 ALCOHOL INTOXICATION (HCC): Primary | ICD-10-CM

## 2023-09-03 LAB
ALBUMIN SERPL BCP-MCNC: 4.6 G/DL (ref 3.5–5)
ALP SERPL-CCNC: 94 U/L (ref 34–104)
ALT SERPL W P-5'-P-CCNC: 35 U/L (ref 7–52)
AMMONIA PLAS-SCNC: 42 UMOL/L (ref 18–72)
ANION GAP SERPL CALCULATED.3IONS-SCNC: 17 MMOL/L
APAP SERPL-MCNC: <10 UG/ML (ref 10–20)
AST SERPL W P-5'-P-CCNC: 41 U/L (ref 13–39)
ATRIAL RATE: 92 BPM
BASOPHILS # BLD AUTO: 0.04 THOUSANDS/ÂΜL (ref 0–0.1)
BASOPHILS NFR BLD AUTO: 0 % (ref 0–1)
BILIRUB SERPL-MCNC: 0.37 MG/DL (ref 0.2–1)
BUN SERPL-MCNC: 18 MG/DL (ref 5–25)
CALCIUM SERPL-MCNC: 8.4 MG/DL (ref 8.4–10.2)
CARDIAC TROPONIN I PNL SERPL HS: 2 NG/L
CHLORIDE SERPL-SCNC: 105 MMOL/L (ref 96–108)
CO2 SERPL-SCNC: 20 MMOL/L (ref 21–32)
CREAT SERPL-MCNC: 0.72 MG/DL (ref 0.6–1.3)
EOSINOPHIL # BLD AUTO: 0.01 THOUSAND/ÂΜL (ref 0–0.61)
EOSINOPHIL NFR BLD AUTO: 0 % (ref 0–6)
ERYTHROCYTE [DISTWIDTH] IN BLOOD BY AUTOMATED COUNT: 13.4 % (ref 11.6–15.1)
ETHANOL SERPL-MCNC: 264 MG/DL
GFR SERPL CREATININE-BSD FRML MDRD: 111 ML/MIN/1.73SQ M
GLUCOSE SERPL-MCNC: 83 MG/DL (ref 65–140)
HCT VFR BLD AUTO: 43.1 % (ref 34.8–46.1)
HGB BLD-MCNC: 14.3 G/DL (ref 11.5–15.4)
IMM GRANULOCYTES # BLD AUTO: 0.04 THOUSAND/UL (ref 0–0.2)
IMM GRANULOCYTES NFR BLD AUTO: 0 % (ref 0–2)
LIPASE SERPL-CCNC: 11 U/L (ref 11–82)
LYMPHOCYTES # BLD AUTO: 1.44 THOUSANDS/ÂΜL (ref 0.6–4.47)
LYMPHOCYTES NFR BLD AUTO: 15 % (ref 14–44)
MAGNESIUM SERPL-MCNC: 2.1 MG/DL (ref 1.9–2.7)
MCH RBC QN AUTO: 29.2 PG (ref 26.8–34.3)
MCHC RBC AUTO-ENTMCNC: 33.2 G/DL (ref 31.4–37.4)
MCV RBC AUTO: 88 FL (ref 82–98)
MONOCYTES # BLD AUTO: 0.37 THOUSAND/ÂΜL (ref 0.17–1.22)
MONOCYTES NFR BLD AUTO: 4 % (ref 4–12)
NEUTROPHILS # BLD AUTO: 7.46 THOUSANDS/ÂΜL (ref 1.85–7.62)
NEUTS SEG NFR BLD AUTO: 81 % (ref 43–75)
NRBC BLD AUTO-RTO: 0 /100 WBCS
P AXIS: 46 DEGREES
PLATELET # BLD AUTO: 250 THOUSANDS/UL (ref 149–390)
PMV BLD AUTO: 10.3 FL (ref 8.9–12.7)
POTASSIUM SERPL-SCNC: 3.5 MMOL/L (ref 3.5–5.3)
PR INTERVAL: 168 MS
PROT SERPL-MCNC: 7.1 G/DL (ref 6.4–8.4)
QRS AXIS: 37 DEGREES
QRSD INTERVAL: 86 MS
QT INTERVAL: 370 MS
QTC INTERVAL: 457 MS
RBC # BLD AUTO: 4.89 MILLION/UL (ref 3.81–5.12)
SALICYLATES SERPL-MCNC: <5 MG/DL (ref 3–20)
SODIUM SERPL-SCNC: 142 MMOL/L (ref 135–147)
T WAVE AXIS: 54 DEGREES
TSH SERPL DL<=0.05 MIU/L-ACNC: 0.93 UIU/ML (ref 0.45–4.5)
VENTRICULAR RATE: 92 BPM
WBC # BLD AUTO: 9.36 THOUSAND/UL (ref 4.31–10.16)

## 2023-09-03 PROCEDURE — 82140 ASSAY OF AMMONIA: CPT | Performed by: EMERGENCY MEDICINE

## 2023-09-03 PROCEDURE — 83690 ASSAY OF LIPASE: CPT | Performed by: EMERGENCY MEDICINE

## 2023-09-03 PROCEDURE — 96361 HYDRATE IV INFUSION ADD-ON: CPT

## 2023-09-03 PROCEDURE — 82948 REAGENT STRIP/BLOOD GLUCOSE: CPT

## 2023-09-03 PROCEDURE — 80053 COMPREHEN METABOLIC PANEL: CPT | Performed by: EMERGENCY MEDICINE

## 2023-09-03 PROCEDURE — 80179 DRUG ASSAY SALICYLATE: CPT | Performed by: EMERGENCY MEDICINE

## 2023-09-03 PROCEDURE — 96374 THER/PROPH/DIAG INJ IV PUSH: CPT

## 2023-09-03 PROCEDURE — 93005 ELECTROCARDIOGRAM TRACING: CPT

## 2023-09-03 PROCEDURE — 71045 X-RAY EXAM CHEST 1 VIEW: CPT

## 2023-09-03 PROCEDURE — 36415 COLL VENOUS BLD VENIPUNCTURE: CPT | Performed by: EMERGENCY MEDICINE

## 2023-09-03 PROCEDURE — 80143 DRUG ASSAY ACETAMINOPHEN: CPT | Performed by: EMERGENCY MEDICINE

## 2023-09-03 PROCEDURE — 99285 EMERGENCY DEPT VISIT HI MDM: CPT

## 2023-09-03 PROCEDURE — 84443 ASSAY THYROID STIM HORMONE: CPT | Performed by: EMERGENCY MEDICINE

## 2023-09-03 PROCEDURE — 99285 EMERGENCY DEPT VISIT HI MDM: CPT | Performed by: EMERGENCY MEDICINE

## 2023-09-03 PROCEDURE — 84484 ASSAY OF TROPONIN QUANT: CPT | Performed by: EMERGENCY MEDICINE

## 2023-09-03 PROCEDURE — 83735 ASSAY OF MAGNESIUM: CPT | Performed by: EMERGENCY MEDICINE

## 2023-09-03 PROCEDURE — 85025 COMPLETE CBC W/AUTO DIFF WBC: CPT | Performed by: EMERGENCY MEDICINE

## 2023-09-03 PROCEDURE — 82077 ASSAY SPEC XCP UR&BREATH IA: CPT | Performed by: EMERGENCY MEDICINE

## 2023-09-03 PROCEDURE — 93010 ELECTROCARDIOGRAM REPORT: CPT | Performed by: INTERNAL MEDICINE

## 2023-09-03 RX ORDER — LORAZEPAM 2 MG/ML
1 INJECTION INTRAMUSCULAR ONCE
Status: COMPLETED | OUTPATIENT
Start: 2023-09-03 | End: 2023-09-03

## 2023-09-03 RX ADMIN — LORAZEPAM 1 MG: 2 INJECTION INTRAMUSCULAR; INTRAVENOUS at 14:49

## 2023-09-03 RX ADMIN — SODIUM CHLORIDE 1000 ML: 0.9 INJECTION, SOLUTION INTRAVENOUS at 14:49

## 2023-09-03 NOTE — ED NOTES
Pt sleeping, no distress noted, respirations even/unlabored, continues on cardiac monitor      Jacky Coreas RN  09/03/23 4002

## 2023-09-03 NOTE — ED NOTES
Pt sleeping, no distress noted, respirations even/unlabored, remains on monitor     Ousmane Salazar RN  09/03/23 0953

## 2023-09-03 NOTE — ED PROVIDER NOTES
History  Chief Complaint   Patient presents with   • Altered Mental Status     Patient arrived with [facility], unknown problem? Patient reports she relapsed on alcohol and used some cocaine. Patient initially standing in room and not answering questions, she slid her body on the wall, placed on stretcher. She eventually woke up and was agitated that we were examining her. HPI  Patient is a 64-year-old female with history of polysubstance abuse, seizure history on Topamax and gabapentin, depression, alcohol abuse, fibromyalgia presenting with altered mental status. EMS was called by patient's friend due to concern that she was having a seizure. Upon EMS arrival patient was not postictal and was alert and oriented x3. Notably she had illicit drugs all over the floor. When asked if patient consumed any illicit drugs patient denied stating that she only took her Ativan. Later she admitted to using cocaine. Denies any complaints at this time however appears disheveled and dehydrated. Patient on examination with bilateral dilated pupils, diaphoretic, tachycardic and hypertensive. Concerning for sympathomimetic intoxication. Prior to Admission Medications   Prescriptions Last Dose Informant Patient Reported? Taking? LORazepam (ATIVAN) 1 mg tablet  Self No No   Sig: Take 0.5 tablets by mouth every 8 (eight) hours as needed for anxiety (moderate anxiety) for up to 10 doses   Patient taking differently: Take 0.5 mg by mouth every 8 (eight) hours as needed for anxiety (moderate anxiety) 10/13/21 Pt reports is taking 1.5 mg TID as needed   Pancrelipase, Lip-Prot-Amyl, (CREON) 00026 units CPEP  Self Yes No   Sig: Take 1 capsule by mouth 3 (three) times a day Take up to 10/20/21 with eating.    acetaminophen (TYLENOL) 325 mg tablet  Self No No   Sig: Take 2 tablets (650 mg total) by mouth every 6 (six) hours as needed for mild pain or headaches   albuterol (PROVENTIL HFA,VENTOLIN HFA) 90 mcg/act inhaler  Self No No Sig: Inhale 2 puffs every 4 (four) hours as needed for wheezing   azaTHIOprine (IMURAN) 50 mg tablet   Yes No   Sig: Take 50 mg by mouth daily Takes daily   budesonide-formoterol (SYMBICORT) 80-4.5 MCG/ACT inhaler  Self No No   Sig: Inhale 2 puffs 2 (two) times a day Rinse mouth after use. buprenorphine-naloxone (SUBOXONE) 8-2 mg per SL tablet  Self Yes No   Sig: Place 1 tablet under the tongue 3 (three) times a day   busPIRone (BUSPAR) 10 mg tablet   Yes No   Sig: Take 10 mg by mouth 3 (three) times a day with meals   citalopram (CeleXA) 40 mg tablet  Self Yes No   Sig: Take 40 mg by mouth daily Takes in the am   clonazePAM (KlonoPIN) 0.5 mg tablet   No No   Sig: Take 1 tablet (0.5 mg total) by mouth 2 (two) times a day as needed for anxiety for up to 10 days   diclofenac (VOLTAREN) 75 mg EC tablet   Yes No   Sig: Take 75 mg by mouth 2 (two) times a day   folic acid (FOLVITE) 1 mg tablet  Self Yes No   Sig: Take 1 mg by mouth daily Take up to 10/20/21 prior to surgery.    gabapentin (NEURONTIN) 400 mg capsule  Self No No   Sig: Take 1 capsule (400 mg total) by mouth 3 (three) times a day   ibuprofen (MOTRIN) 600 mg tablet   No No   Sig: Take 1 tablet (600 mg total) by mouth every 6 (six) hours as needed for mild pain   lidocaine (LIDODERM) 5 %  Self No No   Sig: Apply 1 patch topically daily Remove & Discard patch within 12 hours or as directed by MD   Patient taking differently: Apply 1 patch topically as needed Remove & Discard patch within 12 hours or as directed by MD-10/13/21 pt reports using only  as needed   melatonin 3 mg  Self Yes No   Sig: Take 1 tablet by mouth daily at bedtime    meloxicam (MOBIC) 15 mg tablet  Self No No   Sig: Take 1 tablet (15 mg total) by mouth daily   methocarbamol (ROBAXIN) 500 mg tablet  Self Yes No   Sig: Take 500 mg by mouth 3 (three) times a day   multivitamin (THERAGRAN) TABS  Self Yes No   Sig: Take 1 tablet by mouth daily   pantoprazole (PROTONIX) 40 mg tablet  Self Yes No   Sig: Take 40 mg by mouth 2 (two) times a day   prazosin (MINIPRESS) 5 mg capsule  Self Yes No   Sig: Take 1 mg by mouth daily at bedtime Takes at night before bed.   sucralfate (CARAFATE) 1 g/10 mL suspension  Self Yes No   Sig: Take 1 g by mouth 4 (four) times a day   thiamine 100 MG tablet  Self No No   Sig: Take 1 tablet by mouth daily   topiramate (TOPAMAX) 100 mg tablet  Self Yes No   Sig: Take 200 mg by mouth 2 (two) times a day    topiramate (TOPAMAX) 200 MG tablet  Self Yes No   Sig: Take 200 mg by mouth daily at bedtime    zinc gluconate 50 mg tablet  Self Yes No   Sig: Take 50 mg by mouth daily      Facility-Administered Medications: None       Past Medical History:   Diagnosis Date   • Alcohol abuse     10/13/21 Pt reports resolved for approx 8 months   • Alcohol abuse    • Anxiety    • Asthma    • Bipolar disorder ([address])    • BOOP (bronchiolitis obliterans with organizing pneumonia) ([address])     10/13/21 Pt reports dx of BOOP   • Chronic pain disorder 10/4/2020   • Depression    • Fibromyalgia, primary    • Liver disease     10/13/21 Pt reports noted scarring in liver - nothing new. • Lymphedema     Pt reports lymphedema in feet /legs    • Pancreatitis    • Panic disorder 10/11/2016   • Psychiatric disorder    • PTSD (post-traumatic stress disorder)    • Seizures ([address])     10/13/21 Pt reports last reported seizure - in january 2021- no seizures since then    • Self-injurious behavior        Past Surgical History:   Procedure Laterality Date   • ALVEOLOPLASTY IN CONJUNCTION WITH EXTRACTIONS - 4 OR MORE TEETH OR TOOTH SPACES, PER QUADRANT N/A 10/21/2021    Procedure: ALVEOPLASTY 4 QUADRANTS;  Surgeon: Kavon Gautam DMD;  Location:  MAIN OR;  Service: Maxillofacial   • ESOPHAGOGASTRODUODENOSCOPY N/A 4/10/2017    Procedure: ESOPHAGOGASTRODUODENOSCOPY (EGD); Surgeon: Anjali Aguilar MD;  Location: [facility];   Service:    • EXTRACTION, ERUPTED TOOTH REQUIRING REMOVAL OF BONE AND/OR SECTIONING OF TOOTH, AND INCLUDING ELEVATION OF MUCOPERIOSTEAL FLAP IF INDICATED N/A 10/21/2021    Procedure: EXTRACTION TEETH 2,3,4,5,6,7,8,9,10,11,12,13,14,15,16,18,19,20,21,22,23,24,25,26,27,28,29,30,31;  Surgeon: Ayesha Jaimes DMD;  Location: [facility];  Service: Maxillofacial   • INDUCED      • WISDOM TOOTH EXTRACTION         Family History   Problem Relation Age of Onset   • Lupus Father    • Coronary artery disease Father    • Stroke Father      I have reviewed and agree with the history as documented. E-Cigarette/Vaping   • E-Cigarette Use Never User      E-Cigarette/Vaping Substances     Social History     Tobacco Use   • Smoking status: Every Day     Packs/day: 1.00     Years: 10.00     Total pack years: 10.00     Types: Cigarettes   • Smokeless tobacco: Never   Vaping Use   • Vaping Use: Never used   Substance Use Topics   • Alcohol use: Yes   • Drug use: Yes     Types: Marijuana, Cocaine       Review of Systems   Constitutional: Negative for chills, diaphoresis, fever and unexpected weight change. HENT: Negative for ear pain and sore throat. Eyes: Negative for visual disturbance. Respiratory: Negative for cough, chest tightness and shortness of breath. Cardiovascular: Negative for chest pain and leg swelling. Gastrointestinal: Negative for abdominal distention, abdominal pain, constipation, diarrhea, nausea and vomiting. Endocrine: Negative. Genitourinary: Negative for difficulty urinating and dysuria. Musculoskeletal: Negative. Skin: Negative. Allergic/Immunologic: Negative. Neurological: Negative. Hematological: Negative. Psychiatric/Behavioral: Negative. All other systems reviewed and are negative. Physical Exam  Physical Exam  Vitals and nursing note reviewed. Constitutional:       General: She is not in acute distress. Appearance: Normal appearance. She is obese. She is diaphoretic. She is not ill-appearing. HENT:      Head: Normocephalic and atraumatic. Right Ear: External ear normal.      Left Ear: External ear normal.      Nose: Nose normal.      Mouth/Throat:      Mouth: Mucous membranes are moist.      Pharynx: Oropharynx is clear. Eyes:      General: No scleral icterus. Right eye: No discharge. Left eye: No discharge. Extraocular Movements: Extraocular movements intact. Conjunctiva/sclera: Conjunctivae normal.      Pupils: Pupils are equal, round, and reactive to light. Cardiovascular:      Rate and Rhythm: Regular rhythm. Tachycardia present. Pulses: Normal pulses. Heart sounds: Normal heart sounds. Pulmonary:      Effort: Pulmonary effort is normal.      Breath sounds: Normal breath sounds. Abdominal:      General: Abdomen is flat. Bowel sounds are normal. There is no distension. Palpations: Abdomen is soft. Tenderness: There is no abdominal tenderness. There is no guarding or rebound. Musculoskeletal:         General: Normal range of motion. Cervical back: Normal range of motion and neck supple. Skin:     General: Skin is warm. Capillary Refill: Capillary refill takes less than 2 seconds. Neurological:      General: No focal deficit present. Mental Status: She is alert and oriented to person, place, and time. Mental status is at baseline. GCS: GCS eye subscore is 4. GCS verbal subscore is 5. GCS motor subscore is 6. Psychiatric:         Mood and Affect: Mood normal.         Behavior: Behavior normal.         Thought Content:  Thought content normal.         Judgment: Judgment normal.         Vital Signs  ED Triage Vitals [09/03/23 1431]   Temperature Pulse Respirations Blood Pressure SpO2   99.5 °F (37.5 °C) 95 16 164/83 94 %      Temp Source Heart Rate Source Patient Position - Orthostatic VS BP Location FiO2 (%)   Tympanic Monitor Lying Left arm --      Pain Score       --           Vitals:    09/03/23 1657 09/03/23 1800 09/03/23 1900 09/03/23 2101   BP: 121/63 117/70 138/72 126/70   Pulse: 68 63 65 63   Patient Position - Orthostatic VS: Lying Lying Lying Lying         Visual Acuity  Visual Acuity    Flowsheet Row Most Recent Value   L Pupil Size (mm) 6   R Pupil Size (mm) 6          ED Medications  Medications   sodium chloride 0.9 % bolus 1,000 mL (0 mL Intravenous Stopped 9/3/23 1549)   LORazepam (ATIVAN) injection 1 mg (1 mg Intravenous Given 9/3/23 1449)       Diagnostic Studies  Results Reviewed     Procedure Component Value Units Date/Time    TSH, 3rd generation with Free T4 reflex [[id number]]  (Normal) Collected: 09/03/23 1445    Lab Status: Final result Specimen: Blood from Arm, Right Updated: 09/03/23 1528     TSH 3RD GENERATON 0.934 uIU/mL     HS Troponin 0hr (reflex protocol) [[id number]]  (Normal) Collected: 09/03/23 1445    Lab Status: Final result Specimen: Blood from Arm, Right Updated: 09/03/23 1519     hs TnI 0hr 2 ng/L     Ethanol [[id number]]  (Abnormal) Collected: 09/03/23 1445    Lab Status: Final result Specimen: Blood from Arm, Right Updated: 09/03/23 1514     Ethanol Lvl 264 mg/dL     Comprehensive metabolic panel [[id number]]  (Abnormal) Collected: 09/03/23 1445    Lab Status: Final result Specimen: Blood from Arm, Right Updated: 09/03/23 1514     Sodium 142 mmol/L      Potassium 3.5 mmol/L      Chloride 105 mmol/L      CO2 20 mmol/L      ANION GAP 17 mmol/L      BUN 18 mg/dL      Creatinine 0.72 mg/dL      Glucose 83 mg/dL      Calcium 8.4 mg/dL      AST 41 U/L      ALT 35 U/L      Alkaline Phosphatase 94 U/L      Total Protein 7.1 g/dL      Albumin 4.6 g/dL      Total Bilirubin 0.37 mg/dL      eGFR 111 ml/min/1.73sq m     Narrative:      [place] guidelines for Chronic Kidney Disease (CKD):   •  Stage 1 with normal or high GFR (GFR > 90 mL/min/1.73 square meters)  •  Stage 2 Mild CKD (GFR = 60-89 mL/min/1.73 square meters)  •  Stage 3A Moderate CKD (GFR = 45-59 mL/min/1.73 square meters)  •  Stage 3B Moderate CKD (GFR = 30-44 mL/min/1.73 square meters)  •  Stage 4 Severe CKD (GFR = 15-29 mL/min/1.73 square meters)  •  Stage 5 End Stage CKD (GFR <15 mL/min/1.73 square meters)  Note: GFR calculation is accurate only with a steady state creatinine    Lipase [[id number]]  (Normal) Collected: 09/03/23 1445    Lab Status: Final result Specimen: Blood from Arm, Right Updated: 09/03/23 1514     Lipase 11 u/L     Magnesium [[id number]]  (Normal) Collected: 09/03/23 1445    Lab Status: Final result Specimen: Blood from Arm, Right Updated: 09/03/23 1514     Magnesium 2.1 mg/dL     Salicylate level [[id number]]  (Normal) Collected: 09/03/23 1445    Lab Status: Final result Specimen: Blood from Arm, Right Updated: 07/23/18 7414     Salicylate Lvl <5 mg/dL     Acetaminophen level-If concentration is detectable, please discuss with medical  on call.  [[id number]]  (Abnormal) Collected: 09/03/23 1445    Lab Status: Final result Specimen: Blood from Arm, Right Updated: 09/03/23 1514     Acetaminophen Level <10 ug/mL     Ammonia [[id number]]  (Normal) Collected: 09/03/23 1445    Lab Status: Final result Specimen: Blood from Arm, Right Updated: 09/03/23 1510     Ammonia 42 umol/L     CBC and differential [[id number]]  (Abnormal) Collected: 09/03/23 1445    Lab Status: Final result Specimen: Blood from Arm, Right Updated: 09/03/23 1455     WBC 9.36 Thousand/uL      RBC 4.89 Million/uL      Hemoglobin 14.3 g/dL      Hematocrit 43.1 %      MCV 88 fL      MCH 29.2 pg      MCHC 33.2 g/dL      RDW 13.4 %      MPV 10.3 fL      Platelets 059 Thousands/uL      nRBC 0 /100 WBCs      Neutrophils Relative 81 %      Immat GRANS % 0 %      Lymphocytes Relative 15 %      Monocytes Relative 4 %      Eosinophils Relative 0 %      Basophils Relative 0 %      Neutrophils Absolute 7.46 Thousands/µL      Immature Grans Absolute 0.04 Thousand/uL      Lymphocytes Absolute 1.44 Thousands/µL      Monocytes Absolute 0.37 Thousand/µL      Eosinophils Absolute 0.01 Thousand/µL Basophils Absolute 0.04 Thousands/µL                  XR chest 1 view portable   ED Interpretation by Fran Keith MD (09/03 1533)   No acute cardiopulmonary disorder      Final Result by Wendie Smallwood MD (09/04 2039)      No acute cardiopulmonary disease. Workstation performed: [id number]                    Procedures  Procedures         ED Course                                             Medical Decision Making  Patient is a 29-year-old female presenting with concerns for altered mental status  Patient on examination was somnolent but was still alert oriented x3  Due to patient initially presenting with diaphoresis, tachycardia, tachypnea cardiac work-up was initiated  Work-up was negative but patient did have alcohol intoxication confirmed by ethanol study  No physical signs of seizure  We will plan to discharge after patient is clinically sober    Alcohol intoxication ([address]): acute illness or injury  Amount and/or Complexity of Data Reviewed  Labs: ordered. Radiology: ordered and independent interpretation performed. Risk  Prescription drug management. Disposition  Final diagnoses:   Alcohol intoxication ([address])     Time reflects when diagnosis was documented in both MDM as applicable and the Disposition within this note     Time User Action Codes Description Comment    9/3/2023  8:51 PM Lilia Meals Add [Q28.975] Alcoholic intoxication with complication ([address])     1/2/4037  8:51 PM Lilia Meals Remove [G30.216] Alcoholic intoxication with complication ([address])     1/1/6010  8:51 PM Lilia Meals Add [F10.929] Alcohol intoxication [facility])       ED Disposition     ED Disposition   Discharge    Condition   Stable    Date/Time   Sun Sep 3, 2023  8:50 PM    [facility] discharge to home/self care.                Follow-up Information     Follow up With Specialties Details Why Contact Info    Edita Perez MD [facility]   [address] [place] [id number]  [phone]            Discharge Medication List as of 9/3/2023  8:51 PM      CONTINUE these medications which have NOT CHANGED    Details   acetaminophen (TYLENOL) 325 mg tablet Take 2 tablets (650 mg total) by mouth every 6 (six) hours as needed for mild pain or headaches, Starting Wed 2/27/2019, Print      albuterol (PROVENTIL HFA,VENTOLIN HFA) 90 mcg/act inhaler Inhale 2 puffs every 4 (four) hours as needed for wheezing, Starting Fri 7/3/2020, Normal      azaTHIOprine (IMURAN) 50 mg tablet Take 50 mg by mouth daily Takes daily, Historical Med      budesonide-formoterol (SYMBICORT) 80-4.5 MCG/ACT inhaler Inhale 2 puffs 2 (two) times a day Rinse mouth after use., Starting Thu 10/8/2020, Normal      buprenorphine-naloxone (SUBOXONE) 8-2 mg per SL tablet Place 1 tablet under the tongue 3 (three) times a day, Historical Med      busPIRone (BUSPAR) 10 mg tablet Take 10 mg by mouth 3 (three) times a day with meals, Starting Wed 10/6/2021, Historical Med      citalopram (CeleXA) 40 mg tablet Take 40 mg by mouth daily Takes in the am, Historical Med      clonazePAM (KlonoPIN) 0.5 mg tablet Take 1 tablet (0.5 mg total) by mouth 2 (two) times a day as needed for anxiety for up to 10 days, Starting Fri 7/3/2020, Until Wed 10/13/2021 at 2359, Normal      diclofenac (VOLTAREN) 75 mg EC tablet Take 75 mg by mouth 2 (two) times a day, Historical Med      folic acid (FOLVITE) 1 mg tablet Take 1 mg by mouth daily Take up to 10/20/21 prior to surgery. , Historical Med      gabapentin (NEURONTIN) 400 mg capsule Take 1 capsule (400 mg total) by mouth 3 (three) times a day, Starting Fri 7/3/2020, No Print      ibuprofen (MOTRIN) 600 mg tablet Take 1 tablet (600 mg total) by mouth every 6 (six) hours as needed for mild pain, Starting Thu 10/21/2021, Normal      lidocaine (LIDODERM) 5 % Apply 1 patch topically daily Remove & Discard patch within 12 hours or as directed by MD, Starting Fri 7/3/2020, Normal      LORazepam (ATIVAN) 1 mg tablet Take 0.5 tablets by mouth every 8 (eight) hours as needed for anxiety (moderate anxiety) for up to 10 doses, Starting Tue 4/11/2017, No Print      melatonin 3 mg Take 1 tablet by mouth daily at bedtime , Historical Med      meloxicam (MOBIC) 15 mg tablet Take 1 tablet (15 mg total) by mouth daily, Starting Fri 7/3/2020, Normal      methocarbamol (ROBAXIN) 500 mg tablet Take 500 mg by mouth 3 (three) times a day, Historical Med      multivitamin (THERAGRAN) TABS Take 1 tablet by mouth daily, Historical Med      Pancrelipase, Lip-Prot-Amyl, (CREON) 72990 units CPEP Take 1 capsule by mouth 3 (three) times a day Take up to 10/20/21 with eating., Historical Med      pantoprazole (PROTONIX) 40 mg tablet Take 40 mg by mouth 2 (two) times a day, Historical Med      prazosin (MINIPRESS) 5 mg capsule Take 1 mg by mouth daily at bedtime Takes at night before bed., Historical Med      sucralfate (CARAFATE) 1 g/10 mL suspension Take 1 g by mouth 4 (four) times a day, Historical Med      thiamine 100 MG tablet Take 1 tablet by mouth daily, Starting Wed 6/7/2017, Print      !! topiramate (TOPAMAX) 100 mg tablet Take 200 mg by mouth 2 (two) times a day , Historical Med      !! topiramate (TOPAMAX) 200 MG tablet Take 200 mg by mouth daily at bedtime , Historical Med      zinc gluconate 50 mg tablet Take 50 mg by mouth daily, Historical Med       !! - Potential duplicate medications found. Please discuss with provider. No discharge procedures on file.     PDMP Review       Value Time User    PDMP Reviewed  Yes 9/4/2023  9:06 AM Paxton Thomas PA-C          ED Provider  Electronically Signed by           Nelson Mcdaniel MD  09/05/23 6887

## 2023-09-03 NOTE — ED CARE HANDOFF
Emergency Department Sign Out Note        Sign out and transfer of care from Dr. James De Jesus. See Separate Emergency Department note. The patient, Christiano Booth, was evaluated by the previous provider for intoxication, possible seizure. ED Course / Workup Pending (followup): Patient presented by EMS, was called by patient's friend for concern of seizure activity. Patient noted to be intoxicated and using drugs at home. Patient currently resting comfortably, will continue to monitor in ED. ED Course as of 09/03/23 2055   Connor  Sep 03, 2023   2042 Patient awake and alert. Ambulated without difficulty, stable blood pressure, sinus rhythm on cardiac monitor. Patient with no signs of acute alcohol withdrawal.   2053 Patient with no signs of acute alcohol withdrawal, patient is requesting medication at this time, discussed with patient that she was using multiple drugs besides alcohol and there is no indication for any medical treatment at this time. Patient states that she is interested in rehab, warm handoff referral order placed for patient to be contacted. Patient instructed to follow-up with her doctors. Procedures  MDM        Disposition  Final diagnoses:   Alcohol intoxication ([address])     Time reflects when diagnosis was documented in both MDM as applicable and the Disposition within this note     Time User Action Codes Description Comment    9/3/2023  8:51 PM Ezekiel Strange Add [J06.547] Alcoholic intoxication with complication ([address])     0/8/0519  8:51 PM Ezekiel Strange Remove [I23.028] Alcoholic intoxication with complication ([address])     1/6/4024  8:51 PM Ezekiel Strange Add [F10.929] Alcohol intoxication [facility])       ED Disposition     ED Disposition   Discharge    Condition   Stable    Date/Time   Sun Sep 3, 2023  8:50 PM    [address] [facility] discharge to home/self care.                Follow-up Information     Follow up With Specialties Details Why Contact Info    Ariel Pang Christopher Watkins, [address]  [phone]          Patient's Medications   Discharge Prescriptions    No medications on file     No discharge procedures on file.        ED Provider  Electronically Signed by     Diana Varner MD  09/03/23 205
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
## 2023-09-04 ENCOUNTER — HOSPITAL ENCOUNTER (EMERGENCY)
Facility: HOSPITAL | Age: 33
End: 2023-09-04
Attending: EMERGENCY MEDICINE
Payer: COMMERCIAL

## 2023-09-04 VITALS
DIASTOLIC BLOOD PRESSURE: 65 MMHG | HEART RATE: 55 BPM | SYSTOLIC BLOOD PRESSURE: 138 MMHG | OXYGEN SATURATION: 97 % | WEIGHT: 230.6 LBS | TEMPERATURE: 98.3 F | RESPIRATION RATE: 18 BRPM | BODY MASS INDEX: 36.12 KG/M2

## 2023-09-04 DIAGNOSIS — R56.9 SEIZURE (HCC): ICD-10-CM

## 2023-09-04 DIAGNOSIS — F19.10 SUBSTANCE ABUSE (HCC): Primary | ICD-10-CM

## 2023-09-04 DIAGNOSIS — F10.10 ALCOHOL ABUSE: ICD-10-CM

## 2023-09-04 LAB
AMPHETAMINES SERPL QL SCN: NEGATIVE
BACTERIA UR QL AUTO: ABNORMAL /HPF
BARBITURATES UR QL: NEGATIVE
BENZODIAZ UR QL: NEGATIVE
BILIRUB UR QL STRIP: NEGATIVE
CLARITY UR: CLEAR
COCAINE UR QL: POSITIVE
COLOR UR: YELLOW
ETHANOL EXG-MCNC: 0 MG/DL
EXT PREGNANCY TEST URINE: NEGATIVE
EXT. CONTROL: NORMAL
GLUCOSE UR STRIP-MCNC: NEGATIVE MG/DL
HGB UR QL STRIP.AUTO: ABNORMAL
KETONES UR STRIP-MCNC: ABNORMAL MG/DL
LEUKOCYTE ESTERASE UR QL STRIP: NEGATIVE
MUCOUS THREADS UR QL AUTO: ABNORMAL
NITRITE UR QL STRIP: NEGATIVE
NON-SQ EPI CELLS URNS QL MICRO: ABNORMAL /HPF
OPIATES UR QL SCN: NEGATIVE
OXYCODONE+OXYMORPHONE UR QL SCN: NEGATIVE
PCP UR QL: NEGATIVE
PH UR STRIP.AUTO: 5.5 [PH] (ref 4.5–8)
PROT UR STRIP-MCNC: ABNORMAL MG/DL
RBC #/AREA URNS AUTO: ABNORMAL /HPF
SP GR UR STRIP.AUTO: >=1.03 (ref 1–1.03)
THC UR QL: NEGATIVE
UROBILINOGEN UR QL STRIP.AUTO: 0.2 E.U./DL
WBC #/AREA URNS AUTO: ABNORMAL /HPF

## 2023-09-04 PROCEDURE — 82075 ASSAY OF BREATH ETHANOL: CPT

## 2023-09-04 PROCEDURE — 80307 DRUG TEST PRSMV CHEM ANLYZR: CPT

## 2023-09-04 PROCEDURE — 99285 EMERGENCY DEPT VISIT HI MDM: CPT

## 2023-09-04 PROCEDURE — 93005 ELECTROCARDIOGRAM TRACING: CPT

## 2023-09-04 PROCEDURE — 81025 URINE PREGNANCY TEST: CPT

## 2023-09-04 PROCEDURE — 81001 URINALYSIS AUTO W/SCOPE: CPT

## 2023-09-04 RX ORDER — BUPRENORPHINE AND NALOXONE 8; 2 MG/1; MG/1
8 FILM, SOLUBLE BUCCAL; SUBLINGUAL 3 TIMES DAILY
Status: DISCONTINUED | OUTPATIENT
Start: 2023-09-04 | End: 2023-09-04 | Stop reason: HOSPADM

## 2023-09-04 RX ORDER — ONDANSETRON 4 MG/1
4 TABLET, ORALLY DISINTEGRATING ORAL ONCE
Status: DISCONTINUED | OUTPATIENT
Start: 2023-09-04 | End: 2023-09-04

## 2023-09-04 RX ORDER — ACETAMINOPHEN 325 MG/1
650 TABLET ORAL EVERY 6 HOURS PRN
Status: CANCELLED | OUTPATIENT
Start: 2023-09-04

## 2023-09-04 RX ORDER — GABAPENTIN 400 MG/1
400 CAPSULE ORAL 3 TIMES DAILY
Status: CANCELLED | OUTPATIENT
Start: 2023-09-04

## 2023-09-04 RX ORDER — TOPIRAMATE 100 MG/1
200 TABLET, FILM COATED ORAL 2 TIMES DAILY
Status: CANCELLED | OUTPATIENT
Start: 2023-09-04

## 2023-09-04 RX ORDER — CLONAZEPAM 0.5 MG/1
0.5 TABLET ORAL ONCE
Status: COMPLETED | OUTPATIENT
Start: 2023-09-04 | End: 2023-09-04

## 2023-09-04 RX ORDER — ONDANSETRON 4 MG/1
4 TABLET, ORALLY DISINTEGRATING ORAL EVERY 6 HOURS SCHEDULED
Status: DISCONTINUED | OUTPATIENT
Start: 2023-09-04 | End: 2023-09-04 | Stop reason: HOSPADM

## 2023-09-04 RX ADMIN — ONDANSETRON 4 MG: 4 TABLET, ORALLY DISINTEGRATING ORAL at 16:34

## 2023-09-04 RX ADMIN — BUPRENORPHINE AND NALOXONE 8 MG: 8; 2 FILM BUCCAL; SUBLINGUAL at 14:03

## 2023-09-04 RX ADMIN — CLONAZEPAM 0.5 MG: 0.5 TABLET ORAL at 12:57

## 2023-09-04 NOTE — ED NOTES
Per Johnathon Patel with [facility], pt is still eligible for [facility] even if she wants detox and not rehab. Johnathon Patel indicates that he does not believe any additional items (such as blood work) is needed for detox and RN asked for call back to provide vehicle information for . Information relayed to provider.      Britney Krishna RN  09/04/23 0788

## 2023-09-04 NOTE — ED NOTES
Per Samuel Ortiz with [facility], pt wants outpatient only and requested that her parents be contacted. Parents stated they would arrive within the hour. RN relaying to provider.       Len Skiff, RN  09/04/23 0134

## 2023-09-04 NOTE — ED NOTES
Contacted Elza at \[facility]\"" who had pt on the list to contact and requested to be transferred to pt.  Lost connection during transfer     Laney Maloney RN  09/04/23 0604

## 2023-09-04 NOTE — ED CARE HANDOFF
Emergency Department Sign Out Note        Sign out and transfer of care from [facility]. See Separate Emergency Department note. The patient, Naz Olmedo, was evaluated by the previous provider for complaint of seizure. Workup Completed:  Results Reviewed     Procedure Component Value Units Date/Time    Rapid drug screen, urine [[id number]]  (Abnormal) Collected: 09/04/23 0908    Lab Status: Final result Specimen: Urine, Clean Catch Updated: 09/04/23 0939     Amph/Meth UR Negative     Barbiturate Ur Negative     Benzodiazepine Urine Negative     Cocaine Urine Positive     Methadone Urine --     Opiate Urine Negative     PCP Ur Negative     THC Urine Negative     Oxycodone Urine Negative    Narrative:      Presumptive report. If requested, specimen will be sent to reference lab for confirmation. FOR MEDICAL PURPOSES ONLY. IF CONFIRMATION NEEDED PLEASE CONTACT THE LAB WITHIN 5 DAYS.     Drug Screen Cutoff Levels:  AMPHETAMINE/METHAMPHETAMINES  1000 ng/mL  BARBITURATES     200 ng/mL  BENZODIAZEPINES     200 ng/mL  COCAINE      300 ng/mL  METHADONE      300 ng/mL  OPIATES      300 ng/mL  PHENCYCLIDINE     25 ng/mL  THC       50 ng/mL  OXYCODONE      100 ng/mL    Urine Microscopic [[id number]]  (Abnormal) Collected: 09/04/23 0910    Lab Status: Final result Specimen: Urine, Clean Catch Updated: 09/04/23 0927     RBC, UA 2-4 /hpf      WBC, UA 1-2 /hpf      Epithelial Cells Moderate /hpf      Bacteria, UA Occasional /hpf      MUCUS THREADS Occasional    POCT pregnancy, urine [[id number]]  (Normal) Resulted: 09/04/23 0914    Lab Status: Final result Updated: 09/04/23 0914     EXT Preg Test, Ur Negative     Control Valid    Urine Macroscopic, POC [[id number]]  (Abnormal) Collected: 09/04/23 0910    Lab Status: Final result Specimen: Urine Updated: 09/04/23 0912     Color, UA Yellow     Clarity, UA Clear     pH, UA 5.5     Leukocytes, UA Negative     Nitrite, UA Negative     Protein, UA Trace mg/dl      Glucose, UA Negative mg/dl      Ketones, UA >=160 (4+) mg/dl      Urobilinogen, UA 0.2 E.U./dl      Bilirubin, UA Negative     Occult Blood, UA Moderate     Specific Gravity, UA >=1.030    Narrative:      CLINITEK RESULT    POCT alcohol breath test [[id number]]  (Normal) Resulted: 09/04/23 0841    Lab Status: Final result Updated: 09/04/23 0841     EXTBreath Alcohol 0.00        No orders to display         ED Course / Workup Pending (followup): Patient is a 36-year-old female who was evaluated by previous provider for possible seizure. Please see separate ED note for more detail. Patient was initially evaluated and determined that it would be as beneficial for her to be in a drug rehabilitation program.  Host was contacted and patient was excepted at [facility]. During time of signout patient mentioned to me that she would like to detox from alcohol. She reported that she had been drinking bottles of liquor every day for the past couple of weeks and is now feeling shaky. I did reach out to detox AP at [facility] who reports that given patient's recent admission to [facility] It is only possible for her to have been drinking for 2 weeks at most even if she started drinking the day that she was discharged. Labs at [address] were also within normal limits and detox AP reports that this would make her then appropriate for a 3.7 level detox and inpatient rehabilitation which is available at [facility]. She recommends against repeating labs as it labs were stable yesterday. [facility] was contacted and informed of patient's request to detox from alcohol. They do not request labs to be completed in ER. Jaden Garcia Patient reports mild nausea while in the ER but otherwise denies any complaint. Patient has remained stable throughout stay in ER and is stable when picked up by [facility] vehicle. ED Course as of 09/04/23 [address] Sep 04, 2023   1632 S/o from [facility].  Patient has been in and out of ER over past 2 days. Requesting medications because she lost all her medications. Tested positive for cocaine so will be going to drug rehab and picked up at 7pm. Home meds are in. She may leave if she would like. Procedures  MDM        Disposition  Final diagnoses:   Substance abuse ([address])   Seizure ([address]) - pt self-reported seizure PTA   Alcohol abuse     Time reflects when diagnosis was documented in both MDM as applicable and the Disposition within this note     Time User Action Codes Description Comment    9/4/2023  1:59 PM Juris Gauze Add [F19.10] Substance abuse ([address])     9/4/2023  1:59 PM Ashley Saber [R56.9] Seizure ([address])     9/4/2023  1:59 PM Juris Gauze Modify [R56.9] Seizure [facility]) pt self-reported seizure PTA    9/4/2023  4:50 PM Juris Gauze Add [F10.10] Alcohol abuse       ED Disposition     ED Disposition   Discharge    Condition   Stable    Date/Time   Mon Sep 4, 2023  7:07 PM    [facility] discharge to home/self care.                Follow-up Information     Follow up With Specialties Details Why Contact Info Additional Information    Cas Nicole MD [facility] Medicine   [address] [place] [address] [id number] [place] [address] Emergency Department Emergency Medicine   [address] [id number]  13[address] [facility] Emergency Department, [address] [place] [address], [address] [place] [address], [id number]        Discharge Medication List as of 9/4/2023  7:13 PM      CONTINUE these medications which have NOT CHANGED    Details   acetaminophen (TYLENOL) 325 mg tablet Take 2 tablets (650 mg total) by mouth every 6 (six) hours as needed for mild pain or headaches, Starting Wed 2/27/2019, Print      albuterol (PROVENTIL HFA,VENTOLIN HFA) 90 mcg/act inhaler Inhale 2 puffs every 4 (four) hours as needed for wheezing, Starting Fri 7/3/2020, Normal      azaTHIOprine (IMURAN) 50 mg tablet Take 50 mg by mouth daily Takes daily, Historical Med      budesonide-formoterol (SYMBICORT) 80-4.5 MCG/ACT inhaler Inhale 2 puffs 2 (two) times a day Rinse mouth after use., Starting Thu 10/8/2020, Normal      buprenorphine-naloxone (SUBOXONE) 8-2 mg per SL tablet Place 1 tablet under the tongue 3 (three) times a day, Historical Med      busPIRone (BUSPAR) 10 mg tablet Take 10 mg by mouth 3 (three) times a day with meals, Starting Wed 10/6/2021, Historical Med      citalopram (CeleXA) 40 mg tablet Take 40 mg by mouth daily Takes in the am, Historical Med      clonazePAM (KlonoPIN) 0.5 mg tablet Take 1 tablet (0.5 mg total) by mouth 2 (two) times a day as needed for anxiety for up to 10 days, Starting Fri 7/3/2020, Until Wed 10/13/2021 at 2359, Normal      diclofenac (VOLTAREN) 75 mg EC tablet Take 75 mg by mouth 2 (two) times a day, Historical Med      folic acid (FOLVITE) 1 mg tablet Take 1 mg by mouth daily Take up to 10/20/21 prior to surgery. , Historical Med      gabapentin (NEURONTIN) 400 mg capsule Take 1 capsule (400 mg total) by mouth 3 (three) times a day, Starting Fri 7/3/2020, No Print      ibuprofen (MOTRIN) 600 mg tablet Take 1 tablet (600 mg total) by mouth every 6 (six) hours as needed for mild pain, Starting Thu 10/21/2021, Normal      lidocaine (LIDODERM) 5 % Apply 1 patch topically daily Remove & Discard patch within 12 hours or as directed by MD, Starting Fri 7/3/2020, Normal      LORazepam (ATIVAN) 1 mg tablet Take 0.5 tablets by mouth every 8 (eight) hours as needed for anxiety (moderate anxiety) for up to 10 doses, Starting Tue 4/11/2017, No Print      melatonin 3 mg Take 1 tablet by mouth daily at bedtime , Historical Med      meloxicam (MOBIC) 15 mg tablet Take 1 tablet (15 mg total) by mouth daily, Starting Fri 7/3/2020, Normal      methocarbamol (ROBAXIN) 500 mg tablet Take 500 mg by mouth 3 (three) times a day, Historical Med      multivitamin (THERAGRAN) TABS Take 1 tablet by mouth daily, Historical Med      Pancrelipase, Lip-Prot-Amyl, (CREON) 60631 units CPEP Take 1 capsule by mouth 3 (three) times a day Take up to 10/20/21 with eating., Historical Med      pantoprazole (PROTONIX) 40 mg tablet Take 40 mg by mouth 2 (two) times a day, Historical Med      prazosin (MINIPRESS) 5 mg capsule Take 1 mg by mouth daily at bedtime Takes at night before bed., Historical Med      sucralfate (CARAFATE) 1 g/10 mL suspension Take 1 g by mouth 4 (four) times a day, Historical Med      thiamine 100 MG tablet Take 1 tablet by mouth daily, Starting Wed 6/7/2017, Print      !! topiramate (TOPAMAX) 100 mg tablet Take 200 mg by mouth 2 (two) times a day , Historical Med      !! topiramate (TOPAMAX) 200 MG tablet Take 200 mg by mouth daily at bedtime , Historical Med      zinc gluconate 50 mg tablet Take 50 mg by mouth daily, Historical Med       !! - Potential duplicate medications found. Please discuss with provider. No discharge procedures on file.        ED Provider  Electronically Signed by     Nikia Valdes PA-C  09/04/23 3805

## 2023-09-04 NOTE — ED NOTES
HOST said pt accepted at [facility]. Pt provided phone for admission questions.      Merlyn Ellison RN  09/04/23 3991

## 2023-09-04 NOTE — ED NOTES
Pt accepted at [facility] and will be picked up at 1900. Pt is to be waiting in the waiting room.       Ladonna Aguilera RN  09/04/23 6571
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
## 2023-09-04 NOTE — ED NOTES
Patient requested that RN notify her father that she will be going to [facility]. RN contacted [facility] and notified him.      Riley Thompson RN  09/04/23 Lydia Adams

## 2023-09-04 NOTE — ED NOTES
Pt awake, and rambling. Pt stated they wanted help with "withdrawal". Provider made aware, and speaking with pt.       Klaus Thurman RN  09/03/23 2040

## 2023-09-04 NOTE — ED NOTES
Aileen Brown from the HOST program updated on patients interest ingoing to inpatient rehab, speaking with patient on phone at this time      Damion Lynne RN  09/04/23 8950

## 2023-09-04 NOTE — ED NOTES
RN had gone in to provide suboxone to pt who asked for an additional dose of klonopin. RN advised pt that the administration would be too close together.      Shelia Fisher RN  09/04/23 4801

## 2023-09-04 NOTE — ED NOTES
Per Raciel Siddiqui, nothing additional is needed, pt. Is good to go. Mahad Moyer is her transport and they will call the hospital as well as Ricardo Bauman.      Jose Coyne RN  09/04/23 6835

## 2023-09-04 NOTE — ED NOTES
Father arrived at bedside requesting to speak with this RN regarding care. Father states pt only has seizures when she drinks and that if she does not go in patient, she will relapse. RN explained that pt is alert and oriented and makes her own medical decisions so unfortunately we can not force her. RN relayed to provider.       Harsh Landis RN  09/04/23 [facility] Slava Cummins RN  09/04/23 8809

## 2023-09-05 LAB
ATRIAL RATE: 83 BPM
GLUCOSE SERPL-MCNC: 91 MG/DL (ref 65–140)
P AXIS: 50 DEGREES
PR INTERVAL: 182 MS
QRS AXIS: 39 DEGREES
QRSD INTERVAL: 88 MS
QT INTERVAL: 404 MS
QTC INTERVAL: 474 MS
T WAVE AXIS: 42 DEGREES
VENTRICULAR RATE: 83 BPM

## 2023-09-05 PROCEDURE — 93010 ELECTROCARDIOGRAM REPORT: CPT | Performed by: INTERNAL MEDICINE

## 2023-09-05 NOTE — ED CARE HANDOFF
[facility] Outcome Note    Patient name Corrie Gray  Location ED-27/ED-27 MRN [id number]  Age: 33 y.o. Plan Type:   Warm Handoff                                                                                    Plan Date: 9/4/2023  Service:  ED Warm Handoff      Substance Use History:  Poly    Warm Handoff Update:  Discharged, notes say that patient stated she was going to [facility] Outcome: Treatment Related Resources

## 2023-09-06 ENCOUNTER — TELEPHONE (OUTPATIENT)
Dept: PSYCHIATRY | Facility: CLINIC | Age: 33
End: 2023-09-06

## 2023-10-11 ENCOUNTER — TELEPHONE (OUTPATIENT)
Dept: BEHAVIORAL/MENTAL HEALTH CLINIC | Facility: CLINIC | Age: 33
End: 2023-10-11

## 2023-10-11 NOTE — TELEPHONE ENCOUNTER
Celso,  Writer attempted to contact pt regarding referral for MAT services. Contact to pt was to confirm interest in MAT services and or therapy. Also to inform pt that we do not have psych services at this time. Phone was unavailable. For your review.

## 2024-01-26 NOTE — ASSESSMENT & PLAN NOTE
01/26/24 0017   Patient Assessment/Suction   Level of Consciousness (AVPU) alert   Respiratory Effort Unlabored   Expansion/Accessory Muscles/Retractions no use of accessory muscles   All Lung Fields Breath Sounds clear;diminished   Rhythm/Pattern, Respiratory no shortness of breath reported   Cough Frequency infrequent   Cough Type no productive sputum   PRE-TX-O2   Device (Oxygen Therapy) nasal cannula with humidification   Flow (L/min) 5   SpO2 95 %   Pulse Oximetry Type Continuous   $ Pulse Oximetry - Multiple Charge Pulse Oximetry - Multiple   Pulse 93   Resp (!) 22   Positioning   Head of Bed (HOB) Positioning HOB elevated;HOB at 30 degrees   Aerosol Therapy   $ Aerosol Therapy Charges Aerosol Treatment   Daily Review of Necessity (SVN) completed   Respiratory Treatment Status (SVN) given   Treatment Route (SVN) mask   Patient Position (SVN) semi-Dinh's   Post Treatment Assessment (SVN) breath sounds improved   Signs of Intolerance (SVN) none   Breath Sounds Post-Respiratory Treatment   Throughout All Fields Post-Treatment All Fields   Throughout All Fields Post-Treatment aeration increased   Post-treatment Heart Rate (beats/min) 93   Post-treatment Resp Rate (breaths/min) 15        Monitor  HOST referral

## 2024-02-21 PROBLEM — J18.9 SEPSIS DUE TO PNEUMONIA (HCC): Status: RESOLVED | Noted: 2020-10-04 | Resolved: 2024-02-21

## 2024-02-21 PROBLEM — J18.9 MULTIFOCAL PNEUMONIA: Status: RESOLVED | Noted: 2020-06-27 | Resolved: 2024-02-21

## 2024-02-21 PROBLEM — A41.9 SEPSIS DUE TO PNEUMONIA (HCC): Status: RESOLVED | Noted: 2020-10-04 | Resolved: 2024-02-21

## 2024-10-04 ENCOUNTER — HOSPITAL ENCOUNTER (EMERGENCY)
Facility: HOSPITAL | Age: 34
Discharge: HOME | End: 2024-10-04
Attending: EMERGENCY MEDICINE
Payer: COMMERCIAL

## 2024-10-04 VITALS
DIASTOLIC BLOOD PRESSURE: 101 MMHG | OXYGEN SATURATION: 95 % | SYSTOLIC BLOOD PRESSURE: 145 MMHG | RESPIRATION RATE: 20 BRPM | TEMPERATURE: 98.3 F | HEART RATE: 78 BPM

## 2024-10-04 DIAGNOSIS — R21 RASH AND NONSPECIFIC SKIN ERUPTION: Primary | ICD-10-CM

## 2024-10-04 PROCEDURE — 99284 EMERGENCY DEPT VISIT MOD MDM: CPT | Performed by: EMERGENCY MEDICINE

## 2024-10-04 PROCEDURE — 99282 EMERGENCY DEPT VISIT SF MDM: CPT

## 2024-10-04 RX ORDER — PERMETHRIN 50 MG/G
CREAM TOPICAL ONCE
Status: COMPLETED | OUTPATIENT
Start: 2024-10-04 | End: 2024-10-04

## 2024-10-04 RX ADMIN — PERMETHRIN: 50 CREAM TOPICAL at 13:51

## 2024-10-04 NOTE — DISCHARGE INSTRUCTIONS
You were seen and evaluated in the emergency department for rash.  You are given permethrin cream.  You can place this cream all of your body to help with bites.    Please follow-up with your primary care physician for further management.

## 2024-10-05 NOTE — ED PROVIDER NOTES
Final diagnoses:   Rash and nonspecific skin eruption     ED Disposition       ED Disposition   Discharge    Condition   Stable    Date/Time   Fri Oct 4, 2024  1:29 PM    Comment   Marilia Arriaga discharge to home/self care.                   Assessment & Plan   {Hyperlinks  Risk Stratification - NIHSS - HEART SCORE - Fill out sepsis note and make sure you call [phone] if severe or septic shock:[id number]}    Medical Decision Making  Patient is a 34 y.o. female  who presents to the ED with spider bites.    Vital signs stable. Exam as listed above    Plan ***    View ED course above for further discussion on patient workup.     On review of previous records ***.    All labs reviewed and utilized in the medical decision making process  All radiology studies independently viewed by me and interpreted by the radiologist.  I reviewed all testing with the patient.       Risk  Prescription drug management.             Medications   permethrin (ELIMITE) 5 % cream ( Topical Given 10/4/24 1351)       ED Risk Strat Scores                                               History of Present Illness   {Hyperlinks  History (Med, Surg, Fam, Social) - Current Medications - Allergies  :[id number]}    Chief Complaint   Patient presents with    Rash     Patient has been seen for this rash on back with biopsy taken. Told she gives off a pheromone. Having increased spider bites around ear and back over the last two months. Patient reports vomiting and dizziness with bites. Was seen by dermatology and thinks it could be internal. Was seen at [facility] and told she has a FB in ear, but ENT only saw webs in her ears.        Past Medical History:   Diagnosis Date    Alcohol abuse     10/13/21 Pt reports resolved for approx 8 months    Alcohol abuse     Anxiety     Asthma     Bipolar disorder ([facility])     BOOP (bronchiolitis obliterans with organizing pneumonia) ([facility])     10/13/21 Pt reports dx of BOOP    Chronic pain disorder 10/4/2020     Depression     Fibromyalgia, primary     Liver disease     10/13/21 Pt reports noted scarring in liver - nothing new.     Lymphedema     Pt reports lymphedema in feet /legs     Pancreatitis     Panic disorder 10/11/2016    Psychiatric disorder     PTSD (post-traumatic stress disorder)     Seizures (HCC)     10/13/21 Pt reports last reported seizure - in 2021- no seizures since then     Self-injurious behavior       Past Surgical History:   Procedure Laterality Date    ALVEOLOPLASTY IN CONJUNCTION WITH EXTRACTIONS - 4 OR MORE TEETH OR TOOTH SPACES, PER QUADRANT N/A 10/21/2021    Procedure: ALVEOPLASTY 4 QUADRANTS;  Surgeon: Biju White DMD;  Location: [facility];  Service: Maxillofacial    ESOPHAGOGASTRODUODENOSCOPY N/A 4/10/2017    Procedure: ESOPHAGOGASTRODUODENOSCOPY (EGD);  Surgeon: Josue Ng MD;  Location: [facility];  Service:     EXTRACTION, ERUPTED TOOTH REQUIRING REMOVAL OF BONE AND/OR SECTIONING OF TOOTH, AND INCLUDING ELEVATION OF MUCOPERIOSTEAL FLAP IF INDICATED N/A 10/21/2021    Procedure: EXTRACTION TEETH 2,3,4,5,6,7,8,9,10,11,12,13,14,15,16,18,19,20,21,22,23,24,25,26,27,28,29,30,31;  Surgeon: Biju White DMD;  Location: [facility];  Service: Maxillofacial    INDUCED       WISDOM TOOTH EXTRACTION        Family History   Problem Relation Age of Onset    Lupus Father     Coronary artery disease Father     Stroke Father       Social History     Tobacco Use    Smoking status: Every Day     Current packs/day: 1.00     Average packs/day: 1 pack/day for 10.0 years (10.0 ttl pk-yrs)     Types: Cigarettes    Smokeless tobacco: Never   Vaping Use    Vaping status: Never Used   Substance Use Topics    Alcohol use: Yes    Drug use: Yes     Types: Marijuana, Cocaine      E-Cigarette/Vaping    E-Cigarette Use Never User       E-Cigarette/Vaping Substances      I have reviewed and agree with the history as documented.     Patient is a 34-year-old female with past medical history of  fibromyalgia, bipolar and chronic pancreatitis who presents today for evaluation of spider bites.  For the past 3 weeks she has had spiders biting her all over her body whenever she leaves her house.  She is being bit around her ears, on her stomach and down her legs.  She states she does not see the spiders notices the bites afterwards.  She has not been camping or living in the woods.  She states she was seen and evaluated by dermatology and they were unable to determine why she she is getting bit.  She was evaluated by ENT for cobwebs in her ears but they treated her for possible ear infections.  She denies visual or auditory hallucinations.      Rash  Associated symptoms: abdominal pain, fatigue, fever, headaches, nausea and vomiting    Associated symptoms: no shortness of breath and no sore throat        Review of Systems   Constitutional:  Positive for chills, fatigue and fever. Negative for appetite change.   HENT:  Positive for ear pain. Negative for sore throat.    Respiratory:  Negative for cough and shortness of breath.    Cardiovascular:  Negative for chest pain.   Gastrointestinal:  Positive for abdominal pain, nausea and vomiting.   Genitourinary:  Negative for difficulty urinating, dysuria and hematuria.   Skin:  Positive for rash.   Neurological:  Positive for headaches. Negative for dizziness and light-headedness.           Objective   {Hyperlinks  Historical Vitals - Historical Labs - Chart Review/Microbiology - Last Echo - Code Status  :[id number]}    ED Triage Vitals [10/04/24 1221]   Temperature Pulse Blood Pressure Respirations SpO2 Patient Position - Orthostatic VS   98.3 °F (36.8 °C) 78 (!) 145/101 20 95 % Sitting      Temp Source Heart Rate Source BP Location FiO2 (%) Pain Score    Oral Monitor Right arm -- 8      Vitals      Date and Time Temp Pulse SpO2 Resp BP Pain Score FACES Pain Rating User   10/04/24 1221 98.3 °F (36.8 °C) 78 95 % 20 145/101 8 -- ST            Physical Exam  Vitals  and nursing note reviewed.   Constitutional:       General: She is not in acute distress.     Appearance: Normal appearance. She is obese. She is not ill-appearing.   HENT:      Head: Normocephalic and atraumatic.      Right Ear: Tympanic membrane normal.      Left Ear: Tympanic membrane normal.      Ears:      Comments: Bilateral ears with moist cotton balls and external canal.  Bilateral external ears, red but no bites or rashes.     Nose: Nose normal.      Mouth/Throat:      Mouth: Mucous membranes are moist.      Pharynx: Oropharynx is clear.   Eyes:      Conjunctiva/sclera: Conjunctivae normal.      Pupils: Pupils are equal, round, and reactive to light.   Cardiovascular:      Rate and Rhythm: Normal rate and regular rhythm.      Heart sounds: Normal heart sounds.   Pulmonary:      Effort: Pulmonary effort is normal.      Breath sounds: Normal breath sounds.   Abdominal:      General: Abdomen is flat. Bowel sounds are normal.      Palpations: Abdomen is soft.      Tenderness: There is abdominal tenderness (Diffuse).   Skin:     General: Skin is warm and dry.      Capillary Refill: Capillary refill takes less than 2 seconds.   Neurological:      General: No focal deficit present.      Mental Status: She is alert and oriented to person, place, and time.   Psychiatric:         Attention and Perception: Attention normal.         Mood and Affect: Mood normal.         Speech: Speech normal.               Results Reviewed       None            No orders to display       Procedures    ED Medication and Procedure Management   Prior to Admission Medications   Prescriptions Last Dose Informant Patient Reported? Taking?   LORazepam (ATIVAN) 1 mg tablet  Self No No   Sig: Take 0.5 tablets by mouth every 8 (eight) hours as needed for anxiety (moderate anxiety) for up to 10 doses   Patient taking differently: Take 0.5 mg by mouth every 8 (eight) hours as needed for anxiety (moderate anxiety) 10/13/21 Pt reports is taking 1.5  mg TID as needed   Pancrelipase, Lip-Prot-Amyl, (CREON) 37871 units CPEP  Self Yes No   Sig: Take 1 capsule by mouth 3 (three) times a day Take up to 10/20/21 with eating.   acetaminophen (TYLENOL) 325 mg tablet  Self No No   Sig: Take 2 tablets (650 mg total) by mouth every 6 (six) hours as needed for mild pain or headaches   albuterol (PROVENTIL HFA,VENTOLIN HFA) 90 mcg/act inhaler  Self No No   Sig: Inhale 2 puffs every 4 (four) hours as needed for wheezing   azaTHIOprine (IMURAN) 50 mg tablet   Yes No   Sig: Take 50 mg by mouth daily Takes daily   budesonide-formoterol (SYMBICORT) 80-4.5 MCG/ACT inhaler  Self No No   Sig: Inhale 2 puffs 2 (two) times a day Rinse mouth after use.   buprenorphine-naloxone (SUBOXONE) 8-2 mg per SL tablet  Self Yes No   Sig: Place 1 tablet under the tongue 3 (three) times a day   busPIRone (BUSPAR) 10 mg tablet   Yes No   Sig: Take 10 mg by mouth 3 (three) times a day with meals   citalopram (CeleXA) 40 mg tablet  Self Yes No   Sig: Take 40 mg by mouth daily Takes in the am   clonazePAM (KlonoPIN) 0.5 mg tablet   No No   Sig: Take 1 tablet (0.5 mg total) by mouth 2 (two) times a day as needed for anxiety for up to 10 days   diclofenac (VOLTAREN) 75 mg EC tablet   Yes No   Sig: Take 75 mg by mouth 2 (two) times a day   folic acid (FOLVITE) 1 mg tablet  Self Yes No   Sig: Take 1 mg by mouth daily Take up to 10/20/21 prior to surgery.   gabapentin (NEURONTIN) 400 mg capsule  Self No No   Sig: Take 1 capsule (400 mg total) by mouth 3 (three) times a day   ibuprofen (MOTRIN) 600 mg tablet   No No   Sig: Take 1 tablet (600 mg total) by mouth every 6 (six) hours as needed for mild pain   lidocaine (LIDODERM) 5 %  Self No No   Sig: Apply 1 patch topically daily Remove & Discard patch within 12 hours or as directed by MD   Patient taking differently: Apply 1 patch topically as needed Remove & Discard patch within 12 hours or as directed by MD-10/13/21 pt reports using only  as needed    melatonin 3 mg  Self Yes No   Sig: Take 1 tablet by mouth daily at bedtime    meloxicam (MOBIC) 15 mg tablet  Self No No   Sig: Take 1 tablet (15 mg total) by mouth daily   methocarbamol (ROBAXIN) 500 mg tablet  Self Yes No   Sig: Take 500 mg by mouth 3 (three) times a day   multivitamin (THERAGRAN) TABS  Self Yes No   Sig: Take 1 tablet by mouth daily   pantoprazole (PROTONIX) 40 mg tablet  Self Yes No   Sig: Take 40 mg by mouth 2 (two) times a day   prazosin (MINIPRESS) 5 mg capsule  Self Yes No   Sig: Take 1 mg by mouth daily at bedtime Takes at night before bed.   sucralfate (CARAFATE) 1 g/10 mL suspension  Self Yes No   Sig: Take 1 g by mouth 4 (four) times a day   thiamine 100 MG tablet  Self No No   Sig: Take 1 tablet by mouth daily   topiramate (TOPAMAX) 100 mg tablet  Self Yes No   Sig: Take 200 mg by mouth 2 (two) times a day    topiramate (TOPAMAX) 200 MG tablet  Self Yes No   Sig: Take 200 mg by mouth daily at bedtime    zinc gluconate 50 mg tablet  Self Yes No   Sig: Take 50 mg by mouth daily      Facility-Administered Medications: None     Discharge Medication List as of 10/4/2024  1:54 PM        CONTINUE these medications which have NOT CHANGED    Details   acetaminophen (TYLENOL) 325 mg tablet Take 2 tablets (650 mg total) by mouth every 6 (six) hours as needed for mild pain or headaches, Starting Wed 2/27/2019, Print      albuterol (PROVENTIL HFA,VENTOLIN HFA) 90 mcg/act inhaler Inhale 2 puffs every 4 (four) hours as needed for wheezing, Starting Fri 7/3/2020, Normal      azaTHIOprine (IMURAN) 50 mg tablet Take 50 mg by mouth daily Takes daily, Historical Med      budesonide-formoterol (SYMBICORT) 80-4.5 MCG/ACT inhaler Inhale 2 puffs 2 (two) times a day Rinse mouth after use., Starting u 10/8/2020, Normal      buprenorphine-naloxone (SUBOXONE) 8-2 mg per SL tablet Place 1 tablet under the tongue 3 (three) times a day, Historical Med      busPIRone (BUSPAR) 10 mg tablet Take 10 mg by mouth 3  (three) times a day with meals, Starting Wed 10/6/2021, Historical Med      citalopram (CeleXA) 40 mg tablet Take 40 mg by mouth daily Takes in the am, Historical Med      clonazePAM (KlonoPIN) 0.5 mg tablet Take 1 tablet (0.5 mg total) by mouth 2 (two) times a day as needed for anxiety for up to 10 days, Starting Fri 7/3/2020, Until Wed 10/13/2021 at 2359, Normal      diclofenac (VOLTAREN) 75 mg EC tablet Take 75 mg by mouth 2 (two) times a day, Historical Med      folic acid (FOLVITE) 1 mg tablet Take 1 mg by mouth daily Take up to 10/20/21 prior to surgery., Historical Med      gabapentin (NEURONTIN) 400 mg capsule Take 1 capsule (400 mg total) by mouth 3 (three) times a day, Starting Fri 7/3/2020, No Print      ibuprofen (MOTRIN) 600 mg tablet Take 1 tablet (600 mg total) by mouth every 6 (six) hours as needed for mild pain, Starting u 10/21/2021, Normal      lidocaine (LIDODERM) 5 % Apply 1 patch topically daily Remove & Discard patch within 12 hours or as directed by MD, Starting Fri 7/3/2020, Normal      LORazepam (ATIVAN) 1 mg tablet Take 0.5 tablets by mouth every 8 (eight) hours as needed for anxiety (moderate anxiety) for up to 10 doses, Starting Tue 4/11/2017, No Print      melatonin 3 mg Take 1 tablet by mouth daily at bedtime , Historical Med      meloxicam (MOBIC) 15 mg tablet Take 1 tablet (15 mg total) by mouth daily, Starting Fri 7/3/2020, Normal      methocarbamol (ROBAXIN) 500 mg tablet Take 500 mg by mouth 3 (three) times a day, Historical Med      multivitamin (THERAGRAN) TABS Take 1 tablet by mouth daily, Historical Med      Pancrelipase, Lip-Prot-Amyl, (CREON) 59739 units CPEP Take 1 capsule by mouth 3 (three) times a day Take up to 10/20/21 with eating., Historical Med      pantoprazole (PROTONIX) 40 mg tablet Take 40 mg by mouth 2 (two) times a day, Historical Med      prazosin (MINIPRESS) 5 mg capsule Take 1 mg by mouth daily at bedtime Takes at night before bed., Historical Med       sucralfate (CARAFATE) 1 g/10 mL suspension Take 1 g by mouth 4 (four) times a day, Historical Med      thiamine 100 MG tablet Take 1 tablet by mouth daily, Starting Wed 6/7/2017, Print      !! topiramate (TOPAMAX) 100 mg tablet Take 200 mg by mouth 2 (two) times a day , Historical Med      !! topiramate (TOPAMAX) 200 MG tablet Take 200 mg by mouth daily at bedtime , Historical Med      zinc gluconate 50 mg tablet Take 50 mg by mouth daily, Historical Med       !! - Potential duplicate medications found. Please discuss with provider.        No discharge procedures on file.  ED SEPSIS DOCUMENTATION   Time reflects when diagnosis was documented in both MDM as applicable and the Disposition within this note       Time User Action Codes Description Comment    10/4/2024  1:29 PM BerkeleyKhloe Tillman Add [R21] Rash     10/4/2024  1:29 PM BerkeleyKhloe Tillman Remove [R21] Rash     10/4/2024  1:29 PM BerkeleyKhloe ulloa Add [R21] Rash and nonspecific skin eruption

## 2024-10-06 NOTE — ED ATTENDING ATTESTATION
10/4/2024  I, Haile Marley DO, saw and evaluated the patient. I have discussed the patient with the resident/non-physician practitioner and agree with the resident's/non-physician practitioner's findings, Plan of Care, and MDM as documented in the resident's/non-physician practitioner's note, except where noted. All available labs and Radiology studies were reviewed.  I was present for key portions of any procedure(s) performed by the resident/non-physician practitioner and I was immediately available to provide assistance.       At this point I agree with the current assessment done in the Emergency Department.  I have conducted an independent evaluation of this patient a history and physical is as follows:    34-year-old female history of chronic pain, psychiatric disorder previous alcohol abuse presents for concerns for spider bites states that for months she has had spiders biting her throughout the day states that she when she showers she sees them fall off of her.  Has been wearing rubber bands around her legs to prevent spiders from calling up the father at the bedside who also gives some history that the patient's mother apparently saw some of these insects in the patient's hair also complains of cobwebs on her body and in her ears.  She was seen by ENT that told her that she may have had otitis externa also was seen in the emergency department recently and had a negative workup.  Patient does have various areas of what appear to be small scabs or even some petechiae it is certainly not diffuse.  Her ears are red but she has been applying multiple different salves on them.  She has normal vital signs other than slight hypertension I do not think the patient actually is getting bit by spiders likely psychogenic, could be other insect however consider scabies we will give permethrin    ED Course         Critical Care Time  Procedures

## 2024-10-08 ENCOUNTER — TELEPHONE (OUTPATIENT)
Age: 34
End: 2024-10-08

## 2024-10-08 NOTE — TELEPHONE ENCOUNTER
Patient called in to schedule a new patient appt preferably with Dr. Haley. I advised patient that we are a referral based office and I would not be able to schedule an appointment until we have a referral in file for her. Patient said she will get a referral faxed to our office.

## 2024-10-14 NOTE — TELEPHONE ENCOUNTER
Patient called in to confirm if we received her referral , Patient stated her the doctor office faxed over on Friday. I advise the patient the referral has not been scanned into her chart . Patient will contact her doctor office to see what happen

## 2024-11-13 ENCOUNTER — TELEPHONE (OUTPATIENT)
Age: 34
End: 2024-11-13

## 2024-11-13 NOTE — TELEPHONE ENCOUNTER
Patient called in to schedule an ID consult . I create the patient Referral Shell . Patient stated she has seen a derm and as a follow up appt with ENT this month . I advise the patient to have Derm office fax over her medical records.  I explain to the patient her referral has to be reviewed once its review will give her a call with the next following steps

## 2024-11-26 ENCOUNTER — TELEPHONE (OUTPATIENT)
Age: 34
End: 2024-11-26

## 2024-11-26 NOTE — TELEPHONE ENCOUNTER
Patient called to schedule appointment, I advised her we would need a referral. She states she will have her pcp fax a referral. I provided central fax number to patient. Thank you.

## 2024-12-24 ENCOUNTER — CONSULT (OUTPATIENT)
Age: 34
End: 2024-12-24
Payer: COMMERCIAL

## 2024-12-24 VITALS
DIASTOLIC BLOOD PRESSURE: 80 MMHG | SYSTOLIC BLOOD PRESSURE: 130 MMHG | TEMPERATURE: 97.9 F | WEIGHT: 184.4 LBS | HEART RATE: 73 BPM | BODY MASS INDEX: 29.63 KG/M2 | OXYGEN SATURATION: 97 % | HEIGHT: 66 IN

## 2024-12-24 DIAGNOSIS — L29.9 ITCHY SKIN: ICD-10-CM

## 2024-12-24 DIAGNOSIS — M79.7 FIBROMYALGIA: Primary | ICD-10-CM

## 2024-12-24 PROCEDURE — 99244 OFF/OP CNSLTJ NEW/EST MOD 40: CPT | Performed by: STUDENT IN AN ORGANIZED HEALTH CARE EDUCATION/TRAINING PROGRAM

## 2024-12-24 NOTE — PROGRESS NOTES
Name: Marilia Arriaga      : 1990      MRN: [id number]  Encounter Provider: Justyna Camacho DO  Encounter Date: 2024   Encounter department: [facility] [address]  :  Assessment & Plan  Fibromyalgia  Patient is a 34-year-old female presenting for follow-up of fibromyalgia.  Patient was previously following at [facility].  Has a history of diffuse arthralgias and myalgias along with numbness and tingling.  No signs or symptoms suggestive of an autoimmune disease.  Patient denies prolonged morning stiffness or joint swelling.  No synovitis or joint tenderness on exam.  Patient has no symptoms suggestive of a connective tissue disease such as mucocutaneous manifestations, photosensitive rash, sicca symptoms, Raynaud's phenomenon.  Serologic workup including QUINTON and all SALLY's have been negative.  ANCA's are negative.  Overall low suspicion for a systemic autoimmune process at this time.  Discussed that chronic management with fibromyalgia is typically through PCP.  -Continue with behavioral therapy  -Can consider aqua therapy or physical therapy if symptoms worsen  -Further medical management per PCP         Itchy skin  Patient reports diffuse itching and weblike material in her mouth, nose, ears.  Etiology of this is unclear.  No objective findings on exam.  Discussed with patient that this is less likely autoimmune in nature.  Patient follows with dermatology.       RTC as needed     Pertinent Medical History   Per chart review, patient was previously following at [facility].  Last visit was on 3/6/2023.  Patient was presenting with diffuse aches and pains.  Patient was diagnosed with fibromyalgia.  Patient was tried on amitriptyline which she did not tolerate.  Patient was also tried on gabapentin and Lyrica.     History of Present Illness   Marilia Arriaga is a 34 y.o. female with a history of migraines, recurrent pancreatitis, seizures who presents for fibromyalgia.    HPI   Patient  states that she has been experiencing recurrent ear infections.  Patient feels like she pulled something out of her ear the other day and it caused it to bleed.  Patient states she feels like there is weblike material in her ears, mouth and nose.  Reports it causes her to develop migraines and has a weird feeling on her tongue.  Patient states that initially felt like it was hair, however then she noticed that it was more wet.  Patient states that she feels like she is choking sometimes.  Patient states she tries to flush it out and take long hot showers but it does not go away.  Patient states that she feels like it sticks to her.  Patient was asked to give a sample but she feels like it does not leave for her to be able to get a sample.  Patient states she tries to put it outside in the cold but it comes right back to her.    Patient states all the symptoms started after COVID.    Patient also reports that she gets chronic low back pain.  Patient gets steroid injections and epidurals which helped.  However, patient has not been able to follow-up recently due to everything else that has been going on.  Patient states that she has been to the hospital multiple times but no one can figure out exactly what is going on with her.  States that the back pain is constant and worse with use.    Patient also endorses diffuse bodyaches.  Reports numbness and tingling that occurs throughout the body.  Patient states she does have a diagnosis of fibromyalgia.  Has tried multiple medicines in the past.  Now follows with psychiatry regularly as well.    Patient states that she has had a positive cardiolipin antibody.  Denies any history of VTE.  Patient states she follows with hematology for this.      Review of Systems  Complete ROS conducted as per HPI. In addition, denies:  Fever  Photosensitive rash  Sicca symptoms  Recurrent oral ulcers  Muscle weakness  Uveitis  Dactylitis  Chest pain  SOB  Pleurisy  Gross hematuria  Foamy  urine  Raynaud's  Joint issues other than noted above    Current Outpatient Medications on File Prior to Visit   Medication Sig Dispense Refill    acetaminophen (TYLENOL) 325 mg tablet Take 2 tablets (650 mg total) by mouth every 6 (six) hours as needed for mild pain or headaches 30 tablet 0    albuterol (PROVENTIL HFA,VENTOLIN HFA) 90 mcg/act inhaler Inhale 2 puffs every 4 (four) hours as needed for wheezing 1 Inhaler 0    azaTHIOprine (IMURAN) 50 mg tablet Take 50 mg by mouth daily Takes daily      budesonide-formoterol (SYMBICORT) 80-4.5 MCG/ACT inhaler Inhale 2 puffs 2 (two) times a day Rinse mouth after use. 1 Inhaler 0    buprenorphine-naloxone (SUBOXONE) 8-2 mg per SL tablet Place 1 tablet under the tongue 3 (three) times a day      busPIRone (BUSPAR) 10 mg tablet Take 10 mg by mouth 3 (three) times a day with meals      citalopram (CeleXA) 40 mg tablet Take 40 mg by mouth daily Takes in the am      diclofenac (VOLTAREN) 75 mg EC tablet Take 75 mg by mouth 2 (two) times a day      folic acid (FOLVITE) 1 mg tablet Take 1 mg by mouth daily Take up to 10/20/21 prior to surgery.      gabapentin (NEURONTIN) 400 mg capsule Take 1 capsule (400 mg total) by mouth 3 (three) times a day  0    ibuprofen (MOTRIN) 600 mg tablet Take 1 tablet (600 mg total) by mouth every 6 (six) hours as needed for mild pain 30 tablet 0    clonazePAM (KlonoPIN) 0.5 mg tablet Take 1 tablet (0.5 mg total) by mouth 2 (two) times a day as needed for anxiety for up to 10 days 20 tablet 0    lidocaine (LIDODERM) 5 % Apply 1 patch topically daily Remove & Discard patch within 12 hours or as directed by MD 10 patch 0    LORazepam (ATIVAN) 1 mg tablet Take 0.5 tablets by mouth every 8 (eight) hours as needed for anxiety (moderate anxiety) for up to 10 doses (Patient taking differently: Take 0.5 mg by mouth every 8 (eight) hours as needed for anxiety (moderate anxiety) 10/13/21 Pt reports is taking 1.5 mg TID as needed)      melatonin 3 mg Take 1  "tablet by mouth daily at bedtime       meloxicam (MOBIC) 15 mg tablet Take 1 tablet (15 mg total) by mouth daily 30 tablet 0    methocarbamol (ROBAXIN) 500 mg tablet Take 500 mg by mouth 3 (three) times a day      multivitamin (THERAGRAN) TABS Take 1 tablet by mouth daily      Pancrelipase, Lip-Prot-Amyl, (CREON) 12457 units CPEP Take 1 capsule by mouth 3 (three) times a day Take up to 10/20/21 with eating.      pantoprazole (PROTONIX) 40 mg tablet Take 40 mg by mouth 2 (two) times a day      prazosin (MINIPRESS) 5 mg capsule Take 1 mg by mouth daily at bedtime Takes at night before bed.      sucralfate (CARAFATE) 1 g/10 mL suspension Take 1 g by mouth 4 (four) times a day      thiamine 100 MG tablet Take 1 tablet by mouth daily 30 tablet 0    topiramate (TOPAMAX) 100 mg tablet Take 200 mg by mouth 2 (two) times a day       topiramate (TOPAMAX) 200 MG tablet Take 200 mg by mouth daily at bedtime       zinc gluconate 50 mg tablet Take 50 mg by mouth daily       No current facility-administered medications on file prior to visit.      Social History     Tobacco Use    Smoking status: Every Day     Current packs/day: 1.00     Average packs/day: 1 pack/day for 10.0 years (10.0 ttl pk-yrs)     Types: Cigarettes    Smokeless tobacco: Never   Vaping Use    Vaping status: Never Used   Substance and Sexual Activity    Alcohol use: Yes    Drug use: Yes     Types: Marijuana, Cocaine    Sexual activity: Not Currently     Birth control/protection: None         Objective   /80 (BP Location: Right arm, Patient Position: Sitting, Cuff Size: Adult)   Pulse 73   Temp 97.9 °F (36.6 °C) (Tympanic)   Ht 5' 6\" (1.676 m)   Wt 83.6 kg (184 lb 6.4 oz)   SpO2 97%   BMI 29.76 kg/m²     Physical Exam  General appearance: normal appearing, no acute distress  Skin: normal, dry skin of both hands  HEENT: normal, moist oropharynx, no nasal or oral ulcers  Lymph nodes: no palpable adenopathy  Lungs: normal respiratory effort, " comfortable on room air, lungs clear to auscultation b/l   Heart: normal heart sounds, normal rate, normal rhythm,  Abdomen: soft, normal bowel sounds, no tenderness  Neurologic: no obvious neurological deficits   Extremities: no edema, warm and well perfused     Musculoskeletal Exam:   - Observation: no obvious joint abnormalities    - Palpation: no joint tenderness  - Synovitis: absent  - Joint effusions: absent  - ROM: intact throughout  - Muscle Strength: 5/5 throughout       Recent labs:  Lab Results   Component Value Date/Time    SODIUM 138 10/15/2024 01:05 PM    K 3.8 10/15/2024 01:05 PM    BUN 9 10/15/2024 01:05 PM    CREATININE 0.58 10/15/2024 01:05 PM    GLUC 97 10/15/2024 01:05 PM    CALCIUM 9.6 10/15/2024 01:05 PM    AST 16 10/15/2024 01:05 PM    ALT 21 10/15/2024 01:05 PM    ALB 4.4 10/15/2024 01:05 PM    TP 7.4 10/15/2024 01:05 PM    EGFR 122 10/15/2024 01:05 PM    EGFR 124 01/05/2021 02:55 AM     Lab Results   Component Value Date/Time    HGB 14.3 09/03/2023 02:45 PM    HGB 10.6 04/05/2017 10:03 AM    WBC 9.36 09/03/2023 02:45 PM    WBC 6.80 01/07/2015 08:29 AM     09/03/2023 02:45 PM     01/07/2015 08:29 AM    INR 1.0 06/05/2021 04:59 PM    PTT 31 10/04/2020 07:27 PM     Lab Results   Component Value Date/Time    LJR3QZDTVNSH 0.934 09/03/2023 02:45 PM     ANCA negative  RF 20  CCP negative   myositis panel negative  SSA negative  SSB negative  dsDNA negative  Veliz/RNP negative  Veliz negative  SCL 70 negative  QUINTON negative    I have personally reviewed notes, labs, and imaging available in the chart.     Justyna Camacho DO, CCD, [facility]  [facility]

## 2025-04-14 ENCOUNTER — HOSPITAL ENCOUNTER (EMERGENCY)
Facility: HOSPITAL | Age: 35
Discharge: HOME | End: 2025-04-15
Attending: EMERGENCY MEDICINE
Payer: COMMERCIAL

## 2025-04-14 ENCOUNTER — APPOINTMENT (EMERGENCY)
Dept: CT IMAGING | Facility: HOSPITAL | Age: 35
End: 2025-04-14
Payer: COMMERCIAL

## 2025-04-14 DIAGNOSIS — F10.930 ALCOHOL WITHDRAWAL SYNDROME WITHOUT COMPLICATION (HCC): ICD-10-CM

## 2025-04-14 DIAGNOSIS — F10.10 ALCOHOL ABUSE: Primary | ICD-10-CM

## 2025-04-14 LAB
ALBUMIN SERPL BCG-MCNC: 4.3 G/DL (ref 3.5–5)
ALP SERPL-CCNC: 65 U/L (ref 34–104)
ALT SERPL W P-5'-P-CCNC: 21 U/L (ref 7–52)
AMPHETAMINES SERPL QL SCN: NEGATIVE
ANION GAP SERPL CALCULATED.3IONS-SCNC: 10 MMOL/L (ref 4–13)
APAP SERPL-MCNC: <2 UG/ML (ref 10–20)
AST SERPL W P-5'-P-CCNC: 23 U/L (ref 13–39)
ATRIAL RATE: 81 BPM
B-HCG SERPL-ACNC: <0.6 MIU/ML (ref 0–5)
BARBITURATES UR QL: POSITIVE
BASOPHILS # BLD AUTO: 0.02 THOUSANDS/ÂΜL (ref 0–0.1)
BASOPHILS NFR BLD AUTO: 0 % (ref 0–1)
BENZODIAZ UR QL: NEGATIVE
BILIRUB SERPL-MCNC: 0.2 MG/DL (ref 0.2–1)
BUN SERPL-MCNC: 9 MG/DL (ref 5–25)
CALCIUM SERPL-MCNC: 8.6 MG/DL (ref 8.4–10.2)
CHLORIDE SERPL-SCNC: 109 MMOL/L (ref 96–108)
CO2 SERPL-SCNC: 25 MMOL/L (ref 21–32)
COCAINE UR QL: NEGATIVE
CREAT SERPL-MCNC: 0.49 MG/DL (ref 0.6–1.3)
EOSINOPHIL # BLD AUTO: 0.06 THOUSAND/ÂΜL (ref 0–0.61)
EOSINOPHIL NFR BLD AUTO: 1 % (ref 0–6)
ERYTHROCYTE [DISTWIDTH] IN BLOOD BY AUTOMATED COUNT: 13.7 % (ref 11.6–15.1)
ETHANOL SERPL-MCNC: 244 MG/DL
FENTANYL UR QL SCN: NEGATIVE
GFR SERPL CREATININE-BSD FRML MDRD: 128 ML/MIN/1.73SQ M
GLUCOSE SERPL-MCNC: 95 MG/DL (ref 65–140)
HCT VFR BLD AUTO: 36.5 % (ref 34.8–46.1)
HGB BLD-MCNC: 11.6 G/DL (ref 11.5–15.4)
HYDROCODONE UR QL SCN: NEGATIVE
IMM GRANULOCYTES # BLD AUTO: 0.02 THOUSAND/UL (ref 0–0.2)
IMM GRANULOCYTES NFR BLD AUTO: 0 % (ref 0–2)
LIPASE SERPL-CCNC: <6 U/L (ref 11–82)
LYMPHOCYTES # BLD AUTO: 2.27 THOUSANDS/ÂΜL (ref 0.6–4.47)
LYMPHOCYTES NFR BLD AUTO: 39 % (ref 14–44)
MAGNESIUM SERPL-MCNC: 2 MG/DL (ref 1.9–2.7)
MCH RBC QN AUTO: 27.5 PG (ref 26.8–34.3)
MCHC RBC AUTO-ENTMCNC: 31.8 G/DL (ref 31.4–37.4)
MCV RBC AUTO: 87 FL (ref 82–98)
METHADONE UR QL: NEGATIVE
MONOCYTES # BLD AUTO: 0.36 THOUSAND/ÂΜL (ref 0.17–1.22)
MONOCYTES NFR BLD AUTO: 6 % (ref 4–12)
NEUTROPHILS # BLD AUTO: 3.11 THOUSANDS/ÂΜL (ref 1.85–7.62)
NEUTS SEG NFR BLD AUTO: 54 % (ref 43–75)
NRBC BLD AUTO-RTO: 0 /100 WBCS
OPIATES UR QL SCN: NEGATIVE
OXYCODONE+OXYMORPHONE UR QL SCN: NEGATIVE
P AXIS: 33 DEGREES
PCP UR QL: NEGATIVE
PLATELET # BLD AUTO: 173 THOUSANDS/UL (ref 149–390)
PMV BLD AUTO: 10.3 FL (ref 8.9–12.7)
POTASSIUM SERPL-SCNC: 3.3 MMOL/L (ref 3.5–5.3)
PR INTERVAL: 198 MS
PROT SERPL-MCNC: 6.7 G/DL (ref 6.4–8.4)
QRS AXIS: 11 DEGREES
QRSD INTERVAL: 92 MS
QT INTERVAL: 394 MS
QTC INTERVAL: 458 MS
RBC # BLD AUTO: 4.22 MILLION/UL (ref 3.81–5.12)
SALICYLATES SERPL-MCNC: <5 MG/DL (ref 3–20)
SODIUM SERPL-SCNC: 144 MMOL/L (ref 135–147)
T WAVE AXIS: 47 DEGREES
THC UR QL: NEGATIVE
VENTRICULAR RATE: 81 BPM
WBC # BLD AUTO: 5.84 THOUSAND/UL (ref 4.31–10.16)

## 2025-04-14 PROCEDURE — 84702 CHORIONIC GONADOTROPIN TEST: CPT

## 2025-04-14 PROCEDURE — 93005 ELECTROCARDIOGRAM TRACING: CPT

## 2025-04-14 PROCEDURE — 99285 EMERGENCY DEPT VISIT HI MDM: CPT

## 2025-04-14 PROCEDURE — 83690 ASSAY OF LIPASE: CPT

## 2025-04-14 PROCEDURE — 96361 HYDRATE IV INFUSION ADD-ON: CPT

## 2025-04-14 PROCEDURE — 96374 THER/PROPH/DIAG INJ IV PUSH: CPT

## 2025-04-14 PROCEDURE — 80179 DRUG ASSAY SALICYLATE: CPT

## 2025-04-14 PROCEDURE — 99284 EMERGENCY DEPT VISIT MOD MDM: CPT

## 2025-04-14 PROCEDURE — 93010 ELECTROCARDIOGRAM REPORT: CPT

## 2025-04-14 PROCEDURE — 70450 CT HEAD/BRAIN W/O DYE: CPT

## 2025-04-14 PROCEDURE — 80143 DRUG ASSAY ACETAMINOPHEN: CPT

## 2025-04-14 PROCEDURE — 80053 COMPREHEN METABOLIC PANEL: CPT

## 2025-04-14 PROCEDURE — 82077 ASSAY SPEC XCP UR&BREATH IA: CPT

## 2025-04-14 PROCEDURE — 96375 TX/PRO/DX INJ NEW DRUG ADDON: CPT

## 2025-04-14 PROCEDURE — 85025 COMPLETE CBC W/AUTO DIFF WBC: CPT

## 2025-04-14 PROCEDURE — 83735 ASSAY OF MAGNESIUM: CPT

## 2025-04-14 PROCEDURE — 80307 DRUG TEST PRSMV CHEM ANLYZR: CPT

## 2025-04-14 PROCEDURE — 36415 COLL VENOUS BLD VENIPUNCTURE: CPT

## 2025-04-14 RX ORDER — KETOROLAC TROMETHAMINE 30 MG/ML
15 INJECTION, SOLUTION INTRAMUSCULAR; INTRAVENOUS ONCE
Status: COMPLETED | OUTPATIENT
Start: 2025-04-14 | End: 2025-04-14

## 2025-04-14 RX ORDER — ONDANSETRON 4 MG/1
4 TABLET, ORALLY DISINTEGRATING ORAL ONCE
Status: COMPLETED | OUTPATIENT
Start: 2025-04-14 | End: 2025-04-14

## 2025-04-14 RX ORDER — PHENOBARBITAL SODIUM 130 MG/ML
130 INJECTION, SOLUTION INTRAMUSCULAR; INTRAVENOUS ONCE
Status: COMPLETED | OUTPATIENT
Start: 2025-04-14 | End: 2025-04-14

## 2025-04-14 RX ORDER — LORAZEPAM 2 MG/ML
1 INJECTION INTRAMUSCULAR ONCE
Status: COMPLETED | OUTPATIENT
Start: 2025-04-14 | End: 2025-04-14

## 2025-04-14 RX ADMIN — LORAZEPAM 1 MG: 2 INJECTION INTRAMUSCULAR; INTRAVENOUS at 21:38

## 2025-04-14 RX ADMIN — PHENOBARBITAL SODIUM 130 MG: 130 INJECTION INTRAMUSCULAR; INTRAVENOUS at 11:16

## 2025-04-14 RX ADMIN — SODIUM CHLORIDE 1000 ML: 0.9 INJECTION, SOLUTION INTRAVENOUS at 11:06

## 2025-04-14 RX ADMIN — ONDANSETRON 4 MG: 4 TABLET, ORALLY DISINTEGRATING ORAL at 19:10

## 2025-04-14 RX ADMIN — KETOROLAC TROMETHAMINE 15 MG: 30 INJECTION, SOLUTION INTRAMUSCULAR at 21:38

## 2025-04-14 NOTE — ED NOTES
HOST called and requested that when the patient is alert enough for an evaluation, to call them at [phone]. Pt currently arousable, but falls asleep within seconds.     Elza Moreland RN  04/14/25 [id number]

## 2025-04-14 NOTE — ED PROVIDER NOTES
"Time reflects when diagnosis was documented in both MDM as applicable and the Disposition within this note       Time User Action Codes Description Comment    4/14/2025 12:07 PM Carroll Ceja Add [F10.10] Alcohol abuse           ED Disposition       None          Assessment & Plan       Medical Decision Making  Patient is a 34-year-old female coming in for potential alcohol withdrawal seizures versus intoxication.  Patient is intoxicated here, but workup was overall normal.  Did talk to detox, who do not believe that she is having alcohol withdrawal seizures due to her history, as well as being in the [facility] emergency department for similar symptoms recently, with no sign of significant withdrawal.  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  Patient pending host/rehab placement.  Signed out to colleague pending placement    Amount and/or Complexity of Data Reviewed  Labs: ordered. Decision-making details documented in ED Course.  Radiology: ordered. Decision-making details documented in ED Course.    Risk  Prescription drug management.        ED Course as of 04/14/25 2039 Mon Apr 14, 2025   1129 ETHANOL(!): 244   1208 Spoke to Danita Franco PA-C, who states \"[facility] note wrote that she relapsed on alcohol on Friday after being sober for 2 years. Looks like she hx of pseudoseizures and had a similar episode during that ED encounter.  Was monitored for 48 hours on CIWA with discharge yesterday, I think this can just be host.\"    Dr. Brannon Knight, toxicology, who agrees with this recommendation   1232 CT head without contrast  No acute intracranial abnormality.       Medications   sodium chloride 0.9 % bolus 1,000 mL (1,000 mL Intravenous New Bag 4/14/25 1106)   PHENobarbital injection 130 mg (130 mg Intravenous Given 4/14/25 1116)       ED Risk Strat Scores                    No data recorded        SBIRT 22yo+      Flowsheet Row Most Recent Value   Initial Alcohol Screen: US AUDIT-C  " "   1. How often do you have a drink containing alcohol? 6 Filed at: 04/14/2025 1107   2. How many drinks containing alcohol do you have on a typical day you are drinking?  6 Filed at: 04/14/2025 1107   3a. Male UNDER 65: How often do you have five or more drinks on one occasion? 0 Filed at: 04/14/2025 1107   3b. FEMALE Any Age, or MALE 65+: How often do you have 4 or more drinks on one occassion? 6 Filed at: 04/14/2025 1107   Audit-C Score 18 Filed at: 04/14/2025 1107   Full Alcohol Screen: US AUDIT    4. How often during the last year have you found that you were not able to stop drinking once you had started? 4 Filed at: 04/14/2025 1107   5. How often during past year have you failed to do what was normally expected of you because of drinking?  4 Filed at: 04/14/2025 1107   6. How often in past year have you needed a first drink in the morning to get yourself going after a heavy drinking session?  4 Filed at: 04/14/2025 1107   7. How often in past year have you had feeling of guilt or remorse after drinking?  4 Filed at: 04/14/2025 1107   8. How often in past year have you been unable to remember what happened night before because you had been drinking?  0 Filed at: 04/14/2025 1107   9. Have you or someone else been injured as a result of your drinking?  0 Filed at: 04/14/2025 1107   10. Has a relative, friend, doctor or other health worker been concerned about your drinking and suggested you cut down?  4 Filed at: 04/14/2025 1107   AUDIT Total Score 38 Filed at: 04/14/2025 1107   CLIFFORD: How many times in the past year have you...    Used an illegal drug or used a prescription medication for non-medical reasons? Never Filed at: 04/14/2025 1107                            History of Present Illness       Chief Complaint   Patient presents with    Alcohol Intoxication     Pts father was bringing her in to ER for \"detox\" but pt allegedly had a seizure on the way here so called 911.  Pt arrives awake but appearing " extremely intoxicated        Past Medical History:   Diagnosis Date    Alcohol abuse     10/13/21 Pt reports resolved for approx 8 months    Alcohol abuse     Anxiety     Asthma     Bipolar disorder (HCC)     BOOP (bronchiolitis obliterans with organizing pneumonia) (HCC)     10/13/21 Pt reports dx of BOOP    Chronic pain disorder 10/4/2020    Depression     Fibromyalgia, primary     Liver disease     10/13/21 Pt reports noted scarring in liver - nothing new.     Lymphedema     Pt reports lymphedema in feet /legs     Pancreatitis     Panic disorder 10/11/2016    Psychiatric disorder     PTSD (post-traumatic stress disorder)     Seizures (HCC)     10/13/21 Pt reports last reported seizure - in 2021- no seizures since then     Self-injurious behavior       Past Surgical History:   Procedure Laterality Date    ALVEOLOPLASTY IN CONJUNCTION WITH EXTRACTIONS - 4 OR MORE TEETH OR TOOTH SPACES, PER QUADRANT N/A 10/21/2021    Procedure: ALVEOPLASTY 4 QUADRANTS;  Surgeon: Biju White DMD;  Location: [facility];  Service: Maxillofacial    ESOPHAGOGASTRODUODENOSCOPY N/A 4/10/2017    Procedure: ESOPHAGOGASTRODUODENOSCOPY (EGD);  Surgeon: Josue Ng MD;  Location: [facility];  Service:     EXTRACTION, ERUPTED TOOTH REQUIRING REMOVAL OF BONE AND/OR SECTIONING OF TOOTH, AND INCLUDING ELEVATION OF MUCOPERIOSTEAL FLAP IF INDICATED N/A 10/21/2021    Procedure: EXTRACTION TEETH 2,3,4,5,6,7,8,9,10,11,12,13,14,15,16,18,19,20,21,22,23,24,25,26,27,28,29,30,31;  Surgeon: Biju White DMD;  Location: [facility];  Service: Maxillofacial    INDUCED       WISDOM TOOTH EXTRACTION        Family History   Problem Relation Age of Onset    Lupus Father     Coronary artery disease Father     Stroke Father       Social History     Tobacco Use    Smoking status: Every Day     Current packs/day: 1.00     Average packs/day: 1 pack/day for 10.0 years (10.0 ttl pk-yrs)     Types: Cigarettes    Smokeless tobacco: Never   Vaping Use     Vaping status: Never Used   Substance Use Topics    Alcohol use: Yes    Drug use: Yes     Types: Marijuana, Cocaine      E-Cigarette/Vaping    E-Cigarette Use Never User       E-Cigarette/Vaping Substances      I have reviewed and agree with the history as documented.     Patient is a 34-year-old female coming in requesting detox from alcohol.  Per father, patient reportedly had a seizure before coming in.  Reports that she was seen at [facility], approximately 3 days ago, discharged last night, as it was taking too long to find a rehab bed.  Patient reports that she relapsed on alcohol approximately 8 days ago, has been drinking multiple beers per day since then.  Patient at this time appears to be inebriated, but no sign of trauma, has no other complaints currently      Alcohol Intoxication      Review of Systems   Unable to perform ROS: Mental status change (Intoxicated)           Objective       ED Triage Vitals   Temperature Pulse Blood Pressure Respirations SpO2 Patient Position - Orthostatic VS   04/14/25 1014 04/14/25 1014 04/14/25 1014 04/14/25 1014 04/14/25 1014 04/14/25 1014   98 °F (36.7 °C) 96 102/56 16 93 % Lying      Temp src Heart Rate Source BP Location FiO2 (%) Pain Score    -- 04/14/25 1014 04/14/25 1014 -- 04/14/25 1729     Monitor Left arm  No Pain      Vitals      Date and Time Temp Pulse SpO2 Resp BP Pain Score FACES Pain Rating User   04/14/25 1958 -- 74 92 % 16 127/74 -- -- MN   04/14/25 1856 -- 77 91 % 18 133/72 -- -- MN   04/14/25 1729 -- -- -- -- -- No Pain -- RS   04/14/25 1727 -- 76 93 % 12 118/69 -- -- RS   04/14/25 1549 -- 74 95 % 13 114/74 -- -- RS   04/14/25 1418 -- 74 95 % 12 108/59 -- -- TB   04/14/25 1233 -- 74 93 % 16 105/59 -- -- JN   04/14/25 1133 -- 76 94 % 16 112/60 -- -- RS   04/14/25 1014 98 °F (36.7 °C) 96 93 % 16 102/56 -- -- TB            Physical Exam  Vitals reviewed.   Constitutional:       Appearance: Normal appearance. She is normal weight.   HENT:      Head:  Normocephalic and atraumatic.      Right Ear: External ear normal.      Left Ear: External ear normal.      Nose: Nose normal.   Eyes:      Conjunctiva/sclera: Conjunctivae normal.   Cardiovascular:      Rate and Rhythm: Normal rate.   Pulmonary:      Effort: Pulmonary effort is normal.   Abdominal:      Palpations: Abdomen is soft.      Tenderness: There is no abdominal tenderness. There is no guarding.   Musculoskeletal:         General: Normal range of motion.      Cervical back: Normal range of motion.   Skin:     General: Skin is warm and dry.   Neurological:      Mental Status: She is alert.         Results Reviewed       Procedure Component Value Units Date/Time    Salicylate level [[id number]]  (Normal) Collected: 04/14/25 1105    Lab Status: Final result Specimen: Blood from Arm, Left Updated: 04/14/25 1321     Salicylate Lvl <5 mg/dL     Acetaminophen level-If concentration is detectable, please discuss with medical  on call. [[id number]]  (Abnormal) Collected: 04/14/25 1105    Lab Status: Final result Specimen: Blood from Arm, Left Updated: 04/14/25 1321     Acetaminophen Level <2 ug/mL     hCG, quantitative [[id number]]  (Normal) Collected: 04/14/25 1105    Lab Status: Final result Specimen: Blood from Arm, Left Updated: 04/14/25 1146     HCG, Quant <0.6 mIU/mL     Narrative:       Expected Ranges:    HCG results between 5.0 and 25.0 mIU/mL may be indicative of early pregnancy but should be interpreted in light of the total clinical presentation.    HCG can rise to detectable levels in pal and post menopausal women (0-11.6 mIU/mL).     Approximate               Approximate HCG  Gestation age          Concentration ( mIU/mL)  _____________          ______________________   Weeks                      HCG values  0.2-1                       5-50  1-2                           2-3                         100-5000  3-4                         500-21966  4-5                          1000-09211  5-6                         [id number]  6-8                         80140-991752  8-12                        [id number]      Magnesium [[id number]]  (Normal) Collected: 04/14/25 1105    Lab Status: Final result Specimen: Blood from Arm, Left Updated: 04/14/25 1133     Magnesium 2.0 mg/dL     Comprehensive metabolic panel [[id number]]  (Abnormal) Collected: 04/14/25 1105    Lab Status: Final result Specimen: Blood from Arm, Left Updated: 04/14/25 1133     Sodium 144 mmol/L      Potassium 3.3 mmol/L      Chloride 109 mmol/L      CO2 25 mmol/L      ANION GAP 10 mmol/L      BUN 9 mg/dL      Creatinine 0.49 mg/dL      Glucose 95 mg/dL      Calcium 8.6 mg/dL      AST 23 U/L      ALT 21 U/L      Alkaline Phosphatase 65 U/L      Total Protein 6.7 g/dL      Albumin 4.3 g/dL      Total Bilirubin 0.20 mg/dL      eGFR 128 ml/min/1.73sq m     Narrative:      National Kidney Disease Foundation guidelines for Chronic Kidney Disease (CKD):     Stage 1 with normal or high GFR (GFR > 90 mL/min/1.73 square meters)    Stage 2 Mild CKD (GFR = 60-89 mL/min/1.73 square meters)    Stage 3A Moderate CKD (GFR = 45-59 mL/min/1.73 square meters)    Stage 3B Moderate CKD (GFR = 30-44 mL/min/1.73 square meters)    Stage 4 Severe CKD (GFR = 15-29 mL/min/1.73 square meters)    Stage 5 End Stage CKD (GFR <15 mL/min/1.73 square meters)  Note: GFR calculation is accurate only with a steady state creatinine    Lipase [[id number]]  (Abnormal) Collected: 04/14/25 1105    Lab Status: Final result Specimen: Blood from Arm, Left Updated: 04/14/25 1133     Lipase <6 u/L     Ethanol [[id number]]  (Abnormal) Collected: 04/14/25 1105    Lab Status: Final result Specimen: Blood from Arm, Left Updated: 04/14/25 1129     Ethanol Lvl 244 mg/dL     CBC and differential [[id number]] Collected: 04/14/25 1105    Lab Status: Final result Specimen: Blood from Arm, Left Updated: 04/14/25 1115     WBC 5.84 Thousand/uL      RBC 4.22 Million/uL      Hemoglobin  11.6 g/dL      Hematocrit 36.5 %      MCV 87 fL      MCH 27.5 pg      MCHC 31.8 g/dL      RDW 13.7 %      MPV 10.3 fL      Platelets 173 Thousands/uL      nRBC 0 /100 WBCs      Segmented % 54 %      Immature Grans % 0 %      Lymphocytes % 39 %      Monocytes % 6 %      Eosinophils Relative 1 %      Basophils Relative 0 %      Absolute Neutrophils 3.11 Thousands/µL      Absolute Immature Grans 0.02 Thousand/uL      Absolute Lymphocytes 2.27 Thousands/µL      Absolute Monocytes 0.36 Thousand/µL      Eosinophils Absolute 0.06 Thousand/µL      Basophils Absolute 0.02 Thousands/µL     Rapid drug screen, urine [[id number]]     Lab Status: No result Specimen: Urine             CT head without contrast   Final Interpretation by Umer Chapman MD (04/14 1230)      No acute intracranial abnormality.                  Workstation performed: [id number]             Procedures    ED Medication and Procedure Management   Prior to Admission Medications   Prescriptions Last Dose Informant Patient Reported? Taking?   LORazepam (ATIVAN) 1 mg tablet  Self No No   Sig: Take 0.5 tablets by mouth every 8 (eight) hours as needed for anxiety (moderate anxiety) for up to 10 doses   Patient taking differently: Take 0.5 mg by mouth every 8 (eight) hours as needed for anxiety (moderate anxiety) 10/13/21 Pt reports is taking 1.5 mg TID as needed   Pancrelipase, Lip-Prot-Amyl, (CREON) 40828 units CPEP  Self Yes No   Sig: Take 1 capsule by mouth 3 (three) times a day Take up to 10/20/21 with eating.   acetaminophen (TYLENOL) 325 mg tablet  Self No No   Sig: Take 2 tablets (650 mg total) by mouth every 6 (six) hours as needed for mild pain or headaches   albuterol (PROVENTIL HFA,VENTOLIN HFA) 90 mcg/act inhaler  Self No No   Sig: Inhale 2 puffs every 4 (four) hours as needed for wheezing   azaTHIOprine (IMURAN) 50 mg tablet   Yes No   Sig: Take 50 mg by mouth daily Takes daily   budesonide-formoterol (SYMBICORT) 80-4.5 MCG/ACT inhaler   Self No No   Sig: Inhale 2 puffs 2 (two) times a day Rinse mouth after use.   buprenorphine-naloxone (SUBOXONE) 8-2 mg per SL tablet  Self Yes No   Sig: Place 1 tablet under the tongue 3 (three) times a day   busPIRone (BUSPAR) 10 mg tablet   Yes No   Sig: Take 10 mg by mouth 3 (three) times a day with meals   citalopram (CeleXA) 40 mg tablet  Self Yes No   Sig: Take 40 mg by mouth daily Takes in the am   clonazePAM (KlonoPIN) 0.5 mg tablet   No No   Sig: Take 1 tablet (0.5 mg total) by mouth 2 (two) times a day as needed for anxiety for up to 10 days   diclofenac (VOLTAREN) 75 mg EC tablet   Yes No   Sig: Take 75 mg by mouth 2 (two) times a day   folic acid (FOLVITE) 1 mg tablet  Self Yes No   Sig: Take 1 mg by mouth daily Take up to 10/20/21 prior to surgery.   gabapentin (NEURONTIN) 400 mg capsule  Self No No   Sig: Take 1 capsule (400 mg total) by mouth 3 (three) times a day   ibuprofen (MOTRIN) 600 mg tablet   No No   Sig: Take 1 tablet (600 mg total) by mouth every 6 (six) hours as needed for mild pain   lidocaine (LIDODERM) 5 %  Self No No   Sig: Apply 1 patch topically daily Remove & Discard patch within 12 hours or as directed by MD   melatonin 3 mg  Self Yes No   Sig: Take 1 tablet by mouth daily at bedtime    meloxicam (MOBIC) 15 mg tablet  Self No No   Sig: Take 1 tablet (15 mg total) by mouth daily   methocarbamol (ROBAXIN) 500 mg tablet  Self Yes No   Sig: Take 500 mg by mouth 3 (three) times a day   multivitamin (THERAGRAN) TABS  Self Yes No   Sig: Take 1 tablet by mouth daily   pantoprazole (PROTONIX) 40 mg tablet  Self Yes No   Sig: Take 40 mg by mouth 2 (two) times a day   prazosin (MINIPRESS) 5 mg capsule  Self Yes No   Sig: Take 1 mg by mouth daily at bedtime Takes at night before bed.   sucralfate (CARAFATE) 1 g/10 mL suspension  Self Yes No   Sig: Take 1 g by mouth 4 (four) times a day   thiamine 100 MG tablet  Self No No   Sig: Take 1 tablet by mouth daily   topiramate (TOPAMAX) 100 mg tablet   Self Yes No   Sig: Take 200 mg by mouth 2 (two) times a day    topiramate (TOPAMAX) 200 MG tablet  Self Yes No   Sig: Take 200 mg by mouth daily at bedtime    zinc gluconate 50 mg tablet  Self Yes No   Sig: Take 50 mg by mouth daily      Facility-Administered Medications: None     Patient's Medications   Discharge Prescriptions    No medications on file     No discharge procedures on file.  ED SEPSIS DOCUMENTATION   Time reflects when diagnosis was documented in both MDM as applicable and the Disposition within this note       Time User Action Codes Description Comment    4/14/2025 12:07 PM Carroll Ceja Add [F10.10] Alcohol abuse                  Carroll Ceja PA-C  04/14/25 2039

## 2025-04-14 NOTE — CERTIFIED RECOVERY SPECIALIST
Certified  Note    Patient name: Marilia Arriaga  Location: ED 06/ED 06  [place]: [facility]  Attending:  Angel Kate MD MRN [id number]  : 1990  Age: 34 y.o.    Sex: female Date 2025         Substance Use History:     Social History     Substance and Sexual Activity   Alcohol Use Yes        Social History     Substance and Sexual Activity   Drug Use Yes    Types: Marijuana, Cocaine     CRS attempted to engage, patient resting comfortably.     CRS SW with RN, referral has been made to HOST. Lexy Knight

## 2025-04-14 NOTE — ED NOTES
Father updated with HOST process. Father agreeable and appreciative. Father expressed concern that pt is not discharged before she has placement, as she was discharged with a HOST referral from [facility] and immediately began drinking again when she got home.     Elza Moreland RN  04/14/25 2588

## 2025-04-14 NOTE — ED NOTES
Attempted to call father with an update about HOST. No answer, left voicemail.     Elza Moreland RN  04/14/25 0011

## 2025-04-14 NOTE — ED NOTES
Father Ministerio requested that he be updated when the pt goes to the IP floor. His number is up to date in the chart.     Elza Moreland RN  04/14/25 1504

## 2025-04-14 NOTE — DISCHARGE INSTR - OTHER ORDERS
Tex Knight   Certified     [facility]  [place], [place] [facility]  [phone]  Monday-Friday 6:45am-3:15pm[facility]   [address]   [place] [address]  [phone]    [facility]  [address]   [facility] [address]  [phone]    [place]  [address] [address]  [phone] ext [phone]    [facility] [phone]   [place] [phone]  [place] [phone]  [place] [phone]   [phone]  [place] [phone]     [facility]  [address]  [place] [address]  [phone]    [place]   [phone]    [facility] [place] [facility]  [address] [place]  [address]  [phone]    [facility]   [address] [facility] [address]  [phone]    [facility]   [address] [facility] [address]  [phone]    [place] [facility]  [address] [facility] [place] [address]  [phone]    [facility]   [address] [place] [address] [place] [address]  [phone]

## 2025-04-15 VITALS
TEMPERATURE: 97.9 F | HEART RATE: 64 BPM | BODY MASS INDEX: 22.92 KG/M2 | SYSTOLIC BLOOD PRESSURE: 111 MMHG | DIASTOLIC BLOOD PRESSURE: 75 MMHG | WEIGHT: 142 LBS | RESPIRATION RATE: 16 BRPM | OXYGEN SATURATION: 95 %

## 2025-04-15 PROCEDURE — 96372 THER/PROPH/DIAG INJ SC/IM: CPT

## 2025-04-15 RX ORDER — ONDANSETRON 4 MG/1
4 TABLET, ORALLY DISINTEGRATING ORAL ONCE
Status: COMPLETED | OUTPATIENT
Start: 2025-04-15 | End: 2025-04-15

## 2025-04-15 RX ORDER — RISPERIDONE 1 MG/1
2 TABLET ORAL
Status: DISCONTINUED | OUTPATIENT
Start: 2025-04-15 | End: 2025-04-16 | Stop reason: HOSPADM

## 2025-04-15 RX ORDER — ACETAMINOPHEN 325 MG/1
650 TABLET ORAL ONCE
Status: COMPLETED | OUTPATIENT
Start: 2025-04-15 | End: 2025-04-15

## 2025-04-15 RX ORDER — DIPHENHYDRAMINE HYDROCHLORIDE 50 MG/ML
25 INJECTION, SOLUTION INTRAMUSCULAR; INTRAVENOUS ONCE
Status: DISCONTINUED | OUTPATIENT
Start: 2025-04-15 | End: 2025-04-15

## 2025-04-15 RX ORDER — KETOROLAC TROMETHAMINE 30 MG/ML
15 INJECTION, SOLUTION INTRAMUSCULAR; INTRAVENOUS ONCE
Status: DISCONTINUED | OUTPATIENT
Start: 2025-04-15 | End: 2025-04-15

## 2025-04-15 RX ORDER — CLONIDINE HYDROCHLORIDE 0.1 MG/1
0.2 TABLET ORAL ONCE
Status: COMPLETED | OUTPATIENT
Start: 2025-04-15 | End: 2025-04-15

## 2025-04-15 RX ORDER — PRAZOSIN HYDROCHLORIDE 1 MG/1
2 CAPSULE ORAL
Status: DISCONTINUED | OUTPATIENT
Start: 2025-04-15 | End: 2025-04-16 | Stop reason: HOSPADM

## 2025-04-15 RX ORDER — METOCLOPRAMIDE HYDROCHLORIDE 5 MG/ML
10 INJECTION INTRAMUSCULAR; INTRAVENOUS ONCE
Status: COMPLETED | OUTPATIENT
Start: 2025-04-15 | End: 2025-04-15

## 2025-04-15 RX ORDER — RISPERIDONE 1 MG/1
1 TABLET ORAL DAILY
Status: DISCONTINUED | OUTPATIENT
Start: 2025-04-16 | End: 2025-04-16 | Stop reason: HOSPADM

## 2025-04-15 RX ORDER — AZATHIOPRINE 50 MG/1
50 TABLET ORAL 2 TIMES DAILY
Status: DISCONTINUED | OUTPATIENT
Start: 2025-04-15 | End: 2025-04-16 | Stop reason: HOSPADM

## 2025-04-15 RX ORDER — DIPHENHYDRAMINE HYDROCHLORIDE 50 MG/ML
25 INJECTION, SOLUTION INTRAMUSCULAR; INTRAVENOUS ONCE
Status: COMPLETED | OUTPATIENT
Start: 2025-04-15 | End: 2025-04-15

## 2025-04-15 RX ORDER — TOPIRAMATE 100 MG/1
100 TABLET, FILM COATED ORAL 2 TIMES DAILY
Status: DISCONTINUED | OUTPATIENT
Start: 2025-04-15 | End: 2025-04-16 | Stop reason: HOSPADM

## 2025-04-15 RX ORDER — LORAZEPAM 1 MG/1
1 TABLET ORAL ONCE
Status: COMPLETED | OUTPATIENT
Start: 2025-04-15 | End: 2025-04-15

## 2025-04-15 RX ORDER — ACETAMINOPHEN 325 MG/1
975 TABLET ORAL ONCE
Status: COMPLETED | OUTPATIENT
Start: 2025-04-15 | End: 2025-04-15

## 2025-04-15 RX ORDER — METOCLOPRAMIDE 10 MG/1
5 TABLET ORAL ONCE
Status: COMPLETED | OUTPATIENT
Start: 2025-04-15 | End: 2025-04-15

## 2025-04-15 RX ORDER — BUPRENORPHINE AND NALOXONE 8; 2 MG/1; MG/1
8 FILM, SOLUBLE BUCCAL; SUBLINGUAL 2 TIMES DAILY
Status: DISCONTINUED | OUTPATIENT
Start: 2025-04-15 | End: 2025-04-16 | Stop reason: HOSPADM

## 2025-04-15 RX ORDER — KETOROLAC TROMETHAMINE 30 MG/ML
15 INJECTION, SOLUTION INTRAMUSCULAR; INTRAVENOUS ONCE
Status: COMPLETED | OUTPATIENT
Start: 2025-04-15 | End: 2025-04-15

## 2025-04-15 RX ORDER — METOCLOPRAMIDE HYDROCHLORIDE 5 MG/ML
10 INJECTION INTRAMUSCULAR; INTRAVENOUS ONCE
Status: DISCONTINUED | OUTPATIENT
Start: 2025-04-15 | End: 2025-04-15

## 2025-04-15 RX ADMIN — ACETAMINOPHEN 975 MG: 325 TABLET, FILM COATED ORAL at 09:50

## 2025-04-15 RX ADMIN — METOCLOPRAMIDE 10 MG: 5 INJECTION, SOLUTION INTRAMUSCULAR; INTRAVENOUS at 17:11

## 2025-04-15 RX ADMIN — LORAZEPAM 1 MG: 1 TABLET ORAL at 05:24

## 2025-04-15 RX ADMIN — AZATHIOPRINE 50 MG: 50 TABLET ORAL at 18:05

## 2025-04-15 RX ADMIN — RISPERIDONE 2 MG: 1 TABLET, FILM COATED ORAL at 22:08

## 2025-04-15 RX ADMIN — ONDANSETRON 4 MG: 4 TABLET, ORALLY DISINTEGRATING ORAL at 19:49

## 2025-04-15 RX ADMIN — CLONIDINE HYDROCHLORIDE 0.2 MG: 0.1 TABLET ORAL at 09:50

## 2025-04-15 RX ADMIN — ONDANSETRON 4 MG: 4 TABLET, ORALLY DISINTEGRATING ORAL at 02:29

## 2025-04-15 RX ADMIN — BUPRENORPHINE AND NALOXONE 8 MG: 8; 2 FILM BUCCAL; SUBLINGUAL at 20:00

## 2025-04-15 RX ADMIN — ACETAMINOPHEN 650 MG: 325 TABLET, FILM COATED ORAL at 02:29

## 2025-04-15 RX ADMIN — ONDANSETRON 4 MG: 4 TABLET, ORALLY DISINTEGRATING ORAL at 16:17

## 2025-04-15 RX ADMIN — PRAZOSIN HYDROCHLORIDE 2 MG: 1 CAPSULE ORAL at 22:08

## 2025-04-15 RX ADMIN — DIPHENHYDRAMINE HYDROCHLORIDE 25 MG: 50 INJECTION, SOLUTION INTRAMUSCULAR; INTRAVENOUS at 17:07

## 2025-04-15 RX ADMIN — TOPIRAMATE 100 MG: 100 TABLET, FILM COATED ORAL at 18:03

## 2025-04-15 RX ADMIN — KETOROLAC TROMETHAMINE 15 MG: 30 INJECTION, SOLUTION INTRAMUSCULAR; INTRAVENOUS at 17:09

## 2025-04-15 RX ADMIN — METOCLOPRAMIDE 5 MG: 10 TABLET ORAL at 05:24

## 2025-04-15 RX ADMIN — ACETAMINOPHEN 650 MG: 325 TABLET, FILM COATED ORAL at 19:49

## 2025-04-15 NOTE — ED NOTES
Pt ambulated to the bathroom with steady gait. Pt provided with warm blankets and a drink. Pt resting comfortably in bed.      Luma Mcgovern RN  04/14/25 9475

## 2025-04-15 NOTE — CERTIFIED RECOVERY SPECIALIST
Certified  Note    Patient name: Marilia Arriaga  Location: ED 06/ED 06  [place]: [facility]  Attending:  Angel Kate MD MRN [id number]  : 1990  Age: 34 y.o.    Sex: female Date 4/15/2025         Substance Use History:     Social History     Substance and Sexual Activity   Alcohol Use Yes        Social History     Substance and Sexual Activity   Drug Use Yes    Types: Marijuana, Cocaine     HOST working on waiting for bed     Lexy Knight

## 2025-04-16 NOTE — ED NOTES
Pt is discharged and will be following up with HOST from home.      Ayla Sahni, RN  04/15/25 7828

## (undated) DEVICE — 2000CC GUARDIAN II: Brand: GUARDIAN

## (undated) DEVICE — DENTAL BURR SIDE WHITE

## (undated) DEVICE — SUT CHROMIC 3-0 SH 27 IN G122H

## (undated) DEVICE — GLOVE SRG BIOGEL ORTHOPEDIC 7.5

## (undated) DEVICE — STERILE MANDIBLE PACK: Brand: CARDINAL HEALTH